# Patient Record
Sex: MALE | Race: WHITE | NOT HISPANIC OR LATINO | Employment: FULL TIME | URBAN - METROPOLITAN AREA
[De-identification: names, ages, dates, MRNs, and addresses within clinical notes are randomized per-mention and may not be internally consistent; named-entity substitution may affect disease eponyms.]

---

## 2019-01-28 LAB — HBA1C MFR BLD HPLC: 5.8 %

## 2019-02-25 ENCOUNTER — CONSULT (OUTPATIENT)
Dept: NEPHROLOGY | Facility: CLINIC | Age: 54
End: 2019-02-25
Payer: COMMERCIAL

## 2019-02-25 VITALS
BODY MASS INDEX: 31.81 KG/M2 | HEIGHT: 73 IN | SYSTOLIC BLOOD PRESSURE: 130 MMHG | WEIGHT: 240 LBS | DIASTOLIC BLOOD PRESSURE: 92 MMHG

## 2019-02-25 DIAGNOSIS — R80.1 PERSISTENT PROTEINURIA: ICD-10-CM

## 2019-02-25 DIAGNOSIS — N18.2 BENIGN HYPERTENSION WITH CKD (CHRONIC KIDNEY DISEASE), STAGE II: ICD-10-CM

## 2019-02-25 DIAGNOSIS — R79.89 ELEVATED SERUM CREATININE: ICD-10-CM

## 2019-02-25 DIAGNOSIS — E79.0 HYPERURICEMIA: ICD-10-CM

## 2019-02-25 DIAGNOSIS — I12.9 BENIGN HYPERTENSION WITH CKD (CHRONIC KIDNEY DISEASE), STAGE II: ICD-10-CM

## 2019-02-25 DIAGNOSIS — N18.2 STAGE 2 CHRONIC KIDNEY DISEASE: Primary | ICD-10-CM

## 2019-02-25 LAB
SL AMB  POCT GLUCOSE, UA: ABNORMAL
SL AMB LEUKOCYTE ESTERASE,UA: ABNORMAL
SL AMB POCT BILIRUBIN,UA: ABNORMAL
SL AMB POCT BLOOD,UA: ABNORMAL
SL AMB POCT KETONES,UA: ABNORMAL
SL AMB POCT NITRITE,UA: ABNORMAL
SL AMB POCT PH,UA: 6
SL AMB POCT SPECIFIC GRAVITY,UA: 1.03
SL AMB POCT URINE PROTEIN: ABNORMAL
SL AMB POCT UROBILINOGEN: 0.2

## 2019-02-25 PROCEDURE — 81002 URINALYSIS NONAUTO W/O SCOPE: CPT | Performed by: INTERNAL MEDICINE

## 2019-02-25 PROCEDURE — 99244 OFF/OP CNSLTJ NEW/EST MOD 40: CPT | Performed by: INTERNAL MEDICINE

## 2019-02-25 RX ORDER — ESZOPICLONE 3 MG/1
3 TABLET, FILM COATED ORAL
COMMUNITY
End: 2020-05-07 | Stop reason: SDUPTHER

## 2019-02-25 RX ORDER — IBUPROFEN 200 MG
200 TABLET ORAL 2 TIMES DAILY
COMMUNITY
End: 2020-05-07

## 2019-02-25 RX ORDER — AMLODIPINE BESYLATE 5 MG/1
5 TABLET ORAL DAILY
Qty: 30 TABLET | Refills: 5 | Status: SHIPPED | OUTPATIENT
Start: 2019-02-25 | End: 2020-05-07 | Stop reason: SDUPTHER

## 2019-02-25 RX ORDER — HYDROCHLOROTHIAZIDE 12.5 MG/1
12.5 TABLET ORAL
COMMUNITY
End: 2020-05-07 | Stop reason: ALTCHOICE

## 2019-02-25 RX ORDER — LISINOPRIL 20 MG/1
20 TABLET ORAL DAILY
COMMUNITY
End: 2020-05-07 | Stop reason: SDUPTHER

## 2019-02-25 RX ORDER — FLUOXETINE 20 MG/1
20 TABLET, FILM COATED ORAL DAILY
COMMUNITY
End: 2020-05-07 | Stop reason: SDUPTHER

## 2019-02-25 RX ORDER — OMEPRAZOLE 20 MG/1
20 CAPSULE, DELAYED RELEASE ORAL DAILY
COMMUNITY
End: 2020-05-07 | Stop reason: ALTCHOICE

## 2019-02-25 RX ORDER — MULTIVITAMIN
1 TABLET ORAL DAILY
COMMUNITY
End: 2021-10-27 | Stop reason: ALTCHOICE

## 2019-02-25 NOTE — PATIENT INSTRUCTIONS
-PLEASE DISCONTINUE IBUPROFEN/ADVIL AND AVOID ALL NSAIDS INCLUDING ADVIL, ALEVE, MOTRIN, IBUPROFEN, NAPROXEN, MELOXICAM ETC  -SALT RESTRICTED DIET  -START AMLODIPINE 5 MG ONE TABLET DAILY AND IF YOU HAVE LEG SWELLING, PLEASE CALL BACK  -CONTINUE TO CHECK BLOOD PRESSURE AT HOME REGULARLY AND CALL BACK IF BLOOD PRESSURE REMAINS GREATER THAN 140/90  -PLEASE BRING BLOOD PRESSURE MACHINE DURING NEXT OFFICE VISIT  -DISCONTINUE HYDROCHLOROTHIAZIDE AFTER DISCUSSING WITH PCP  -CHECK BLOOD AND URINE TEST IN ONE MONTH AND BEFORE NEXT OFFICE VISIT  -RECOMMEND TO CHANGE PRILOSEC/OMEPRAZOLE TO PEPCID OR ZANTAC      Low-Sodium Diet   WHAT YOU NEED TO KNOW:   A low-sodium diet limits foods that are high in sodium (salt)  You will need to follow a low-sodium diet if you have high blood pressure, kidney disease, or heart failure  You may also need to follow this diet if you have a condition that is causing your body to retain (hold) extra fluid  You may need to limit the amount of sodium you eat to 1,500 mg  Ask your healthcare provider how much sodium you can have each day  DISCHARGE INSTRUCTIONS:   How to use food labels to choose foods that are low in sodium:  Read food labels to find the amount of sodium they contain  The amount of sodium is listed in milligrams (mg)  The % Daily Value (DV) column tells you how much of your daily needs are met by 1 serving of the food for each nutrient listed  Choose foods that have less than 5% of the DV of sodium  These foods are considered low in sodium  Foods that have 20% or more of the DV of sodium are considered high in sodium  Some food labels may also list any of the following terms that tell you about the sodium content in the food:  · Sodium-free:  Less than 5 mg in each serving    · Very low sodium:  35 mg of sodium or less in each serving    · Low sodium:  140 mg of sodium or less in each serving    · Reduced sodium:   At least 25% less sodium in each serving than the regular type    · Light in sodium:  50% less sodium in each serving    · Unsalted or no added salt:  No extra salt is added during processing (the food may still contain sodium)  Foods to avoid:  Salty foods are high in sodium  You should avoid the following:  · Processed foods:      ¨ Mixes for cornbread, biscuits, cake, and pudding     ¨ Instant foods, such as potatoes, cereals, noodles, and rice     ¨ Packaged foods, such as bread stuffing, rice and pasta mixes, snack dip mixes, and macaroni and cheese     ¨ Canned foods, such as canned vegetables, soups, broths, sauces, and vegetable or tomato juice    ¨ Snack foods, such as salted chips, popcorn, pretzels, pork rinds, salted crackers, and salted nuts    ¨ Frozen foods, such as dinners, entrees, vegetables with sauces, and breaded meats    ¨ Sauerkraut, pickled vegetables, and other foods prepared in brine    · Meats and cheeses:      ¨ Smoked or cured meat, such as corned beef, bell, ham, hot dogs, and sausage    ¨ Canned meats or spreads, such as potted meats, sardines, anchovies, and imitation seafood    ¨ Deli or lunch meats, such as bologna, ham, turkey, and roast beef    ¨ Processed cheese, such as American cheese and cheese spreads    · Condiments, sauces, and seasonings:      ¨ Salt (¼ teaspoon of salt contains 575 mg of sodium)    ¨ Seasonings made with salt, such as garlic salt, celery salt, onion salt, and seasoned salt    ¨ Regular soy sauce, barbecue sauce, teriyaki sauce, steak sauce, Worcestershire sauce, and most flavored vinegars    ¨ Canned gravy and mixes     ¨ Regular condiments, such as mustard, ketchup, and salad dressings    ¨ Pickles and olives    ¨ Meat tenderizers and monosodium glutamate (MSG)  Foods to include:  Read the food label to find the amount of sodium in each serving  · Bread and cereal:  Try to choose breads with less than 80 mg of sodium per serving  ¨ Bread, roll, cayetano, tortilla, or unsalted crackers      ¨ Ready-to-eat cereals with less than 5% DV of sodium (examples include shredded wheat and puffed rice)    ¨ Pasta    · Vegetables and fruits:      ¨ Unsalted fresh, frozen, or canned vegetables    ¨ Fresh, frozen, or canned fruits    ¨ Fruit juice    · Dairy:  One serving has about 150 mg of sodium  ¨ Milk, all types    ¨ Yogurt    ¨ Hard cheese, such as cheddar, Swiss, [employer], or mozzarella    · Meat and other protein foods:  Some raw meats may have added sodium  ¨ Plain meats, fish, and poultry     ¨ Egg    · Other foods:      ¨ Homemade pudding    ¨ Unsalted nuts, popcorn, or pretzels    ¨ Unsalted butter or margarine  Ways to decrease sodium:   · Add spices and herbs to foods instead of salt during cooking  Use salt-free seasonings to add flavor to foods  Examples include onion powder, garlic powder, basil, van powder, paprika, and parsley  Try lemon or lime juice or vinegar to give foods a tart flavor  Use hot peppers, pepper, or cayenne pepper to add a spicy flavor to foods  · Do not keep a salt shaker at your kitchen table  This may help keep you from adding salt to food at the table  It may take time to get used to enjoying the natural flavor of food instead of adding salt  Talk to your healthcare provider before you use salt substitutes  Some salt substitutes have a high amount of potassium and need to be avoided if you have kidney disease  · Choose low-sodium foods at restaurants  Meals from restaurants are often high in sodium  Some restaurants have nutrition information on the menu that tells you the amount of sodium in their foods  If possible, ask for your food to be prepared with less, or no salt  · Shop for unsalted or low-sodium foods and snacks at the grocery store  Examples include unsalted or low-sodium broths, soups, and canned vegetables  Choose fresh or frozen vegetables instead  Choose unsalted nuts or seeds or fresh fruits or vegetables as snacks   Read food labels and choose salt-free, very low-sodium, or low-sodium foods  © 2017 [employer]  Information is for End User's use only and may not be sold, redistributed or otherwise used for commercial purposes  All illustrations and images included in CareNotes® are the copyrighted property of [employer] M [employer]  or [employer]  The above information is an  only  It is not intended as medical advice for individual conditions or treatments  Talk to your doctor, nurse or pharmacist before following any medical regimen to see if it is safe and effective for you  Low Purine Diet (FOR GOUT)  WHAT YOU NEED TO KNOW:   A low-purine diet is a meal plan based on foods that are low in purine content  Purine is a substance that is found in foods and is produced naturally by the body  Purines are broken down by the body and changed to uric acid  The kidneys normally filter the uric acid, and it leaves the body through the urine  However, people with gout sometimes have a buildup of uric acid in the blood  This buildup of uric acid can cause swelling and pain (a gout attack)  A low-purine diet may help to treat and prevent gout attacks  DISCHARGE INSTRUCTIONS:   Foods to include: The following foods are low in purine  · Eggs, nuts, and peanut butter    · Low-fat and fat-free cheese and ice cream    · Skim or 1% milk    · Soup made without meat extract or broth    · Vegetables that are not on the medium-purine list below    · All fruit and fruit juice    · Bread, pasta, rice, cakes, cornbread, and popcorn    · Water, soda, tea, coffee, and cocoa    · Sugar, sweets, and gelatin    · Fat and oil  Foods to limit:   · Medium-purine foods:     ¨ Meats:  Limit the following to 4 to 6 ounces each day  ¨ Meat and poultry     ¨ Crab, lobster, oysters, and shrimp    ¨ Vegetables:  Limit the following vegetables to ½ cup each day      ¨ Asparagus    ¨ Cauliflower    ¨ Spinach    ¨ Mushrooms    ¨ Green peas    ¨ Beans, peas, and lentils (limit to 1 cup each day)    ¨ Oats and oatmeal (limit to ? cup uncooked each day)    ¨ Wheat germ and bran (limit to ¼ cup each day)    · High-purine foods:  Limit or avoid foods high in purine  ¨ Anchovies, sardines, scallops, and mussels    ¨ Tuna, codfish, herring, and [employer], like goose and duck    [employer], such as brains, heart, kidney, liver, and sweetbreads    ¨ Gravies and sauces made with meat    ¨ Yeast extracts taken in the form of a supplement  Other guidelines to follow:   · Increase liquid intake  Drink 8 to 16 (eight-ounce) cups of liquid each day  At least half of the liquid you drink should be water  Liquid can help your body get rid of extra uric acid  · Limit or avoid alcohol  Alcohol (especially beer) increases your risk of a gout attack  Beer contains a high amount of purine  · Maintain a healthy weight  If you are overweight, you should lose weight slowly  Weight loss can help decrease the amount of stress on your joints  Regular exercise can help you lose weight if you are overweight, or maintain your weight if you are at a normal weight  Talk to your healthcare provider before you begin an exercise program   © 2017 [employer] Information is for End User's use only and may not be sold, redistributed or otherwise used for commercial purposes  All illustrations and images included in CareNotes® are the copyrighted property of [employer] M [employer]  or [employer]  The above information is an  only  It is not intended as medical advice for individual conditions or treatments  Talk to your doctor, nurse or pharmacist before following any medical regimen to see if it is safe and effective for you

## 2019-02-25 NOTE — LETTER
February 25, 2019     Hoda Avery, [address]    Patient: Isma Mac   YOB: 1965   Date of Visit: 2/25/2019       Dear Dr Hiram Al: Thank you for referring Carlyle Aschoff to me for evaluation  Below are my notes for this consultation  If you have questions, please do not hesitate to call me  I look forward to following your patient along with you  Sincerely,        Ary Sevilla MD        CC: No Recipients  Ary Sevilla MD  2/25/2019 10:18 AM  Sign at close encounter  [address] Faubert 52 y o  male MRN: [id number]  Date: 2/25/2019  Reason for consultation:   Chief Complaint   Patient presents with    Consult    Chronic Kidney Disease    Hypertension     ASSESSMENT AND PLAN:  Suspected CKD stage 2 with baseline creatinine 1 1 going back to 2015  -last creatinine 1 2 in January 2019, prior to that only creatinine value available was 1 1 from 2016  -this may suggest some progression of CKD versus slightly elevated creatinine in the setting of taking significant NSAIDs/recently started HCTZ/use of lisinopril  -urinalysis in the office today shows trace proteinuria, concentrated sample  No obvious hematuria  No obvious signs of infection  -patient has strong history of taking NSAIDs for many years, he used to take Advil 8 to 9 tablets daily for long time and recently has reduce to two tablets daily for last couple months   -I have strongly recommended to discontinue NSAIDs and avoid any NSAIDs in the future   -avoid any other nephrotoxins  -also recommended to discontinue HCTZ as below  -will do repeat urinalysis, urine microalbumin/creatinine ratio, BMP in one month  -if creatinine continued to worsen, may need to consider renal ultrasound  -also recommended to change omeprazole to famotidine/Ranitidine if tolerated      Hypertension  -blood pressure slightly above goal in the office today   Currently remains on lisinopril 20 mg p o  Daily  Recommend to discontinue HCTZ in the setting of significant gout issues/hyperuricemia and he wants to discuss this with PCP  -start amlodipine 5 mg p o  Daily  -patient has BP machine although does not check blood pressure on a regular basis  Advised him to check BP regularly and call back if blood pressure remains greater than 140/90   -patient also has significant salt intake in his diet and has soup daily  I have strongly recommended him to follow low salt diet  Education material provided  -significant pain issues, use of NSAIDs, high salt intake all can contribute to elevated BP   -discontinue NSAIDs as above    Patient reported hyperuricemia/gout  -I do not have uric acid level results  Patient says that he has chronically elevated uric acid level when checked by PCP  -he was unable to tolerate allopurinol due to rash/allergy issues  -will repeat serum uric acid level  Patient was recently started on HCTZ which can occasionally cause hyperuricemia  I have recommended to discontinue HCTZ  -may consider rheumatology evaluation if has persistent high uric acid level for further treatment recommendations  -recommend to have a low purine diet  Chronic back pain  -patient has significant NSAID use as mentioned above  Advised to discontinue NSAIDs and if needed, may consider Tramadol  -may need to consider pain management evaluation, will defer to PCP    Trace proteinuria on urinalysis in the office today  -no obvious hematuria  Will check repeat urinalysis and urine microalbumin/creatinine ratio prior to next visit  No significant WBCs noted on microscopic examination in the office        HISTORY OF PRESENT ILLNESS:  Pratima Hankins is a 52y o  year old male with medical issues of hypertension for 15 years, chronic back pain, stomach ulcer in the past, gout/hyperuricemia, no obvious history of diabetes, who presents for initial consultation for worsening renal failure  Old medical records were reviewed  Patient's baseline creatinine seems to be 1 1 going back to 2016  Last creatinine 1 2 in January 2019  I do not have any creatinine values since 2016 up until January 2019  Patient does have long-term hypertension history  He also has chronic long-term significant NSAID use for many years which includes Advil 8 to 9 tablets daily although he has reduce to two tablets daily for last two months  He was recently started on HCTZ couple months ago due to elevated blood pressure issues  He does have blood pressure machine at home although does not check blood pressure regular basis  He does have hidden salt intake and has soup daily  He remains on lisinopril, and HCTZ was recently added  He has chronic back pain issues requiring significant NSAID use  He was evaluated by Orthopedic in the past and was recommended to have surgery as per patient although patient was not interested  Patient denies any urinary complaint including no dysuria, no hematuria, no urgency or hesitancy  Denies any lightheadedness or dizziness  Denies any leg swelling  Denies shortness of breath, nausea, vomiting or diarrhea  No obvious history of kidney stones or autoimmune conditions in the past       REVIEW OF SYSTEMS:    More than 10 point review of systems were obtained and discussed in length with the patient  Complete review of systems were negative / unremarkable except mentioned in the HPI section  PHYSICAL EXAM:  Vitals:    02/25/19 0855 02/25/19 0944   BP:  130/92   Weight: 109 kg (240 lb)    Height: 6' 1" (1 854 m)      Body mass index is 31 66 kg/m²  Physical Exam   Constitutional: He is oriented to person, place, and time  He appears well-developed and well-nourished  HENT:   Head: Normocephalic and atraumatic     Right Ear: External ear normal    Left Ear: External ear normal    Nose: Nose normal    Eyes: Pupils are equal, round, and reactive to light  Conjunctivae and EOM are normal  No scleral icterus  Neck: Neck supple  No JVD present  Cardiovascular: Normal rate and normal heart sounds  Pulmonary/Chest: Effort normal and breath sounds normal  No respiratory distress  He has no wheezes  He has no rales  Abdominal: Soft  Bowel sounds are normal  He exhibits no distension  There is no tenderness  Musculoskeletal: He exhibits no edema or tenderness  Neurological: He is alert and oriented to person, place, and time  Skin: Skin is warm and dry  Psychiatric: He has a normal mood and affect  His behavior is normal    Vitals reviewed  PAST MEDICAL HISTORY:  No past medical history on file  PAST SURGICAL HISTORY:  No past surgical history on file  ALLERGIES:  Allergies   Allergen Reactions    Allopurinol Rash       SOCIAL HISTORY:  Social History     Substance and Sexual Activity   Alcohol Use Yes    Frequency: 2-3 times a week    Drinks per session: 1 or 2    Binge frequency: Never     Social History     Substance and Sexual Activity   Drug Use Not on file     Social History     Tobacco Use   Smoking Status Former Smoker   Smokeless Tobacco Never Used       FAMILY HISTORY:  No family history on file      MEDICATIONS:    Current Outpatient Medications:     eszopiclone (LUNESTA) 3 MG tablet, Take 3 mg by mouth daily at bedtime Take immediately before bedtime, Disp: , Rfl:     FLUoxetine (PROzac) 20 MG tablet, Take 20 mg by mouth daily, Disp: , Rfl:     hydrochlorothiazide (HYDRODIURIL) 12 5 mg tablet, Take 12 5 mg by mouth, Disp: , Rfl:     ibuprofen (MOTRIN) 200 mg tablet, Take 200 mg by mouth 2 (two) times a day, Disp: , Rfl:     lisinopril (ZESTRIL) 20 mg tablet, Take 20 mg by mouth daily, Disp: , Rfl:     Multiple Vitamin (MULTIVITAMIN) tablet, Take 1 tablet by mouth daily, Disp: , Rfl:     omeprazole (PriLOSEC) 20 mg delayed release capsule, Take 20 mg by mouth daily, Disp: , Rfl:     amLODIPine (NORVASC) 5 mg tablet, Take 1 tablet (5 mg total) by mouth daily, Disp: 30 tablet, Rfl: 5    Lab Results:   Results for orders placed or performed in visit on 02/25/19   POCT urine dip   Result Value Ref Range    LEUKOCYTE ESTERASE,UA neg     NITRITE,UA neg     SL AMB POCT UROBILINOGEN 0 2     POCT URINE PROTEIN trace      PH,UA 6 0     BLOOD,UA nonhemolyzed trace     SPECIFIC GRAVITY,UA 1 030     KETONES,UA neg     BILIRUBIN,UA neg     GLUCOSE, UA neg     Creatinine 1 2 in January 2019  Portions of the record may have been created with voice recognition software  Occasional wrong word or "sound a like" substitutions may have occurred due to the inherent limitations of voice recognition software  Read the chart carefully and recognize, using context, where substitutions have occurred

## 2019-02-25 NOTE — PROGRESS NOTES
NEPHROLOGY OUTPATIENT CONSULTATION   Cherelle Arriola 52 y o  male MRN: [id number]  Date: 2/25/2019  Reason for consultation:   Chief Complaint   Patient presents with    Consult    Chronic Kidney Disease    Hypertension     ASSESSMENT AND PLAN:  Suspected CKD stage 2 with baseline creatinine 1 1 going back to 2015  -last creatinine 1 2 in January 2019, prior to that only creatinine value available was 1 1 from 2016  -this may suggest some progression of CKD versus slightly elevated creatinine in the setting of taking significant NSAIDs/recently started HCTZ/use of lisinopril  -urinalysis in the office today shows trace proteinuria, concentrated sample  No obvious hematuria  No obvious signs of infection  -patient has strong history of taking NSAIDs for many years, he used to take Advil 8 to 9 tablets daily for long time and recently has reduce to two tablets daily for last couple months   -I have strongly recommended to discontinue NSAIDs and avoid any NSAIDs in the future   -avoid any other nephrotoxins  -also recommended to discontinue HCTZ as below  -will do repeat urinalysis, urine microalbumin/creatinine ratio, BMP in one month  -if creatinine continued to worsen, may need to consider renal ultrasound  -also recommended to change omeprazole to famotidine/Ranitidine if tolerated  Hypertension  -blood pressure slightly above goal in the office today  Currently remains on lisinopril 20 mg p o  Daily  Recommend to discontinue HCTZ in the setting of significant gout issues/hyperuricemia and he wants to discuss this with PCP  -start amlodipine 5 mg p o  Daily  -patient has BP machine although does not check blood pressure on a regular basis  Advised him to check BP regularly and call back if blood pressure remains greater than 140/90   -patient also has significant salt intake in his diet and has soup daily  I have strongly recommended him to follow low salt diet    Education material provided  -significant pain issues, use of NSAIDs, high salt intake all can contribute to elevated BP   -discontinue NSAIDs as above    Patient reported hyperuricemia/gout  -I do not have uric acid level results  Patient says that he has chronically elevated uric acid level when checked by PCP  -he was unable to tolerate allopurinol due to rash/allergy issues  -will repeat serum uric acid level  Patient was recently started on HCTZ which can occasionally cause hyperuricemia  I have recommended to discontinue HCTZ  -may consider rheumatology evaluation if has persistent high uric acid level for further treatment recommendations  -recommend to have a low purine diet  Chronic back pain  -patient has significant NSAID use as mentioned above  Advised to discontinue NSAIDs and if needed, may consider Tramadol  -may need to consider pain management evaluation, will defer to PCP    Trace proteinuria on urinalysis in the office today  -no obvious hematuria  Will check repeat urinalysis and urine microalbumin/creatinine ratio prior to next visit  No significant WBCs noted on microscopic examination in the office  HISTORY OF PRESENT ILLNESS:  Theo Aguilar is a 52y o  year old male with medical issues of hypertension for 15 years, chronic back pain, stomach ulcer in the past, gout/hyperuricemia, no obvious history of diabetes, who presents for initial consultation for worsening renal failure  Old medical records were reviewed  Patient's baseline creatinine seems to be 1 1 going back to 2016  Last creatinine 1 2 in January 2019  I do not have any creatinine values since 2016 up until January 2019  Patient does have long-term hypertension history  He also has chronic long-term significant NSAID use for many years which includes Advil 8 to 9 tablets daily although he has reduce to two tablets daily for last two months    He was recently started on HCTZ couple months ago due to elevated blood pressure issues  He does have blood pressure machine at home although does not check blood pressure regular basis  He does have hidden salt intake and has soup daily  He remains on lisinopril, and HCTZ was recently added  He has chronic back pain issues requiring significant NSAID use  He was evaluated by Orthopedic in the past and was recommended to have surgery as per patient although patient was not interested  Patient denies any urinary complaint including no dysuria, no hematuria, no urgency or hesitancy  Denies any lightheadedness or dizziness  Denies any leg swelling  Denies shortness of breath, nausea, vomiting or diarrhea  No obvious history of kidney stones or autoimmune conditions in the past       REVIEW OF SYSTEMS:    More than 10 point review of systems were obtained and discussed in length with the patient  Complete review of systems were negative / unremarkable except mentioned in the HPI section  PHYSICAL EXAM:  Vitals:    02/25/19 0855 02/25/19 0944   BP:  130/92   Weight: 109 kg (240 lb)    Height: 6' 1" (1 854 m)      Body mass index is 31 66 kg/m²  Physical Exam   Constitutional: He is oriented to person, place, and time  He appears well-developed and well-nourished  HENT:   Head: Normocephalic and atraumatic  Right Ear: External ear normal    Left Ear: External ear normal    Nose: Nose normal    Eyes: Pupils are equal, round, and reactive to light  Conjunctivae and EOM are normal  No scleral icterus  Neck: Neck supple  No JVD present  Cardiovascular: Normal rate and normal heart sounds  Pulmonary/Chest: Effort normal and breath sounds normal  No respiratory distress  He has no wheezes  He has no rales  Abdominal: Soft  Bowel sounds are normal  He exhibits no distension  There is no tenderness  Musculoskeletal: He exhibits no edema or tenderness  Neurological: He is alert and oriented to person, place, and time  Skin: Skin is warm and dry     Psychiatric: He has a normal mood and affect  His behavior is normal    Vitals reviewed  PAST MEDICAL HISTORY:  No past medical history on file  PAST SURGICAL HISTORY:  No past surgical history on file  ALLERGIES:  Allergies   Allergen Reactions    Allopurinol Rash       SOCIAL HISTORY:  Social History     Substance and Sexual Activity   Alcohol Use Yes    Frequency: 2-3 times a week    Drinks per session: 1 or 2    Binge frequency: Never     Social History     Substance and Sexual Activity   Drug Use Not on file     Social History     Tobacco Use   Smoking Status Former Smoker   Smokeless Tobacco Never Used       FAMILY HISTORY:  No family history on file  MEDICATIONS:    Current Outpatient Medications:     eszopiclone (LUNESTA) 3 MG tablet, Take 3 mg by mouth daily at bedtime Take immediately before bedtime, Disp: , Rfl:     FLUoxetine (PROzac) 20 MG tablet, Take 20 mg by mouth daily, Disp: , Rfl:     hydrochlorothiazide (HYDRODIURIL) 12 5 mg tablet, Take 12 5 mg by mouth, Disp: , Rfl:     ibuprofen (MOTRIN) 200 mg tablet, Take 200 mg by mouth 2 (two) times a day, Disp: , Rfl:     lisinopril (ZESTRIL) 20 mg tablet, Take 20 mg by mouth daily, Disp: , Rfl:     Multiple Vitamin (MULTIVITAMIN) tablet, Take 1 tablet by mouth daily, Disp: , Rfl:     omeprazole (PriLOSEC) 20 mg delayed release capsule, Take 20 mg by mouth daily, Disp: , Rfl:     amLODIPine (NORVASC) 5 mg tablet, Take 1 tablet (5 mg total) by mouth daily, Disp: 30 tablet, Rfl: 5    Lab Results:   Results for orders placed or performed in visit on 02/25/19   POCT urine dip   Result Value Ref Range    LEUKOCYTE ESTERASE,UA neg     NITRITE,UA neg     SL AMB POCT UROBILINOGEN 0 2     POCT URINE PROTEIN trace      PH,UA 6 0     BLOOD,UA nonhemolyzed trace     SPECIFIC GRAVITY,UA 1 030     KETONES,UA neg     BILIRUBIN,UA neg     GLUCOSE, UA neg     Creatinine 1 2 in January 2019       Portions of the record may have been created with voice recognition software  Occasional wrong word or "sound a like" substitutions may have occurred due to the inherent limitations of voice recognition software  Read the chart carefully and recognize, using context, where substitutions have occurred

## 2019-06-13 ENCOUNTER — HOSPITAL ENCOUNTER (EMERGENCY)
Facility: HOSPITAL | Age: 54
Discharge: HOME | End: 2019-06-13
Attending: EMERGENCY MEDICINE | Admitting: EMERGENCY MEDICINE
Payer: COMMERCIAL

## 2019-06-13 ENCOUNTER — APPOINTMENT (EMERGENCY)
Dept: CT IMAGING | Facility: HOSPITAL | Age: 54
End: 2019-06-13
Payer: COMMERCIAL

## 2019-06-13 VITALS
RESPIRATION RATE: 18 BRPM | SYSTOLIC BLOOD PRESSURE: 128 MMHG | HEART RATE: 61 BPM | DIASTOLIC BLOOD PRESSURE: 75 MMHG | OXYGEN SATURATION: 97 % | TEMPERATURE: 97.8 F

## 2019-06-13 DIAGNOSIS — K42.9 UMBILICAL HERNIA: ICD-10-CM

## 2019-06-13 DIAGNOSIS — K76.9 HEPATIC LESION: ICD-10-CM

## 2019-06-13 DIAGNOSIS — N40.0 ENLARGED PROSTATE: ICD-10-CM

## 2019-06-13 DIAGNOSIS — K57.92 DIVERTICULITIS: Primary | ICD-10-CM

## 2019-06-13 LAB
ALBUMIN SERPL BCP-MCNC: 3.8 G/DL (ref 3.5–5)
ALP SERPL-CCNC: 72 U/L (ref 46–116)
ALT SERPL W P-5'-P-CCNC: 44 U/L (ref 12–78)
ANION GAP SERPL CALCULATED.3IONS-SCNC: 9 MMOL/L (ref 4–13)
AST SERPL W P-5'-P-CCNC: 20 U/L (ref 5–45)
BASOPHILS # BLD AUTO: 0.06 THOUSANDS/ΜL (ref 0–0.1)
BASOPHILS NFR BLD AUTO: 1 % (ref 0–1)
BILIRUB SERPL-MCNC: 0.5 MG/DL (ref 0.2–1)
BILIRUB UR QL STRIP: NEGATIVE
BUN SERPL-MCNC: 19 MG/DL (ref 5–25)
CALCIUM SERPL-MCNC: 9.4 MG/DL (ref 8.3–10.1)
CHLORIDE SERPL-SCNC: 102 MMOL/L (ref 100–108)
CLARITY UR: CLEAR
CO2 SERPL-SCNC: 25 MMOL/L (ref 21–32)
COLOR UR: YELLOW
CREAT SERPL-MCNC: 1.19 MG/DL (ref 0.6–1.3)
EOSINOPHIL # BLD AUTO: 0.09 THOUSAND/ΜL (ref 0–0.61)
EOSINOPHIL NFR BLD AUTO: 1 % (ref 0–6)
ERYTHROCYTE [DISTWIDTH] IN BLOOD BY AUTOMATED COUNT: 13 % (ref 11.6–15.1)
GFR SERPL CREATININE-BSD FRML MDRD: 68 ML/MIN/1.73SQ M
GLUCOSE SERPL-MCNC: 132 MG/DL (ref 65–140)
GLUCOSE UR STRIP-MCNC: NEGATIVE MG/DL
HCT VFR BLD AUTO: 41.7 % (ref 36.5–49.3)
HGB BLD-MCNC: 14 G/DL (ref 12–17)
HGB UR QL STRIP.AUTO: NEGATIVE
IMM GRANULOCYTES # BLD AUTO: 0.06 THOUSAND/UL (ref 0–0.2)
IMM GRANULOCYTES NFR BLD AUTO: 1 % (ref 0–2)
KETONES UR STRIP-MCNC: NEGATIVE MG/DL
LEUKOCYTE ESTERASE UR QL STRIP: NEGATIVE
LIPASE SERPL-CCNC: 155 U/L (ref 73–393)
LYMPHOCYTES # BLD AUTO: 1.33 THOUSANDS/ΜL (ref 0.6–4.47)
LYMPHOCYTES NFR BLD AUTO: 13 % (ref 14–44)
MCH RBC QN AUTO: 29.9 PG (ref 26.8–34.3)
MCHC RBC AUTO-ENTMCNC: 33.6 G/DL (ref 31.4–37.4)
MCV RBC AUTO: 89 FL (ref 82–98)
MONOCYTES # BLD AUTO: 0.7 THOUSAND/ΜL (ref 0.17–1.22)
MONOCYTES NFR BLD AUTO: 7 % (ref 4–12)
NEUTROPHILS # BLD AUTO: 8.05 THOUSANDS/ΜL (ref 1.85–7.62)
NEUTS SEG NFR BLD AUTO: 77 % (ref 43–75)
NITRITE UR QL STRIP: NEGATIVE
NRBC BLD AUTO-RTO: 0 /100 WBCS
PH UR STRIP.AUTO: 5.5 [PH] (ref 4.5–8)
PLATELET # BLD AUTO: 369 THOUSANDS/UL (ref 149–390)
PMV BLD AUTO: 9 FL (ref 8.9–12.7)
POTASSIUM SERPL-SCNC: 3.5 MMOL/L (ref 3.5–5.3)
PROT SERPL-MCNC: 7.7 G/DL (ref 6.4–8.2)
PROT UR STRIP-MCNC: NEGATIVE MG/DL
RBC # BLD AUTO: 4.68 MILLION/UL (ref 3.88–5.62)
SODIUM SERPL-SCNC: 136 MMOL/L (ref 136–145)
SP GR UR STRIP.AUTO: <=1.005 (ref 1–1.03)
UROBILINOGEN UR QL STRIP.AUTO: 0.2 E.U./DL
WBC # BLD AUTO: 10.29 THOUSAND/UL (ref 4.31–10.16)

## 2019-06-13 PROCEDURE — 74177 CT ABD & PELVIS W/CONTRAST: CPT

## 2019-06-13 PROCEDURE — 85025 COMPLETE CBC W/AUTO DIFF WBC: CPT | Performed by: EMERGENCY MEDICINE

## 2019-06-13 PROCEDURE — 81003 URINALYSIS AUTO W/O SCOPE: CPT

## 2019-06-13 PROCEDURE — 96374 THER/PROPH/DIAG INJ IV PUSH: CPT

## 2019-06-13 PROCEDURE — 99284 EMERGENCY DEPT VISIT MOD MDM: CPT | Performed by: EMERGENCY MEDICINE

## 2019-06-13 PROCEDURE — 99284 EMERGENCY DEPT VISIT MOD MDM: CPT

## 2019-06-13 PROCEDURE — 83690 ASSAY OF LIPASE: CPT | Performed by: EMERGENCY MEDICINE

## 2019-06-13 PROCEDURE — 36415 COLL VENOUS BLD VENIPUNCTURE: CPT | Performed by: EMERGENCY MEDICINE

## 2019-06-13 PROCEDURE — 80053 COMPREHEN METABOLIC PANEL: CPT | Performed by: EMERGENCY MEDICINE

## 2019-06-13 RX ORDER — ONDANSETRON 4 MG/1
4 TABLET, ORALLY DISINTEGRATING ORAL EVERY 8 HOURS PRN
Qty: 12 TABLET | Refills: 0 | Status: SHIPPED | OUTPATIENT
Start: 2019-06-13 | End: 2020-05-07 | Stop reason: ALTCHOICE

## 2019-06-13 RX ORDER — CIPROFLOXACIN 500 MG/1
500 TABLET, FILM COATED ORAL ONCE
Status: COMPLETED | OUTPATIENT
Start: 2019-06-13 | End: 2019-06-13

## 2019-06-13 RX ORDER — METRONIDAZOLE 500 MG/1
500 TABLET ORAL EVERY 8 HOURS SCHEDULED
Qty: 30 TABLET | Refills: 0 | Status: SHIPPED | OUTPATIENT
Start: 2019-06-13 | End: 2019-06-23

## 2019-06-13 RX ORDER — KETOROLAC TROMETHAMINE 30 MG/ML
15 INJECTION, SOLUTION INTRAMUSCULAR; INTRAVENOUS ONCE
Status: COMPLETED | OUTPATIENT
Start: 2019-06-13 | End: 2019-06-13

## 2019-06-13 RX ORDER — METRONIDAZOLE 500 MG/1
500 TABLET ORAL ONCE
Status: COMPLETED | OUTPATIENT
Start: 2019-06-13 | End: 2019-06-13

## 2019-06-13 RX ORDER — CIPROFLOXACIN 500 MG/1
500 TABLET, FILM COATED ORAL 2 TIMES DAILY
Qty: 20 TABLET | Refills: 0 | Status: SHIPPED | OUTPATIENT
Start: 2019-06-13 | End: 2019-06-23

## 2019-06-13 RX ORDER — IBUPROFEN 600 MG/1
600 TABLET ORAL EVERY 6 HOURS PRN
Qty: 28 TABLET | Refills: 0 | Status: SHIPPED | OUTPATIENT
Start: 2019-06-13 | End: 2020-05-07 | Stop reason: ALTCHOICE

## 2019-06-13 RX ADMIN — METRONIDAZOLE 500 MG: 500 TABLET, FILM COATED ORAL at 14:23

## 2019-06-13 RX ADMIN — IOHEXOL 100 ML: 350 INJECTION, SOLUTION INTRAVENOUS at 13:19

## 2019-06-13 RX ADMIN — KETOROLAC TROMETHAMINE 15 MG: 30 INJECTION, SOLUTION INTRAMUSCULAR at 13:11

## 2019-06-13 RX ADMIN — CIPROFLOXACIN HYDROCHLORIDE 500 MG: 500 TABLET, FILM COATED ORAL at 14:23

## 2020-05-07 ENCOUNTER — TELEMEDICINE (OUTPATIENT)
Dept: FAMILY MEDICINE CLINIC | Facility: CLINIC | Age: 55
End: 2020-05-07
Payer: COMMERCIAL

## 2020-05-07 VITALS — HEIGHT: 73 IN | BODY MASS INDEX: 29.16 KG/M2 | WEIGHT: 220 LBS

## 2020-05-07 DIAGNOSIS — K21.9 GASTROESOPHAGEAL REFLUX DISEASE, ESOPHAGITIS PRESENCE NOT SPECIFIED: ICD-10-CM

## 2020-05-07 DIAGNOSIS — F51.01 PRIMARY INSOMNIA: Primary | ICD-10-CM

## 2020-05-07 DIAGNOSIS — I12.9 BENIGN HYPERTENSION WITH CKD (CHRONIC KIDNEY DISEASE), STAGE II: ICD-10-CM

## 2020-05-07 DIAGNOSIS — Z12.5 SCREENING FOR PROSTATE CANCER: ICD-10-CM

## 2020-05-07 DIAGNOSIS — K57.92 DIVERTICULITIS: ICD-10-CM

## 2020-05-07 DIAGNOSIS — E78.2 MIXED HYPERLIPIDEMIA: ICD-10-CM

## 2020-05-07 DIAGNOSIS — N40.1 BPH ASSOCIATED WITH NOCTURIA: ICD-10-CM

## 2020-05-07 DIAGNOSIS — Z11.4 SCREENING FOR HIV (HUMAN IMMUNODEFICIENCY VIRUS): ICD-10-CM

## 2020-05-07 DIAGNOSIS — E55.9 VITAMIN D DEFICIENCY: ICD-10-CM

## 2020-05-07 DIAGNOSIS — Z11.59 NEED FOR HEPATITIS C SCREENING TEST: ICD-10-CM

## 2020-05-07 DIAGNOSIS — R35.1 BPH ASSOCIATED WITH NOCTURIA: ICD-10-CM

## 2020-05-07 DIAGNOSIS — N18.2 BENIGN HYPERTENSION WITH CKD (CHRONIC KIDNEY DISEASE), STAGE II: ICD-10-CM

## 2020-05-07 DIAGNOSIS — F32.A DEPRESSION, UNSPECIFIED DEPRESSION TYPE: ICD-10-CM

## 2020-05-07 DIAGNOSIS — Z13.1 SCREENING FOR DIABETES MELLITUS: ICD-10-CM

## 2020-05-07 DIAGNOSIS — I10 ESSENTIAL HYPERTENSION: ICD-10-CM

## 2020-05-07 DIAGNOSIS — Z13.6 SCREENING FOR CARDIOVASCULAR CONDITION: ICD-10-CM

## 2020-05-07 PROBLEM — K57.90 DIVERTICULOSIS: Status: ACTIVE | Noted: 2020-05-07

## 2020-05-07 PROBLEM — G47.30 SLEEP APNEA: Status: ACTIVE | Noted: 2020-05-07

## 2020-05-07 PROBLEM — E78.5 HYPERLIPIDEMIA: Status: ACTIVE | Noted: 2020-05-07

## 2020-05-07 PROCEDURE — 99203 OFFICE O/P NEW LOW 30 MIN: CPT | Performed by: NURSE PRACTITIONER

## 2020-05-07 RX ORDER — LISINOPRIL 20 MG/1
20 TABLET ORAL DAILY
Qty: 90 TABLET | Refills: 3 | Status: SHIPPED | OUTPATIENT
Start: 2020-05-07 | End: 2020-09-24

## 2020-05-07 RX ORDER — ESZOPICLONE 3 MG/1
3 TABLET, FILM COATED ORAL
Qty: 90 TABLET | Refills: 1 | Status: SHIPPED | OUTPATIENT
Start: 2020-05-07 | End: 2020-11-06 | Stop reason: SDUPTHER

## 2020-05-07 RX ORDER — AMLODIPINE BESYLATE 5 MG/1
5 TABLET ORAL DAILY
Qty: 90 TABLET | Refills: 3 | Status: SHIPPED | OUTPATIENT
Start: 2020-05-07 | End: 2020-11-06 | Stop reason: SDUPTHER

## 2020-05-07 RX ORDER — FLUOXETINE 20 MG/1
20 TABLET, FILM COATED ORAL DAILY
Qty: 90 TABLET | Refills: 3 | Status: SHIPPED | OUTPATIENT
Start: 2020-05-07 | End: 2020-11-06 | Stop reason: SDUPTHER

## 2020-05-17 ENCOUNTER — APPOINTMENT (OUTPATIENT)
Dept: LAB | Facility: CLINIC | Age: 55
End: 2020-05-17
Payer: COMMERCIAL

## 2020-05-17 ENCOUNTER — TRANSCRIBE ORDERS (OUTPATIENT)
Dept: LAB | Facility: CLINIC | Age: 55
End: 2020-05-17

## 2020-05-17 DIAGNOSIS — Z13.1 SCREENING FOR DIABETES MELLITUS: ICD-10-CM

## 2020-05-17 DIAGNOSIS — E78.2 MIXED HYPERLIPIDEMIA: ICD-10-CM

## 2020-05-17 DIAGNOSIS — Z11.59 NEED FOR HEPATITIS C SCREENING TEST: ICD-10-CM

## 2020-05-17 DIAGNOSIS — R35.1 BPH ASSOCIATED WITH NOCTURIA: ICD-10-CM

## 2020-05-17 DIAGNOSIS — I10 ESSENTIAL HYPERTENSION: ICD-10-CM

## 2020-05-17 DIAGNOSIS — E55.9 VITAMIN D DEFICIENCY: ICD-10-CM

## 2020-05-17 DIAGNOSIS — K21.9 GASTROESOPHAGEAL REFLUX DISEASE, ESOPHAGITIS PRESENCE NOT SPECIFIED: ICD-10-CM

## 2020-05-17 DIAGNOSIS — F32.A DEPRESSION, UNSPECIFIED DEPRESSION TYPE: ICD-10-CM

## 2020-05-17 DIAGNOSIS — N40.1 BPH ASSOCIATED WITH NOCTURIA: ICD-10-CM

## 2020-05-17 DIAGNOSIS — Z13.6 SCREENING FOR CARDIOVASCULAR CONDITION: ICD-10-CM

## 2020-05-17 DIAGNOSIS — Z11.4 SCREENING FOR HIV (HUMAN IMMUNODEFICIENCY VIRUS): ICD-10-CM

## 2020-05-17 LAB
25(OH)D3 SERPL-MCNC: 29.7 NG/ML (ref 30–100)
ALBUMIN SERPL BCP-MCNC: 4.1 G/DL (ref 3.5–5)
ALP SERPL-CCNC: 64 U/L (ref 46–116)
ALT SERPL W P-5'-P-CCNC: 42 U/L (ref 12–78)
ANION GAP SERPL CALCULATED.3IONS-SCNC: 9 MMOL/L (ref 4–13)
AST SERPL W P-5'-P-CCNC: 21 U/L (ref 5–45)
BASOPHILS # BLD AUTO: 0.03 THOUSANDS/ΜL (ref 0–0.1)
BASOPHILS NFR BLD AUTO: 1 % (ref 0–1)
BILIRUB SERPL-MCNC: 0.96 MG/DL (ref 0.2–1)
BUN SERPL-MCNC: 24 MG/DL (ref 5–25)
CALCIUM SERPL-MCNC: 9.2 MG/DL (ref 8.3–10.1)
CHLORIDE SERPL-SCNC: 100 MMOL/L (ref 100–108)
CHOLEST SERPL-MCNC: 196 MG/DL (ref 50–200)
CO2 SERPL-SCNC: 27 MMOL/L (ref 21–32)
CREAT SERPL-MCNC: 1.22 MG/DL (ref 0.6–1.3)
EOSINOPHIL # BLD AUTO: 0 THOUSAND/ΜL (ref 0–0.61)
EOSINOPHIL NFR BLD AUTO: 0 % (ref 0–6)
ERYTHROCYTE [DISTWIDTH] IN BLOOD BY AUTOMATED COUNT: 13.1 % (ref 11.6–15.1)
EST. AVERAGE GLUCOSE BLD GHB EST-MCNC: 114 MG/DL
GFR SERPL CREATININE-BSD FRML MDRD: 66 ML/MIN/1.73SQ M
GLUCOSE P FAST SERPL-MCNC: 114 MG/DL (ref 65–99)
HBA1C MFR BLD: 5.6 %
HCT VFR BLD AUTO: 46.8 % (ref 36.5–49.3)
HCV AB SER QL: NORMAL
HDLC SERPL-MCNC: 64 MG/DL
HGB BLD-MCNC: 15.5 G/DL (ref 12–17)
IMM GRANULOCYTES # BLD AUTO: 0.01 THOUSAND/UL (ref 0–0.2)
IMM GRANULOCYTES NFR BLD AUTO: 0 % (ref 0–2)
LDLC SERPL CALC-MCNC: 119 MG/DL (ref 0–100)
LYMPHOCYTES # BLD AUTO: 1.07 THOUSANDS/ΜL (ref 0.6–4.47)
LYMPHOCYTES NFR BLD AUTO: 20 % (ref 14–44)
MCH RBC QN AUTO: 30.7 PG (ref 26.8–34.3)
MCHC RBC AUTO-ENTMCNC: 33.1 G/DL (ref 31.4–37.4)
MCV RBC AUTO: 93 FL (ref 82–98)
MONOCYTES # BLD AUTO: 0.44 THOUSAND/ΜL (ref 0.17–1.22)
MONOCYTES NFR BLD AUTO: 8 % (ref 4–12)
NEUTROPHILS # BLD AUTO: 3.86 THOUSANDS/ΜL (ref 1.85–7.62)
NEUTS SEG NFR BLD AUTO: 71 % (ref 43–75)
NONHDLC SERPL-MCNC: 132 MG/DL
NRBC BLD AUTO-RTO: 0 /100 WBCS
PLATELET # BLD AUTO: 259 THOUSANDS/UL (ref 149–390)
PMV BLD AUTO: 9.6 FL (ref 8.9–12.7)
POTASSIUM SERPL-SCNC: 4.4 MMOL/L (ref 3.5–5.3)
PROT SERPL-MCNC: 8 G/DL (ref 6.4–8.2)
PSA SERPL-MCNC: 1.1 NG/ML (ref 0–4)
RBC # BLD AUTO: 5.05 MILLION/UL (ref 3.88–5.62)
SODIUM SERPL-SCNC: 136 MMOL/L (ref 136–145)
TRIGL SERPL-MCNC: 63 MG/DL
TSH SERPL DL<=0.05 MIU/L-ACNC: 0.85 UIU/ML (ref 0.36–3.74)
WBC # BLD AUTO: 5.41 THOUSAND/UL (ref 4.31–10.16)

## 2020-05-17 PROCEDURE — 87389 HIV-1 AG W/HIV-1&-2 AB AG IA: CPT

## 2020-05-17 PROCEDURE — 80061 LIPID PANEL: CPT

## 2020-05-17 PROCEDURE — 84443 ASSAY THYROID STIM HORMONE: CPT

## 2020-05-17 PROCEDURE — 36415 COLL VENOUS BLD VENIPUNCTURE: CPT

## 2020-05-17 PROCEDURE — 86803 HEPATITIS C AB TEST: CPT

## 2020-05-17 PROCEDURE — G0103 PSA SCREENING: HCPCS

## 2020-05-17 PROCEDURE — 82306 VITAMIN D 25 HYDROXY: CPT

## 2020-05-17 PROCEDURE — 83036 HEMOGLOBIN GLYCOSYLATED A1C: CPT

## 2020-05-17 PROCEDURE — 80053 COMPREHEN METABOLIC PANEL: CPT

## 2020-05-17 PROCEDURE — 85025 COMPLETE CBC W/AUTO DIFF WBC: CPT

## 2020-05-18 LAB — HIV 1+2 AB+HIV1 P24 AG SERPL QL IA: NORMAL

## 2020-06-03 ENCOUNTER — TELEPHONE (OUTPATIENT)
Dept: FAMILY MEDICINE CLINIC | Facility: CLINIC | Age: 55
End: 2020-06-03

## 2020-06-04 ENCOUNTER — TELEMEDICINE (OUTPATIENT)
Dept: FAMILY MEDICINE CLINIC | Facility: CLINIC | Age: 55
End: 2020-06-04
Payer: COMMERCIAL

## 2020-06-04 ENCOUNTER — APPOINTMENT (OUTPATIENT)
Dept: RADIOLOGY | Facility: CLINIC | Age: 55
End: 2020-06-04
Payer: COMMERCIAL

## 2020-06-04 ENCOUNTER — TELEPHONE (OUTPATIENT)
Dept: ADMINISTRATIVE | Facility: OTHER | Age: 55
End: 2020-06-04

## 2020-06-04 VITALS — BODY MASS INDEX: 29.16 KG/M2 | WEIGHT: 220 LBS | HEIGHT: 73 IN

## 2020-06-04 DIAGNOSIS — E78.00 PURE HYPERCHOLESTEROLEMIA: Primary | ICD-10-CM

## 2020-06-04 DIAGNOSIS — I12.9 BENIGN HYPERTENSION WITH CKD (CHRONIC KIDNEY DISEASE), STAGE II: ICD-10-CM

## 2020-06-04 DIAGNOSIS — M25.522 ELBOW PAIN, LEFT: ICD-10-CM

## 2020-06-04 DIAGNOSIS — L40.9 PSORIASIS: ICD-10-CM

## 2020-06-04 DIAGNOSIS — N18.2 BENIGN HYPERTENSION WITH CKD (CHRONIC KIDNEY DISEASE), STAGE II: ICD-10-CM

## 2020-06-04 DIAGNOSIS — E55.9 VITAMIN D DEFICIENCY: ICD-10-CM

## 2020-06-04 PROCEDURE — 1036F TOBACCO NON-USER: CPT | Performed by: NURSE PRACTITIONER

## 2020-06-04 PROCEDURE — 3074F SYST BP LT 130 MM HG: CPT | Performed by: NURSE PRACTITIONER

## 2020-06-04 PROCEDURE — 73080 X-RAY EXAM OF ELBOW: CPT

## 2020-06-04 PROCEDURE — 3078F DIAST BP <80 MM HG: CPT | Performed by: NURSE PRACTITIONER

## 2020-06-04 PROCEDURE — 3008F BODY MASS INDEX DOCD: CPT | Performed by: NURSE PRACTITIONER

## 2020-06-04 PROCEDURE — 99213 OFFICE O/P EST LOW 20 MIN: CPT | Performed by: NURSE PRACTITIONER

## 2020-06-04 RX ORDER — MELATONIN
1000 DAILY
COMMUNITY
Start: 2020-06-04 | End: 2020-06-04

## 2020-06-04 RX ORDER — MELATONIN
1000 DAILY
Qty: 90 TABLET | Refills: 3 | Status: SHIPPED | OUTPATIENT
Start: 2020-06-04 | End: 2021-06-03 | Stop reason: SDUPTHER

## 2020-06-07 ENCOUNTER — OFFICE VISIT (OUTPATIENT)
Dept: URGENT CARE | Facility: CLINIC | Age: 55
End: 2020-06-07
Payer: COMMERCIAL

## 2020-06-07 VITALS
SYSTOLIC BLOOD PRESSURE: 125 MMHG | TEMPERATURE: 97.7 F | BODY MASS INDEX: 30.09 KG/M2 | DIASTOLIC BLOOD PRESSURE: 80 MMHG | WEIGHT: 227 LBS | HEART RATE: 101 BPM | HEIGHT: 73 IN | RESPIRATION RATE: 24 BRPM

## 2020-06-07 DIAGNOSIS — K57.92 DIVERTICULITIS: Primary | ICD-10-CM

## 2020-06-07 PROCEDURE — S9083 URGENT CARE CENTER GLOBAL: HCPCS | Performed by: FAMILY MEDICINE

## 2020-06-07 PROCEDURE — G0382 LEV 3 HOSP TYPE B ED VISIT: HCPCS | Performed by: FAMILY MEDICINE

## 2020-06-07 RX ORDER — METRONIDAZOLE 500 MG/1
500 TABLET ORAL EVERY 12 HOURS SCHEDULED
Qty: 28 TABLET | Refills: 0 | Status: SHIPPED | OUTPATIENT
Start: 2020-06-07 | End: 2020-06-21

## 2020-06-07 RX ORDER — CIPROFLOXACIN 500 MG/1
500 TABLET, FILM COATED ORAL EVERY 12 HOURS SCHEDULED
Qty: 28 TABLET | Refills: 0 | Status: SHIPPED | OUTPATIENT
Start: 2020-06-07 | End: 2020-06-21

## 2020-06-12 DIAGNOSIS — M25.522 ELBOW PAIN, LEFT: Primary | ICD-10-CM

## 2020-07-02 ENCOUNTER — OFFICE VISIT (OUTPATIENT)
Dept: OBGYN CLINIC | Facility: CLINIC | Age: 55
End: 2020-07-02
Payer: COMMERCIAL

## 2020-07-02 VITALS
HEART RATE: 87 BPM | HEIGHT: 73 IN | BODY MASS INDEX: 30.09 KG/M2 | WEIGHT: 227 LBS | DIASTOLIC BLOOD PRESSURE: 86 MMHG | SYSTOLIC BLOOD PRESSURE: 131 MMHG

## 2020-07-02 DIAGNOSIS — M25.522 ELBOW PAIN, LEFT: ICD-10-CM

## 2020-07-02 DIAGNOSIS — M77.12 LATERAL EPICONDYLITIS OF LEFT ELBOW: Primary | ICD-10-CM

## 2020-07-02 PROCEDURE — 99244 OFF/OP CNSLTJ NEW/EST MOD 40: CPT | Performed by: SURGERY

## 2020-07-02 RX ORDER — MELOXICAM 7.5 MG/1
7.5 TABLET ORAL DAILY
Qty: 30 TABLET | Refills: 2 | Status: SHIPPED | OUTPATIENT
Start: 2020-07-02 | End: 2020-11-05 | Stop reason: ALTCHOICE

## 2020-07-02 RX ORDER — METHYLPREDNISOLONE 4 MG/1
TABLET ORAL
Qty: 1 EACH | Refills: 0 | Status: SHIPPED | OUTPATIENT
Start: 2020-07-02 | End: 2020-11-05 | Stop reason: ALTCHOICE

## 2020-07-02 NOTE — PROGRESS NOTES
Deborrah Hodgkins M D  Attending, Orthopaedic Surgery  Hand, Wrist, and Elbow Surgery  [facility]      ORTHOPAEDIC HAND, WRIST, AND ELBOW OFFICE  VISIT       ASSESSMENT/PLAN:      62 y o  male with left lateral epicondylitis  The etiology of lateral epicondylitis was discussed with Shirin Martins today  It was discussed today that if a MRI was performed it would likely show micro tears of the common extensor origin  The main stay or treatment with regards to lateral epicondylitis is OT  OT was ordered today for lateral epicondylitis exercises  Patient was also provided with a cock-up wrist splint  Medrol Dosepak was sent electronically as was a 7 5 mg Mobic prescription He was instructed to be compliant with OT as well as his HEP  A medrol dosepak was prescribed today and sent to his pharmacy electronically  Once the medrol dosepak is completed, he will start Mobic once a day  He was advised that the medrol dosepak will raise his blood sugar while on the medication  His kidney function was reviewed prior to prescribing Mobic  Shirin Martins was fit with a left cock up wrist brace that he will wear at night  If he fails 6-8 weeks of therapy a CSI may be performed  Follow up in 6 weeks time  The patient verbalized understanding of exam findings and treatment plan  We engaged in the shared decision-making process and treatment options were discussed at length with the patient  Surgical and conservative management discussed today along with risks and benefits  Diagnoses and all orders for this visit:    Lateral epicondylitis of left elbow  -     Ambulatory referral to PT/OT hand therapy; Future    Elbow pain, left  -     Ambulatory referral to Orthopedic Surgery    Other orders  -     meloxicam (MOBIC) 7 5 mg tablet; Take 1 tablet (7 5 mg total) by mouth daily  -     methylPREDNISolone 4 MG tablet therapy pack; Use as directed on package      Follow Up:  Return in about 6 weeks (around 8/13/2020)      To Do Next Visit:  Re-evaluation of current issue      General Discussions:  Lateral Epicondylitis: The anatomy and physiology of lateral epicondylitis was discussed with the patient today  Typically, degenerative changes in the extensor carpi radialis brevis muscle occur over time  These degenerative changes appear as tears of the tendon on MRI  This creates pain over the lateral epicondyle (outside part of elbow)  This pain typically is made worse with palm down lifting activities as well as anything that involves strength and stability of the wrist   The pain may radiate from the wrist up to the elbow  At times, the shoulder may be weak as well which can predispose or cause continuation of the problem  Conservative treatment usually cures a majority of patients; however, this may take up to 6-9 months  Conservative treatment options typically include activity modification, therapy for strengthening of the shoulder and elbow, tennis elbow straps, and possible corticosteroid injections  Corticosteroid injections are very effective at relieving pain but do not alter the natural history of this process  Rather, steroid injections decrease the pain temporarily to allow for therapy to take place without discomfort  It was discussed that therapy to prevent recurrence is a "life long" process and that if patient relies on the steroid injection alone without performing therapeutic exercises the risk of recurrence is likely  Typical home regimen includes heat and stretching and resisted wrist extension exercises discussed in the office   Surgery is required in fewer than 10% of patients     ____________________________________________________________________________________________________________________________________________      CHIEF COMPLAINT:  Chief Complaint   Patient presents with    Left Elbow - Pain       SUBJECTIVE:  Annette Caceres is a 62y o  year old LHD male who presents to the office today for left elbow pain  Merari Samayoa was referred for consultation by his PCP, DODIE Hatfield  Merari Samayoa states that he has been experiencing pain to the lateral aspect of his left elbow for multiple months  He denies any injury or trama  He notes his pain is worse with use of the arm and can radiate into his forearm  He is taking Advil for pain control  He states that he has had kidney issues in the past  He tried a elbow brace with relief of his symptoms         Pain/symptom timing:  Worse during the day when active  Pain/symptom context:  Worse with activites and work  Pain/symptom modifying factors:  Rest makes better, activities make worse  Pain/symptom associated signs/symptoms: none    Prior treatment   · NSAIDsYes   · Injections No   · Bracing/Orthotics Yes    Physical Therapy No     I have personally reviewed all the relevant PMH, PSH, SH, FH, Medications and allergies      PAST MEDICAL HISTORY:  Past Medical History:   Diagnosis Date    Depression     Diverticulosis     GERD (gastroesophageal reflux disease)     Hyperlipidemia     Hypertension     Sleep apnea     Umbilical hernia        PAST SURGICAL HISTORY:  Past Surgical History:   Procedure Laterality Date    CATARACT EXTRACTION Bilateral     CHOLECYSTECTOMY      COLONOSCOPY      ESOPHAGOGASTRODUODENOSCOPY      HERNIA REPAIR  09/19/2019    Open repair of incarcerated incisional hernia with mesh - Dr Sari Younger ARTHROSCOPY Left     WISDOM TOOTH EXTRACTION         FAMILY HISTORY:  Family History   Problem Relation Age of Onset    Anemia Mother     Hypertension Mother     Pancreatic cancer Father     Diabetes Father     Hypertension Father     Diabetes Maternal Grandmother     Diabetes Maternal Grandfather     Diabetes Paternal Grandmother     Diabetes Paternal Grandfather        SOCIAL HISTORY:  Social History     Tobacco Use    Smoking status: Former Smoker     Packs/day: 1 00     Years: 35 00     Pack years: 35 00     Types: Cigarettes    Smokeless tobacco: Never Used    Tobacco comment: quit 6 5 years ago - As per [place]    Substance Use Topics    Alcohol use: Yes     Frequency: 2-3 times a week     Drinks per session: 1 or 2     Binge frequency: Never     Comment: "few times a week"    Drug use: Never       MEDICATIONS:    Current Outpatient Medications:     amLODIPine (NORVASC) 5 mg tablet, Take 1 tablet (5 mg total) by mouth daily, Disp: 90 tablet, Rfl: 3    cholecalciferol (VITAMIN D3) 1,000 units tablet, Take 1 tablet (1,000 Units total) by mouth daily, Disp: 90 tablet, Rfl: 3    eszopiclone (LUNESTA) 3 MG tablet, Take 1 tablet (3 mg total) by mouth daily at bedtime Take immediately before bedtime, Disp: 90 tablet, Rfl: 1    Famotidine (PEPCID AC PO), Take 1 Dose by mouth daily as needed Patient does not recall dosage (OTC) , Disp: , Rfl:     FLUoxetine (PROzac) 20 MG tablet, Take 1 tablet (20 mg total) by mouth daily, Disp: 90 tablet, Rfl: 3    lisinopril (ZESTRIL) 20 mg tablet, Take 1 tablet (20 mg total) by mouth daily, Disp: 90 tablet, Rfl: 3    Multiple Vitamin (MULTIVITAMIN) tablet, Take 1 tablet by mouth daily, Disp: , Rfl:     triamcinolone (KENALOG) 0 1 % ointment, Apply topically 2 (two) times a day, Disp: 15 g, Rfl: 1    ALLERGIES:  Allergies   Allergen Reactions    Allopurinol Rash           REVIEW OF SYSTEMS:  Review of Systems   Constitutional: Negative for chills, fever and unexpected weight change  HENT: Negative for hearing loss, nosebleeds and sore throat  Eyes: Negative for pain, redness and visual disturbance  Respiratory: Negative for cough, shortness of breath and wheezing  Cardiovascular: Negative for chest pain, palpitations and leg swelling  Gastrointestinal: Negative for abdominal pain, nausea and vomiting  Endocrine: Negative for polydipsia and polyuria  Genitourinary: Negative for difficulty urinating and hematuria  Musculoskeletal: Negative for arthralgias, joint swelling and myalgias  Skin: Negative for rash and wound  Neurological: Negative for dizziness, numbness and headaches  Psychiatric/Behavioral: Negative for decreased concentration, dysphoric mood and suicidal ideas  The patient is not nervous/anxious  VITALS:  Vitals:    07/02/20 0804   BP: 131/86   Pulse: 87       LABS:  HgA1c:   Lab Results   Component Value Date    HGBA1C 5 6 05/17/2020     BMP:   Lab Results   Component Value Date    CALCIUM 9 2 05/17/2020    K 4 4 05/17/2020    CO2 27 05/17/2020     05/17/2020    BUN 24 05/17/2020    CREATININE 1 22 05/17/2020       _____________________________________________________  PHYSICAL EXAMINATION:  General: well developed and well nourished, alert, oriented times 3 and appears comfortable  Psychiatric: Normal  HEENT: Normocephalic, Atraumatic Trachea Midline, No torticollis  Pulmonary: No audible wheezing or respiratory distress   Cardiovascular: No pitting edema, 2+ radial pulse   Skin: No masses, erythema, lacerations, fluctation, ulcerations  Neurovascular: Sensation Intact to the Median, Ulnar, Radial Nerve, Motor Intact to the Median, Ulnar, Radial Nerve and Pulses Intact  Musculoskeletal: Normal, except as noted in detailed exam and in HPI  MUSCULOSKELETAL EXAMINATION:    Left Elbow:    No swelling or deformity  Full range of motion with flexion, extension, supination and pronation  Positive tenderness to palpation over the Lateral Epicondyle, mild pain to radial tunnel, no pain to the cubital tunnel or medial epicondyle  Pain with passive flexion of the wrist with elbow fully extended  Pain with resisted wrist extension with elbow fully extended  Pain with resisted long finger extension with the elbow fully extended  Negative tinels over the ulnar nerve at the elbow        ___________________________________________________  STUDIES REVIEWED:  I have personally reviewed AP lateral and oblique radiographs of left elbow which demonstrates no acute fracture dislocation no significant degenerative changes no significant calcification of the common extensor origin          PROCEDURES PERFORMED:  Procedures  No Procedures performed today    _____________________________________________________      Jatinder Cortez    I,:   Nia Mario am acting as a scribe while in the presence of the attending physician :        I,:   Latonya Shen MD personally performed the services described in this documentation    as scribed in my presence :

## 2020-07-14 ENCOUNTER — EVALUATION (OUTPATIENT)
Dept: OCCUPATIONAL THERAPY | Facility: CLINIC | Age: 55
End: 2020-07-14
Payer: COMMERCIAL

## 2020-07-14 DIAGNOSIS — M77.12 LATERAL EPICONDYLITIS OF LEFT ELBOW: ICD-10-CM

## 2020-07-14 PROCEDURE — 97140 MANUAL THERAPY 1/> REGIONS: CPT | Performed by: OCCUPATIONAL THERAPIST

## 2020-07-14 PROCEDURE — 97165 OT EVAL LOW COMPLEX 30 MIN: CPT | Performed by: OCCUPATIONAL THERAPIST

## 2020-07-14 NOTE — PROGRESS NOTES
OT Evaluation     Today's date: 2020  Patient name: Boris Gan  : 1965  MRN: [id number]  Referring provider: Refugio Cotter MD  Dx:   Encounter Diagnosis     ICD-10-CM    1  Lateral epicondylitis of left elbow M77 12 Ambulatory referral to PT/OT hand therapy                  Assessment  Assessment details: Chantel Mac presents with findings consistent to the referred diagnosis including tenderness at the lateral epicondyle, pain with grasping, weakness, reduced function, and positive provacative testing  See below for a detailed assessment  Impairments: abnormal or restricted ROM, activity intolerance, impaired physical strength, lacks appropriate home exercise program and pain with function    Symptom irritability: moderate  Goals  STG: Patient will be compliant with ergonomic modifications and home exercise program in 1 week  STG: Pain will be reduced to 5/10 during appropriate activity and during therapy in 4 weeks  STG: Strength will be improved to 115 pounds and painfree in 4 weeks  LTG: Pain will be reduced to 3/10 during appropriate activity and during therapy by discharge  LTG: Strength will be improved to 125 pounds and painfree in 6-8 weeks or discharge  LTG: Performance in ADLs and IADLS will be improved to prior level of function with the affected extremity within 6 weeks or discharge  LTG: Performance in work activity will be improved to prior level of function with the affected extremity within 6 weeks or discharge  LTG: FOTO score increase by 15 points within 6 weeks or discharge  Plan  Plan details: Treatment to include modalities, manual therapy, PRE's, HEP, and orthotics as appropriate     Patient would benefit from: skilled OT and OT eval  Planned modality interventions: thermotherapy: hydrocollator packs  Planned therapy interventions: home exercise program, joint mobilization, manual therapy, therapeutic exercise, patient education and therapeutic activities  Frequency: 2x week  Duration in visits: 10  Plan of Care beginning date: 2020  Plan of Care expiration date: 9/15/2020  Treatment plan discussed with: patient        Subjective Evaluation    History of Present Illness  Mechanism of injury: He reports left lateral elbow pain that began a few months ago not associated with any trauma  Pain  At best pain ratin (No pain at rest)  At worst pain rating: 10    Social Support    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[employer])  [employer] dominance: left    Treatments  Current treatment: occupational therapy  Patient Goals  Patient goals for therapy: decreased pain and increased strength          Objective     Tenderness     Left Elbow   Tenderness in the lateral epicondyle  Left Wrist/Hand   Tenderness in the common extensor tendon and lateral epicondyle  Active Range of Motion     Left Elbow   Normal active range of motion  Extension: with pain    Strength/Myotome Testing     Left Wrist/Hand      (2nd hand position)     Trial 1: 94 8    Comments: 6/10 pain        Right Wrist/Hand      (2nd hand position)     Trial 1: 137 1    Additional Strength Details  36 7 Stress position gripping and 8/10 pain     Tests     Left Elbow   Positive Cozen's and Mill's  Negative radial tunnel  Left Wrist/Hand   Positive resisted middle finger                Precautions: Universal  HEP: Isometric, Extrinsic stretch    Manuals             IASTM 7'            Cupping 3'                                      Neuro Re-Ed             MWM                                                                                           Ther Ex             Wrist eccentric                                                                                                        Ther Activity                                       Gait Training                                       Modalities             MHP 5'

## 2020-07-16 ENCOUNTER — OFFICE VISIT (OUTPATIENT)
Dept: OCCUPATIONAL THERAPY | Facility: CLINIC | Age: 55
End: 2020-07-16
Payer: COMMERCIAL

## 2020-07-16 DIAGNOSIS — M77.12 LATERAL EPICONDYLITIS OF LEFT ELBOW: Primary | ICD-10-CM

## 2020-07-16 PROCEDURE — 97140 MANUAL THERAPY 1/> REGIONS: CPT | Performed by: OCCUPATIONAL THERAPIST

## 2020-07-16 NOTE — PROGRESS NOTES
Daily Note     Today's date: 2020  Patient name: Rubina Coelho  : 1965  MRN: [id number]  Referring provider: Estelle Hearn MD  Dx:   Encounter Diagnosis     ICD-10-CM    1  Lateral epicondylitis of left elbow M77 12                   Subjective: He states consistent 6/10 pain in the elbow over the last 24 hours and feels that this is worse than normal        Objective: See treatment diary below  Assessment: Was not able to tolerate exercises today due to pain  Focusing on pain relief and gentle soft tissue mobilization to tolerance  Also applied KT  Plan: Progress treatment as tolerated         Precautions: Universal  HEP: Isometric, Extrinsic stretch    Manuals            IASTM 7' 7'           Cupping 3' 3'           KT  X                        Neuro Re-Ed             MWM  No weight, 2x10                                                                                         Ther Ex             Wrist eccentric                                                                                                        Ther Activity                                       Gait Training                                       Modalities             MHP 5' 5'           CP post  5'

## 2020-07-21 ENCOUNTER — OFFICE VISIT (OUTPATIENT)
Dept: OCCUPATIONAL THERAPY | Facility: CLINIC | Age: 55
End: 2020-07-21
Payer: COMMERCIAL

## 2020-07-21 DIAGNOSIS — M77.12 LATERAL EPICONDYLITIS OF LEFT ELBOW: Primary | ICD-10-CM

## 2020-07-21 PROCEDURE — 97140 MANUAL THERAPY 1/> REGIONS: CPT

## 2020-07-21 NOTE — PROGRESS NOTES
Daily Note     Today's date: 2020  Patient name: Be Read  : 1965  MRN: [id number]  Referring provider: Mary Woods MD  Dx:   Encounter Diagnosis     ICD-10-CM    1  Lateral epicondylitis of left elbow M77 12                   Subjective: "It felt a little better after my last tx, but yesterday I had to lift boxes of tile for an hour and now (the elbow) is very angry "       Objective: See treatment diary below  Assessment: Pt felt relief with [id number] today  Focused on modalities and soft tissue mobilization for pain relief  Plan: Progress treatment as tolerated         Precautions: Universal  HEP: Isometric, Extrinsic stretch    Manuals           IASTM 7' 7' 8'          Cupping 3' 3' 5'          KT  X X                       Neuro Re-Ed             MWM  No weight, 2x10 No wt 2x10, Nirschl stretches in between sets                                                                                        Ther Ex             Wrist eccentric                                                                                                        Ther Activity                                       Gait Training                                       Modalities             MHP 5' 5' 5'          CP post  5' 5'

## 2020-07-23 ENCOUNTER — OFFICE VISIT (OUTPATIENT)
Dept: OCCUPATIONAL THERAPY | Facility: CLINIC | Age: 55
End: 2020-07-23
Payer: COMMERCIAL

## 2020-07-23 DIAGNOSIS — M77.12 LATERAL EPICONDYLITIS OF LEFT ELBOW: Primary | ICD-10-CM

## 2020-07-23 PROCEDURE — 97140 MANUAL THERAPY 1/> REGIONS: CPT | Performed by: OCCUPATIONAL THERAPIST

## 2020-07-23 PROCEDURE — 97110 THERAPEUTIC EXERCISES: CPT | Performed by: OCCUPATIONAL THERAPIST

## 2020-07-23 NOTE — PROGRESS NOTES
Daily Note     Today's date: 2020  Patient name: Amisha Pardo  : 1965  MRN: [id number]  Referring provider: Med Holliday MD  Dx:   Encounter Diagnosis     ICD-10-CM    1  Lateral epicondylitis of left elbow M77 12                   Subjective: "Sometimes it is good and sometimes it isn't"      Objective: See treatment diary below  Assessment: Upgraded PRE today  Can adjust as needed based on reports of elbow pain after today's session  Improved tolerance towards manuals  Plan: Progress treatment as tolerated         Precautions: Universal  HEP: Isometric, Extrinsic stretch    Manuals          IASTM 7' 7' 8' 8'         Cupping 3' 3' 5' 5'         KT  X X X                      Neuro Re-Ed             MWM  No weight, 2x10 No wt 2x10, Nirschl stretches in between sets 1# 3x10 with self stretching in between                                                                                       Ther Ex             Wrist eccentric    YFB F/E 2x10         Flex bar    Yellow S/P 2x10          Wrist maze    10x                                                                          Ther Activity                                       Gait Training                                       Modalities             MHP 5' 5' 5' 5'         CP post  5' 5'

## 2020-07-28 ENCOUNTER — APPOINTMENT (OUTPATIENT)
Dept: OCCUPATIONAL THERAPY | Facility: CLINIC | Age: 55
End: 2020-07-28
Payer: COMMERCIAL

## 2020-07-30 ENCOUNTER — OFFICE VISIT (OUTPATIENT)
Dept: OCCUPATIONAL THERAPY | Facility: CLINIC | Age: 55
End: 2020-07-30
Payer: COMMERCIAL

## 2020-07-30 DIAGNOSIS — M77.12 LATERAL EPICONDYLITIS OF LEFT ELBOW: Primary | ICD-10-CM

## 2020-07-30 PROCEDURE — 97110 THERAPEUTIC EXERCISES: CPT | Performed by: OCCUPATIONAL THERAPIST

## 2020-07-30 PROCEDURE — 97140 MANUAL THERAPY 1/> REGIONS: CPT | Performed by: OCCUPATIONAL THERAPIST

## 2020-07-30 PROCEDURE — 97112 NEUROMUSCULAR REEDUCATION: CPT | Performed by: OCCUPATIONAL THERAPIST

## 2020-07-30 NOTE — PROGRESS NOTES
Daily Note     Today's date: 2020  Patient name: Cherelle Arriola  : 1965  MRN: [id number]  Referring provider: Marlee Glass MD  Dx:   Encounter Diagnosis     ICD-10-CM    1  Lateral epicondylitis of left elbow M77 12                   Subjective: "It is about the same"  He states minimal improvement in elbow pain thus far, however has completed only 4 full sessions of therapy  He is going on vacation next week and will again not be able to attend therapy during this time  Objective: See treatment diary below  Assessment: Therapist assisted MWM allows for pain free wrist extension  All other active motion of the wrist continues to elicit pain through the elbow  Less trigger points and soft tissue tightness through the extensors  It is likely that his activity at work is aggravating the elbow  He may benefit from more consistent wrist bracing if pain does not subside  Plan: Progress treatment as tolerated         Precautions: Universal  HEP: Isometric, Extrinsic stretch    Manuals         IASTM 7' 7' 8' 8' 8'        Cupping 3' 3' 5' 5' 5'        KT  X X X Declined                     Neuro Re-Ed             MWM  No weight, 2x10 No wt 2x10, Nirschl stretches in between sets 1# 3x10 with self stretching in between No weight, 3x10        Door self MWM     1x10 with gripping, 2x10 with wrist extension AROM                                                                         Ther Ex             Wrist eccentric    YFB F/E 2x10 YFB wrist extension only 2x10        Flex bar    Yellow S/P 2x10          Wrist maze    10x 10x        keypegs     1x with elbow extension as tolerated                                                             Ther Activity                                       Gait Training                                       Modalities             MHP 5' 5' 5' 5' 5'        CP post  5' 5'

## 2020-08-12 ENCOUNTER — APPOINTMENT (OUTPATIENT)
Dept: OCCUPATIONAL THERAPY | Facility: CLINIC | Age: 55
End: 2020-08-12
Payer: COMMERCIAL

## 2020-08-13 ENCOUNTER — OFFICE VISIT (OUTPATIENT)
Dept: OBGYN CLINIC | Facility: CLINIC | Age: 55
End: 2020-08-13
Payer: COMMERCIAL

## 2020-08-13 VITALS
BODY MASS INDEX: 31.01 KG/M2 | DIASTOLIC BLOOD PRESSURE: 84 MMHG | SYSTOLIC BLOOD PRESSURE: 124 MMHG | HEART RATE: 83 BPM | HEIGHT: 73 IN | WEIGHT: 234 LBS

## 2020-08-13 DIAGNOSIS — M77.12 LATERAL EPICONDYLITIS OF LEFT ELBOW: Primary | ICD-10-CM

## 2020-08-13 PROCEDURE — 20551 NJX 1 TENDON ORIGIN/INSJ: CPT | Performed by: SURGERY

## 2020-08-13 PROCEDURE — 99213 OFFICE O/P EST LOW 20 MIN: CPT | Performed by: SURGERY

## 2020-08-13 PROCEDURE — 3074F SYST BP LT 130 MM HG: CPT | Performed by: SURGERY

## 2020-08-13 PROCEDURE — 1036F TOBACCO NON-USER: CPT | Performed by: SURGERY

## 2020-08-13 PROCEDURE — 3008F BODY MASS INDEX DOCD: CPT | Performed by: SURGERY

## 2020-08-13 PROCEDURE — 3079F DIAST BP 80-89 MM HG: CPT | Performed by: SURGERY

## 2020-08-13 RX ORDER — LIDOCAINE HYDROCHLORIDE 10 MG/ML
0.5 INJECTION, SOLUTION INFILTRATION; PERINEURAL
Status: COMPLETED | OUTPATIENT
Start: 2020-08-13 | End: 2020-08-13

## 2020-08-13 RX ORDER — BUPIVACAINE HYDROCHLORIDE 2.5 MG/ML
0.5 INJECTION, SOLUTION INFILTRATION; PERINEURAL
Status: COMPLETED | OUTPATIENT
Start: 2020-08-13 | End: 2020-08-13

## 2020-08-13 RX ORDER — TRIAMCINOLONE ACETONIDE 40 MG/ML
20 INJECTION, SUSPENSION INTRA-ARTICULAR; INTRAMUSCULAR
Status: COMPLETED | OUTPATIENT
Start: 2020-08-13 | End: 2020-08-13

## 2020-08-13 RX ADMIN — LIDOCAINE HYDROCHLORIDE 0.5 ML: 10 INJECTION, SOLUTION INFILTRATION; PERINEURAL at 08:05

## 2020-08-13 RX ADMIN — TRIAMCINOLONE ACETONIDE 20 MG: 40 INJECTION, SUSPENSION INTRA-ARTICULAR; INTRAMUSCULAR at 08:05

## 2020-08-13 RX ADMIN — BUPIVACAINE HYDROCHLORIDE 0.5 ML: 2.5 INJECTION, SOLUTION INFILTRATION; PERINEURAL at 08:05

## 2020-08-13 NOTE — PROGRESS NOTES
ASSESSMENT/PLAN:      60 y o  male with left lateral epicondylitis  It was discussed with Ruel Carlos today that he does have to perform activity mortification  He does have to rehab the elbow and push thru the discomfort  A left lateral epicondylitis CSI was offered today, Prince excepted  Left lateral epicondylitis CSI was performed in the office today, without complication  The CSI may wear off if he does not rehab the elbow correctly  I would not inject his lateral epicondyle more then 3 times as the tendon can rupture resulting in weakness  He was advised to continue the use of the cock up wrist brace at night  He will continue OT, advised to resume in aprox  10 days, updated script provided today  I will see him back in 2 months time  The patient verbalized understanding of exam findings and treatment plan  We engaged in the shared decision-making process and treatment options were discussed at length with the patient  Surgical and conservative management discussed today along with risks and benefits  Diagnoses and all orders for this visit:    Lateral epicondylitis of left elbow  -     Hand/upper extremity injection: L elbow  -     Ambulatory referral to PT/OT hand therapy; Future      Follow Up:  Return in about 2 months (around 10/13/2020)  To Do Next Visit:  Re-evaluation of current issue    ____________________________________________________________________________________________________________________________________________      CHIEF COMPLAINT:  Chief Complaint   Patient presents with    Left Elbow - Follow-up       SUBJECTIVE:  Annette Caceres is a 60y o  year old LHD male who presents to the office today for a follow up regarding left lateral epicondylitis  Prince notes that while on the medrol dosepak and when he fist started OT his lateral elbow pain improved  He notes that once he completed the oral steroid and therapy become more aggressive his lateral elbow pain increased   He did take Mobic for a couple of weeks and didn't see benefit from the medication so he stopped it  He has been wearing a cock up wrist brace at night  I have personally reviewed all the relevant PMH, PSH, SH, FH, Medications and allergies       PAST MEDICAL HISTORY:  Past Medical History:   Diagnosis Date    Depression     Diverticulosis     GERD (gastroesophageal reflux disease)     Hyperlipidemia     Hypertension     Sleep apnea     Umbilical hernia        PAST SURGICAL HISTORY:  Past Surgical History:   Procedure Laterality Date    CATARACT EXTRACTION Bilateral     CHOLECYSTECTOMY      COLONOSCOPY      ESOPHAGOGASTRODUODENOSCOPY      HERNIA REPAIR  09/19/2019    Open repair of incarcerated incisional hernia with mesh - Dr Martha Martinez ARTHROSCOPY Left     WISDOM TOOTH EXTRACTION         FAMILY HISTORY:  Family History   Problem Relation Age of Onset    Anemia Mother     Hypertension Mother     Pancreatic cancer Father     Diabetes Father     Hypertension Father     Diabetes Maternal Grandmother     Diabetes Maternal Grandfather     Diabetes Paternal Grandmother     Diabetes Paternal Grandfather        SOCIAL HISTORY:  Social History     Tobacco Use    Smoking status: Former Smoker     Packs/day: 1 00     Years: 35 00     Pack years: 35 00     Types: Cigarettes    Smokeless tobacco: Never Used    Tobacco comment: quit 6 5 years ago - As per [place]    Substance Use Topics    Alcohol use: Yes     Frequency: 2-3 times a week     Drinks per session: 1 or 2     Binge frequency: Never     Comment: "few times a week"    Drug use: Never       MEDICATIONS:    Current Outpatient Medications:     amLODIPine (NORVASC) 5 mg tablet, Take 1 tablet (5 mg total) by mouth daily, Disp: 90 tablet, Rfl: 3    cholecalciferol (VITAMIN D3) 1,000 units tablet, Take 1 tablet (1,000 Units total) by mouth daily, Disp: 90 tablet, Rfl: 3    eszopiclone (LUNESTA) 3 MG tablet, Take 1 tablet (3 mg total) by mouth daily at bedtime Take immediately before bedtime, Disp: 90 tablet, Rfl: 1    Famotidine (PEPCID AC PO), Take 1 Dose by mouth daily as needed Patient does not recall dosage (OTC) , Disp: , Rfl:     FLUoxetine (PROzac) 20 MG tablet, Take 1 tablet (20 mg total) by mouth daily, Disp: 90 tablet, Rfl: 3    lisinopril (ZESTRIL) 20 mg tablet, Take 1 tablet (20 mg total) by mouth daily, Disp: 90 tablet, Rfl: 3    meloxicam (MOBIC) 7 5 mg tablet, Take 1 tablet (7 5 mg total) by mouth daily, Disp: 30 tablet, Rfl: 2    methylPREDNISolone 4 MG tablet therapy pack, Use as directed on package, Disp: 1 each, Rfl: 0    Multiple Vitamin (MULTIVITAMIN) tablet, Take 1 tablet by mouth daily, Disp: , Rfl:     triamcinolone (KENALOG) 0 1 % ointment, Apply topically 2 (two) times a day, Disp: 15 g, Rfl: 1    ALLERGIES:  Allergies   Allergen Reactions    Allopurinol Rash       REVIEW OF SYSTEMS:  Review of Systems   Constitutional: Negative for chills, fever and unexpected weight change  HENT: Negative for hearing loss, nosebleeds and sore throat  Eyes: Negative for pain, redness and visual disturbance  Respiratory: Negative for cough, shortness of breath and wheezing  Cardiovascular: Negative for chest pain, palpitations and leg swelling  Gastrointestinal: Negative for abdominal pain, nausea and vomiting  Endocrine: Negative for polydipsia and polyuria  Genitourinary: Negative for difficulty urinating and hematuria  Musculoskeletal: Negative for arthralgias, joint swelling and myalgias  Skin: Negative for rash and wound  Neurological: Negative for dizziness, numbness and headaches  Psychiatric/Behavioral: Negative for decreased concentration, dysphoric mood and suicidal ideas  The patient is not nervous/anxious          VITALS:  Vitals:    08/13/20 0742   BP: 124/84   Pulse: 83       LABS:  HgA1c:   Lab Results   Component Value Date    HGBA1C 5 6 05/17/2020     BMP:   Lab Results   Component Value Date CALCIUM 9 2 05/17/2020    K 4 4 05/17/2020    CO2 27 05/17/2020     05/17/2020    BUN 24 05/17/2020    CREATININE 1 22 05/17/2020       _____________________________________________________  PHYSICAL EXAMINATION:  General: well developed and well nourished, alert, oriented times 3 and appears comfortable  Psychiatric: Normal  HEENT: Normocephalic, Atraumatic Trachea Midline, No torticollis  Pulmonary: No audible wheezing or respiratory distress   Cardiovascular: No pitting edema, 2+ radial pulse   Skin: No masses, erythema, lacerations, fluctation, ulcerations  Neurovascular: Sensation Intact to the Median, Ulnar, Radial Nerve, Motor Intact to the Median, Ulnar, Radial Nerve and Pulses Intact  Musculoskeletal: Normal, except as noted in detailed exam and in HPI  MUSCULOSKELETAL EXAMINATION:    Left Elbow:    No swelling or deformity  Full range of motion with flexion, extension, supination and pronation  Positive tenderness to palpation over the Lateral Epicondyle  No pain with passive flexion of the wrist with elbow fully extended  Pain with resisted wrist extension with elbow fully extended  Pain with resisted long finger extension with the elbow fully extended  Negative tinels over the ulnar nerve at the elbow      ___________________________________________________  STUDIES REVIEWED:  No new imaging to review           PROCEDURES PERFORMED:  Hand/upper extremity injection: L elbow  Date/Time: 8/13/2020 8:05 AM  Consent given by: patient  Site marked: site marked  Timeout: Immediately prior to procedure a time out was called to verify the correct patient, procedure, equipment, support staff and site/side marked as required   Supporting Documentation  Indications: pain   Procedure Details  Condition:lateral epicondylitis Site: L elbow   Preparation: Patient was prepped and draped in the usual sterile fashion  Needle size: 25 G  Ultrasound guidance: no  Medications administered: 0 5 mL bupivacaine 0 25 %; 0 5 mL lidocaine 1 %; 20 mg triamcinolone acetonide 40 mg/mL    Patient tolerance: patient tolerated the procedure well with no immediate complications  Dressing:  Sterile dressing applied            _____________________________________________________      Scribe Attestation    I,:   Samantha Mario am acting as a scribe while in the presence of the attending physician :        I,:   Rodrigo Carrasco MD personally performed the services described in this documentation    as scribed in my presence :

## 2020-08-21 ENCOUNTER — TELEPHONE (OUTPATIENT)
Dept: FAMILY MEDICINE CLINIC | Facility: CLINIC | Age: 55
End: 2020-08-21

## 2020-08-31 ENCOUNTER — EVALUATION (OUTPATIENT)
Dept: OCCUPATIONAL THERAPY | Facility: CLINIC | Age: 55
End: 2020-08-31
Payer: COMMERCIAL

## 2020-08-31 DIAGNOSIS — M77.12 LATERAL EPICONDYLITIS OF LEFT ELBOW: Primary | ICD-10-CM

## 2020-08-31 PROCEDURE — 97112 NEUROMUSCULAR REEDUCATION: CPT | Performed by: OCCUPATIONAL THERAPIST

## 2020-08-31 PROCEDURE — 97140 MANUAL THERAPY 1/> REGIONS: CPT | Performed by: OCCUPATIONAL THERAPIST

## 2020-08-31 PROCEDURE — 97110 THERAPEUTIC EXERCISES: CPT | Performed by: OCCUPATIONAL THERAPIST

## 2020-08-31 NOTE — PROGRESS NOTES
OT Re-Evaluation     Today's date: 2020  Patient name: Theo Aguilar  : 1965  MRN: [id number]  Referring provider: Brook Tucker MD  Dx:   Encounter Diagnosis     ICD-10-CM    1  Lateral epicondylitis of left elbow  M77 12                   Assessment  Assessment details: Ranjana Escobedo presents with findings consistent to the referred diagnosis including tenderness at the lateral epicondyle, pain with grasping, weakness, reduced function, and positive provacative testing  He states minimal to no improvement in elbow pain since beginning therapy but is willing to continue with conservative measures  See below for a detailed assessment  Impairments: abnormal or restricted ROM, activity intolerance, impaired physical strength, lacks appropriate home exercise program and pain with function    Symptom irritability: moderateUnderstanding of Dx/Px/POC: good   Prognosis: good    Goals  STG: Patient will be compliant with ergonomic modifications and home exercise program in 1 week  - MET  STG: Pain will be reduced to 5/10 during appropriate activity and during therapy in 4 weeks - NOT MET  STG: Strength will be improved to 115 pounds and painfree in 4 weeks  - NOT MET    LTG: Pain will be reduced to 3/10 during appropriate activity and during therapy by discharge  LTG: Strength will be improved to 125 pounds and painfree in 6-8 weeks or discharge  LTG: Performance in ADLs and IADLS will be improved to prior level of function with the affected extremity within 6 weeks or discharge  LTG: Performance in work activity will be improved to prior level of function with the affected extremity within 6 weeks or discharge  LTG: FOTO score increase by 15 points within 6 weeks or discharge  Plan  Plan details: Treatment to include modalities, manual therapy, PRE's, HEP, and orthotics as appropriate     Patient would benefit from: skilled OT and OT eval  Planned modality interventions: thermotherapy: hydrocollator packs  Planned therapy interventions: home exercise program, joint mobilization, manual therapy, therapeutic exercise, patient education and therapeutic activities  Frequency: 2x week  Duration in visits: 10  Plan of Care beginning date: 2020  Plan of Care expiration date: 10/30/2020  Treatment plan discussed with: patient        Subjective Evaluation    History of Present Illness  Mechanism of injury: He reports left lateral elbow pain that began a few months ago not associated with any trauma  He received a cortisone injection two weeks ago and states no improvement  Pain  At best pain ratin (No pain at rest)  At worst pain rating: 10  Quality: sharp, tight and burning    Social Support    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[employer])  [employer] dominance: left    Treatments  Current treatment: occupational therapy  Patient Goals  Patient goals for therapy: decreased pain and increased strength          Objective     Tenderness     Left Elbow   Tenderness in the lateral epicondyle  Left Wrist/Hand   Tenderness in the common extensor tendon and lateral epicondyle  Active Range of Motion     Left Elbow   Normal active range of motion  Extension: with pain    Strength/Myotome Testing     Left Wrist/Hand      (2nd hand position)     Trial 1: 73 1    Comments: 10/10 pain    Right Wrist/Hand      (2nd hand position)     Trial 1: 137 1    Additional Strength Details  33 6 Stress position gripping and 10/10 pain     Tests     Left Elbow   Positive Co[employer]'s [employer] Mill's  Negative radial tunnel  Left Wrist/Hand   Positive resisted middle finger                   Precautions: Universal  HEP: Isometric, Extrinsic stretch    Manuals        IASTM 7' 7' 8' 8' 8' 8'       Cupping 3' 3' 5' 5' 5' 5'       KT  X X X Declined                     Neuro Re-Ed             MWM  No weight, 2x10 No wt 2x10, Nirschl stretches in between sets 1# 3x10 with self stretching in between No weight, 3x10 With belt and gripping 4x6       Door self MWM     1x10 with gripping, 2x10 with wrist extension AROM                                                                         Ther Ex             Wrist eccentric    YFB F/E 2x10 YFB wrist extension only 2x10 YFB F/E 2x10       Flex bar    Yellow S/P 2x10          Wrist maze    10x 10x        keypegs     1x with elbow extension as tolerated  1x with elbow extension as tolerated        Rows      Green TB 3x10                                              Ther Activity                                       Gait Training                                       Modalities             MHP 5' 5' 5' 5' 5'        CP post  5' 5'

## 2020-09-08 ENCOUNTER — OFFICE VISIT (OUTPATIENT)
Dept: OCCUPATIONAL THERAPY | Facility: CLINIC | Age: 55
End: 2020-09-08
Payer: COMMERCIAL

## 2020-09-08 DIAGNOSIS — M77.12 LATERAL EPICONDYLITIS OF LEFT ELBOW: Primary | ICD-10-CM

## 2020-09-08 PROCEDURE — 97140 MANUAL THERAPY 1/> REGIONS: CPT | Performed by: OCCUPATIONAL THERAPIST

## 2020-09-08 PROCEDURE — 97112 NEUROMUSCULAR REEDUCATION: CPT | Performed by: OCCUPATIONAL THERAPIST

## 2020-09-08 PROCEDURE — 97110 THERAPEUTIC EXERCISES: CPT | Performed by: OCCUPATIONAL THERAPIST

## 2020-09-08 NOTE — PROGRESS NOTES
Daily Note     Today's date: 2020  Patient name: Chase Barraza  : 1965  MRN: [id number]  Referring provider: Eliane Sorensen MD  Dx:   Encounter Diagnosis     ICD-10-CM    1  Lateral epicondylitis of left elbow  M77 12                   Subjective: He states more pain with increased use of the wrist brace, so discontinued it use  Objective: See treatment diary below  Assessment: Good tolerance towards MWM, however pain with eccentric wrist extension  Plan: Progress treatment as tolerated            Precautions: Universal  HEP: Isometric, Extrinsic stretch    Manuals       IASTM 7' 7' 8' 8' 8' 8' 8'      Cupping 3' 3' 5' 5' 5' 5' 5'      KT  X X X Declined                     Neuro Re-Ed             MWM  No weight, 2x10 No wt 2x10, Nirschl stretches in between sets 1# 3x10 with self stretching in between No weight, 3x10 With belt and gripping 4x6 With belt and gripping 4x6      Door self MWM     1x10 with gripping, 2x10 with wrist extension AROM        Wrist extension eccentric       therapist assisted 2# 3x8                                                          Ther Ex             Wrist eccentric    YFB F/E 2x10 YFB wrist extension only 2x10 YFB F/E 2x10 RFB 2x10      Flex bar    Yellow S/P 2x10          Wrist maze    10x 10x        keypegs     1x with elbow extension as tolerated  1x with elbow extension as tolerated        Rows      Green TB 3x10 Green TB 3x10                                             Ther Activity                                       Gait Training                                       Modalities             MHP 5' 5' 5' 5' 5'        CP post  5' 5'

## 2020-09-10 ENCOUNTER — OFFICE VISIT (OUTPATIENT)
Dept: OCCUPATIONAL THERAPY | Facility: CLINIC | Age: 55
End: 2020-09-10
Payer: COMMERCIAL

## 2020-09-10 DIAGNOSIS — M77.12 LATERAL EPICONDYLITIS OF LEFT ELBOW: Primary | ICD-10-CM

## 2020-09-10 PROCEDURE — 97112 NEUROMUSCULAR REEDUCATION: CPT | Performed by: OCCUPATIONAL THERAPIST

## 2020-09-10 PROCEDURE — 97110 THERAPEUTIC EXERCISES: CPT | Performed by: OCCUPATIONAL THERAPIST

## 2020-09-10 PROCEDURE — 97140 MANUAL THERAPY 1/> REGIONS: CPT | Performed by: OCCUPATIONAL THERAPIST

## 2020-09-10 RX ORDER — DEXAMETHASONE SODIUM PHOSPHATE 4 MG/ML
4 INJECTION, SOLUTION INTRA-ARTICULAR; INTRALESIONAL; INTRAMUSCULAR; INTRAVENOUS; SOFT TISSUE EVERY 6 HOURS SCHEDULED
Qty: 30 ML | Refills: 3 | Status: SHIPPED | OUTPATIENT
Start: 2020-09-10 | End: 2020-11-05 | Stop reason: ALTCHOICE

## 2020-09-10 NOTE — PROGRESS NOTES
Daily Note     Today's date: 9/10/2020  Patient name: Kyleigh Arnold  : 1965  MRN: [id number]  Referring provider: Janice Lockett MD  Dx:   Encounter Diagnosis     ICD-10-CM    1  Lateral epicondylitis of left elbow  M77 12                   Subjective: "my shoulder kills me at the end of the day"    Objective: See treatment diary below  Assessment: Wrist extensors over contract during most activity  Also addressed shoulder discomfort today which is likely due to compensating from elbow pain  Educated on self stretches to perform for the shoulder during the day  No ROM impairment in the shoulder noted today  Plan: Progress treatment as tolerated  Spoke with physician about initiating ionto for the elbow, and she concurred that this would be a good modality to try  He will get it filled at the pharmacy and bring it to his next visit           Precautions: Universal  HEP: Isometric, Extrinsic stretch    Manuals  9/10     IASTM 7' 7' 8' 8' 8' 8' 8' 8'     Cupping 3' 3' 5' 5' 5' 5' 5' 5'     KT  X X X Declined                     Neuro Re-Ed             MWM  No weight, 2x10 No wt 2x10, Nirschl stretches in between sets 1# 3x10 with self stretching in between No weight, 3x10 With belt and gripping 4x6 With belt and gripping 4x6 With belt and gripping 3/10     Door self MWM     1x10 with gripping, 2x10 with wrist extension AROM        Wrist extension eccentric       therapist assisted 2# 3x8 therapist assisted 2# 2x8     Gripping        elbow flexed 3x10 GPW, 50% extended 3x10                                            Ther Ex             Wrist eccentric    YFB F/E 2x10 YFB wrist extension only 2x10 YFB F/E 2x10 RFB 2x10      Flex bar    Yellow S/P 2x10          Wrist maze    10x 10x        keypegs     1x with elbow extension as tolerated  1x with elbow extension as tolerated        Rows      Green TB 3x10 Green TB 3x10      Extension web        Submax, yellow 2x10 Shoulder stretching        5' performed by therapist, educated on self stretching and performed                   Ther Activity                                       Gait Training                                       Modalities             MHP 5' 5' 5' 5' 5'   5'     CP post  5' 5'

## 2020-09-15 ENCOUNTER — OFFICE VISIT (OUTPATIENT)
Dept: OCCUPATIONAL THERAPY | Facility: CLINIC | Age: 55
End: 2020-09-15
Payer: COMMERCIAL

## 2020-09-15 DIAGNOSIS — M77.12 LATERAL EPICONDYLITIS OF LEFT ELBOW: Primary | ICD-10-CM

## 2020-09-15 PROCEDURE — 97112 NEUROMUSCULAR REEDUCATION: CPT | Performed by: OCCUPATIONAL THERAPIST

## 2020-09-15 PROCEDURE — 97140 MANUAL THERAPY 1/> REGIONS: CPT | Performed by: OCCUPATIONAL THERAPIST

## 2020-09-15 PROCEDURE — 97110 THERAPEUTIC EXERCISES: CPT | Performed by: OCCUPATIONAL THERAPIST

## 2020-09-15 NOTE — PROGRESS NOTES
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  Objective: See treatment diary below  Assessment: Initiated ionto for the lateral elbow  Reduced the PRE's today due to the heavy activity he performed at work and the pain level he was already feeling at the start of the visit  Plan: Progress treatment as tolerated            Precautions: Universal  HEP: Isometric, Extrinsic stretch    Manuals 7/14 7/16 7/21 7/23 7/30 8/31 9/8 9/10 9/15    IASTM 7' 7' 8' 8' 8' 8' 8' 8' 8'    Cupping 3' 3' 5' 5' 5' 5' 5' 5' 5'    KT  X X X Declined                     Neuro Re-Ed             MWM  No weight, 2x10 No wt 2x10, Nirschl stretches in between sets 1# 3x10 with self stretching in between No weight, 3x10 With belt and gripping 4x6 With belt and gripping 4x6 With belt and gripping 3/10 With belt and 1# 4x10    Door self MWM     1x10 with gripping, 2x10 with wrist extension AROM        Wrist extension eccentric       therapist assisted 2# 3x8 therapist assisted 2# 2x8     Gripping        elbow flexed 3x10 GPW, 50% extended 3x10 elbow flexed 3x10 GPW, 50% extended 3x10                                           Ther Ex             Wrist eccentric    YFB F/E 2x10 YFB wrist extension only 2x10 YFB F/E 2x10 RFB 2x10      Flex bar    Yellow S/P 2x10          Wrist maze    10x 10x        keypegs     1x with elbow extension as tolerated  1x with elbow extension as tolerated        Rows      Green TB 3x10 Green TB 3x10      Extension web        Submax, yellow 2x10     Shoulder stretching        5' performed by therapist, educated on self stretching and performed Ther Activity                                       Gait Training                                       Modalities             MHP 5' 5' 5' 5' 5'   5' 5'    CP post  5' 5'

## 2020-09-17 ENCOUNTER — OFFICE VISIT (OUTPATIENT)
Dept: OCCUPATIONAL THERAPY | Facility: CLINIC | Age: 55
End: 2020-09-17
Payer: COMMERCIAL

## 2020-09-17 DIAGNOSIS — M77.12 LATERAL EPICONDYLITIS OF LEFT ELBOW: Primary | ICD-10-CM

## 2020-09-17 PROCEDURE — 97112 NEUROMUSCULAR REEDUCATION: CPT | Performed by: OCCUPATIONAL THERAPIST

## 2020-09-17 PROCEDURE — 97140 MANUAL THERAPY 1/> REGIONS: CPT | Performed by: OCCUPATIONAL THERAPIST

## 2020-09-17 NOTE — PROGRESS NOTES
Daily Note     Today's date: 2020  Patient name: Jonny Barrios  : 1965  MRN: [id number]  Referring provider: Librado Najera MD  Dx:   Encounter Diagnosis     ICD-10-CM    1  Lateral epicondylitis of left elbow  M77 12                   Subjective: "It is the same" referring to elbow and shoulder    Objective: See treatment diary below  Assessment: +quispe-bereket test for impingement  After MWM for shoulder IR, able to perform close to end range AROM IR with no pain  Will benefit from continued postural and mechanical adjustment  Shoulder impairment likely contributing to elbow irritation  Focused on manuals and pain reduction today and held PREs as there has been no improvement in pain or symptoms  Plan: Progress treatment as tolerated            Precautions: Universal  HEP: Isometric, Extrinsic stretch    Manuals  9 9/10 9/15 9/17   IASTM 7' 7' 8' 8' 8' 8' 8' 8' 8' 10'   Cupping 3' 3' 5' 5' 5' 5' 5' 5' 5' 5'   KT  X X X Declined        Trigger point release          8''   Neuro Re-Ed             MWM  No weight, 2x10 No wt 2x10, Nirschl stretches in between sets 1# 3x10 with self stretching in between No weight, 3x10 With belt and gripping 4x6 With belt and gripping 4x6 With belt and gripping 3/10 With belt and 1# 4x10 With belt and 1# 4x10   Door self MWM     1x10 with gripping, 2x10 with wrist extension AROM        Wrist extension eccentric       therapist assisted 2# 3x8 therapist assisted 2# 2x8     Gripping        elbow flexed 3x10 GPW, 50% extended 3x10 elbow flexed 3x10 GPW, 50% extended 3x10    IR MWM           5' for impingement                             Ther Ex             Wrist eccentric    YFB F/E 2x10 YFB wrist extension only 2x10 YFB F/E 2x10 RFB 2x10      Flex bar    Yellow S/P 2x10          Wrist maze    10x 10x        keypegs     1x with elbow extension as tolerated  1x with elbow extension as tolerated        Rows      Green TB 3x10 Green TB 3x10      Extension web        Submax, yellow 2x10     Shoulder stretching        5' performed by therapist, educated on self stretching and performed                   Ther Activity                                       Gait Training                                       Modalities             MHP 5' 5' 5' 5' 5'   5' 5' 5'   Ionto         X X   CP post  5' 5'

## 2020-09-21 ENCOUNTER — OFFICE VISIT (OUTPATIENT)
Dept: OCCUPATIONAL THERAPY | Facility: CLINIC | Age: 55
End: 2020-09-21
Payer: COMMERCIAL

## 2020-09-21 DIAGNOSIS — M77.12 LATERAL EPICONDYLITIS OF LEFT ELBOW: Primary | ICD-10-CM

## 2020-09-21 PROCEDURE — 97110 THERAPEUTIC EXERCISES: CPT | Performed by: OCCUPATIONAL THERAPIST

## 2020-09-21 PROCEDURE — 97112 NEUROMUSCULAR REEDUCATION: CPT | Performed by: OCCUPATIONAL THERAPIST

## 2020-09-21 PROCEDURE — 97140 MANUAL THERAPY 1/> REGIONS: CPT | Performed by: OCCUPATIONAL THERAPIST

## 2020-09-21 NOTE — PROGRESS NOTES
Daily Note     Today's date: 2020  Patient name: Rubina Coelho  : 1965  MRN: [id number]  Referring provider: Estelle Hearn MD  Dx:   Encounter Diagnosis     ICD-10-CM    1  Lateral epicondylitis of left elbow  M77 12                   Subjective: "It is about the same" referring to elbow and shoulder    Objective: See treatment diary below  Assessment: +quispe-bereket test for impingement and painful arc  Continued focus on manual techniques and postural retraining to reduce shoulder impingement that is contributing to elbow impairment  Plan: Progress treatment as tolerated       Next MD visit 10/8/20        Precautions: Universal  HEP: Isometric, Extrinsic stretch    Manuals 9/21 7/16 7/21 7/23 7/30 8/31 9/8 9/10 9/15 9/17   IASTM 7' 7' 8' 8' 8' 8' 8' 8' 8' 10'   Cupping 3' 3' 5' 5' 5' 5' 5' 5' 5' 5'   KT  X X X Declined        Trigger point release 5'         8'   Neuro Re-Ed             MWM With belt and 1# 4x10 No weight, 2x10 No wt 2x10, Nirschl stretches in between sets 1# 3x10 with self stretching in between No weight, 3x10 With belt and gripping 4x6 With belt and gripping 4x6 With belt and gripping 3/10 With belt and 1# 4x10 With belt and 1# 4x10   Door self MWM     1x10 with gripping, 2x10 with wrist extension AROM        Wrist extension eccentric       therapist assisted 2# 3x8 therapist assisted 2# 2x8     Gripping        elbow flexed 3x10 GPW, 50% extended 3x10 elbow flexed 3x10 GPW, 50% extended 3x10    IR MWM           5' for impingement   SF MWM 8'                         Ther Ex             Wrist eccentric    YFB F/E 2x10 YFB wrist extension only 2x10 YFB F/E 2x10 RFB 2x10      Flex bar    Yellow S/P 2x10          Wrist maze    10x 10x        keypegs     1x with elbow extension as tolerated  1x with elbow extension as tolerated        Rows      Green TB 3x10 Green TB 3x10      Extension web        Submax, yellow 2x10     Shoulder stretching 5'       5' performed by therapist, educated on self stretching and performed      UBE warm up 2'2, 2 0 resistance 90 RPM            Ther Activity                                       Gait Training                                       Modalities             MHP  5' 5' 5' 5'   5' 5' 5'   Ionto X        X X   CP post  5' 5'

## 2020-09-22 PROBLEM — F32.0 MAJOR DEPRESSIVE DISORDER, SINGLE EPISODE, MILD (HCC): Status: ACTIVE | Noted: 2020-05-07

## 2020-09-22 RX ORDER — TESTOSTERONE CYPIONATE 200 MG/ML
INJECTION INTRAMUSCULAR
COMMUNITY
Start: 2020-09-10 | End: 2020-11-05 | Stop reason: ALTCHOICE

## 2020-09-23 ENCOUNTER — TELEPHONE (OUTPATIENT)
Dept: FAMILY MEDICINE CLINIC | Facility: CLINIC | Age: 55
End: 2020-09-23

## 2020-09-24 ENCOUNTER — OFFICE VISIT (OUTPATIENT)
Dept: FAMILY MEDICINE CLINIC | Facility: CLINIC | Age: 55
End: 2020-09-24
Payer: COMMERCIAL

## 2020-09-24 ENCOUNTER — EVALUATION (OUTPATIENT)
Dept: OCCUPATIONAL THERAPY | Facility: CLINIC | Age: 55
End: 2020-09-24
Payer: COMMERCIAL

## 2020-09-24 VITALS
BODY MASS INDEX: 30.22 KG/M2 | TEMPERATURE: 97.4 F | HEART RATE: 78 BPM | WEIGHT: 228 LBS | SYSTOLIC BLOOD PRESSURE: 130 MMHG | RESPIRATION RATE: 16 BRPM | HEIGHT: 73 IN | DIASTOLIC BLOOD PRESSURE: 90 MMHG | OXYGEN SATURATION: 98 %

## 2020-09-24 DIAGNOSIS — I12.9 BENIGN HYPERTENSION WITH CKD (CHRONIC KIDNEY DISEASE), STAGE II: ICD-10-CM

## 2020-09-24 DIAGNOSIS — I10 ESSENTIAL HYPERTENSION: ICD-10-CM

## 2020-09-24 DIAGNOSIS — E78.00 PURE HYPERCHOLESTEROLEMIA: ICD-10-CM

## 2020-09-24 DIAGNOSIS — N18.2 BENIGN HYPERTENSION WITH CKD (CHRONIC KIDNEY DISEASE), STAGE II: ICD-10-CM

## 2020-09-24 DIAGNOSIS — M77.12 LATERAL EPICONDYLITIS OF LEFT ELBOW: Primary | ICD-10-CM

## 2020-09-24 DIAGNOSIS — Z00.00 ANNUAL PHYSICAL EXAM: ICD-10-CM

## 2020-09-24 DIAGNOSIS — R73.01 ELEVATED FASTING GLUCOSE: ICD-10-CM

## 2020-09-24 DIAGNOSIS — R68.82 LOW LIBIDO: ICD-10-CM

## 2020-09-24 DIAGNOSIS — R41.89 SLUGGISHNESS: ICD-10-CM

## 2020-09-24 DIAGNOSIS — Z23 ENCOUNTER FOR IMMUNIZATION: Primary | ICD-10-CM

## 2020-09-24 PROCEDURE — 90715 TDAP VACCINE 7 YRS/> IM: CPT | Performed by: NURSE PRACTITIONER

## 2020-09-24 PROCEDURE — 3725F SCREEN DEPRESSION PERFORMED: CPT | Performed by: NURSE PRACTITIONER

## 2020-09-24 PROCEDURE — 97140 MANUAL THERAPY 1/> REGIONS: CPT | Performed by: OCCUPATIONAL THERAPIST

## 2020-09-24 PROCEDURE — 3075F SYST BP GE 130 - 139MM HG: CPT | Performed by: NURSE PRACTITIONER

## 2020-09-24 PROCEDURE — 99396 PREV VISIT EST AGE 40-64: CPT | Performed by: NURSE PRACTITIONER

## 2020-09-24 PROCEDURE — 90682 RIV4 VACC RECOMBINANT DNA IM: CPT | Performed by: NURSE PRACTITIONER

## 2020-09-24 PROCEDURE — 90471 IMMUNIZATION ADMIN: CPT | Performed by: NURSE PRACTITIONER

## 2020-09-24 PROCEDURE — 97110 THERAPEUTIC EXERCISES: CPT | Performed by: OCCUPATIONAL THERAPIST

## 2020-09-24 PROCEDURE — 90472 IMMUNIZATION ADMIN EACH ADD: CPT | Performed by: NURSE PRACTITIONER

## 2020-09-24 PROCEDURE — 3080F DIAST BP >= 90 MM HG: CPT | Performed by: NURSE PRACTITIONER

## 2020-09-24 PROCEDURE — 97112 NEUROMUSCULAR REEDUCATION: CPT | Performed by: OCCUPATIONAL THERAPIST

## 2020-09-24 RX ORDER — LOSARTAN POTASSIUM AND HYDROCHLOROTHIAZIDE 12.5; 5 MG/1; MG/1
1 TABLET ORAL DAILY
Qty: 30 TABLET | Refills: 11 | Status: SHIPPED | OUTPATIENT
Start: 2020-09-24 | End: 2020-09-30

## 2020-09-24 NOTE — PATIENT INSTRUCTIONS
Wellness Visit for Adults   AMBULATORY CARE:   A wellness visit  is when you see your healthcare provider to get screened for health problems  You can also get advice on how to stay healthy  Write down your questions so you remember to ask them  Ask your healthcare provider how often you should have a wellness visit  What happens at a wellness visit:  Your healthcare provider will ask about your health, and your family history of health problems  This includes high blood pressure, heart disease, and cancer  He or she will ask if you have symptoms that concern you, if you smoke, and about your mood  You may also be asked about your intake of medicines, supplements, food, and alcohol  Any of the following may be done:  · Your weight  will be checked  Your height may also be checked so your body mass index (BMI) can be calculated  Your BMI shows if you are at a healthy weight  · Your blood pressure  and heart rate will be checked  Your temperature may also be checked  · Blood and urine tests  may be done  Blood tests may be done to check your cholesterol levels  Abnormal cholesterol levels increase your risk for heart disease and stroke  You may also need a blood or urine test to check for diabetes if you are at increased risk  Urine tests may be done to look for signs of an infection or kidney disease  · A physical exam  includes checking your heartbeat and lungs with a stethoscope  Your healthcare provider may also check your skin to look for sun damage  · Screening tests  may be recommended  A screening test is done to check for diseases that may not cause symptoms  The screening tests you may need depend on your age, gender, family history, and lifestyle habits  For example, colorectal screening may be recommended if you are 48years old or older  Screening tests you need if you are a woman:   · A Pap smear  is used to screen for cervical cancer   Pap smears are usually done every 3 to 5 years depending on your age  You may need them more often if you have had abnormal Pap smear test results in the past  Ask your healthcare provider how often you should have a Pap smear  · A mammogram  is an x-ray of your breasts to screen for breast cancer  Experts recommend mammograms every 2 years starting at age 48 years  You may need a mammogram at age 52 years or younger if you have an increased risk for breast cancer  Talk to your healthcare provider about when you should start having mammograms and how often you need them  Vaccines you may need:   · Get an influenza vaccine  every year  The influenza vaccine protects you from the flu  Several types of viruses cause the flu  The viruses change over time, so new vaccines are made each year  · Get a tetanus-diphtheria (Td) booster vaccine  every 10 years  This vaccine protects you against tetanus and diphtheria  Tetanus is a severe infection that may cause painful muscle spasms and lockjaw  Diphtheria is a severe bacterial infection that causes a thick covering in the back of your mouth and throat  · Get a human papillomavirus (HPV) vaccine  if you are female and aged 23 to 32 or male 23 to 24 and never received it  This vaccine protects you from HPV infection  HPV is the most common infection spread by sexual contact  HPV may also cause vaginal, penile, and anal cancers  · Get a pneumococcal vaccine  if you are aged 72 years or older  The pneumococcal vaccine is an injection given to protect you from pneumococcal disease  Pneumococcal disease is an infection caused by pneumococcal bacteria  The infection may cause pneumonia, meningitis, or an ear infection  · Get a shingles vaccine  if you are aged 61 or older, even if you have had shingles before  The shingles vaccine is an injection to protect you from the varicella-zoster virus  This is the same virus that causes chickenpox   Shingles is a painful rash that develops in people who had chickenpox or have been exposed to the virus  How to eat healthy:  My Plate is a model for planning healthy meals  It shows the types and amounts of foods that should go on your plate  Fruits and vegetables make up about half of your plate, and grains and protein make up the other half  A serving of dairy is included on the side of your plate  The amount of calories and serving sizes you need depends on your age, gender, weight, and height  Examples of healthy foods are listed below:  · Eat a variety of vegetables  such as dark green, red, and orange vegetables  You can also include canned vegetables low in sodium (salt) and frozen vegetables without added butter or sauces  · Eat a variety of fresh fruits , canned fruit in 100% juice, frozen fruit, and dried fruit  · Include whole grains  At least half of the grains you eat should be whole grains  Examples include whole-wheat bread, wheat pasta, brown rice, and whole-grain cereals such as oatmeal     · Eat a variety of protein foods such as seafood (fish and shellfish), lean meat, and poultry without skin (turkey and chicken)  Examples of lean meats include pork leg, shoulder, or tenderloin, and beef round, sirloin, tenderloin, and extra lean ground beef  Other protein foods include eggs and egg substitutes, beans, peas, soy products, nuts, and seeds  · Choose low-fat dairy products such as skim or 1% milk or low-fat yogurt, cheese, and cottage cheese  · Limit unhealthy fats  such as butter, hard margarine, and shortening  Exercise:  Exercise at least 30 minutes per day on most days of the week  Some examples of exercise include walking, biking, dancing, and swimming  You can also fit in more physical activity by taking the stairs instead of the elevator or parking farther away from stores  Include muscle strengthening activities 2 days each week  Regular exercise provides many health benefits   It helps you manage your weight, and decreases your risk for type 2 diabetes, heart disease, stroke, and high blood pressure  Exercise can also help improve your mood  Ask your healthcare provider about the best exercise plan for you  General health and safety guidelines:   · Do not smoke  Nicotine and other chemicals in cigarettes and cigars can cause lung damage  Ask your healthcare provider for information if you currently smoke and need help to quit  E-cigarettes or smokeless tobacco still contain nicotine  Talk to your healthcare provider before you use these products  · Limit alcohol  A drink of alcohol is 12 ounces of beer, 5 ounces of wine, or 1½ ounces of liquor  · Lose weight, if needed  Being overweight increases your risk of certain health conditions  These include heart disease, high blood pressure, type 2 diabetes, and certain types of cancer  · Protect your skin  Do not sunbathe or use tanning beds  Use sunscreen with a SPF 15 or higher  Apply sunscreen at least 15 minutes before you go outside  Reapply sunscreen every 2 hours  Wear protective clothing, hats, and sunglasses when you are outside  · Drive safely  Always wear your seatbelt  Make sure everyone in your car wears a seatbelt  A seatbelt can save your life if you are in an accident  Do not use your cell phone when you are driving  This could distract you and cause an accident  Pull over if you need to make a call or send a text message  · Practice safe sex  Use latex condoms if are sexually active and have more than one partner  Your healthcare provider may recommend screening tests for sexually transmitted infections (STIs)  · Wear helmets, lifejackets, and protective gear  Always wear a helmet when you ride a bike or motorcycle, go skiing, or play sports that could cause a head injury  Wear protective equipment when you play sports  Wear a lifejacket when you are on a boat or doing water sports    © 2017 [employer] Information is for End User's use only and may not be sold, redistributed or otherwise used for commercial purposes  All illustrations and images included in CareNotes® are the copyrighted property of [employer]  or [employer]  The above information is an  only  It is not intended as medical advice for individual conditions or treatments  Talk to your doctor, nurse or pharmacist before following any medical regimen to see if it is safe and effective for you  Weight Management   AMBULATORY CARE:   Why it is important to manage your weight:  Being overweight increases your risk of health conditions such as heart disease, high blood pressure, type 2 diabetes, and certain types of cancer  It can also increase your risk for osteoarthritis, sleep apnea, and other respiratory problems  Aim for a slow, steady weight loss  Even a small amount of weight loss can lower your risk of health problems  How to lose weight safely:  A safe and healthy way to lose weight is to eat fewer calories and get regular exercise  You can lose up about 1 pound a week by decreasing the number of calories you eat by 500 calories each day  You can decrease calories by eating smaller portion sizes or by cutting out high-calorie foods  Read labels to find out how many calories are in the foods you eat  You can also burn calories with exercise such as walking, swimming, or biking  You will be more likely to keep weight off if you make these changes part of your lifestyle  Healthy meal plan for weight management:  A healthy meal plan includes a variety of foods, contains fewer calories, and helps you stay healthy  A healthy meal plan includes the following:  · Eat whole-grain foods more often  A healthy meal plan should contain fiber  Fiber is the part of grains, fruits, and vegetables that is not broken down by your body  Whole-grain foods are healthy and provide extra fiber in your diet   Some examples of whole-grain foods are whole-wheat breads and pastas, oatmeal, brown rice, and bulgur  · Eat a variety of vegetables every day  Include dark, leafy greens such as spinach, kale, elvin greens, and mustard greens  Eat yellow and orange vegetables such as carrots, sweet potatoes, and winter squash  · Eat a variety of fruits every day  Choose fresh or canned fruit (canned in its own juice or light syrup) instead of juice  Fruit juice has very little or no fiber  · Eat low-fat dairy foods  Drink fat-free (skim) milk or 1% milk  Eat fat-free yogurt and low-fat cottage cheese  Try low-fat cheeses such as mozzarella and other reduced-fat cheeses  · Choose meat and other protein foods that are low in fat  Choose beans or other legumes such as split peas or lentils  Choose fish, skinless poultry (chicken or turkey), or lean cuts of red meat (beef or pork)  Before you cook meat or poultry, cut off any visible fat  · Use less fat and oil  Try baking foods instead of frying them  Add less fat, such as margarine, sour cream, regular salad dressing and mayonnaise to foods  Eat fewer high-fat foods  Some examples of high-fat foods include french fries, doughnuts, ice cream, and cakes  · Eat fewer sweets  Limit foods and drinks that are high in sugar  This includes candy, cookies, regular soda, and sweetened drinks  Ways to decrease calories:   · Eat smaller portions  ¨ Use a small plate with smaller servings  ¨ Do not eat second helpings  ¨ When you eat at a restaurant, ask for a box and place half of your meal in the box before you eat  ¨ Share an entrée with someone else  · Replace high-calorie snacks with healthy, low-calorie snacks  ¨ Choose fresh fruit, vegetables, fat-free rice cakes, or air-popped popcorn instead of potato chips, nuts, or chocolate  ¨ Choose water or calorie-free drinks instead of soda or sweetened drinks  · Eat regular meals  Skipping meals can lead to overeating later in the day   Eat a healthy snack in place of a meal if you do not have time to eat a regular meal      · Do not shop for groceries when you are hungry  You may be more likely to make unhealthy food choices  Take a grocery list of healthy foods and shop after you have eaten  Exercise:  Exercise at least 30 minutes per day on most days of the week  Some examples of exercise include walking, biking, dancing, and swimming  You can also fit in more physical activity by taking the stairs instead of the elevator or parking farther away from stores  Ask your healthcare provider about the best exercise plan for you  Other things to consider as you try to lose weight:   · Be aware of situations that may give you the urge to overeat, such as eating while watching television  Find ways to avoid these situations  For example, read a book, go for a walk, or do crafts  · Meet with a weight loss support group or friends who are also trying to lose weight  This may help you stay motivated to continue working on your weight loss goals  © 2017 [employer]  Information is for End User's use only and may not be sold, redistributed or otherwise used for commercial purposes  All illustrations and images included in CareNotes® are the copyrighted property of [employer] M [employer] Inc  or [employer]  The above information is an  only  It is not intended as medical advice for individual conditions or treatments  Talk to your doctor, nurse or pharmacist before following any medical regimen to see if it is safe and effective for you  Obesity   AMBULATORY CARE:   Obesity  is when your body mass index (BMI) is greater than 30  Your healthcare provider will use your height and weight to measure your BMI  The risks of obesity include  many health problems, such as injuries or physical disability   You may need tests to check for the following:  · Diabetes     · High blood pressure or high cholesterol     · Heart disease · Gallbladder or liver disease     · Cancer of the colon, breast, prostate, liver, or kidney     · Sleep apnea     · Arthritis or gout  Seek care immediately if:   · You have a severe headache, confusion, or difficulty speaking  · You have weakness on one side of your body  · You have chest pain, sweating, or shortness of breath  Contact your healthcare provider if:   · You have symptoms of gallbladder or liver disease, such as pain in your upper abdomen  · You have knee or hip pain and discomfort while walking  · You have symptoms of diabetes, such as intense hunger and thirst, and frequent urination  · You have symptoms of sleep apnea, such as snoring or daytime sleepiness  · You have questions or concerns about your condition or care  Treatment for obesity  focuses on helping you lose weight to improve your health  Even a small decrease in BMI can reduce the risk for many health problems  Your healthcare provider will help you set a weight-loss goal   · Lifestyle changes  are the first step in treating obesity  These include making healthy food choices and getting regular physical activity  Your healthcare provider may suggest a weight-loss program that involves coaching, education, and therapy  · Medicine  may help you lose weight when it is used with a healthy diet and physical activity  · Surgery  can help you lose weight if you are very obese and have other health problems  There are several types of weight-loss surgery  Ask your healthcare provider for more information  Be successful losing weight:   · Set small, realistic goals  An example of a small goal is to walk for 20 minutes 5 days a week  Ocatvio goal is to lose 5% of your body weight  · Tell friends, family members, and coworkers about your goals  and ask for their support  Ask a friend to lose weight with you, or join a weight-loss support group      · Identify foods or triggers that may cause you to overeat , and find ways to avoid them  Remove tempting high-calorie foods from your home and workplace  Place a bowl of fresh fruit on your kitchen counter  If stress causes you to eat, then find other ways to cope with stress  · Keep a diary to track what you eat and drink  Also write down how many minutes of physical activity you do each day  Weigh yourself once a week and record it in your diary  Eating changes: You will need to eat 500 to 1,000 fewer calories each day than you currently eat to lose 1 to 2 pounds a week  The following changes will help you cut calories:  · Eat smaller portions  Use small plates, no larger than 9 inches in diameter  Fill your plate half full of fruits and vegetables  Measure your food using measuring cups until you know what a serving size looks like  · Eat 3 meals and 1 or 2 snacks each day  Plan your meals in advance  Yuri Serrano and eat at home most of the time  Eat slowly  · Eat fruits and vegetables at every meal   They are low in calories and high in fiber, which makes you feel full  Do not add butter, margarine, or cream sauce to vegetables  Use herbs to season steamed vegetables  · Eat less fat and fewer fried foods  Eat more baked or grilled chicken and fish  These protein sources are lower in calories and fat than red meat  Limit fast food  Dress your salads with olive oil and vinegar instead of bottled dressing  · Limit the amount of sugar you eat  Do not drink sugary beverages  Limit alcohol  Activity changes:  Physical activity is good for your body in many ways  It helps you burn calories and build strong muscles  It decreases stress and depression, and improves your mood  It can also help you sleep better  Talk to your healthcare provider before you begin an exercise program   · Exercise for at least 30 minutes 5 days a week  Start slowly  Set aside time each day for physical activity that you enjoy and that is convenient for you   It is best to do both weight training and an activity that increases your heart rate, such as walking, bicycling, or swimming  · Find ways to be more active  Do yard work and housecleaning  Walk up the stairs instead of using elevators  Spend your leisure time going to events that require walking, such as outdoor festivals or fairs  This extra physical activity can help you lose weight and keep it off  Follow up with your healthcare provider as directed: You may need to meet with a dietitian  Write down your questions so you remember to ask them during your visits  © 2017 [employer]  Information is for End User's use only and may not be sold, redistributed or otherwise used for commercial purposes  All illustrations and images included in CareNotes® are the copyrighted property of [employer]  or [employer]  The above information is an  only  It is not intended as medical advice for individual conditions or treatments  Talk to your doctor, nurse or pharmacist before following any medical regimen to see if it is safe and effective for you  Cholesterol and Your Health   AMBULATORY CARE:   Cholesterol  is a waxy, fat-like substance  Cholesterol is made by your body, but also comes from certain foods you eat  Your body uses cholesterol to make hormones and new cells  Your body also uses cholesterol to protect nerves  Cholesterol comes from foods such as meat and dairy products  Your total cholesterol level is made up by LDL cholesterol, HDL cholesterol, and triglycerides:  · LDL cholesterol  is called bad cholesterol  because it forms plaque in your arteries  As plaque builds up, your arteries become narrow, and less blood flows through  When plaque decreases blood flow to your heart, you may have chest pain  If plaque completely blocks an artery that bring blood to your heart, you may have a heart attack  Plaque can break off and form blood clots   Blood clots may block arteries in your brain and cause a stroke  · HDL cholesterol  is called good cholesterol  because it helps remove LDL cholesterol from your arteries  It does this by attaching to LDL cholesterol and carrying it to your liver  Your liver breaks down LDL cholesterol so your body can get rid of it  High levels of HDL cholesterol can help prevent a heart attack and stroke  Low levels of HDL cholesterol can increase your risk for heart disease, heart attack, and stroke  · Triglycerides  are a type of fat that store energy from foods you eat  High levels of triglycerides also cause plaque buildup  This can increase your risk for a heart attack or stroke  If your triglyceride level is high, your LDL cholesterol level may also be high  How food affects your cholesterol levels:   · Unhealthy fats  increase LDL cholesterol and triglyceride levels in your blood  They are found in foods high in cholesterol, saturated fat, and trans fat:     ¨ Cholesterol  is found in eggs, dairy, and meat  ¨ Saturated fat  is found in butter, cheese, ice cream, whole milk, and coconut oil  Saturated fat is also found in meat, such as sausage, hot dogs, and bologna  ¨ Trans fat  is found in liquid oils and is used in fried and baked foods  Foods that contain trans fats include chips, crackers, muffins, sweet rolls, microwave popcorn, and cookies  · Healthy fats,  also called unsaturated fats, help lower LDL cholesterol and triglyceride levels  Healthy fats include the following:     ¨ Monounsaturated fats  are found in foods such as olive oil, canola oil, avocado, nuts, and olives  ¨ Polyunsaturated fats,  such as omega 3 fats, are found in fish, such as salmon, trout, and tuna  They can also be found in plant foods such as flaxseed, walnuts, and soybeans    Other things that affect your cholesterol levels:   · Smoking cigarettes    · Being overweight or obese     · Drinking large amounts of alcohol    · Not enough exercise or no exercise    · Certain genes passed from your parents to you  What you need to know about having your cholesterol levels checked: Adults 21to 39years of age should have their cholesterol levels checked every 4 to 6 years  Adults 45 years and older should have their cholesterol checked every 1 to 2 years  You may need your cholesterol checked more often, or at a younger age, if you have risk factors for heart disease  You may also need to have your cholesterol checked more often if you have other health conditions, such as diabetes  Blood tests are used to check cholesterol levels  Blood tests measure your levels of triglycerides, LDL cholesterol, and HDL cholesterol  Cholesterol level goals: Your cholesterol level goal may depend on your risk for heart disease  It may also depend on your age and other health conditions  Ask your healthcare provider if the following goals are right for you:  · Your total cholesterol level  should be less than 200 mg/dL  This number may also depend on your HDL and LDL cholesterol goals  · Your LDL cholesterol level  should be less than 130 mg/dL  · Your HDL cholesterol level  should be 60 mg/dL or higher  · Your triglyceride level  should be less than 150 mg/dL  Treatment for high cholesterol:  Treatment for high cholesterol will also decrease your risk of heart disease, heart attack, and stroke  Treatment may include any of the following:  · Medicines  may be given to lower your LDL cholesterol, triglyceride levels, or total cholesterol level   You may need medicines to lower your cholesterol if any of the following is true:     ¨ You have a history of stroke, TIA, unstable angina, or a heart attack    ¨ Your LDL cholesterol level is 190 mg/dL or higher    ¨ You are age 36to 76years of age, have diabetes, and your LDL cholesterol is 70 mg/dL or higher    ¨ You are age 36to 76years of age, have risk factors for heart disease, and your LDL cholesterol is 70 mg/dL or higher    · Lifestyle changes  include changes to your diet, exercise, weight loss, and quitting smoking  It also includes decreasing the amount of alcohol you drink  · Supplements  include fish oil, red yeast rice, and garlic  Fish oil may help lower your triglyceride and LDL cholesterol levels  It may also increase your HDL cholesterol level  Red yeast rice may help decrease your total cholesterol level and LDL cholesterol level  Garlic may help lower your total cholesterol level  Do not take these supplements without talking to your healthcare provider  Nutrition to help lower your cholesterol levels:  A registered dietitian can help you create a healthy eating plan  Read food labels and choose foods low in saturated fat, trans fats, and cholesterol  · Decrease the total amount of fat you eat  Choose lean meats, fat-free or 1% fat milk, and low-fat dairy products, such as yogurt and cheese  Try to limit or avoid red meats  Limit or do not eat fried foods or baked goods such as cookies  · Replace unhealthy fats with healthy fats  Cook foods in olive oil or canola oil  Choose soft margarines that are low in saturated fat and trans fat  Seeds, nuts, and avocados are other examples of healthy fats  · Eat foods with omega-3 fats  Examples include salmon, tuna, mackerel, walnuts, and flaxseed  Eat fish 2 times per week  Children and pregnant women should not eat fish that have high levels of mercury, such as shark, swordfish, and yunier mackerel  · Increase the amount of plant-based foods you eat  Plant-based foods are low in cholesterol and fat  Eating more of these foods may help lower your cholesterol and help you lose weight  Examples of plant-based foods includes fruits, vegetables, legumes, and whole grains  Replace milk that contains dairy with almond, soy, or coconut milk  Eat beans and foods with soy for protein instead of meat   Ask your healthcare provider or dietitian for more information on plant-based foods  · Increase the amount of fiber you eat  High-fiber foods can help lower your LDL cholesterol  You should eat between 20 and 30 grams of fiber each day  Eat at least 5 servings of fruits and vegetables each day  Other examples of high-fiber foods include whole-grain or whole-wheat breads, pastas, or cereals, and brown rice  Eat 3 ounces of whole-grain foods each day  Increase fiber slowly  You may have abdominal discomfort, bloating, and gas if you add fiber to your diet too quickly  Lifestyle changes you can make to help lower your cholesterol levels:   · Maintain a healthy weight  Ask your healthcare provider how much you should weigh  Ask him or her to help you create a weight loss plan if you are overweight  Weight loss can decrease your total cholesterol and triglyceride levels  · Exercise regularly  Exercise can help lower your total cholesterol level and maintain a healthy weight  Exercise can also help increase your HDL cholesterol level  Work with your healthcare provider to create an exercise program that is right for you  Get at least 30 minutes of moderate exercise most days of the week  Examples of exercise include brisk walking, swimming, or biking  · Do not smoke  Nicotine and other chemicals in cigarettes and cigars can damage your lungs, heart, and blood vessels  They can also raise your triglyceride levels  Ask your healthcare provider for information if you currently smoke and need help to quit  E-cigarettes or smokeless tobacco still contain nicotine  Talk to your healthcare provider before you use these products  · Limit or do not drink alcohol  Alcohol can increase your triglyceride levels  Ask your healthcare provider if it is safe for you to drink alcohol  Also ask how much is safe for you to drink each day    © 2017 [employer] Information is for End User's use only and may not be sold, redistributed or otherwise used for commercial purposes  All illustrations and images included in CareNotes® are the copyrighted property of [employer]  or [employer]  The above information is an  only  It is not intended as medical advice for individual conditions or treatments  Talk to your doctor, nurse or pharmacist before following any medical regimen to see if it is safe and effective for you  Heart Healthy Diet   AMBULATORY CARE:   A heart healthy diet  is an eating plan low in total fat, unhealthy fats, and sodium (salt)  A heart healthy diet helps decrease your risk for heart disease and stroke  Limit the amount of fat you eat to 25% to 35% of your total daily calories  Limit sodium to less than 2,300 mg each day  Healthy fats:  Healthy fats can help improve cholesterol levels  The risk for heart disease is decreased when cholesterol levels are normal  Choose healthy fats, such as the following:  · Unsaturated fat  is found in foods such as soybean, canola, olive, corn, and safflower oils  It is also found in soft tub margarine that is made with liquid vegetable oil  · Omega-3 fat  is found in certain fish, such as salmon, tuna, and trout, and in walnuts and flaxseed  Unhealthy fats:  Unhealthy fats can cause unhealthy cholesterol levels in your blood and increase your risk of heart disease  Limit unhealthy fats, such as the following:  · Cholesterol  is found in animal foods, such as eggs and lobster, and in dairy products made from whole milk  Limit cholesterol to less than 300 milligrams (mg) each day  You may need to limit cholesterol to 200 mg each day if you have heart disease  · Saturated fat  is found in meats, such as bell and hamburger  It is also found in chicken or turkey skin, whole milk, and butter  Limit saturated fat to less than 7% of your total daily calories  Limit saturated fat to less than 6% if you have heart disease or are at increased risk for it       · Trans fat  is found in packaged foods, such as potato chips and cookies  It is also in hard margarine, some fried foods, and shortening  Avoid trans fats as much as possible    Heart healthy foods and drinks to include:  Ask your dietitian or healthcare provider how many servings to have from each of the following food groups:  · Grains:      ¨ Whole-wheat breads, cereals, and pastas, and brown rice    ¨ Low-fat, low-sodium crackers and chips    · Vegetables:      ¨ Broccoli, green beans, green peas, and spinach    ¨ Collards, kale, and lima beans    ¨ Carrots, sweet potatoes, tomatoes, and peppers    ¨ Canned vegetables with no salt added    · Fruits:      ¨ Bananas, peaches, pears, and pineapple    ¨ Grapes, raisins, and dates    ¨ Oranges, tangerines, grapefruit, orange juice, and grapefruit juice    ¨ Apricots, mangoes, melons, and papaya    ¨ Raspberries and strawberries    ¨ Canned fruit with no added sugar    · Low-fat dairy products:      ¨ Nonfat (skim) milk, 1% milk, and low-fat almond, cashew, or soy milks fortified with calcium    ¨ Low-fat cheese, regular or frozen yogurt, and cottage cheese    · Meats and proteins , such as lean cuts of beef and pork (loin, leg, round), skinless chicken and turkey, legumes, soy products, egg whites, and nuts  Foods and drinks to limit or avoid:  Ask your dietitian or healthcare provider about these and other foods that are high in unhealthy fat, sodium, and sugar:  · Snack or packaged foods , such as frozen dinners, cookies, macaroni and cheese, and cereals with more than 300 mg of sodium per serving    · Canned or dry mixes  for cakes, soups, sauces, or gravies    · Vegetables with added sodium , such as instant potatoes, vegetables with added sauces, or regular canned vegetables    · Other foods high in sodium , such as ketchup, barbecue sauce, salad dressing, pickles, olives, soy sauce, and miso    · High-fat dairy foods  such as whole or 2% milk, cream cheese, or sour cream, and cheeses     · High-fat protein foods  such as high-fat cuts of beef (T-bone steaks, ribs), chicken or turkey with skin, and organ meats, such as liver    · Cured or smoked meats , such as hot dogs, bell, and sausage    · Unhealthy fats and oils , such as butter, stick margarine, shortening, and cooking oils such as coconut or palm oil    · Food and drinks high in sugar , such as soft drinks (soda), sports drinks, sweetened tea, candy, cake, cookies, pies, and doughnuts  Other diet guidelines to follow:   · Eat more foods containing omega-3 fats  Eat fish high in omega-3 fats at least 2 times a week  · Limit alcohol  Too much alcohol can damage your heart and raise your blood pressure  Women should limit alcohol to 1 drink a day  Men should limit alcohol to 2 drinks a day  A drink of alcohol is 12 ounces of beer, 5 ounces of wine, or 1½ ounces of liquor  · Choose low-sodium foods  High-sodium foods can lead to high blood pressure  Add little or no salt to food you prepare  Use herbs and spices in place of salt  · Eat more fiber  to help lower cholesterol levels  Eat at least 5 servings of fruits and vegetables each day  Eat 3 ounces of whole-grain foods each day  Legumes (beans) are also a good source of fiber  Lifestyle guidelines:   · Do not smoke  Nicotine and other chemicals in cigarettes and cigars can cause lung and heart damage  Ask your healthcare provider for information if you currently smoke and need help to quit  E-cigarettes or smokeless tobacco still contain nicotine  Talk to your healthcare provider before you use these products  · Exercise regularly  to help you maintain a healthy weight and improve your blood pressure and cholesterol levels  Ask your healthcare provider about the best exercise plan for you  Do not start an exercise program without asking your healthcare provider     Follow up with your healthcare provider as directed:  Write down your questions so you remember to ask them during your visits  © 2017 [employer] Information is for End User's use only and may not be sold, redistributed or otherwise used for commercial purposes  All illustrations and images included in CareNotes® are the copyrighted property of [employer]  or [employer]  The above information is an  only  It is not intended as medical advice for individual conditions or treatments  Talk to your doctor, nurse or pharmacist before following any medical regimen to see if it is safe and effective for you  Hypertension   WHAT YOU NEED TO KNOW:   What is hypertension? Hypertension is high blood pressure (BP)  Your BP is the force of your blood moving against the walls of your arteries  Normal BP is less than 120/80  Prehypertension is between 120/80 and 139/89  Hypertension is 140/90 or higher  Hypertension causes your BP to get so high that your heart has to work much harder than normal  This can damage your heart  You can control hypertension with a healthy lifestyle or medicines  A controlled blood pressure helps protect your organs, such as your heart, lungs, brain, and kidneys  What causes hypertension? The cause of hypertension may not be known  This type of hypertension is called essential or primary hypertension  Hypertension can sometimes be caused by other medical conditions, such as kidney disease  This type of hypertension is called secondary hypertension  What increases my risk for hypertension?    · Age older than 54 years (men)    · Age older than 72 years (women)    · A family history of hypertension or heart disease     · Obesity or lack of exercise     · Too many high-sodium foods    · Stress     · Use of tobacco, alcohol, or illegal drugs     · A medical condition, such as diabetes, kidney disease, thyroid disease, or adrenal gland disorder     · Certain medicines, such as steroids or birth control pills  What are the signs and symptoms of hypertension? You may have no signs or symptoms, or you may have any of the following:  · Headache     · Blurred vision     · Chest pain     · Dizziness or weakness     · Trouble breathing    · Nosebleeds  How is hypertension diagnosed? Your healthcare provider will ask about your symptoms and the medicines you take  He or she will also ask if you have a family history of high blood pressure and about any health conditions you have  He or she will also check your blood pressure and weight and examine your heart, lungs, and eyes  You may need any of the following tests:  · Blood tests  may help healthcare providers find the cause of your hypertension  Blood tests can also help find other health problems caused by hypertension  · Urine tests  will be done to check your kidney function  Kidney problems can increase your risk for hypertension  How is hypertension treated? Your healthcare provider will recommend lifestyle changes to lower your BP  You may also need the following medicines:  · Medicine  may be used to help lower your BP  You may need more than one type of medicine  Take the medicine exactly as directed  · Diuretics  help decrease extra fluid that collects in your body  This will help lower your BP  You may urinate more often while you take this medicine  · Cholesterol medicine  helps lower your cholesterol level  A low cholesterol level helps prevent heart disease and makes it easier to control your blood pressure  What can I do to manage hypertension? Talk with your healthcare provider about these and other ways to manage hypertension:  · Check your BP at home  Sit and rest for 5 minutes before you take your BP  Extend your arm and support it on a flat surface  Your arm should be at the same level as your heart  Follow the directions that came with your BP monitor  If possible, take at least 2 BP readings each time   Take your BP at least twice a day at the same times each day, such as morning and evening  Keep a record of your BP readings and bring it to your follow-up visits  Ask your healthcare provider what your BP should be  · Limit sodium (salt) as directed  Too much sodium can affect your fluid balance  Check labels to find low-sodium or no-salt-added foods  Some low-sodium foods use potassium salts for flavor  Too much potassium can also cause health problems  Your healthcare provider will tell you how much sodium and potassium are safe for you to have in a day  He or she may recommend that you limit sodium to 2,300 mg a day  · Follow the meal plan recommended by your healthcare provider  A dietitian or your provider can give you more information on low-sodium plans or the DASH (Dietary Approaches to Stop Hypertension) eating plan  The DASH plan is low in sodium, unhealthy fats, and total fat  It is high in potassium, calcium, and fiber  · Exercise to maintain a healthy weight  Exercise at least 30 minutes per day, on most days of the week  This will help decrease your blood pressure  Ask your healthcare provider about the best exercise plan for you  · Decrease stress  This may help lower your BP  Learn ways to relax, such as deep breathing or listening to music  · Limit alcohol  Women should limit alcohol to 1 drink a day  Men should limit alcohol to 2 drinks a day  A drink of alcohol is 12 ounces of beer, 5 ounces of wine, or 1½ ounces of liquor  · Do not smoke  Nicotine and other chemicals in cigarettes and cigars can increase your BP and also cause lung damage  Ask your healthcare provider for information if you currently smoke and need help to quit  E-cigarettes or smokeless tobacco still contain nicotine  Talk to your healthcare provider before you use these products  · Manage any other health conditions you have  Health conditions such as diabetes can increase your risk for hypertension   Follow your healthcare provider's instructions and take all your medicines as directed  Call 911 for any of the following:   · You have discomfort in your chest that feels like squeezing, pressure, fullness, or pain  · You become confused or have difficulty speaking  · You suddenly feel lightheaded or have trouble breathing  · You have pain or discomfort in your back, neck, jaw, stomach, or arm  When should I seek immediate care? · You have a severe headache or vision loss  · You have weakness in an arm or leg  When should I contact my healthcare provider? · You feel faint, dizzy, confused, or drowsy  · You have been taking your BP medicine and your BP is still higher than your healthcare provider says it should be  · You have questions or concerns about your condition or care  CARE AGREEMENT:   You have the right to help plan your care  Learn about your health condition and how it may be treated  Discuss treatment options with your caregivers to decide what care you want to receive  You always have the right to refuse treatment  The above information is an  only  It is not intended as medical advice for individual conditions or treatments  Talk to your doctor, nurse or pharmacist before following any medical regimen to see if it is safe and effective for you  © 2017 [employer]  Information is for End User's use only and may not be sold, redistributed or otherwise used for commercial purposes  All illustrations and images included in CareNotes® are the copyrighted property of [employer]  or [employer]  Losartan/Hydrochlorothiazide (By mouth)   Hydrochlorothiazide (jose juan-droe-klor-sn-BEVM-s-zide), Losartan Potassium (emma-SHAREE-tan sfd-NYM-gi-um)  Treats high blood pressure  This medicine contains an angiotensin receptor blocker (ARB) and a diuretic (water pill)  Brand Name(s): Hyzaar   There may be other brand names for this medicine    When This Medicine Should Not Be Used: This medicine is not right for everyone  Do not use it if you had an allergic reaction to losartan or hydrochlorothiazide, or if you are pregnant  How to Use This Medicine:   Tablet  · Take your medicine as directed  Your dose may need to be changed several times to find what works best for you  · Read and follow the patient instructions that come with this medicine  Talk to your doctor or pharmacist if you have any questions  · Missed dose: Take a dose as soon as you remember  If it is almost time for your next dose, wait until then and take a regular dose  Do not take extra medicine to make up for a missed dose  · Store the medicine in a closed container at room temperature, away from heat, moisture, and direct light  Drugs and Foods to Avoid:   Ask your doctor or pharmacist before using any other medicine, including over-the-counter medicines, vitamins, and herbal products  · Do not use this medicine together with aliskiren if you have diabetes  · Some foods and medicines can affect how this medicine works  Tell your doctor if you are using any of the following:  ¨ Lithium, insulin or other diabetes medicine  ¨ Another blood pressure medicine  ¨ Diuretic (water pill)  ¨ NSAID pain or arthritis medicine (including aspirin, celecoxib, diclofenac, ibuprofen, naproxen)  · Ask your doctor before you use any medicine, supplement, or salt substitute that contains potassium  · If you also use cholestyramine or colestipol, take it at least 4 hours after you take this medicine  · Alcohol, narcotic pain relievers, or sleeping pills may cause you to feel more lightheaded, dizzy, or faint when used with this medicine  Warnings While Using This Medicine:   · It is not safe to take this medicine during pregnancy  It could harm an unborn baby  Tell your doctor right away if you become pregnant    · Tell your doctor if you are breastfeeding, or if you have kidney disease, liver disease (including cirrhosis), heart failure, diabetes, gout, high cholesterol, lupus, trouble urinating, or a history of asthma or allergies  · This medicine may cause the following problems:   ¨ Kidney problems  ¨ Low or high levels of minerals in your blood, including potassium and sodium  ¨ Glaucoma  · This medicine could lower your blood pressure too much, especially when you first use it or if you are dehydrated  Stand or sit up slowly if you feel dizzy or lightheaded  Do not drive or do anything dangerous until you know how this medicine affects you  · Do not stop using this medicine without asking your doctor, even if you feel well  This medicine will not cure your high blood pressure, but it will help keep it in normal range  You may have to take blood pressure medicine for the rest of your life  · Your doctor will do lab tests at regular visits to check on the effects of this medicine  Keep all appointments  · Keep all medicine out of the reach of children  Never share your medicine with anyone  Possible Side Effects While Using This Medicine:   Call your doctor right away if you notice any of these side effects:  · Allergic reaction: Itching or hives, swelling in your face or hands, swelling or tingling in your mouth or throat, chest tightness, trouble breathing  · Change in how much or how often you urinate, bloody urine, lower back or side pain, rapid weight gain, swelling in your hands, ankles, or feet  · Confusion, weakness, trouble breathing, numbness in your hands, feet, or lips  · Dry mouth, increased thirst, muscle twitching or cramps, nausea, vomiting  · Eye pain, vision changes, seeing halos around lights  · Fast, pounding, or uneven heartbeat  · Lightheadedness, dizziness, fainting  If you notice these less serious side effects, talk with your doctor:   · Dry cough  If you notice other side effects that you think are caused by this medicine, tell your doctor  Call your doctor for medical advice about side effects   You may report side effects to FDA at 2-697-FDA-5051  © 2017 [employer] Information is for End User's use only and may not be sold, redistributed or otherwise used for commercial purposes  The above information is an  only  It is not intended as medical advice for individual conditions or treatments  Talk to your doctor, nurse or pharmacist before following any medical regimen to see if it is safe and effective for you

## 2020-09-24 NOTE — PROGRESS NOTES
OT Re-Evaluation     Today's date: 2020  Patient name: Mickie Chance  : 1965  MRN: [id number]  Referring provider: Mary Hand MD  Dx:   Encounter Diagnosis     ICD-10-CM    1  Lateral epicondylitis of left elbow  M77 12                   Assessment  Assessment details: Debby Arevalo presents with findings consistent to the referred diagnosis of left lateral epicondylitis including tenderness at the lateral epicondyle, pain with grasping, weakness, reduced function, and positive provacative testing  He states minimal to no improvement in elbow pain since beginning therapy but is willing to continue with conservative measures  He is unable to tolerate daytime or nighttime wrist splinting and states that this makes his pain worse  He has started iontophoresis treatment and also reports minimal improvement  He demonstrates left shoulder impairment as well, likely subacromial impingement which is contributing to his elbow irritation  See below for a detailed assessment  Impairments: abnormal or restricted ROM, activity intolerance, impaired physical strength, lacks appropriate home exercise program and pain with function    Symptom irritability: moderateUnderstanding of Dx/Px/POC: good   Prognosis: fair    Goals  STG: Patient will be compliant with ergonomic modifications and home exercise program in 1 week  - MET  STG: Pain will be reduced to 5/10 during appropriate activity and during therapy in 4 weeks - NOT MET  STG: Strength will be improved to 115 pounds and painfree in 4 weeks  - NOT MET    LTG: Pain will be reduced to 3/10 during appropriate activity and during therapy by discharge  LTG: Strength will be improved to 125 pounds and painfree in 6-8 weeks or discharge  LTG: Performance in ADLs and IADLS will be improved to prior level of function with the affected extremity within 6 weeks or discharge     LTG: Performance in work activity will be improved to prior level of function with the affected extremity within 6 weeks or discharge  LTG: FOTO score increase by 15 points within 6 weeks or discharge  Plan  Plan details: Treatment to include modalities, manual therapy, PRE's, HEP, and orthotics as appropriate  Patient would benefit from: skilled OT and OT eval  Planned modality interventions: thermotherapy: hydrocollator packs and iontophoresis  Planned therapy interventions: home exercise program, joint mobilization, manual therapy, therapeutic exercise, patient education, therapeutic activities, neuromuscular re-education, strengthening and stretching  Frequency: 2x week  Duration in visits: 10  Plan of Care beginning date: 2020  Plan of Care expiration date: 2020  Treatment plan discussed with: patient        Subjective Evaluation    History of Present Illness  Mechanism of injury: He reports left lateral elbow pain that began a few months ago not associated with any trauma  He received a cortisone injection two weeks ago and states no improvement  He is also complaining of worsening left shoulder pain  Pain  At best pain ratin (No pain at rest)  At worst pain rating: 10  Quality: sharp and tight    Social Support    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[employer])  [employer] dominance: left    Treatments  Current treatment: occupational therapy  Patient Goals  Patient goals for therapy: decreased pain and increased strength          Objective     Tenderness     Left Elbow   Tenderness in the lateral epicondyle  Left Wrist/Hand   Tenderness in the common extensor tendon and lateral epicondyle       Active Range of Motion   Left Shoulder   Flexion: 161 degrees with pain  Abduction: 148 degrees with pain    Left Elbow   Normal active range of motion  Flexion: 136 degrees   Extension: 0 degrees with pain    Strength/Myotome Testing     Left Wrist/Hand      (2nd hand position)     Trial 1: 55    Comments: 8/10 pain        Right Wrist/Hand      (2nd hand position)     Trial 1: 137 1    Additional Strength Details  38 6 Stress position gripping and 10/10 pain     Tests     Left Shoulder   Positive Hawkin's and painful arc (Painful at 137 degrees)  Left Elbow   Positive Cozen's and Mill's  Negative radial tunnel  Left Wrist/Hand   Positive resisted middle finger                   Next MD visit 10/8/20        Precautions: Universal  HEP: Isometric, Extrinsic stretch    Manuals 9/21 9/24 7/21 7/23 7/30 8/31 9/8 9/10 9/15 9/17   IASTM 7' 7' 8' 8' 8' 8' 8' 8' 8' 10'   Cupping 3' 3' 5' 5' 5' 5' 5' 5' 5' 5'   KT   X X Declined        Trigger point release 5'         8'   Neuro Re-Ed             MWM With belt and 1# 4x10 With belt and 2# 4x10 No wt 2x10, Nirschl stretches in between sets 1# 3x10 with self stretching in between No weight, 3x10 With belt and gripping 4x6 With belt and gripping 4x6 With belt and gripping 3/10 With belt and 1# 4x10 With belt and 1# 4x10   Door self MWM     1x10 with gripping, 2x10 with wrist extension AROM        Wrist extension eccentric       therapist assisted 2# 3x8 therapist assisted 2# 2x8     Gripping        elbow flexed 3x10 GPW, 50% extended 3x10 elbow flexed 3x10 GPW, 50% extended 3x10    IR MWM           5' for impingement   MWM 8' shoulder flexion 7' shoulder flexion, abduction                        Ther Ex             Wrist eccentric    YFB F/E 2x10 YFB wrist extension only 2x10 YFB F/E 2x10 RFB 2x10      Flex bar    Yellow S/P 2x10          Wrist maze    10x 10x        keypegs     1x with elbow extension as tolerated  1x with elbow extension as tolerated        Rows      Green TB 3x10 Green TB 3x10      Extension web        Submax, yellow 2x10     Shoulder stretching 5'       5' performed by therapist, educated on self stretching and performed      UBE warm up 2'2, 2 0 resistance 90 RPM warm up 2 5'2, 4 0 resistance 90 RPM           Re-eval  performed           Ther Activity                                       Gait Training                                       Modalities             [facility]   5' 5' 5'   5' 5' 5'   Ionto X Forgot medicine, given patch for home with instructions       X X   CP post   5'

## 2020-09-24 NOTE — PROGRESS NOTES
ADULT ANNUAL PHYSICAL  [address] [facility] [address]    NAME: Hershal Baumgarten  AGE: 60 y o  SEX: male  : 1965     DATE: 2020     Assessment and Plan:  Take blood pressure daily and log  Have blood work drawn as ordered  Will follow with virtual visit in 2 weeks to review findings    Additionally, have fasting blood work drawn in the end of November and follow 1 week later for follow-up     Problem List Items Addressed This Visit        Cardiovascular and Mediastinum    Hypertension    Relevant Medications    losartan-hydrochlorothiazide (HYZAAR) 50-12 5 mg per tablet    Other Relevant Orders    CBC and differential    Comprehensive metabolic panel    Lipid panel    UA w Reflex to Microscopic w Reflex to Culture    Hemoglobin A1C    Benign hypertension with CKD (chronic kidney disease), stage II    Relevant Medications    losartan-hydrochlorothiazide (HYZAAR) 50-12 5 mg per tablet    Other Relevant Orders    CBC and differential    Comprehensive metabolic panel    Lipid panel    UA w Reflex to Microscopic w Reflex to Culture    Hemoglobin A1C       Other    Hyperlipidemia    Relevant Orders    CBC and differential    Comprehensive metabolic panel    Lipid panel    UA w Reflex to Microscopic w Reflex to Culture    Hemoglobin A1C    Encounter for immunization - Primary    Relevant Orders    influenza vaccine, quadrivalent, recombinant, PF, 0 5 mL, for patients 18 yr+ (FLUBLOK) (Completed)    TDAP VACCINE GREATER THAN OR EQUAL TO 6YO IM (Completed)    Sluggishness    Relevant Orders    Testosterone, free, total    Ambulatory referral to Endocrinology    Low libido    Relevant Orders    Testosterone, free, total    Ambulatory referral to Endocrinology    Annual physical exam      Other Visit Diagnoses     Elevated fasting glucose        Relevant Orders    CBC and differential    Comprehensive metabolic panel    Lipid panel    UA w Reflex to Microscopic w Reflex to Culture    Hemoglobin A1C    BMI 30 0-30 9,adult        Relevant Orders    CBC and differential    Comprehensive metabolic panel    Lipid panel    UA w Reflex to Microscopic w Reflex to Culture    Hemoglobin A1C          Immunizations and preventive care screenings were discussed with patient today  Appropriate education was printed on patient's after visit summary  Counseling:  Alcohol/drug use: discussed moderation in alcohol intake, the recommendations for healthy alcohol use, and avoidance of illicit drug use  Dental Health: discussed importance of regular tooth brushing, flossing, and dental visits  Injury prevention: discussed safety/seat belts, safety helmets, smoke detectors, carbon dioxide detectors, and smoking near bedding or upholstery  Sexual health: discussed sexually transmitted diseases, partner selection, use of condoms, avoidance of unintended pregnancy, and contraceptive alternatives  · Exercise: the importance of regular exercise/physical activity was discussed  Recommend exercise 3-5 times per week for at least 30 minutes  Return in 2 months (on 2020)  Chief Complaint:     Chief Complaint   Patient presents with    Annual Exam      History of Present Illness:     Adult Annual Physical   Patient here for a comprehensive physical exam  The patient reports problems - low libido, sluggishness  Diet and Physical Activity  · Diet/Nutrition: heart healthy (low sodium) diet, frequent junk food and low fat diet  · Exercise: no formal exercise        Depression Screening  PHQ-9 Depression Screening    PHQ-9:    Frequency of the following problems over the past two weeks:       Little interest or pleasure in doing things:  0 - not at all  Feeling down, depressed, or hopeless:  0 - not at all  Trouble falling or staying asleep, or sleeping too much:  3 - nearly every day  Feeling tired or having little energy:  3 - nearly every day  Poor appetite or overeatin - not at all  Feeling bad about yourself - or that you are a failure or have let yourself or your family down:  0 - not at all  Trouble concentrating on things, such as reading the newspaper or watching television:  0 - not at all  Moving or speaking so slowly that other people could have noticed  Or the opposite - being so fidgety or restless that you have been moving around a lot more than usual:  0 - not at all  Thoughts that you would be better off dead, or of hurting yourself in some way:  0 - not at all  PHQ-2 Score:  0  PHQ-9 Score:  6       General Health  · Sleep: sleeps poorly, gets 4-6 hours of sleep on average and snores loudly  · Hearing:  Decreased mildly bilaterally  · Vision: vision problems: wears glasses, goes for regular eye exams and wears glasses  · Dental: regular dental visits, brushes teeth twice daily and does not floss   Health  · Symptoms include: urinary hesitancy and nocturia, refuses urology consult     Review of Systems:     Review of Systems   Constitutional: Negative  HENT: Negative  Eyes: Negative  Respiratory: Negative  Cardiovascular: Negative  Gastrointestinal: Negative  Endocrine: Negative  Sluggishness   Genitourinary: Negative  Low libido, now sex drive recently   Musculoskeletal: Positive for arthralgias (Left elbow)  Skin: Negative  Allergic/Immunologic: Negative  Neurological: Negative  Hematological: Negative  Psychiatric/Behavioral: Negative  All other systems reviewed and are negative       Past Medical History:     Past Medical History:   Diagnosis Date    Depression     Diverticulosis     GERD (gastroesophageal reflux disease)     Hyperlipidemia     Hypertension     Sleep apnea     Umbilical hernia       Past Surgical History:     Past Surgical History:   Procedure Laterality Date    CATARACT EXTRACTION Bilateral     CHOLECYSTECTOMY      COLONOSCOPY      ESOPHAGOGASTRODUODENOSCOPY      HERNIA REPAIR  09/19/2019    Open repair of incarcerated incisional hernia with mesh - Dr Antony Mortimer ARTHROSCOPY Left     WISDOM TOOTH EXTRACTION        Family History:     Family History   Problem Relation Age of Onset    Anemia Mother     Hypertension Mother     Pancreatic cancer Father     Diabetes Father     Hypertension Father     Diabetes Maternal Grandmother     Diabetes Maternal Grandfather     Diabetes Paternal Grandmother     Diabetes Paternal Grandfather       Social History:        Social History     Socioeconomic History    Marital status: /Civil Union     Spouse name: None    Number of children: None    Years of education: None    Highest education level: None   Occupational History    None   Social Needs    Financial resource strain: Not hard at all   Nilesh-Eileen insecurity     Worry: Never true     Inability: Never true    Transportation needs     Medical: No     Non-medical: No   Tobacco Use    Smoking status: Former Smoker     Packs/day: 1 00     Years: 35 00     Pack years: 35 00     Types: Cigarettes    Smokeless tobacco: Never Used    Tobacco comment: quit 6 5 years ago - As per [place]    Substance and Sexual Activity    Alcohol use: Yes     Frequency: 2-3 times a week     Drinks per session: 1 or 2     Binge frequency: Never     Comment: "few times a week"    Drug use: Never    Sexual activity: Yes     Partners: Female   Lifestyle    Physical activity     Days per week: 5 days     Minutes per session: 30 min    Stress:  To some extent   Relationships    Social connections     Talks on phone: More than three times a week     Gets together: Once a week     Attends Synagogue service: Never     Active member of club or organization: No     Attends meetings of clubs or organizations: Never     Relationship status:     Intimate partner violence     Fear of current or ex partner: No     Emotionally abused: No     Physically abused: No     Forced sexual activity: No Other Topics Concern    None   Social History Narrative    · Do you currently or have you served in [employer]:   No      · Were you activated, into active duty, as a member of the [employer] or as a Reservist:   No      · Advance directive:   No       Current Medications:     Current Outpatient Medications   Medication Sig Dispense Refill    amLODIPine (NORVASC) 5 mg tablet Take 1 tablet (5 mg total) by mouth daily 90 tablet 3    cholecalciferol (VITAMIN D3) 1,000 units tablet Take 1 tablet (1,000 Units total) by mouth daily 90 tablet 3    dexamethasone (DECADRON) 120 mg/30 mL SOLN USE 1 ML BY IONTOPHORESIS EVERY 6 HOURS      dexamethasone (DECADRON) 4 mg/mL 1 mL (4 mg total) by Iontophoresis route every 6 (six) hours 30 mL 3    eszopiclone (LUNESTA) 3 MG tablet Take 1 tablet (3 mg total) by mouth daily at bedtime Take immediately before bedtime 90 tablet 1    Famotidine (PEPCID AC PO) Take 1 Dose by mouth daily as needed Patient does not recall dosage (OTC)       FLUoxetine (PROzac) 20 MG tablet Take 1 tablet (20 mg total) by mouth daily 90 tablet 3    meloxicam (MOBIC) 7 5 mg tablet Take 1 tablet (7 5 mg total) by mouth daily 30 tablet 2    Multiple Vitamin (MULTIVITAMIN) tablet Take 1 tablet by mouth daily      triamcinolone (KENALOG) 0 1 % ointment Apply topically 2 (two) times a day 15 g 1    losartan-hydrochlorothiazide (HYZAAR) 50-12 5 mg per tablet Take 1 tablet by mouth daily 30 tablet 11    methylPREDNISolone 4 MG tablet therapy pack Use as directed on package (Patient not taking: Reported on 9/24/2020) 1 each 0     No current facility-administered medications for this visit  Allergies:      Allergies   Allergen Reactions    Allopurinol Rash      Physical Exam:     /90 (BP Location: Left arm, Patient Position: Sitting, Cuff Size: Large)   Pulse 78   Temp (!) 97 4 °F (36 3 °C) (Temporal)   Resp 16   Ht 6' 1" (1 854 m)   Wt 103 kg (228 lb)   SpO2 98%   BMI 30 08 kg/m²     Physical Exam  Vitals signs and nursing note reviewed  Constitutional:       Appearance: Normal appearance  He is normal weight  HENT:      Head: Normocephalic and atraumatic  Nose: Nose normal    Eyes:      Extraocular Movements: Extraocular movements intact  Conjunctiva/sclera: Conjunctivae normal    Neck:      Musculoskeletal: Normal range of motion  No muscular tenderness  Cardiovascular:      Rate and Rhythm: Normal rate and regular rhythm  Heart sounds: Normal heart sounds  Pulmonary:      Effort: Pulmonary effort is normal       Breath sounds: Normal breath sounds  Abdominal:      General: Abdomen is flat  Bowel sounds are normal    Musculoskeletal: Normal range of motion  General: Tenderness (Left elbow point tenderness followed by PT OT and ortho) present  Skin:     General: Skin is warm and dry  Neurological:      General: No focal deficit present  Mental Status: He is alert and oriented to person, place, and time  Motor: No weakness  Gait: Gait normal    Psychiatric:         Mood and Affect: Mood normal          Behavior: Behavior normal          Thought Content:  Thought content normal          Judgment: Judgment normal           Ranee Kayser, [address]

## 2020-09-25 ENCOUNTER — APPOINTMENT (OUTPATIENT)
Dept: LAB | Facility: CLINIC | Age: 55
End: 2020-09-25
Payer: COMMERCIAL

## 2020-09-25 ENCOUNTER — TRANSCRIBE ORDERS (OUTPATIENT)
Dept: LAB | Facility: CLINIC | Age: 55
End: 2020-09-25

## 2020-09-25 DIAGNOSIS — R68.82 LOW LIBIDO: ICD-10-CM

## 2020-09-25 DIAGNOSIS — R41.89 SLUGGISHNESS: ICD-10-CM

## 2020-09-25 PROCEDURE — 84402 ASSAY OF FREE TESTOSTERONE: CPT

## 2020-09-25 PROCEDURE — 84403 ASSAY OF TOTAL TESTOSTERONE: CPT

## 2020-09-25 PROCEDURE — 36415 COLL VENOUS BLD VENIPUNCTURE: CPT

## 2020-09-26 LAB
TESTOST FREE SERPL-MCNC: 9.9 PG/ML (ref 7.2–24)
TESTOST SERPL-MCNC: 382 NG/DL (ref 264–916)

## 2020-09-28 ENCOUNTER — OFFICE VISIT (OUTPATIENT)
Dept: OCCUPATIONAL THERAPY | Facility: CLINIC | Age: 55
End: 2020-09-28
Payer: COMMERCIAL

## 2020-09-28 DIAGNOSIS — M77.12 LATERAL EPICONDYLITIS OF LEFT ELBOW: Primary | ICD-10-CM

## 2020-09-28 PROCEDURE — 97112 NEUROMUSCULAR REEDUCATION: CPT | Performed by: OCCUPATIONAL THERAPIST

## 2020-09-28 PROCEDURE — 97140 MANUAL THERAPY 1/> REGIONS: CPT | Performed by: OCCUPATIONAL THERAPIST

## 2020-09-28 NOTE — PROGRESS NOTES
Daily Note     Today's date: 2020  Patient name: Hershal Baumgarten  : 1965  MRN: [id number]  Referring provider: Suresh Fuentes MD  Dx:   Encounter Diagnosis     ICD-10-CM    1  Lateral epicondylitis of left elbow  M77 12                   Subjective: "it is the same" referring to elbow and shoulder pain  Despite continued education regarding ergonomics, avoiding repetitive use, and encouraging rest, the pain in his elbow and shoulder remains unchanged  Objective: See treatment diary below      Assessment: He declined continuing with ionto, and states that his over the counter pain patches have helped his symptoms more  Slightly improved tolerance towards soft tissue mobilization at the lateral elbow  There is no pain initially at the lateral epicondyle during MWM, however by the 3rd set pain is mild  Plan: Progress treatment as tolerated            Next MD visit 10/8/20        Precautions: Universal  HEP: Isometric, Extrinsic stretch    Manuals 9/21 9/24 9/28 7/23 7/30 8/31 9/8 9/10 9/15 9/17   IASTM 7' 7' 7' 8' 8' 8' 8' 8' 8' 10'   Cupping 3' 3' 3' 5' 5' 5' 5' 5' 5' 5'   KT    X Declined        Trigger point release 5' 5'        8'   Neuro Re-Ed             MWM With belt and 1# 4x10 With belt and 2# 4x10 With belt and 2# 4x10 1# 3x10 with self stretching in between No weight, 3x10 With belt and gripping 4x6 With belt and gripping 4x6 With belt and gripping 3/10 With belt and 1# 4x10 With belt and 1# 4x10   Door self MWM     1x10 with gripping, 2x10 with wrist extension AROM        Wrist extension eccentric       therapist assisted 2# 3x8 therapist assisted 2# 2x8     Gripping        elbow flexed 3x10 GPW, 50% extended 3x10 elbow flexed 3x10 GPW, 50% extended 3x10    IR MWM           5' for impingement   MWM 8' shoulder flexion 7' shoulder flexion, abduction Posterior and inferior gliding with motion,7'                       Ther Ex             Wrist eccentric    YFB F/E 2x10 YFB wrist extension only 2x10 YFB F/E 2x10 RFB 2x10      Flex bar    Yellow S/P 2x10          Wrist maze    10x 10x        keypegs     1x with elbow extension as tolerated  1x with elbow extension as tolerated        Rows      Green TB 3x10 Green TB 3x10      Extension web        Submax, yellow 2x10     Shoulder stretching 5'       5' performed by therapist, educated on self stretching and performed      UBE warm up 2'2, 2 0 resistance 90 RPM warm up 2 5'2, 4 0 resistance 90 RPM warm up 2 5'2, 4 0 resistance 90 RPM          Re-eval  performed           Ther Activity                                       Gait Training                                       Modalities             MHP    5' 5'   5' 5' 5'   Ionto X Forgot medicine, given patch for home with instructions Declined      X X   CP post   5'

## 2020-09-29 ENCOUNTER — TELEPHONE (OUTPATIENT)
Dept: FAMILY MEDICINE CLINIC | Facility: CLINIC | Age: 55
End: 2020-09-29

## 2020-09-30 DIAGNOSIS — I10 ESSENTIAL HYPERTENSION: Primary | ICD-10-CM

## 2020-09-30 RX ORDER — OLMESARTAN MEDOXOMIL AND HYDROCHLOROTHIAZIDE 20/12.5 20; 12.5 MG/1; MG/1
1 TABLET ORAL DAILY
Qty: 30 TABLET | Refills: 0 | Status: SHIPPED | OUTPATIENT
Start: 2020-09-30 | End: 2020-11-05 | Stop reason: ALTCHOICE

## 2020-10-08 ENCOUNTER — OFFICE VISIT (OUTPATIENT)
Dept: OBGYN CLINIC | Facility: CLINIC | Age: 55
End: 2020-10-08
Payer: COMMERCIAL

## 2020-10-08 VITALS
BODY MASS INDEX: 30.35 KG/M2 | TEMPERATURE: 97.8 F | HEART RATE: 93 BPM | HEIGHT: 73 IN | WEIGHT: 229 LBS | DIASTOLIC BLOOD PRESSURE: 86 MMHG | SYSTOLIC BLOOD PRESSURE: 134 MMHG

## 2020-10-08 DIAGNOSIS — M77.12 LATERAL EPICONDYLITIS OF LEFT ELBOW: Primary | ICD-10-CM

## 2020-10-08 PROCEDURE — 99214 OFFICE O/P EST MOD 30 MIN: CPT | Performed by: SURGERY

## 2020-10-08 PROCEDURE — 1036F TOBACCO NON-USER: CPT | Performed by: SURGERY

## 2020-10-13 NOTE — PROGRESS NOTES
Patient did not return to therapy and he canceled his last visit  Patient status relating to their referred diagnosis is unknown after this service date

## 2020-10-29 ENCOUNTER — HOSPITAL ENCOUNTER (OUTPATIENT)
Dept: MRI IMAGING | Facility: HOSPITAL | Age: 55
Discharge: HOME | End: 2020-10-29
Attending: SURGERY
Payer: COMMERCIAL

## 2020-10-29 DIAGNOSIS — M77.12 LATERAL EPICONDYLITIS OF LEFT ELBOW: ICD-10-CM

## 2020-10-29 PROCEDURE — G1004 CDSM NDSC: HCPCS

## 2020-10-29 PROCEDURE — 73221 MRI JOINT UPR EXTREM W/O DYE: CPT

## 2020-11-05 ENCOUNTER — OFFICE VISIT (OUTPATIENT)
Dept: FAMILY MEDICINE CLINIC | Facility: CLINIC | Age: 55
End: 2020-11-05
Payer: COMMERCIAL

## 2020-11-05 ENCOUNTER — OFFICE VISIT (OUTPATIENT)
Dept: OBGYN CLINIC | Facility: CLINIC | Age: 55
End: 2020-11-05
Payer: COMMERCIAL

## 2020-11-05 ENCOUNTER — ANESTHESIA EVENT (OUTPATIENT)
Dept: PERIOP | Facility: AMBULARY SURGERY CENTER | Age: 55
End: 2020-11-05
Payer: COMMERCIAL

## 2020-11-05 ENCOUNTER — APPOINTMENT (OUTPATIENT)
Dept: LAB | Facility: CLINIC | Age: 55
End: 2020-11-05
Payer: COMMERCIAL

## 2020-11-05 ENCOUNTER — OFFICE VISIT (OUTPATIENT)
Dept: LAB | Facility: CLINIC | Age: 55
End: 2020-11-05
Payer: COMMERCIAL

## 2020-11-05 VITALS
TEMPERATURE: 98.7 F | OXYGEN SATURATION: 95 % | HEART RATE: 99 BPM | BODY MASS INDEX: 31.01 KG/M2 | DIASTOLIC BLOOD PRESSURE: 84 MMHG | WEIGHT: 234 LBS | HEIGHT: 73 IN | RESPIRATION RATE: 16 BRPM | SYSTOLIC BLOOD PRESSURE: 110 MMHG

## 2020-11-05 VITALS
HEART RATE: 96 BPM | SYSTOLIC BLOOD PRESSURE: 127 MMHG | BODY MASS INDEX: 31.01 KG/M2 | WEIGHT: 234 LBS | HEIGHT: 73 IN | DIASTOLIC BLOOD PRESSURE: 85 MMHG

## 2020-11-05 DIAGNOSIS — I12.9 BENIGN HYPERTENSION WITH CKD (CHRONIC KIDNEY DISEASE), STAGE II: ICD-10-CM

## 2020-11-05 DIAGNOSIS — M79.672 FOOT PAIN, BILATERAL: ICD-10-CM

## 2020-11-05 DIAGNOSIS — E55.9 VITAMIN D DEFICIENCY: ICD-10-CM

## 2020-11-05 DIAGNOSIS — M19.072 OSTEOARTHRITIS OF FIRST METATARSOPHALANGEAL (MTP) JOINT OF BOTH FEET: ICD-10-CM

## 2020-11-05 DIAGNOSIS — M77.12 LATERAL EPICONDYLITIS OF LEFT ELBOW: ICD-10-CM

## 2020-11-05 DIAGNOSIS — Z01.812 PRE-PROCEDURE LAB EXAM: ICD-10-CM

## 2020-11-05 DIAGNOSIS — F32.A DEPRESSION, UNSPECIFIED DEPRESSION TYPE: ICD-10-CM

## 2020-11-05 DIAGNOSIS — M79.671 FOOT PAIN, BILATERAL: ICD-10-CM

## 2020-11-05 DIAGNOSIS — M77.12 LATERAL EPICONDYLITIS OF LEFT ELBOW: Primary | ICD-10-CM

## 2020-11-05 DIAGNOSIS — E66.09 CLASS 1 OBESITY DUE TO EXCESS CALORIES WITH SERIOUS COMORBIDITY AND BODY MASS INDEX (BMI) OF 30.0 TO 30.9 IN ADULT: ICD-10-CM

## 2020-11-05 DIAGNOSIS — N18.2 BENIGN HYPERTENSION WITH CKD (CHRONIC KIDNEY DISEASE), STAGE II: ICD-10-CM

## 2020-11-05 DIAGNOSIS — I10 ESSENTIAL HYPERTENSION: Primary | ICD-10-CM

## 2020-11-05 DIAGNOSIS — F32.0 MAJOR DEPRESSIVE DISORDER, SINGLE EPISODE, MILD (HCC): ICD-10-CM

## 2020-11-05 DIAGNOSIS — M10.079 IDIOPATHIC GOUT OF FOOT, UNSPECIFIED CHRONICITY, UNSPECIFIED LATERALITY: ICD-10-CM

## 2020-11-05 DIAGNOSIS — F51.01 PRIMARY INSOMNIA: ICD-10-CM

## 2020-11-05 DIAGNOSIS — E78.2 MIXED HYPERLIPIDEMIA: ICD-10-CM

## 2020-11-05 DIAGNOSIS — R53.83 OTHER FATIGUE: ICD-10-CM

## 2020-11-05 DIAGNOSIS — M19.071 OSTEOARTHRITIS OF FIRST METATARSOPHALANGEAL (MTP) JOINT OF BOTH FEET: ICD-10-CM

## 2020-11-05 PROBLEM — Z00.00 ANNUAL PHYSICAL EXAM: Status: RESOLVED | Noted: 2020-09-24 | Resolved: 2020-11-05

## 2020-11-05 PROBLEM — Z23 ENCOUNTER FOR IMMUNIZATION: Status: RESOLVED | Noted: 2020-09-24 | Resolved: 2020-11-05

## 2020-11-05 PROBLEM — E66.811 CLASS 1 OBESITY DUE TO EXCESS CALORIES WITH SERIOUS COMORBIDITY AND BODY MASS INDEX (BMI) OF 30.0 TO 30.9 IN ADULT: Status: ACTIVE | Noted: 2020-11-05

## 2020-11-05 PROBLEM — R41.89 SLUGGISHNESS: Status: RESOLVED | Noted: 2020-09-24 | Resolved: 2020-11-05

## 2020-11-05 LAB
ANION GAP SERPL CALCULATED.3IONS-SCNC: 10 MMOL/L (ref 4–13)
BUN SERPL-MCNC: 21 MG/DL (ref 5–25)
CALCIUM SERPL-MCNC: 9.8 MG/DL (ref 8.3–10.1)
CHLORIDE SERPL-SCNC: 102 MMOL/L (ref 100–108)
CO2 SERPL-SCNC: 26 MMOL/L (ref 21–32)
CREAT SERPL-MCNC: 1.25 MG/DL (ref 0.6–1.3)
GFR SERPL CREATININE-BSD FRML MDRD: 64 ML/MIN/1.73SQ M
GLUCOSE SERPL-MCNC: 135 MG/DL (ref 65–140)
POTASSIUM SERPL-SCNC: 4.5 MMOL/L (ref 3.5–5.3)
SODIUM SERPL-SCNC: 138 MMOL/L (ref 136–145)

## 2020-11-05 PROCEDURE — 99214 OFFICE O/P EST MOD 30 MIN: CPT | Performed by: SURGERY

## 2020-11-05 PROCEDURE — 3008F BODY MASS INDEX DOCD: CPT | Performed by: SURGERY

## 2020-11-05 PROCEDURE — 3725F SCREEN DEPRESSION PERFORMED: CPT | Performed by: FAMILY MEDICINE

## 2020-11-05 PROCEDURE — 99215 OFFICE O/P EST HI 40 MIN: CPT | Performed by: FAMILY MEDICINE

## 2020-11-05 PROCEDURE — 80048 BASIC METABOLIC PNL TOTAL CA: CPT

## 2020-11-05 PROCEDURE — 3079F DIAST BP 80-89 MM HG: CPT | Performed by: FAMILY MEDICINE

## 2020-11-05 PROCEDURE — 3074F SYST BP LT 130 MM HG: CPT | Performed by: FAMILY MEDICINE

## 2020-11-05 PROCEDURE — 1036F TOBACCO NON-USER: CPT | Performed by: SURGERY

## 2020-11-05 PROCEDURE — 36415 COLL VENOUS BLD VENIPUNCTURE: CPT

## 2020-11-05 RX ORDER — LISINOPRIL 30 MG/1
30 TABLET ORAL DAILY
Qty: 30 TABLET | Refills: 2 | Status: SHIPPED | OUTPATIENT
Start: 2020-11-05 | End: 2021-02-08 | Stop reason: SDUPTHER

## 2020-11-06 LAB
ATRIAL RATE: 89 BPM
PR INTERVAL: 172 MS
QRS AXIS: 61 DEGREES
QRSD INTERVAL: 84 MS
QT INTERVAL: 358 MS
QTC INTERVAL: 435 MS
T WAVE AXIS: 49 DEGREES
VENTRICULAR RATE: 89 BPM

## 2020-11-06 PROCEDURE — 93010 ELECTROCARDIOGRAM REPORT: CPT | Performed by: INTERNAL MEDICINE

## 2020-11-06 RX ORDER — FLUOXETINE 20 MG/1
20 TABLET, FILM COATED ORAL DAILY
Qty: 90 TABLET | Refills: 3 | Status: SHIPPED | OUTPATIENT
Start: 2020-11-06 | End: 2021-02-08 | Stop reason: SDUPTHER

## 2020-11-06 RX ORDER — ESZOPICLONE 3 MG/1
3 TABLET, FILM COATED ORAL
Qty: 90 TABLET | Refills: 1 | Status: SHIPPED | OUTPATIENT
Start: 2020-11-06 | End: 2021-02-08 | Stop reason: SDUPTHER

## 2020-11-06 RX ORDER — AMLODIPINE BESYLATE 5 MG/1
5 TABLET ORAL DAILY
Qty: 90 TABLET | Refills: 3 | Status: SHIPPED | OUTPATIENT
Start: 2020-11-06 | End: 2021-02-08 | Stop reason: SDUPTHER

## 2020-11-12 DIAGNOSIS — Z01.818 PRE-OP TESTING: ICD-10-CM

## 2020-11-12 DIAGNOSIS — Z01.818 PRE-OP TESTING: Primary | ICD-10-CM

## 2020-11-12 PROCEDURE — U0003 INFECTIOUS AGENT DETECTION BY NUCLEIC ACID (DNA OR RNA); SEVERE ACUTE RESPIRATORY SYNDROME CORONAVIRUS 2 (SARS-COV-2) (CORONAVIRUS DISEASE [COVID-19]), AMPLIFIED PROBE TECHNIQUE, MAKING USE OF HIGH THROUGHPUT TECHNOLOGIES AS DESCRIBED BY CMS-2020-01-R: HCPCS | Performed by: SURGERY

## 2020-11-14 LAB — SARS-COV-2 RNA SPEC QL NAA+PROBE: NOT DETECTED

## 2020-11-17 ENCOUNTER — ANESTHESIA (OUTPATIENT)
Dept: PERIOP | Facility: AMBULARY SURGERY CENTER | Age: 55
End: 2020-11-17
Payer: COMMERCIAL

## 2020-11-17 ENCOUNTER — HOSPITAL ENCOUNTER (OUTPATIENT)
Facility: AMBULARY SURGERY CENTER | Age: 55
Setting detail: OUTPATIENT SURGERY
Discharge: HOME | End: 2020-11-17
Attending: SURGERY | Admitting: SURGERY
Payer: COMMERCIAL

## 2020-11-17 VITALS
WEIGHT: 232 LBS | BODY MASS INDEX: 30.75 KG/M2 | HEIGHT: 73 IN | TEMPERATURE: 97.2 F | RESPIRATION RATE: 18 BRPM | HEART RATE: 80 BPM | SYSTOLIC BLOOD PRESSURE: 130 MMHG | OXYGEN SATURATION: 92 % | DIASTOLIC BLOOD PRESSURE: 88 MMHG

## 2020-11-17 VITALS — HEART RATE: 71 BPM

## 2020-11-17 DIAGNOSIS — M77.12 LATERAL EPICONDYLITIS OF LEFT ELBOW: Primary | ICD-10-CM

## 2020-11-17 DIAGNOSIS — Z47.89 AFTERCARE FOLLOWING SURGERY OF THE MUSCULOSKELETAL SYSTEM: ICD-10-CM

## 2020-11-17 PROCEDURE — C1713 ANCHOR/SCREW BN/BN,TIS/BN: HCPCS | Performed by: SURGERY

## 2020-11-17 PROCEDURE — 24359 REPAIR ELBOW DEB/ATTCH OPEN: CPT | Performed by: SURGERY

## 2020-11-17 PROCEDURE — 24359 REPAIR ELBOW DEB/ATTCH OPEN: CPT | Performed by: PHYSICIAN ASSISTANT

## 2020-11-17 DEVICE — ANCHOR MINI: Type: IMPLANTABLE DEVICE | Site: ELBOW | Status: FUNCTIONAL

## 2020-11-17 RX ORDER — DEXAMETHASONE SODIUM PHOSPHATE 4 MG/ML
INJECTION, SOLUTION INTRA-ARTICULAR; INTRALESIONAL; INTRAMUSCULAR; INTRAVENOUS; SOFT TISSUE AS NEEDED
Status: DISCONTINUED | OUTPATIENT
Start: 2020-11-17 | End: 2020-11-17

## 2020-11-17 RX ORDER — ONDANSETRON 2 MG/ML
4 INJECTION INTRAMUSCULAR; INTRAVENOUS ONCE AS NEEDED
Status: DISCONTINUED | OUTPATIENT
Start: 2020-11-17 | End: 2020-11-17 | Stop reason: HOSPADM

## 2020-11-17 RX ORDER — CEFAZOLIN SODIUM 2 G/50ML
2000 SOLUTION INTRAVENOUS ONCE
Status: COMPLETED | OUTPATIENT
Start: 2020-11-17 | End: 2020-11-17

## 2020-11-17 RX ORDER — PROPOFOL 10 MG/ML
INJECTION, EMULSION INTRAVENOUS AS NEEDED
Status: DISCONTINUED | OUTPATIENT
Start: 2020-11-17 | End: 2020-11-17

## 2020-11-17 RX ORDER — HYDROCODONE BITARTRATE AND ACETAMINOPHEN 5; 325 MG/1; MG/1
1 TABLET ORAL EVERY 6 HOURS PRN
Status: DISCONTINUED | OUTPATIENT
Start: 2020-11-17 | End: 2020-11-17 | Stop reason: HOSPADM

## 2020-11-17 RX ORDER — HYDROCODONE BITARTRATE AND ACETAMINOPHEN 5; 325 MG/1; MG/1
1 TABLET ORAL EVERY 6 HOURS PRN
Qty: 12 TABLET | Refills: 0 | Status: SHIPPED | OUTPATIENT
Start: 2020-11-17 | End: 2020-11-17 | Stop reason: HOSPADM

## 2020-11-17 RX ORDER — ROPIVACAINE HYDROCHLORIDE 5 MG/ML
INJECTION, SOLUTION EPIDURAL; INFILTRATION; PERINEURAL AS NEEDED
Status: DISCONTINUED | OUTPATIENT
Start: 2020-11-17 | End: 2020-11-17

## 2020-11-17 RX ORDER — LIDOCAINE HYDROCHLORIDE 10 MG/ML
INJECTION, SOLUTION EPIDURAL; INFILTRATION; INTRACAUDAL; PERINEURAL AS NEEDED
Status: DISCONTINUED | OUTPATIENT
Start: 2020-11-17 | End: 2020-11-17

## 2020-11-17 RX ORDER — SODIUM CHLORIDE, SODIUM LACTATE, POTASSIUM CHLORIDE, CALCIUM CHLORIDE 600; 310; 30; 20 MG/100ML; MG/100ML; MG/100ML; MG/100ML
INJECTION, SOLUTION INTRAVENOUS CONTINUOUS PRN
Status: DISCONTINUED | OUTPATIENT
Start: 2020-11-17 | End: 2020-11-17

## 2020-11-17 RX ORDER — FENTANYL CITRATE/PF 50 MCG/ML
50 SYRINGE (ML) INJECTION
Status: DISCONTINUED | OUTPATIENT
Start: 2020-11-17 | End: 2020-11-17 | Stop reason: HOSPADM

## 2020-11-17 RX ORDER — KETAMINE HYDROCHLORIDE 50 MG/ML
INJECTION, SOLUTION, CONCENTRATE INTRAMUSCULAR; INTRAVENOUS AS NEEDED
Status: DISCONTINUED | OUTPATIENT
Start: 2020-11-17 | End: 2020-11-17

## 2020-11-17 RX ORDER — MAGNESIUM HYDROXIDE 1200 MG/15ML
LIQUID ORAL AS NEEDED
Status: DISCONTINUED | OUTPATIENT
Start: 2020-11-17 | End: 2020-11-17 | Stop reason: HOSPADM

## 2020-11-17 RX ORDER — GLYCOPYRROLATE 0.2 MG/ML
INJECTION INTRAMUSCULAR; INTRAVENOUS AS NEEDED
Status: DISCONTINUED | OUTPATIENT
Start: 2020-11-17 | End: 2020-11-17

## 2020-11-17 RX ORDER — FENTANYL CITRATE 50 UG/ML
INJECTION, SOLUTION INTRAMUSCULAR; INTRAVENOUS AS NEEDED
Status: DISCONTINUED | OUTPATIENT
Start: 2020-11-17 | End: 2020-11-17

## 2020-11-17 RX ORDER — SODIUM CHLORIDE, SODIUM LACTATE, POTASSIUM CHLORIDE, CALCIUM CHLORIDE 600; 310; 30; 20 MG/100ML; MG/100ML; MG/100ML; MG/100ML
50 INJECTION, SOLUTION INTRAVENOUS CONTINUOUS
Status: DISCONTINUED | OUTPATIENT
Start: 2020-11-17 | End: 2020-11-17 | Stop reason: HOSPADM

## 2020-11-17 RX ORDER — METOCLOPRAMIDE HYDROCHLORIDE 5 MG/ML
10 INJECTION INTRAMUSCULAR; INTRAVENOUS ONCE AS NEEDED
Status: DISCONTINUED | OUTPATIENT
Start: 2020-11-17 | End: 2020-11-17 | Stop reason: HOSPADM

## 2020-11-17 RX ORDER — PROPOFOL 10 MG/ML
INJECTION, EMULSION INTRAVENOUS CONTINUOUS PRN
Status: DISCONTINUED | OUTPATIENT
Start: 2020-11-17 | End: 2020-11-17

## 2020-11-17 RX ORDER — OXYCODONE HYDROCHLORIDE AND ACETAMINOPHEN 5; 325 MG/1; MG/1
1 TABLET ORAL EVERY 4 HOURS PRN
Qty: 12 TABLET | Refills: 0 | Status: SHIPPED | OUTPATIENT
Start: 2020-11-17 | End: 2020-11-27

## 2020-11-17 RX ORDER — MIDAZOLAM HYDROCHLORIDE 2 MG/2ML
INJECTION, SOLUTION INTRAMUSCULAR; INTRAVENOUS AS NEEDED
Status: DISCONTINUED | OUTPATIENT
Start: 2020-11-17 | End: 2020-11-17

## 2020-11-17 RX ADMIN — PROPOFOL 100 MCG/KG/MIN: 10 INJECTION, EMULSION INTRAVENOUS at 10:56

## 2020-11-17 RX ADMIN — PROPOFOL 20 MG: 10 INJECTION, EMULSION INTRAVENOUS at 11:01

## 2020-11-17 RX ADMIN — ROPIVACAINE HYDROCHLORIDE 30 ML: 5 INJECTION, SOLUTION EPIDURAL; INFILTRATION; PERINEURAL at 10:25

## 2020-11-17 RX ADMIN — MIDAZOLAM HYDROCHLORIDE 2 MG: 1 INJECTION, SOLUTION INTRAMUSCULAR; INTRAVENOUS at 10:23

## 2020-11-17 RX ADMIN — GLYCOPYRROLATE 0.1 MG: 0.2 INJECTION, SOLUTION INTRAMUSCULAR; INTRAVENOUS at 11:30

## 2020-11-17 RX ADMIN — FENTANYL CITRATE 25 MCG: 50 INJECTION, SOLUTION INTRAMUSCULAR; INTRAVENOUS at 10:56

## 2020-11-17 RX ADMIN — LIDOCAINE HYDROCHLORIDE 50 MG: 10 INJECTION, SOLUTION EPIDURAL; INFILTRATION; INTRACAUDAL at 10:56

## 2020-11-17 RX ADMIN — PROPOFOL 70 MG: 10 INJECTION, EMULSION INTRAVENOUS at 10:56

## 2020-11-17 RX ADMIN — CEFAZOLIN SODIUM 2000 MG: 2 SOLUTION INTRAVENOUS at 10:50

## 2020-11-17 RX ADMIN — KETAMINE HYDROCHLORIDE 20 MG: 50 INJECTION INTRAMUSCULAR; INTRAVENOUS at 11:09

## 2020-11-17 RX ADMIN — SODIUM CHLORIDE, SODIUM LACTATE, POTASSIUM CHLORIDE, AND CALCIUM CHLORIDE: .6; .31; .03; .02 INJECTION, SOLUTION INTRAVENOUS at 10:53

## 2020-11-17 RX ADMIN — FENTANYL CITRATE 25 MCG: 50 INJECTION, SOLUTION INTRAMUSCULAR; INTRAVENOUS at 11:02

## 2020-11-17 RX ADMIN — DEXAMETHASONE SODIUM PHOSPHATE 4 MG: 4 INJECTION, SOLUTION INTRAMUSCULAR; INTRAVENOUS at 10:25

## 2020-11-19 ENCOUNTER — TELEPHONE (OUTPATIENT)
Dept: OBGYN CLINIC | Facility: MEDICAL CENTER | Age: 55
End: 2020-11-19

## 2020-11-20 ENCOUNTER — APPOINTMENT (OUTPATIENT)
Dept: RADIOLOGY | Facility: CLINIC | Age: 55
End: 2020-11-20
Payer: COMMERCIAL

## 2020-11-20 ENCOUNTER — LAB (OUTPATIENT)
Dept: LAB | Facility: CLINIC | Age: 55
End: 2020-11-20
Payer: COMMERCIAL

## 2020-11-20 ENCOUNTER — TRANSCRIBE ORDERS (OUTPATIENT)
Dept: LAB | Facility: CLINIC | Age: 55
End: 2020-11-20

## 2020-11-20 DIAGNOSIS — R53.83 OTHER FATIGUE: ICD-10-CM

## 2020-11-20 DIAGNOSIS — M10.079 IDIOPATHIC GOUT OF FOOT, UNSPECIFIED CHRONICITY, UNSPECIFIED LATERALITY: ICD-10-CM

## 2020-11-20 DIAGNOSIS — E78.2 MIXED HYPERLIPIDEMIA: ICD-10-CM

## 2020-11-20 DIAGNOSIS — E55.9 VITAMIN D DEFICIENCY: ICD-10-CM

## 2020-11-20 DIAGNOSIS — I10 ESSENTIAL HYPERTENSION: ICD-10-CM

## 2020-11-20 DIAGNOSIS — M79.672 FOOT PAIN, BILATERAL: ICD-10-CM

## 2020-11-20 DIAGNOSIS — I12.9 BENIGN HYPERTENSION WITH CKD (CHRONIC KIDNEY DISEASE), STAGE II: ICD-10-CM

## 2020-11-20 DIAGNOSIS — M79.671 FOOT PAIN, BILATERAL: ICD-10-CM

## 2020-11-20 DIAGNOSIS — N18.2 BENIGN HYPERTENSION WITH CKD (CHRONIC KIDNEY DISEASE), STAGE II: ICD-10-CM

## 2020-11-20 LAB
BASOPHILS # BLD AUTO: 0.05 THOUSANDS/ΜL (ref 0–0.1)
BASOPHILS NFR BLD AUTO: 1 % (ref 0–1)
CHOLEST SERPL-MCNC: 206 MG/DL (ref 50–200)
CREAT UR-MCNC: 163 MG/DL
EOSINOPHIL # BLD AUTO: 0.03 THOUSAND/ΜL (ref 0–0.61)
EOSINOPHIL NFR BLD AUTO: 0 % (ref 0–6)
ERYTHROCYTE [DISTWIDTH] IN BLOOD BY AUTOMATED COUNT: 12.4 % (ref 11.6–15.1)
HCT VFR BLD AUTO: 47.9 % (ref 36.5–49.3)
HDLC SERPL-MCNC: 55 MG/DL
HGB BLD-MCNC: 15.5 G/DL (ref 12–17)
IMM GRANULOCYTES # BLD AUTO: 0.03 THOUSAND/UL (ref 0–0.2)
IMM GRANULOCYTES NFR BLD AUTO: 0 % (ref 0–2)
LDLC SERPL CALC-MCNC: 112 MG/DL (ref 0–100)
LYMPHOCYTES # BLD AUTO: 2 THOUSANDS/ΜL (ref 0.6–4.47)
LYMPHOCYTES NFR BLD AUTO: 24 % (ref 14–44)
MCH RBC QN AUTO: 30.4 PG (ref 26.8–34.3)
MCHC RBC AUTO-ENTMCNC: 32.4 G/DL (ref 31.4–37.4)
MCV RBC AUTO: 94 FL (ref 82–98)
MICROALBUMIN UR-MCNC: 15.3 MG/L (ref 0–20)
MICROALBUMIN/CREAT 24H UR: 9 MG/G CREATININE (ref 0–30)
MONOCYTES # BLD AUTO: 0.83 THOUSAND/ΜL (ref 0.17–1.22)
MONOCYTES NFR BLD AUTO: 10 % (ref 4–12)
NEUTROPHILS # BLD AUTO: 5.4 THOUSANDS/ΜL (ref 1.85–7.62)
NEUTS SEG NFR BLD AUTO: 65 % (ref 43–75)
NONHDLC SERPL-MCNC: 151 MG/DL
NRBC BLD AUTO-RTO: 0 /100 WBCS
PLATELET # BLD AUTO: 272 THOUSANDS/UL (ref 149–390)
PMV BLD AUTO: 9.6 FL (ref 8.9–12.7)
RBC # BLD AUTO: 5.1 MILLION/UL (ref 3.88–5.62)
TRIGL SERPL-MCNC: 195 MG/DL
TSH SERPL DL<=0.05 MIU/L-ACNC: 2.21 UIU/ML (ref 0.36–3.74)
URATE SERPL-MCNC: 6.5 MG/DL (ref 4.2–8)
WBC # BLD AUTO: 8.34 THOUSAND/UL (ref 4.31–10.16)

## 2020-11-20 PROCEDURE — 85025 COMPLETE CBC W/AUTO DIFF WBC: CPT

## 2020-11-20 PROCEDURE — 84550 ASSAY OF BLOOD/URIC ACID: CPT

## 2020-11-20 PROCEDURE — 84443 ASSAY THYROID STIM HORMONE: CPT

## 2020-11-20 PROCEDURE — 80061 LIPID PANEL: CPT

## 2020-11-20 PROCEDURE — 36415 COLL VENOUS BLD VENIPUNCTURE: CPT

## 2020-11-20 PROCEDURE — 73630 X-RAY EXAM OF FOOT: CPT

## 2020-11-20 PROCEDURE — 82043 UR ALBUMIN QUANTITATIVE: CPT | Performed by: FAMILY MEDICINE

## 2020-11-20 PROCEDURE — 82570 ASSAY OF URINE CREATININE: CPT | Performed by: FAMILY MEDICINE

## 2020-11-24 ENCOUNTER — HOSPITAL ENCOUNTER (OUTPATIENT)
Dept: NON INVASIVE DIAGNOSTICS | Facility: CLINIC | Age: 55
Discharge: HOME | End: 2020-11-24
Payer: COMMERCIAL

## 2020-11-24 DIAGNOSIS — I10 ESSENTIAL HYPERTENSION: ICD-10-CM

## 2020-11-24 PROCEDURE — 93975 VASCULAR STUDY: CPT | Performed by: SURGERY

## 2020-11-24 PROCEDURE — 93975 VASCULAR STUDY: CPT

## 2020-12-03 ENCOUNTER — OFFICE VISIT (OUTPATIENT)
Dept: OBGYN CLINIC | Facility: CLINIC | Age: 55
End: 2020-12-03

## 2020-12-03 ENCOUNTER — OFFICE VISIT (OUTPATIENT)
Dept: OCCUPATIONAL THERAPY | Facility: CLINIC | Age: 55
End: 2020-12-03
Payer: COMMERCIAL

## 2020-12-03 VITALS
BODY MASS INDEX: 31.28 KG/M2 | SYSTOLIC BLOOD PRESSURE: 127 MMHG | HEART RATE: 89 BPM | HEIGHT: 73 IN | DIASTOLIC BLOOD PRESSURE: 86 MMHG | WEIGHT: 236 LBS

## 2020-12-03 DIAGNOSIS — M77.12 LATERAL EPICONDYLITIS OF LEFT ELBOW: ICD-10-CM

## 2020-12-03 DIAGNOSIS — M77.12 LATERAL EPICONDYLITIS OF LEFT ELBOW: Primary | ICD-10-CM

## 2020-12-03 PROCEDURE — L3906 WHO W/O JOINTS CF: HCPCS | Performed by: OCCUPATIONAL THERAPIST

## 2020-12-03 PROCEDURE — 99024 POSTOP FOLLOW-UP VISIT: CPT | Performed by: SURGERY

## 2020-12-08 ENCOUNTER — OFFICE VISIT (OUTPATIENT)
Dept: FAMILY MEDICINE CLINIC | Facility: CLINIC | Age: 55
End: 2020-12-08
Payer: COMMERCIAL

## 2020-12-08 VITALS
HEART RATE: 95 BPM | RESPIRATION RATE: 16 BRPM | DIASTOLIC BLOOD PRESSURE: 86 MMHG | TEMPERATURE: 97.9 F | OXYGEN SATURATION: 97 % | WEIGHT: 237.2 LBS | HEIGHT: 73 IN | SYSTOLIC BLOOD PRESSURE: 136 MMHG | BODY MASS INDEX: 31.44 KG/M2

## 2020-12-08 DIAGNOSIS — Z12.5 SCREENING PSA (PROSTATE SPECIFIC ANTIGEN): ICD-10-CM

## 2020-12-08 DIAGNOSIS — E78.2 MIXED HYPERLIPIDEMIA: Primary | ICD-10-CM

## 2020-12-08 DIAGNOSIS — E78.00 HYPERCHOLESTEREMIA: ICD-10-CM

## 2020-12-08 DIAGNOSIS — R06.83 SNORING: ICD-10-CM

## 2020-12-08 DIAGNOSIS — I10 ESSENTIAL HYPERTENSION: ICD-10-CM

## 2020-12-08 DIAGNOSIS — R53.83 OTHER FATIGUE: ICD-10-CM

## 2020-12-08 DIAGNOSIS — M19.071 ARTHRITIS OF BOTH FEET: ICD-10-CM

## 2020-12-08 DIAGNOSIS — M19.072 ARTHRITIS OF BOTH FEET: ICD-10-CM

## 2020-12-08 PROCEDURE — 3008F BODY MASS INDEX DOCD: CPT | Performed by: FAMILY MEDICINE

## 2020-12-08 PROCEDURE — 1036F TOBACCO NON-USER: CPT | Performed by: FAMILY MEDICINE

## 2020-12-08 PROCEDURE — 3079F DIAST BP 80-89 MM HG: CPT | Performed by: FAMILY MEDICINE

## 2020-12-08 PROCEDURE — 3075F SYST BP GE 130 - 139MM HG: CPT | Performed by: FAMILY MEDICINE

## 2020-12-08 PROCEDURE — 99214 OFFICE O/P EST MOD 30 MIN: CPT | Performed by: FAMILY MEDICINE

## 2020-12-09 ENCOUNTER — TELEPHONE (OUTPATIENT)
Dept: ADMINISTRATIVE | Facility: OTHER | Age: 55
End: 2020-12-09

## 2020-12-13 PROBLEM — E78.00 HYPERCHOLESTEREMIA: Status: ACTIVE | Noted: 2020-12-13

## 2020-12-13 PROBLEM — M19.071 ARTHRITIS OF BOTH FEET: Status: ACTIVE | Noted: 2020-12-13

## 2020-12-13 PROBLEM — M19.072 ARTHRITIS OF BOTH FEET: Status: ACTIVE | Noted: 2020-12-13

## 2020-12-15 ENCOUNTER — TELEPHONE (OUTPATIENT)
Dept: OBGYN CLINIC | Facility: CLINIC | Age: 55
End: 2020-12-15

## 2020-12-15 ENCOUNTER — TELEPHONE (OUTPATIENT)
Dept: OBGYN CLINIC | Facility: HOSPITAL | Age: 55
End: 2020-12-15

## 2020-12-17 ENCOUNTER — APPOINTMENT (OUTPATIENT)
Dept: OCCUPATIONAL THERAPY | Facility: CLINIC | Age: 55
End: 2020-12-17
Payer: COMMERCIAL

## 2020-12-28 ENCOUNTER — EVALUATION (OUTPATIENT)
Dept: OCCUPATIONAL THERAPY | Facility: CLINIC | Age: 55
End: 2020-12-28
Payer: COMMERCIAL

## 2020-12-28 DIAGNOSIS — M77.12 LATERAL EPICONDYLITIS OF LEFT ELBOW: Primary | ICD-10-CM

## 2020-12-28 PROCEDURE — 97140 MANUAL THERAPY 1/> REGIONS: CPT | Performed by: OCCUPATIONAL THERAPIST

## 2020-12-28 PROCEDURE — 97165 OT EVAL LOW COMPLEX 30 MIN: CPT | Performed by: OCCUPATIONAL THERAPIST

## 2020-12-31 ENCOUNTER — TRANSCRIBE ORDERS (OUTPATIENT)
Dept: LAB | Facility: CLINIC | Age: 55
End: 2020-12-31

## 2020-12-31 ENCOUNTER — LAB (OUTPATIENT)
Dept: LAB | Facility: CLINIC | Age: 55
End: 2020-12-31
Payer: COMMERCIAL

## 2020-12-31 DIAGNOSIS — R53.83 OTHER FATIGUE: ICD-10-CM

## 2020-12-31 PROCEDURE — 36415 COLL VENOUS BLD VENIPUNCTURE: CPT

## 2020-12-31 PROCEDURE — 86618 LYME DISEASE ANTIBODY: CPT

## 2021-01-02 LAB — B BURGDOR IGG+IGM SER-ACNC: 0

## 2021-01-05 ENCOUNTER — TELEPHONE (OUTPATIENT)
Dept: SLEEP CENTER | Facility: CLINIC | Age: 56
End: 2021-01-05

## 2021-01-05 NOTE — TELEPHONE ENCOUNTER
----- Message from Mendel Look, DO sent at 1/2/2021  9:11 PM EST -----  Chart reviewed  Study approved  Schedule HST    ----- Message -----  From: Pelon Simpson MA  Sent: 12/29/2020   6:48 AM EST  To: Sleep Medicine Cathy Provider    This sleep study needs approval      If approved please sign and return to clerical pool  If denied please include reasons why  Also provide alternative testing if warranted  Please sign and return to clerical pool

## 2021-01-06 ENCOUNTER — OFFICE VISIT (OUTPATIENT)
Dept: OCCUPATIONAL THERAPY | Facility: CLINIC | Age: 56
End: 2021-01-06
Payer: COMMERCIAL

## 2021-01-06 DIAGNOSIS — M77.12 LATERAL EPICONDYLITIS OF LEFT ELBOW: Primary | ICD-10-CM

## 2021-01-06 PROCEDURE — 97530 THERAPEUTIC ACTIVITIES: CPT | Performed by: OCCUPATIONAL THERAPIST

## 2021-01-06 PROCEDURE — 97140 MANUAL THERAPY 1/> REGIONS: CPT | Performed by: OCCUPATIONAL THERAPIST

## 2021-01-06 PROCEDURE — 97110 THERAPEUTIC EXERCISES: CPT | Performed by: OCCUPATIONAL THERAPIST

## 2021-01-06 NOTE — PROGRESS NOTES
Daily Note     Today's date: 2021  Patient name: Inocencio Wang  : 1965  MRN: [id number]  Referring provider: Roxana Perez MD  Dx:   Encounter Diagnosis     ICD-10-CM    1  Lateral epicondylitis of left elbow  M77 12                   Subjective: "I was really sore last time"      Objective: See treatment diary below      Assessment: Downgraded due to painful response to last time      Plan: Continue per plan of care        Precautions: No composite wrist flexion with elbow extension; strengthening as tolerated and pain allows    Manuals            IASTM 5' extensors 5           Scar massage 3' 3                                     Neuro Re-Ed                                                                                                        Ther Ex             HEP: Green TB wrist strength, blue TB elbow and shoulder strength; elastomere Issued Concentric strengthening with weights if tolerated            UBE             Flex bar F/E  R 3x 10           Flex bar S/P  R 3x 10           Flex bar all planes on table  R 3 10           Cable column strengthening   GTB3x 10 EE, EF, rows           EDC  Y Gum 3x10           Sup/pro  Hammer 3x10           Ther Activity              strengthening                          Gait Training                                       Modalities             MHP 5' 5

## 2021-01-12 ENCOUNTER — OFFICE VISIT (OUTPATIENT)
Dept: OCCUPATIONAL THERAPY | Facility: CLINIC | Age: 56
End: 2021-01-12
Payer: COMMERCIAL

## 2021-01-12 DIAGNOSIS — M77.12 LATERAL EPICONDYLITIS OF LEFT ELBOW: Primary | ICD-10-CM

## 2021-01-12 PROCEDURE — 97110 THERAPEUTIC EXERCISES: CPT | Performed by: OCCUPATIONAL THERAPIST

## 2021-01-12 PROCEDURE — 97140 MANUAL THERAPY 1/> REGIONS: CPT | Performed by: OCCUPATIONAL THERAPIST

## 2021-01-12 NOTE — PROGRESS NOTES
Daily Note     Today's date: 2021  Patient name: Marybel Armstrong  : 1965  MRN: [id number]  Referring provider: Stephenie Russ MD  Dx:   Encounter Diagnosis     ICD-10-CM    1  Lateral epicondylitis of left elbow  M77 12                   Subjective: He states minimal pain during upgrades and feels that he can independently strengthen at home      Objective: See treatment diary below  Assessment: Minimal scar tissue adhesion  Slight ECRB tightness  Doing very well  Full elbow and wrist motion  Plan: Returns to doctor tomorrow for follow up, will return as needed to therapy        Precautions: No composite wrist flexion with elbow extension; strengthening as tolerated and pain allows    Manuals           IASTM 5' extensors 5 5'          Scar massage 3' 3 3'                                    Neuro Re-Ed                                                                                                        Ther Ex             HEP: Green TB wrist strength, blue TB elbow and shoulder strength; elastomere Issued Concentric strengthening with weights if tolerated  Instructed to begin          UBE             Flex bar F/E  R 3x 10 Green 3x10          Flex bar S/P  R 3x 10 Green 3x10          Flex bar all planes on table  R 3 10           Cable column strengthening   GTB3x 10 EE, EF, rows Bicep 25# 3x10, tricep 30# 3x10, rows 30# 3x10          EDC  Y Gum 3x10 yellow 3x10          Sup/pro  Hammer 3x10 hammer 3x10          Ther Activity              strengthening   static gripping, 2# with red spring 3rd, 15 seconds, 5x                       Gait Training                                       Modalities             MHP 5' 5 5'

## 2021-01-13 ENCOUNTER — OFFICE VISIT (OUTPATIENT)
Dept: OBGYN CLINIC | Facility: CLINIC | Age: 56
End: 2021-01-13

## 2021-01-13 VITALS
SYSTOLIC BLOOD PRESSURE: 118 MMHG | HEART RATE: 99 BPM | BODY MASS INDEX: 31.54 KG/M2 | DIASTOLIC BLOOD PRESSURE: 82 MMHG | HEIGHT: 73 IN | WEIGHT: 238 LBS

## 2021-01-13 DIAGNOSIS — M77.12 LATERAL EPICONDYLITIS OF LEFT ELBOW: Primary | ICD-10-CM

## 2021-01-13 PROCEDURE — 99024 POSTOP FOLLOW-UP VISIT: CPT | Performed by: SURGERY

## 2021-01-13 PROCEDURE — 3008F BODY MASS INDEX DOCD: CPT | Performed by: FAMILY MEDICINE

## 2021-01-13 NOTE — LETTER
January 13, 2021     Patient: Jerome Yoo   YOB: 1965   Date of Visit: 1/13/2021       To Whom it May Concern:    Maddy Cody is under my professional care  He was seen in my office on 1/13/2021  He may return to work with limitations with max lifting 20lbs  He will be re-evaluate in 4 weeks  If you have any questions or concerns, please don't hesitate to call           Sincerely,          Libertad Peguero MD        CC: No Recipients

## 2021-01-13 NOTE — PROGRESS NOTES
Assessment/Plan:  Patient ID: Roseann Nance 60 y o  male   Surgery: lateral epicondyle elbow debridement, release, and repair - Left  Date of Surgery: 11/17/2020    The patient is 2 months s/p of the left lateral epicondylar debridement, release and repair  At this time, we can increase his lifting restriction from 5 to 20lbs max  In regards to weight-lifting and other activities, it's recommended to wait until the 3 month angélica  We will re-evaluate in 5 weeks, which would bring him to 3 months before releasing him to all his activities  Follow Up:  5  week(s)    To Do Next Visit:  Re-evaluate      CHIEF COMPLAINT:  Chief Complaint   Patient presents with    Follow-up         SUBJECTIVE:  Roseann Nance is a 60y o  year old male who presents for follow up after lateral epicondyle elbow debridement, release, and repair - Left  Today patient has Pain  Mild  Intermittant  Dull and Aching  At this time he has completed formal occupational therapy and transition to home exercise program   Felt that he could do the exercises independently at home  Patient states that he is ambitious and wants to get back to his normal activities but does not want to have a setback        PHYSICAL EXAMINATION:  General: well developed and well nourished, alert, oriented times 3 and appears comfortable  Psychiatric: Normal    MUSCULOSKELETAL EXAMINATION:  Incision: Clean, dry, intact and healed  Surgery Site: normal, no evidence of infection   Range of Motion: As expected, opposition intact and full composite fist possible, full elbow range of motion, full forearm pronation supination  Neurovascular status: Neuro intact, good cap refill and radial pulse 2+  Activity Restrictions: Restrictions: max lifting 20lbs      STUDIES REVIEWED:  No Studies to review      PROCEDURES PERFORMED:  Procedures  No Procedures performed today    Scribe Attestation    I,:  Arpit Springer am acting as a scribe while in the presence of the attending physician :       I,:  Jennifer Rodriguez MD personally performed the services described in this documentation    as scribed in my presence :

## 2021-01-28 ENCOUNTER — PATIENT MESSAGE (OUTPATIENT)
Dept: FAMILY MEDICINE CLINIC | Facility: CLINIC | Age: 56
End: 2021-01-28

## 2021-01-28 ENCOUNTER — OFFICE VISIT (OUTPATIENT)
Dept: PODIATRY | Facility: CLINIC | Age: 56
End: 2021-01-28
Payer: COMMERCIAL

## 2021-01-28 VITALS
DIASTOLIC BLOOD PRESSURE: 90 MMHG | BODY MASS INDEX: 31.54 KG/M2 | HEIGHT: 73 IN | SYSTOLIC BLOOD PRESSURE: 139 MMHG | WEIGHT: 238 LBS | RESPIRATION RATE: 17 BRPM

## 2021-01-28 DIAGNOSIS — M21.961 ACQUIRED DEFORMITY OF RIGHT FOOT: ICD-10-CM

## 2021-01-28 DIAGNOSIS — M21.42 ACQUIRED FLAT FOOT, LEFT: ICD-10-CM

## 2021-01-28 DIAGNOSIS — M20.22 HALLUX RIGIDUS, LEFT FOOT: ICD-10-CM

## 2021-01-28 DIAGNOSIS — M21.41 ACQUIRED FLAT FOOT, RIGHT: ICD-10-CM

## 2021-01-28 DIAGNOSIS — M21.962 ACQUIRED DEFORMITY OF LEFT FOOT: ICD-10-CM

## 2021-01-28 DIAGNOSIS — M20.21 HALLUX RIGIDUS, RIGHT FOOT: Primary | ICD-10-CM

## 2021-01-28 PROCEDURE — 20605 DRAIN/INJ JOINT/BURSA W/O US: CPT | Performed by: PODIATRIST

## 2021-01-28 PROCEDURE — L3000 FT INSERT UCB BERKELEY SHELL: HCPCS | Performed by: PODIATRIST

## 2021-01-28 PROCEDURE — 99202 OFFICE O/P NEW SF 15 MIN: CPT | Performed by: PODIATRIST

## 2021-01-28 NOTE — TELEPHONE ENCOUNTER
From: Seda Stone LPN  To: Tito Maldonado  Sent: 1/27/2021 11:56 AM EST  Subject: Influenza Vaccine     Hello,     We hope you are doing well  While it is important to receive your flu vaccine yearly, our records show that you have not yet received yours for this year  Please call the office at [phone] if you are interested in scheduling a visit to receive the vaccine  If you have had your flu vaccine outside of our office, please reply to this message with the date and location so we can update your medical record  If you have already received your flu vaccine in our office, please disregard this message            We look forward to hearing from you,    Your Medical Team at [address]

## 2021-01-28 NOTE — PROGRESS NOTES
Assessment/Plan: arthralgia 1st MPJ bilateral   Acquired deformity of foot  Pes planus bilateral     plan  Foot exam performed  X-rays reviewed  Bilateral arthrocentesis done  1 cc Kenalog 10 injected into each 1st MPJ bilateral          Diagnoses and all orders for this visit:    Hallux rigidus, right foot    Hallux rigidus, left foot    Acquired deformity of right foot    Acquired deformity of left foot    Acquired flat foot, right    Acquired flat foot, left          Subjective:   Patient has bilateral foot pain  He has been diagnosed with arthralgia  Presents for 2nd opinion      Allergies   Allergen Reactions    Allopurinol Rash         Current Outpatient Medications:     amLODIPine (NORVASC) 5 mg tablet, Take 1 tablet (5 mg total) by mouth daily, Disp: 90 tablet, Rfl: 3    cholecalciferol (VITAMIN D3) 1,000 units tablet, Take 1 tablet (1,000 Units total) by mouth daily, Disp: 90 tablet, Rfl: 3    eszopiclone (LUNESTA) 3 MG tablet, Take 1 tablet (3 mg total) by mouth daily at bedtime Take immediately before bedtime, Disp: 90 tablet, Rfl: 1    Famotidine (PEPCID AC PO), Take 20 mg by mouth daily as needed Patient does not recall dosage (OTC) , Disp: , Rfl:     FLUoxetine (PROzac) 20 MG tablet, Take 1 tablet (20 mg total) by mouth daily, Disp: 90 tablet, Rfl: 3    lisinopril (ZESTRIL) 30 mg tablet, Take 1 tablet (30 mg total) by mouth daily (Patient taking differently: Take 30 mg by mouth daily at bedtime ), Disp: 30 tablet, Rfl: 2    Multiple Vitamin (MULTIVITAMIN) tablet, Take 1 tablet by mouth daily, Disp: , Rfl:     triamcinolone (KENALOG) 0 1 % ointment, Apply topically 2 (two) times a day, Disp: 15 g, Rfl: 1    Patient Active Problem List   Diagnosis    Essential hypertension    GERD (gastroesophageal reflux disease)    Diverticulosis    Sleep apnea    Major depressive disorder, single episode, mild (HCC)    Mixed hyperlipidemia    Benign hypertension with CKD (chronic kidney disease), stage II    Primary insomnia    Elbow pain, left    Psoriasis    Vitamin D deficiency    Low libido    Class 1 obesity due to excess calories with serious comorbidity and body mass index (BMI) of 30 0 to 30 9 in adult    Lateral epicondylitis of left elbow    Hypercholesteremia    Arthritis of both feet          Patient ID: Altaf Perez is a 60 y o  male  HPI    The following portions of the patient's history were reviewed and updated as appropriate:     family history includes Abdominal aortic aneurysm in his father; Anemia in his mother; Diabetes in his father, maternal grandfather, maternal grandmother, paternal grandfather, and paternal grandmother; Hypertension in his father and mother; Pancreatic cancer in his father  reports that he quit smoking about 8 years ago  His smoking use included cigarettes  He has a 35 00 pack-year smoking history  He has never used smokeless tobacco  He reports current alcohol use  He reports that he does not use drugs  Vitals:    01/28/21 1128   BP: 139/90   Resp: 17       Review of Systems      Objective:  Patient's shoes and socks removed  Foot ExamPhysical Exam  Vitals signs and nursing note reviewed  Cardiovascular:      Rate and Rhythm: Normal rate and regular rhythm  Musculoskeletal:      Comments:   Pes planus noted bilateral   Patient has metatarsus adductus  Early hallux rigidus deformity noted  X-ray review demonstrates significant osteoarthritic change of the 1st MPJ bilateral   Psychiatric:         Mood and Affect: Mood normal          Behavior: Behavior normal          Thought Content:  Thought content normal          Judgment: Judgment normal

## 2021-01-29 RX ORDER — MELOXICAM 7.5 MG/1
7.5 TABLET ORAL DAILY
Qty: 10 TABLET | Refills: 0 | Status: SHIPPED | OUTPATIENT
Start: 2021-01-29 | End: 2021-06-03

## 2021-02-07 DIAGNOSIS — I10 ESSENTIAL HYPERTENSION: ICD-10-CM

## 2021-02-07 RX ORDER — LISINOPRIL 30 MG/1
TABLET ORAL
Qty: 30 TABLET | Refills: 2 | OUTPATIENT
Start: 2021-02-07

## 2021-02-07 NOTE — TELEPHONE ENCOUNTER
Medication refill received from [facility]  Please let the patient know that we do not accept refills directly from the pharmacy

## 2021-02-08 DIAGNOSIS — I10 ESSENTIAL HYPERTENSION: ICD-10-CM

## 2021-02-08 DIAGNOSIS — F32.0 MAJOR DEPRESSIVE DISORDER, SINGLE EPISODE, MILD (HCC): ICD-10-CM

## 2021-02-08 DIAGNOSIS — N18.2 BENIGN HYPERTENSION WITH CKD (CHRONIC KIDNEY DISEASE), STAGE II: ICD-10-CM

## 2021-02-08 DIAGNOSIS — F51.01 PRIMARY INSOMNIA: ICD-10-CM

## 2021-02-08 DIAGNOSIS — I12.9 BENIGN HYPERTENSION WITH CKD (CHRONIC KIDNEY DISEASE), STAGE II: ICD-10-CM

## 2021-02-08 RX ORDER — FLUOXETINE 20 MG/1
20 TABLET, FILM COATED ORAL DAILY
Qty: 90 TABLET | Refills: 1 | Status: SHIPPED | OUTPATIENT
Start: 2021-02-08 | End: 2021-06-03 | Stop reason: SDUPTHER

## 2021-02-08 RX ORDER — ESZOPICLONE 3 MG/1
3 TABLET, FILM COATED ORAL
Qty: 90 TABLET | Refills: 1 | Status: SHIPPED | OUTPATIENT
Start: 2021-02-08 | End: 2021-05-10 | Stop reason: SDUPTHER

## 2021-02-08 RX ORDER — LISINOPRIL 30 MG/1
30 TABLET ORAL DAILY
Qty: 30 TABLET | Refills: 1 | Status: SHIPPED | OUTPATIENT
Start: 2021-02-08 | End: 2021-04-09 | Stop reason: SDUPTHER

## 2021-02-08 RX ORDER — AMLODIPINE BESYLATE 5 MG/1
5 TABLET ORAL DAILY
Qty: 90 TABLET | Refills: 1 | Status: SHIPPED | OUTPATIENT
Start: 2021-02-08 | End: 2021-06-03 | Stop reason: SDUPTHER

## 2021-02-08 RX ORDER — LISINOPRIL 30 MG/1
30 TABLET ORAL DAILY
Qty: 30 TABLET | Refills: 2 | OUTPATIENT
Start: 2021-02-08

## 2021-02-08 NOTE — TELEPHONE ENCOUNTER
Patient needs his medication refilled, he stated he thought he sent the office a message for this refill but may not have, please send refill over as he is running low

## 2021-02-08 NOTE — TELEPHONE ENCOUNTER
Medication: NORVASC 5 MG, LISINOPRIL 30 MG, PROZAC 20 MG, LUNESTA 3 MG   Last refilled: 11/6/20, 11/5/20, 11/6/20, 11/6/20  Last Office Visit: 12/08/20  Next Office Visit: 6/3/21  Pharmacy: [facility]

## 2021-02-08 NOTE — TELEPHONE ENCOUNTER
Medication: LISINOPRIL 30 MG   Last refilled: 11/13/20  Last Office Visit: 12/8/20  Next Office Visit: 6/3/21  Pharmacy: [facility]

## 2021-02-11 ENCOUNTER — OFFICE VISIT (OUTPATIENT)
Dept: OBGYN CLINIC | Facility: CLINIC | Age: 56
End: 2021-02-11

## 2021-02-11 VITALS
BODY MASS INDEX: 31.54 KG/M2 | WEIGHT: 238 LBS | SYSTOLIC BLOOD PRESSURE: 113 MMHG | DIASTOLIC BLOOD PRESSURE: 75 MMHG | HEIGHT: 73 IN | HEART RATE: 85 BPM

## 2021-02-11 DIAGNOSIS — M77.12 LATERAL EPICONDYLITIS OF LEFT ELBOW: Primary | ICD-10-CM

## 2021-02-11 PROCEDURE — 99024 POSTOP FOLLOW-UP VISIT: CPT | Performed by: SURGERY

## 2021-02-11 RX ORDER — PREDNISONE 5 MG/1
TABLET, DELAYED RELEASE ORAL
COMMUNITY
Start: 2021-01-23 | End: 2021-06-03

## 2021-02-11 NOTE — PROGRESS NOTES
Assessment/Plan:  Patient ID: Miky Keane 60 y o  male   Surgery: lateral epicondyle elbow debridement, release, and repair - Left  Date of Surgery: 11/17/2020    Patient has minimal pain at the elbow, only worsened with shoveling and strenuous activity   ROM is full, strength will continue to return  At this time he may be released for activities as tolerated, work note provided today  Follow up as needed     Follow Up:  PRN    To Do Next Visit:         CHIEF COMPLAINT:  Chief Complaint   Patient presents with    Left Elbow - Post-op         SUBJECTIVE:  Miky Keane is a 60y o  year old male who presents for follow up after lateral epicondyle elbow debridement, release, and repair - Left  Theo Arita notes only mild soreness at the surgical site when shoveling, but otherwise is doing well and denies significant daily pain or any functional limitations  He denies any paresthesias  Patient has completed OT and states his strength seems to be coming back        PHYSICAL EXAMINATION:  General: well developed and well nourished, alert, oriented times 3 and appears comfortable  Psychiatric: Normal    MUSCULOSKELETAL EXAMINATION:  Incision: Clean, dry, intact and healed  Surgery Site: normal, no evidence of infection   Range of Motion: full ROM of the elbow  Neurovascular status: Neuro intact, good cap refill  Activity Restrictions: No restrictions      Only very mild pain with resisted wrist extension with elbow extended       STUDIES REVIEWED:  No Studies to review      PROCEDURES PERFORMED:  Procedures  No Procedures performed today    Jorden Denton PA-C

## 2021-02-11 NOTE — LETTER
February 11, 2021     Patient: Lilliam Jacobs   YOB: 1965   Date of Visit: 2/11/2021       To Whom it May Concern:    Ervin Rehman is under my professional care  He was seen in my office on 2/11/2021  He may return to work without restrictions as of 2/11/2021  If you have any questions or concerns, please don't hesitate to call  Sincerely,          Anamaria Kapadia MD        CC: Prince Gonzalez

## 2021-03-25 ENCOUNTER — OFFICE VISIT (OUTPATIENT)
Dept: PODIATRY | Facility: CLINIC | Age: 56
End: 2021-03-25
Payer: COMMERCIAL

## 2021-03-25 VITALS
SYSTOLIC BLOOD PRESSURE: 113 MMHG | BODY MASS INDEX: 31.54 KG/M2 | HEART RATE: 85 BPM | WEIGHT: 238 LBS | DIASTOLIC BLOOD PRESSURE: 75 MMHG | HEIGHT: 73 IN | RESPIRATION RATE: 17 BRPM

## 2021-03-25 DIAGNOSIS — M21.962 ACQUIRED DEFORMITY OF LEFT FOOT: ICD-10-CM

## 2021-03-25 DIAGNOSIS — M20.22 HALLUX RIGIDUS, LEFT FOOT: ICD-10-CM

## 2021-03-25 DIAGNOSIS — M20.21 HALLUX RIGIDUS, RIGHT FOOT: Primary | ICD-10-CM

## 2021-03-25 DIAGNOSIS — M21.961 ACQUIRED DEFORMITY OF RIGHT FOOT: ICD-10-CM

## 2021-03-25 PROCEDURE — 20605 DRAIN/INJ JOINT/BURSA W/O US: CPT | Performed by: PODIATRIST

## 2021-03-25 PROCEDURE — 99212 OFFICE O/P EST SF 10 MIN: CPT | Performed by: PODIATRIST

## 2021-03-25 NOTE — PROGRESS NOTES
Assessment/Plan: arthralgia 1st MPJ bilateral   Acquired deformity of foot  Pes planus bilateral      plan  Foot exam performed  X-rays reviewed  Bilateral arthrocentesis done  1 cc Kenalog 10 injected into each 1st MPJ bilateral  Patient will need cheilectomy  He will take Aleve p r n  use orthotics as directed            Diagnoses and all orders for this visit:     Hallux rigidus, right foot     Hallux rigidus, left foot     Acquired deformity of right foot     Acquired deformity of left foot     Acquired flat foot, right     Acquired flat foot, left            Subjective:   Patient has bilateral foot pain  He has been diagnosed with arthralgia    Presents for 2nd opinion           Allergies   Allergen Reactions    Allopurinol Rash            Current Outpatient Medications:     amLODIPine (NORVASC) 5 mg tablet, Take 1 tablet (5 mg total) by mouth daily, Disp: 90 tablet, Rfl: 3    cholecalciferol (VITAMIN D3) 1,000 units tablet, Take 1 tablet (1,000 Units total) by mouth daily, Disp: 90 tablet, Rfl: 3    eszopiclone (LUNESTA) 3 MG tablet, Take 1 tablet (3 mg total) by mouth daily at bedtime Take immediately before bedtime, Disp: 90 tablet, Rfl: 1    Famotidine (PEPCID AC PO), Take 20 mg by mouth daily as needed Patient does not recall dosage (OTC) , Disp: , Rfl:     FLUoxetine (PROzac) 20 MG tablet, Take 1 tablet (20 mg total) by mouth daily, Disp: 90 tablet, Rfl: 3    lisinopril (ZESTRIL) 30 mg tablet, Take 1 tablet (30 mg total) by mouth daily (Patient taking differently: Take 30 mg by mouth daily at bedtime ), Disp: 30 tablet, Rfl: 2    Multiple Vitamin (MULTIVITAMIN) tablet, Take 1 tablet by mouth daily, Disp: , Rfl:     triamcinolone (KENALOG) 0 1 % ointment, Apply topically 2 (two) times a day, Disp: 15 g, Rfl: 1         Patient Active Problem List   Diagnosis    Essential hypertension    GERD (gastroesophageal reflux disease)    Diverticulosis    Sleep apnea    Major depressive disorder, single episode, mild (HCC)    Mixed hyperlipidemia    Benign hypertension with CKD (chronic kidney disease), stage II    Primary insomnia    Elbow pain, left    Psoriasis    Vitamin D deficiency    Low libido    Class 1 obesity due to excess calories with serious comorbidity and body mass index (BMI) of 30 0 to 30 9 in adult    Lateral epicondylitis of left elbow    Hypercholesteremia    Arthritis of both feet             Patient ID: Mary Ortiz is a 60 y o  male      HPI     The following portions of the patient's history were reviewed and updated as appropriate:      family history includes Abdominal aortic aneurysm in his father; Anemia in his mother; Diabetes in his father, maternal grandfather, maternal grandmother, paternal grandfather, and paternal grandmother; Hypertension in his father and mother; Pancreatic cancer in his father        reports that he quit smoking about 8 years ago  His smoking use included cigarettes  He has a 35 00 pack-year smoking history  He has never used smokeless tobacco  He reports current alcohol use  He reports that he does not use drugs          Vitals:     01/28/21 1128   BP: 139/90   Resp: 17         Review of Systems       Objective:  Patient's shoes and socks removed  Foot ExamPhysical Exam  Vitals signs and nursing note reviewed  Cardiovascular:      Rate and Rhythm: Normal rate and regular rhythm  Musculoskeletal:      Comments:   Pes planus noted bilateral   Patient has metatarsus adductus  Early hallux rigidus deformity noted  X-ray review demonstrates significant osteoarthritic change of the 1st MPJ bilateral   Psychiatric:         Mood and Affect: Mood normal          Behavior: Behavior normal          Thought Content:  Thought content normal          Judgment: Judgment normal

## 2021-04-09 DIAGNOSIS — F51.01 PRIMARY INSOMNIA: ICD-10-CM

## 2021-04-09 DIAGNOSIS — I10 ESSENTIAL HYPERTENSION: ICD-10-CM

## 2021-04-09 RX ORDER — ESZOPICLONE 3 MG/1
3 TABLET, FILM COATED ORAL
Qty: 30 TABLET | Refills: 5 | Status: CANCELLED | OUTPATIENT
Start: 2021-04-09

## 2021-04-09 RX ORDER — LISINOPRIL 30 MG/1
TABLET ORAL
Qty: 30 TABLET | Refills: 1 | OUTPATIENT
Start: 2021-04-09

## 2021-04-09 NOTE — TELEPHONE ENCOUNTER
Patient called, left a voice message on the prescription refill line for the following medications(s) to be refilled for a 30 day supply:     Medication refill request:  lisinopril   Last office visit: 12/8/2020  Next office visit: 6/3/21  Last refilled: 2/8/21 #30x1  Pharmacy:   [place] [address] [address] [address]  Phone: [phone] Fax: [phone]      Pended: 30x5    Upon completion, patient requests a confirmation call  Denied Lunesta, refill at pharmacy and they have it filled for the patient

## 2021-04-09 NOTE — TELEPHONE ENCOUNTER
Medication refill received from [facility]  Please let the patient know that we do not accept refills directly from the pharmacy

## 2021-04-12 RX ORDER — LISINOPRIL 30 MG/1
30 TABLET ORAL DAILY
Qty: 30 TABLET | Refills: 2 | Status: SHIPPED | OUTPATIENT
Start: 2021-04-12 | End: 2021-06-03 | Stop reason: SDUPTHER

## 2021-05-10 DIAGNOSIS — F51.01 PRIMARY INSOMNIA: ICD-10-CM

## 2021-05-10 RX ORDER — ESZOPICLONE 3 MG/1
3 TABLET, FILM COATED ORAL
Qty: 90 TABLET | Refills: 0 | Status: SHIPPED | OUTPATIENT
Start: 2021-05-10 | End: 2021-06-03 | Stop reason: SDUPTHER

## 2021-05-10 NOTE — TELEPHONE ENCOUNTER
Medication: Eszopiclone 3 mg   Last refilled: 2/8/21  Last Office Visit: 12/8/20  Next Office Visit: 6/3/21  Pharmacy: [facility]

## 2021-05-26 ENCOUNTER — TELEPHONE (OUTPATIENT)
Dept: FAMILY MEDICINE CLINIC | Facility: CLINIC | Age: 56
End: 2021-05-26

## 2021-05-26 NOTE — TELEPHONE ENCOUNTER
Please remind pt to have fasting blood work prior to his upcoming appointment   (change visit to 20 min)

## 2021-05-27 ENCOUNTER — LAB (OUTPATIENT)
Dept: LAB | Facility: CLINIC | Age: 56
End: 2021-05-27
Payer: COMMERCIAL

## 2021-05-27 ENCOUNTER — RA CDI HCC (OUTPATIENT)
Dept: OTHER | Facility: HOSPITAL | Age: 56
End: 2021-05-27

## 2021-05-27 DIAGNOSIS — R73.01 IFG (IMPAIRED FASTING GLUCOSE): Primary | ICD-10-CM

## 2021-05-27 DIAGNOSIS — Z12.5 SCREENING PSA (PROSTATE SPECIFIC ANTIGEN): ICD-10-CM

## 2021-05-27 DIAGNOSIS — E78.2 MIXED HYPERLIPIDEMIA: ICD-10-CM

## 2021-05-27 LAB
ALBUMIN SERPL BCP-MCNC: 4 G/DL (ref 3.5–5)
ALP SERPL-CCNC: 57 U/L (ref 46–116)
ALT SERPL W P-5'-P-CCNC: 43 U/L (ref 12–78)
ANION GAP SERPL CALCULATED.3IONS-SCNC: 9 MMOL/L (ref 4–13)
AST SERPL W P-5'-P-CCNC: 21 U/L (ref 5–45)
BILIRUB SERPL-MCNC: 0.56 MG/DL (ref 0.2–1)
BUN SERPL-MCNC: 16 MG/DL (ref 5–25)
CALCIUM SERPL-MCNC: 9.3 MG/DL (ref 8.3–10.1)
CHLORIDE SERPL-SCNC: 107 MMOL/L (ref 100–108)
CHOLEST SERPL-MCNC: 178 MG/DL (ref 50–200)
CO2 SERPL-SCNC: 28 MMOL/L (ref 21–32)
CREAT SERPL-MCNC: 1.12 MG/DL (ref 0.6–1.3)
GFR SERPL CREATININE-BSD FRML MDRD: 73 ML/MIN/1.73SQ M
GLUCOSE P FAST SERPL-MCNC: 118 MG/DL (ref 65–99)
HDLC SERPL-MCNC: 61 MG/DL
LDLC SERPL CALC-MCNC: 101 MG/DL (ref 0–100)
NONHDLC SERPL-MCNC: 117 MG/DL
POTASSIUM SERPL-SCNC: 4.5 MMOL/L (ref 3.5–5.3)
PROT SERPL-MCNC: 7.6 G/DL (ref 6.4–8.2)
PSA SERPL-MCNC: 1.7 NG/ML (ref 0–4)
SODIUM SERPL-SCNC: 144 MMOL/L (ref 136–145)
TRIGL SERPL-MCNC: 80 MG/DL

## 2021-05-27 PROCEDURE — 80053 COMPREHEN METABOLIC PANEL: CPT

## 2021-05-27 PROCEDURE — 80061 LIPID PANEL: CPT

## 2021-05-27 PROCEDURE — G0103 PSA SCREENING: HCPCS

## 2021-05-27 PROCEDURE — 36415 COLL VENOUS BLD VENIPUNCTURE: CPT

## 2021-05-27 NOTE — TELEPHONE ENCOUNTER
Left detailed message (Natalie Sargent per communication form in chart ) informing patient  and to call the office with any further questions or concerns   Portal message also sent

## 2021-05-27 NOTE — PROGRESS NOTES
[employer]  coding opportunities             Chart reviewed, (number of) suggestions sent to provider: 1           Patients insurance company: [employer] (Medicare Advantage and Commercial)     Visit status: Patient arrived for their scheduled appointment     Provider never responded to [employer]  coding request  Provider used F32 0       [employer] [facility]  coding opportunities             Chart reviewed, (number of) suggestions sent to provider: 1           Patients insurance company: [id number] ([employer])           Found on active problem list - Please assess for 2021 - last assessed 11/5/20  F32 0 Major depressive disorder, single episode, mild ([address] ) - USED

## 2021-06-02 PROBLEM — I10 ESSENTIAL HYPERTENSION: Status: RESOLVED | Noted: 2020-05-07 | Resolved: 2021-06-02

## 2021-06-02 PROBLEM — E78.00 HYPERCHOLESTEREMIA: Status: RESOLVED | Noted: 2020-12-13 | Resolved: 2021-06-02

## 2021-06-03 ENCOUNTER — OFFICE VISIT (OUTPATIENT)
Dept: FAMILY MEDICINE CLINIC | Facility: CLINIC | Age: 56
End: 2021-06-03
Payer: COMMERCIAL

## 2021-06-03 VITALS
BODY MASS INDEX: 30.93 KG/M2 | OXYGEN SATURATION: 98 % | RESPIRATION RATE: 16 BRPM | WEIGHT: 233.4 LBS | HEIGHT: 73 IN | TEMPERATURE: 98.2 F | DIASTOLIC BLOOD PRESSURE: 86 MMHG | HEART RATE: 91 BPM | SYSTOLIC BLOOD PRESSURE: 116 MMHG

## 2021-06-03 DIAGNOSIS — E78.2 MIXED HYPERLIPIDEMIA: ICD-10-CM

## 2021-06-03 DIAGNOSIS — Z12.2 ENCOUNTER FOR SCREENING FOR MALIGNANT NEOPLASM OF LUNG IN FORMER SMOKER WHO QUIT IN PAST 15 YEARS WITH 30 PACK YEAR HISTORY OR GREATER: ICD-10-CM

## 2021-06-03 DIAGNOSIS — F32.0 MAJOR DEPRESSIVE DISORDER, SINGLE EPISODE, MILD (HCC): ICD-10-CM

## 2021-06-03 DIAGNOSIS — I10 ESSENTIAL HYPERTENSION: ICD-10-CM

## 2021-06-03 DIAGNOSIS — R73.01 IFG (IMPAIRED FASTING GLUCOSE): ICD-10-CM

## 2021-06-03 DIAGNOSIS — E66.09 CLASS 1 OBESITY DUE TO EXCESS CALORIES WITH SERIOUS COMORBIDITY AND BODY MASS INDEX (BMI) OF 31.0 TO 31.9 IN ADULT: ICD-10-CM

## 2021-06-03 DIAGNOSIS — Z87.891 ENCOUNTER FOR SCREENING FOR MALIGNANT NEOPLASM OF LUNG IN FORMER SMOKER WHO QUIT IN PAST 15 YEARS WITH 30 PACK YEAR HISTORY OR GREATER: ICD-10-CM

## 2021-06-03 DIAGNOSIS — I12.9 BENIGN HYPERTENSION WITH CKD (CHRONIC KIDNEY DISEASE), STAGE II: Primary | ICD-10-CM

## 2021-06-03 DIAGNOSIS — E55.9 VITAMIN D DEFICIENCY: ICD-10-CM

## 2021-06-03 DIAGNOSIS — K21.9 GASTROESOPHAGEAL REFLUX DISEASE, UNSPECIFIED WHETHER ESOPHAGITIS PRESENT: ICD-10-CM

## 2021-06-03 DIAGNOSIS — N18.2 BENIGN HYPERTENSION WITH CKD (CHRONIC KIDNEY DISEASE), STAGE II: Primary | ICD-10-CM

## 2021-06-03 DIAGNOSIS — F51.01 PRIMARY INSOMNIA: ICD-10-CM

## 2021-06-03 DIAGNOSIS — Z87.891 FORMER SMOKER: ICD-10-CM

## 2021-06-03 PROBLEM — M77.12 LATERAL EPICONDYLITIS OF LEFT ELBOW: Status: RESOLVED | Noted: 2020-11-17 | Resolved: 2021-06-03

## 2021-06-03 PROCEDURE — 99214 OFFICE O/P EST MOD 30 MIN: CPT | Performed by: FAMILY MEDICINE

## 2021-06-03 PROCEDURE — 1036F TOBACCO NON-USER: CPT | Performed by: FAMILY MEDICINE

## 2021-06-03 PROCEDURE — 3079F DIAST BP 80-89 MM HG: CPT | Performed by: FAMILY MEDICINE

## 2021-06-03 PROCEDURE — 3008F BODY MASS INDEX DOCD: CPT | Performed by: FAMILY MEDICINE

## 2021-06-03 PROCEDURE — 3074F SYST BP LT 130 MM HG: CPT | Performed by: FAMILY MEDICINE

## 2021-06-03 RX ORDER — ESZOPICLONE 3 MG/1
3 TABLET, FILM COATED ORAL
Qty: 90 TABLET | Refills: 1 | Status: SHIPPED | OUTPATIENT
Start: 2021-06-03 | End: 2021-09-07 | Stop reason: SDUPTHER

## 2021-06-03 RX ORDER — FLUOXETINE 20 MG/1
20 TABLET, FILM COATED ORAL DAILY
Qty: 90 TABLET | Refills: 1 | Status: SHIPPED | OUTPATIENT
Start: 2021-06-03 | End: 2021-09-07 | Stop reason: SDUPTHER

## 2021-06-03 RX ORDER — LISINOPRIL 30 MG/1
30 TABLET ORAL DAILY
Qty: 30 TABLET | Refills: 1 | Status: SHIPPED | OUTPATIENT
Start: 2021-06-03 | End: 2021-08-04 | Stop reason: SDUPTHER

## 2021-06-03 RX ORDER — AMLODIPINE BESYLATE 5 MG/1
5 TABLET ORAL DAILY
Qty: 90 TABLET | Refills: 1 | Status: SHIPPED | OUTPATIENT
Start: 2021-06-03 | End: 2021-09-07 | Stop reason: SDUPTHER

## 2021-06-03 RX ORDER — FAMOTIDINE 20 MG/1
20 TABLET, FILM COATED ORAL 2 TIMES DAILY
Qty: 90 TABLET | Refills: 1 | Status: SHIPPED | OUTPATIENT
Start: 2021-06-03 | End: 2021-10-27 | Stop reason: ALTCHOICE

## 2021-06-03 RX ORDER — MELATONIN
1000 DAILY
Qty: 90 TABLET | Refills: 1 | Status: SHIPPED | OUTPATIENT
Start: 2021-06-03 | End: 2021-10-27 | Stop reason: ALTCHOICE

## 2021-06-03 NOTE — PROGRESS NOTES
Assessment/Plan:  1  Benign hypertension with CKD (chronic kidney disease), stage II  Well controlled  Refill:   - amLODIPine (NORVASC) 5 mg tablet; Take 1 tablet (5 mg total) by mouth daily  Dispense: 90 tablet; Refill: 1    2  Mixed hyperlipidemia  Nice improvement since prior    - Comprehensive metabolic panel; Future  - Lipid panel; Future  - Hemoglobin A1C; Future    3  Major depressive disorder, single episode, mild ([address] )  Doing well on prozac  Refill   - FLUoxetine (PROzac) 20 MG tablet; Take 1 tablet (20 mg total) by mouth daily  Dispense: 90 tablet; Refill: 1    4  IFG (impaired fasting glucose)  New  Work on diet and exercise, specifically lowering carbs  Update labs with A1C in 6 mos  - Comprehensive metabolic panel; Future  - Lipid panel; Future  - Hemoglobin A1C; Future    5  Class 1 obesity due to excess calories with serious comorbidity and body mass index (BMI) of 31 0 to 31 9 in adult  Encouraged diet and exercise to help for a healthier BMI  6  Primary insomnia  Doing well on lunesta  Understands risks and benefits   PDMP Review       Value Time User    PDMP Reviewed  Yes 6/3/2021  8:42 AM Bailey Obrien DO          - eszopiclone (LUNESTA) 3 MG tablet; Take 1 tablet (3 mg total) by mouth daily at bedtime Take immediately before bedtime  Dispense: 90 tablet; Refill: 1    7  Former smoker  - CT lung screening program; Future  - NM myocardial perfusion spect (stress and/or rest); Future  - Spirometry with diffusing capacity; Future    8  Encounter for screening for malignant neoplasm of lung in former smoker who quit in past 15 years with 30 pack year history or greater  Pros and cons of screening were reviewed including false positive, radiation exposure, incidental findings, further testing needed  The patient was counseling on smoking cessation or remaining smoke free if they have already quit     We reviewed the importance of annual follow up for this exam in order to be an effective screening exam   - CT lung screening program; Future  - NM myocardial perfusion spect (stress and/or rest); Future  - Spirometry with diffusing capacity; Future    9  Vitamin D deficiency  Continue   - cholecalciferol (VITAMIN D3) 1,000 units tablet; Take 1 tablet (1,000 Units total) by mouth daily  Dispense: 90 tablet; Refill: 1    10  Essential hypertension  Well controlled  Refill   - lisinopril (ZESTRIL) 30 mg tablet; Take 1 tablet (30 mg total) by mouth daily  Dispense: 30 tablet; Refill: 1    11  Gastroesophageal reflux disease, unspecified whether esophagitis present  Continue   - famotidine (PEPCID) 20 mg tablet; Take 1 tablet (20 mg total) by mouth 2 (two) times a day  Dispense: 90 tablet; Refill: 1    Return in about 6 months (around 12/3/2021) for labs, CC (physical) 40 min        Subjective:   Merari Samayoa is a 60 y o  male here today for a follow-up on his current medical conditions:  Patient Active Problem List   Diagnosis    GERD (gastroesophageal reflux disease)    Diverticulosis    Sleep apnea    Major depressive disorder, single episode, mild (HCC)    Mixed hyperlipidemia    Benign hypertension with CKD (chronic kidney disease), stage II    Primary insomnia    Elbow pain, left    Psoriasis    Vitamin D deficiency    Low libido    Class 1 obesity due to excess calories with serious comorbidity and body mass index (BMI) of 31 0 to 31 9 in adult    Arthritis of both feet        Current Outpatient Medications   Medication Sig Dispense Refill    amLODIPine (NORVASC) 5 mg tablet Take 1 tablet (5 mg total) by mouth daily 90 tablet 1    cholecalciferol (VITAMIN D3) 1,000 units tablet Take 1 tablet (1,000 Units total) by mouth daily 90 tablet 1    eszopiclone (LUNESTA) 3 MG tablet Take 1 tablet (3 mg total) by mouth daily at bedtime Take immediately before bedtime 90 tablet 1    FLUoxetine (PROzac) 20 MG tablet Take 1 tablet (20 mg total) by mouth daily 90 tablet 1    lisinopril (ZESTRIL) 30 mg tablet Take 1 tablet (30 mg total) by mouth daily 30 tablet 1    Multiple Vitamin (MULTIVITAMIN) tablet Take 1 tablet by mouth daily      triamcinolone (KENALOG) 0 1 % ointment Apply topically 2 (two) times a day 15 g 1    famotidine (PEPCID) 20 mg tablet Take 1 tablet (20 mg total) by mouth 2 (two) times a day 90 tablet 1     No current facility-administered medications for this visit  HPI:  Chief Complaint   Patient presents with    Follow-up     CHRONICS, LAB REVIEW     Shortness of Breath     Patient c/o SOB for as long as he can remember, family hx/o Emphysema      -- Above per clinical staff and reviewed  --    PHQ-9 Depression Screening    PHQ-9:   Frequency of the following problems over the past two weeks:             last visit increase lisinopril  foot xrays DJD b/l  no RUDOLPH  follows with nephrology Gerard Stern last Feb 2019 - d/c HCTZ due to gout, recommend d/c omerazole and NSAIDS   started Norvasc  low salt diet   May 2021 labs fbs 118 chol 206 to 178,  to 80  , HDL 61,  to 101, GFR 64 to 73, PSA 1 1 to1 7  *could not add on HbA1C   Today:  Diet doing pretty well    Busy with carpentry   pepcid 20 mg s as needed     He has had shortness of breath for years   PPD for 35 years   Quit 8 yrs ago   Shortness of breath with working   Difficult to catch his breath   Shortness of breath with activity   No wheezing   Some sweating   No nausea  Nagging cough also   Brother emphysema   Grandmother als   The following portions of the patient's history were reviewed and updated as appropriate: allergies, current medications, past family history, past medical history, past social history, past surgical history and problem list     Objective:  Vitals:  /86   Pulse 91   Temp 98 2 °F (36 8 °C)   Resp 16   Ht 6' 1" (1 854 m)   Wt 106 kg (233 lb 6 4 oz)   SpO2 98%   BMI 30 79 kg/m²    Wt Readings from Last 3 Encounters:   06/03/21 106 kg (233 lb 6 4 oz)   03/25/21 108 kg (238 lb) 02/11/21 108 kg (238 lb)      BP Readings from Last 3 Encounters:   06/03/21 116/86   03/25/21 113/75   02/11/21 113/75        Review of Systems   He has no other concerns  No unexpected weight changes  No chest pain, SOB, or palpitations  No GERD  No changes in bowels or bladder  Sleeping well  No mood changes  + cough     Physical Exam   Constitutional:  he appears well-developed and well-nourished  HENT: Head: Normocephalic  Neck: Neck supple  Cardiovascular: Normal rate, regular rhythm and normal heart sounds  Pulmonary/Chest: Effort normal and breath sounds normal  No wheezes, rales, or rhonchi  Abdominal: Soft  Bowel sounds are normal  There is no tenderness  No hepatosplenomegaly  Musculoskeletal: he exhibits no edema  Lymphadenopathy: he has no cervical adenopathy  Neurological: he is alert and oriented to person, place, and time  Skin: Skin is warm and dry  Psychiatric: he has a normal mood and affect  his behavior is normal  Thought content normal      BMI Counseling: Body mass index is 30 79 kg/m²  The BMI is above normal  Nutrition recommendations include decreasing portion sizes  Exercise recommendations include exercising 3-5 times per week

## 2021-07-01 ENCOUNTER — OFFICE VISIT (OUTPATIENT)
Dept: PODIATRY | Facility: CLINIC | Age: 56
End: 2021-07-01
Payer: COMMERCIAL

## 2021-07-01 VITALS
WEIGHT: 233 LBS | HEART RATE: 91 BPM | RESPIRATION RATE: 16 BRPM | HEIGHT: 73 IN | BODY MASS INDEX: 30.88 KG/M2 | SYSTOLIC BLOOD PRESSURE: 116 MMHG | DIASTOLIC BLOOD PRESSURE: 68 MMHG

## 2021-07-01 DIAGNOSIS — M20.21 HALLUX RIGIDUS, RIGHT FOOT: Primary | ICD-10-CM

## 2021-07-01 DIAGNOSIS — M20.22 HALLUX RIGIDUS, LEFT FOOT: ICD-10-CM

## 2021-07-01 DIAGNOSIS — M79.671 RIGHT FOOT PAIN: ICD-10-CM

## 2021-07-01 DIAGNOSIS — M79.672 LEFT FOOT PAIN: ICD-10-CM

## 2021-07-01 PROCEDURE — 20605 DRAIN/INJ JOINT/BURSA W/O US: CPT | Performed by: PODIATRIST

## 2021-07-01 RX ORDER — MELOXICAM 7.5 MG/1
7.5 TABLET ORAL DAILY
Qty: 20 TABLET | Refills: 0 | Status: SHIPPED | OUTPATIENT
Start: 2021-07-01 | End: 2021-08-13

## 2021-07-01 NOTE — PROGRESS NOTES
Assessment/Plan: arthralgia 1st MPJ bilateral   Acquired deformity of foot   Pes planus bilateral      plan   Foot exam performed   X-rays reviewed   Bilateral arthrocentesis done   1 cc Kenalog 10 injected into each 1st MPJ bilateral  Patient will need cheilectomy    He will take Aleve p r n  use orthotics as directed            Diagnoses and all orders for this visit:     Hallux rigidus, right foot     Hallux rigidus, left foot     Acquired deformity of right foot     Acquired deformity of left foot     Acquired flat foot, right     Acquired flat foot, left            Subjective:   Patient has bilateral foot pain   He has been diagnosed with arthralgia   Presents for 2nd opinion              Allergies   Allergen Reactions    Allopurinol Rash            Current Outpatient Medications:     amLODIPine (NORVASC) 5 mg tablet, Take 1 tablet (5 mg total) by mouth daily, Disp: 90 tablet, Rfl: 3    cholecalciferol (VITAMIN D3) 1,000 units tablet, Take 1 tablet (1,000 Units total) by mouth daily, Disp: 90 tablet, Rfl: 3    eszopiclone (LUNESTA) 3 MG tablet, Take 1 tablet (3 mg total) by mouth daily at bedtime Take immediately before bedtime, Disp: 90 tablet, Rfl: 1    Famotidine (PEPCID AC PO), Take 20 mg by mouth daily as needed Patient does not recall dosage (OTC) , Disp: , Rfl:     FLUoxetine (PROzac) 20 MG tablet, Take 1 tablet (20 mg total) by mouth daily, Disp: 90 tablet, Rfl: 3    lisinopril (ZESTRIL) 30 mg tablet, Take 1 tablet (30 mg total) by mouth daily (Patient taking differently: Take 30 mg by mouth daily at bedtime ), Disp: 30 tablet, Rfl: 2    Multiple Vitamin (MULTIVITAMIN) tablet, Take 1 tablet by mouth daily, Disp: , Rfl:     triamcinolone (KENALOG) 0 1 % ointment, Apply topically 2 (two) times a day, Disp: 15 g, Rfl: 1           Patient Active Problem List   Diagnosis    Essential hypertension    GERD (gastroesophageal reflux disease)    Diverticulosis    Sleep apnea    Major depressive disorder, single episode, mild (HCC)    Mixed hyperlipidemia    Benign hypertension with CKD (chronic kidney disease), stage II    Primary insomnia    Elbow pain, left    Psoriasis    Vitamin D deficiency    Low libido    Class 1 obesity due to excess calories with serious comorbidity and body mass index (BMI) of 30 0 to 30 9 in adult    Lateral epicondylitis of left elbow    Hypercholesteremia    Arthritis of both feet             Patient ID: Prince Gonzalez is a 60 y  o  male      HPI     The following portions of the patient's history were reviewed and updated as appropriate:      family history includes Abdominal aortic aneurysm in his father; Anemia in his mother; Diabetes in his father, maternal grandfather, maternal grandmother, paternal grandfather, and paternal grandmother; Hypertension in his father and mother; Pancreatic cancer in his father        reports that he quit smoking about 8 years ago  His smoking use included cigarettes  He has a 35 00 pack-year smoking history  He has never used smokeless tobacco  He reports current alcohol use  He reports that he does not use drugs            Vitals:     01/28/21 1128   BP: 139/90   Resp: 17         Review of Systems       Objective:  Patient's shoes and socks removed    Foot ExamPhysical Exam  Vitals signs and nursing note reviewed  Cardiovascular:      Rate and Rhythm: Normal rate and regular rhythm  Musculoskeletal:      Comments:   Pes planus noted bilateral   Patient has metatarsus adductus   Early hallux rigidus deformity noted       X-ray review demonstrates significant osteoarthritic change of the 1st MPJ bilateral   Psychiatric:         Mood and Affect: Mood normal          Behavior: Behavior normal          Thought Content:  Thought content normal          Judgment: Judgment normal

## 2021-07-15 ENCOUNTER — HOSPITAL ENCOUNTER (OUTPATIENT)
Dept: CT IMAGING | Facility: HOSPITAL | Age: 56
Discharge: HOME | End: 2021-07-15
Attending: FAMILY MEDICINE
Payer: COMMERCIAL

## 2021-07-15 ENCOUNTER — HOSPITAL ENCOUNTER (OUTPATIENT)
Dept: PULMONOLOGY | Facility: HOSPITAL | Age: 56
Discharge: HOME | End: 2021-07-15
Attending: FAMILY MEDICINE
Payer: COMMERCIAL

## 2021-07-15 DIAGNOSIS — Z12.2 ENCOUNTER FOR SCREENING FOR MALIGNANT NEOPLASM OF LUNG IN FORMER SMOKER WHO QUIT IN PAST 15 YEARS WITH 30 PACK YEAR HISTORY OR GREATER: ICD-10-CM

## 2021-07-15 DIAGNOSIS — Z87.891 FORMER SMOKER: ICD-10-CM

## 2021-07-15 DIAGNOSIS — Z87.891 ENCOUNTER FOR SCREENING FOR MALIGNANT NEOPLASM OF LUNG IN FORMER SMOKER WHO QUIT IN PAST 15 YEARS WITH 30 PACK YEAR HISTORY OR GREATER: ICD-10-CM

## 2021-07-15 PROCEDURE — 94760 N-INVAS EAR/PLS OXIMETRY 1: CPT

## 2021-07-15 PROCEDURE — 94729 DIFFUSING CAPACITY: CPT

## 2021-07-15 PROCEDURE — 94729 DIFFUSING CAPACITY: CPT | Performed by: INTERNAL MEDICINE

## 2021-07-15 PROCEDURE — 94010 BREATHING CAPACITY TEST: CPT

## 2021-07-15 PROCEDURE — 94010 BREATHING CAPACITY TEST: CPT | Performed by: INTERNAL MEDICINE

## 2021-07-15 PROCEDURE — 71271 CT THORAX LUNG CANCER SCR C-: CPT

## 2021-07-24 ENCOUNTER — PATIENT MESSAGE (OUTPATIENT)
Dept: FAMILY MEDICINE CLINIC | Facility: CLINIC | Age: 56
End: 2021-07-24

## 2021-07-26 ENCOUNTER — PATIENT MESSAGE (OUTPATIENT)
Dept: FAMILY MEDICINE CLINIC | Facility: CLINIC | Age: 56
End: 2021-07-26

## 2021-07-26 DIAGNOSIS — J43.9 PULMONARY EMPHYSEMA, UNSPECIFIED EMPHYSEMA TYPE (HCC): Primary | ICD-10-CM

## 2021-07-26 DIAGNOSIS — R06.02 SOB (SHORTNESS OF BREATH): ICD-10-CM

## 2021-08-03 DIAGNOSIS — I10 ESSENTIAL HYPERTENSION: ICD-10-CM

## 2021-08-04 RX ORDER — LISINOPRIL 30 MG/1
30 TABLET ORAL DAILY
Qty: 30 TABLET | Refills: 5 | Status: SHIPPED | OUTPATIENT
Start: 2021-08-04 | End: 2021-09-07 | Stop reason: SDUPTHER

## 2021-08-04 RX ORDER — LISINOPRIL 30 MG/1
TABLET ORAL
Qty: 30 TABLET | Refills: 1 | OUTPATIENT
Start: 2021-08-04

## 2021-08-04 NOTE — TELEPHONE ENCOUNTER
Medication refill requested lisinopril (ZESTRIL) 30 mg tablet     Spoke with pt who is nearly out of BP med  Last office visit: 6/3/21  Next office visit: 12/2/21  Last refilled: 6/3/21  Pharmacy:   [place] [address] [address] [facility], [address] [place] [id number]  Phone: [phone] Fax: [phone]      Pended: y

## 2021-09-07 DIAGNOSIS — F51.01 PRIMARY INSOMNIA: ICD-10-CM

## 2021-09-07 DIAGNOSIS — F32.0 MAJOR DEPRESSIVE DISORDER, SINGLE EPISODE, MILD (HCC): ICD-10-CM

## 2021-09-07 DIAGNOSIS — I10 ESSENTIAL HYPERTENSION: ICD-10-CM

## 2021-09-07 DIAGNOSIS — I12.9 BENIGN HYPERTENSION WITH CKD (CHRONIC KIDNEY DISEASE), STAGE II: ICD-10-CM

## 2021-09-07 DIAGNOSIS — N18.2 BENIGN HYPERTENSION WITH CKD (CHRONIC KIDNEY DISEASE), STAGE II: ICD-10-CM

## 2021-09-07 RX ORDER — LISINOPRIL 30 MG/1
30 TABLET ORAL DAILY
Qty: 90 TABLET | Refills: 1 | Status: SHIPPED | OUTPATIENT
Start: 2021-09-07 | End: 2022-02-10 | Stop reason: SDUPTHER

## 2021-09-07 RX ORDER — AMLODIPINE BESYLATE 5 MG/1
5 TABLET ORAL DAILY
Qty: 90 TABLET | Refills: 1 | Status: SHIPPED | OUTPATIENT
Start: 2021-09-07 | End: 2021-10-27

## 2021-09-07 RX ORDER — FLUOXETINE 20 MG/1
20 TABLET, FILM COATED ORAL DAILY
Qty: 90 TABLET | Refills: 1 | Status: SHIPPED | OUTPATIENT
Start: 2021-09-07 | End: 2022-02-10 | Stop reason: SDUPTHER

## 2021-09-07 RX ORDER — ESZOPICLONE 3 MG/1
3 TABLET, FILM COATED ORAL
Qty: 90 TABLET | Refills: 1 | Status: SHIPPED | OUTPATIENT
Start: 2021-09-07 | End: 2021-12-07

## 2021-09-07 NOTE — TELEPHONE ENCOUNTER
Patient called, left a voice message stating he need refills on medication but did not leave the names of the medications  Please call patient to follow up

## 2021-09-07 NOTE — TELEPHONE ENCOUNTER
Medication refill requested: Lunesta  Last office visit: 6/3/21  Next office visit: 12/2/21  Last refilled: 6/3/21  Pharmacy :   [facility], [address] [place] [address], [address]  [address] [place] [address]  Phone: [phone] Fax: [phone]       Pended: 80 x 1    Pt left message requesting refill for meds - he stated [employer] system shut off and they are unable to refill meds that were sent there

## 2021-09-09 ENCOUNTER — HOSPITAL ENCOUNTER (OUTPATIENT)
Dept: RADIOLOGY | Facility: HOSPITAL | Age: 56
Discharge: HOME | End: 2021-09-09
Payer: COMMERCIAL

## 2021-09-09 ENCOUNTER — HOSPITAL ENCOUNTER (OUTPATIENT)
Dept: NON INVASIVE DIAGNOSTICS | Facility: HOSPITAL | Age: 56
Discharge: HOME | End: 2021-09-09
Payer: COMMERCIAL

## 2021-09-09 DIAGNOSIS — Z12.2 ENCOUNTER FOR SCREENING FOR MALIGNANT NEOPLASM OF LUNG IN FORMER SMOKER WHO QUIT IN PAST 15 YEARS WITH 30 PACK YEAR HISTORY OR GREATER: ICD-10-CM

## 2021-09-09 DIAGNOSIS — Z87.891 ENCOUNTER FOR SCREENING FOR MALIGNANT NEOPLASM OF LUNG IN FORMER SMOKER WHO QUIT IN PAST 15 YEARS WITH 30 PACK YEAR HISTORY OR GREATER: ICD-10-CM

## 2021-09-09 DIAGNOSIS — Z87.891 FORMER SMOKER: ICD-10-CM

## 2021-09-09 LAB
CHEST PAIN STATEMENT: NORMAL
MAX DIASTOLIC BP: 80 MMHG
MAX HEART RATE: 107 BPM
MAX PREDICTED HEART RATE: 162 BPM
MAX. SYSTOLIC BP: 124 MMHG
PROTOCOL NAME: NORMAL
REASON FOR TERMINATION: NORMAL
TARGET HR FORMULA: NORMAL
TIME IN EXERCISE PHASE: NORMAL

## 2021-09-09 PROCEDURE — A9502 TC99M TETROFOSMIN: HCPCS

## 2021-09-09 PROCEDURE — 78452 HT MUSCLE IMAGE SPECT MULT: CPT

## 2021-09-09 PROCEDURE — 93017 CV STRESS TEST TRACING ONLY: CPT

## 2021-09-09 PROCEDURE — G1004 CDSM NDSC: HCPCS

## 2021-09-09 RX ADMIN — REGADENOSON 0.4 MG: 0.08 INJECTION, SOLUTION INTRAVENOUS at 09:12

## 2021-09-10 PROCEDURE — 93016 CV STRESS TEST SUPVJ ONLY: CPT | Performed by: INTERNAL MEDICINE

## 2021-09-10 PROCEDURE — 93018 CV STRESS TEST I&R ONLY: CPT | Performed by: INTERNAL MEDICINE

## 2021-09-10 PROCEDURE — 78452 HT MUSCLE IMAGE SPECT MULT: CPT | Performed by: INTERNAL MEDICINE

## 2021-09-11 PROBLEM — R94.39 ABNORMAL STRESS TEST: Status: ACTIVE | Noted: 2021-09-11

## 2021-09-11 NOTE — RESULT ENCOUNTER NOTE
Call patient with results - stress test was equivocal meaning it was not for sure positive, but it was not completely normal either  I would like him to see cardiology to see what they think  (please place referral to cardio and give him the # - let him know they will pick a provider for him   Dx abnormal stress test, HTN)

## 2021-09-23 ENCOUNTER — OFFICE VISIT (OUTPATIENT)
Dept: PODIATRY | Facility: CLINIC | Age: 56
End: 2021-09-23
Payer: COMMERCIAL

## 2021-09-23 VITALS
DIASTOLIC BLOOD PRESSURE: 88 MMHG | BODY MASS INDEX: 30.88 KG/M2 | WEIGHT: 233 LBS | HEART RATE: 98 BPM | HEIGHT: 73 IN | SYSTOLIC BLOOD PRESSURE: 127 MMHG | RESPIRATION RATE: 17 BRPM

## 2021-09-23 DIAGNOSIS — M79.672 LEFT FOOT PAIN: ICD-10-CM

## 2021-09-23 DIAGNOSIS — M20.22 HALLUX RIGIDUS, LEFT FOOT: ICD-10-CM

## 2021-09-23 DIAGNOSIS — M20.21 HALLUX RIGIDUS, RIGHT FOOT: Primary | ICD-10-CM

## 2021-09-23 DIAGNOSIS — M79.671 RIGHT FOOT PAIN: ICD-10-CM

## 2021-09-23 DIAGNOSIS — M54.16 RADICULOPATHY OF LUMBAR REGION: ICD-10-CM

## 2021-09-23 PROCEDURE — 99212 OFFICE O/P EST SF 10 MIN: CPT | Performed by: PODIATRIST

## 2021-09-23 PROCEDURE — 20605 DRAIN/INJ JOINT/BURSA W/O US: CPT | Performed by: PODIATRIST

## 2021-09-23 RX ORDER — GABAPENTIN 100 MG/1
100 CAPSULE ORAL 3 TIMES DAILY
Qty: 90 CAPSULE | Refills: 0 | Status: SHIPPED | OUTPATIENT
Start: 2021-09-23 | End: 2021-10-11

## 2021-09-23 NOTE — PROGRESS NOTES
Assessment/Plan: hallux rigidus with arthropathy 1st MPJ bilateral   Pain upon ambulation  Acquired deformity of foot  Rule out radiculopathy  Plan  Foot exam performed  Patient educated on condition  Bilateral 1st MPJ arthrocentesis done  1 cc of Celestone injected into 1st MPJ bilateral   Patient will use orthotics  We will workup radiculopathy  Spine x-rays ordered  He will be placed on gabapentin  Diagnoses and all orders for this visit:    Hallux rigidus, right foot    Hallux rigidus, left foot    Right foot pain    Left foot pain    Radiculopathy of lumbar region  -     gabapentin (NEURONTIN) 100 mg capsule; Take 1 capsule (100 mg total) by mouth 3 (three) times a day  -     XR spine lumbar complete w bending minimum 6 views; Future          Subjective:   Patient has return of pain in his big toe joints  He is requesting injection therapy  But he has a new complaint  He now has pain in the ball of his feet  This occurs when he walks  He is using orthotics  He has history of low back pain      Allergies   Allergen Reactions    Allopurinol Rash         Current Outpatient Medications:     amLODIPine (NORVASC) 5 mg tablet, Take 1 tablet (5 mg total) by mouth daily, Disp: 90 tablet, Rfl: 1    cholecalciferol (VITAMIN D3) 1,000 units tablet, Take 1 tablet (1,000 Units total) by mouth daily, Disp: 90 tablet, Rfl: 1    eszopiclone (LUNESTA) 3 MG tablet, Take 1 tablet (3 mg total) by mouth daily at bedtime Take immediately before bedtime, Disp: 90 tablet, Rfl: 1    famotidine (PEPCID) 20 mg tablet, Take 1 tablet (20 mg total) by mouth 2 (two) times a day, Disp: 90 tablet, Rfl: 1    FLUoxetine (PROzac) 20 MG tablet, Take 1 tablet (20 mg total) by mouth daily, Disp: 90 tablet, Rfl: 1    gabapentin (NEURONTIN) 100 mg capsule, Take 1 capsule (100 mg total) by mouth 3 (three) times a day, Disp: 90 capsule, Rfl: 0    lisinopril (ZESTRIL) 30 mg tablet, Take 1 tablet (30 mg total) by mouth daily, Disp: 90 tablet, Rfl: 1    meloxicam (MOBIC) 7 5 mg tablet, TAKE 1 TABLET(7 5 MG) BY MOUTH DAILY FOR 20 DAYS, Disp: 20 tablet, Rfl: 0    Multiple Vitamin (MULTIVITAMIN) tablet, Take 1 tablet by mouth daily, Disp: , Rfl:     triamcinolone (KENALOG) 0 1 % ointment, Apply topically 2 (two) times a day, Disp: 15 g, Rfl: 1    Patient Active Problem List   Diagnosis    GERD (gastroesophageal reflux disease)    Diverticulosis    Sleep apnea    Major depressive disorder, single episode, mild (HCC)    Mixed hyperlipidemia    Benign hypertension with CKD (chronic kidney disease), stage II    Primary insomnia    Elbow pain, left    Psoriasis    Vitamin D deficiency    Low libido    Class 1 obesity due to excess calories with serious comorbidity and body mass index (BMI) of 31 0 to 31 9 in adult    Arthritis of both feet    Abnormal stress test          Patient ID: Kranthi Rogers is a 60 y o  male  HPI    The following portions of the patient's history were reviewed and updated as appropriate:     family history includes Abdominal aortic aneurysm in his father; Anemia in his mother; Diabetes in his father, maternal grandfather, maternal grandmother, paternal grandfather, and paternal grandmother; Emphysema in his brother and maternal grandmother; Hypertension in his father and mother; Pancreatic cancer in his father  reports that he quit smoking about 8 years ago  His smoking use included cigarettes  He has a 35 00 pack-year smoking history  He has never used smokeless tobacco  He reports current alcohol use  He reports that he does not use drugs  Vitals:    09/23/21 1135   BP: 127/88   Pulse: 98   Resp: 17       Review of Systems      Objective:  Patient's shoes and socks removed  Foot ExamPhysical Exam       Vitals signs and nursing note reviewed  Cardiovascular:      Rate and Rhythm: Normal rate and regular rhythm     Musculoskeletal:      Comments:   Pes planus noted bilateral   Patient has metatarsus adductus   Early hallux rigidus deformity noted       X-ray review demonstrates significant osteoarthritic change of the 1st MPJ bilateral   Psychiatric:         Mood and Affect: Mood normal          Behavior: Behavior normal          Thought Content: Thought content normal          Judgment: Judgment normal        Neuro  Decreased vibratory sensation noted bilateral foot  Negative Tinel bilateral   Negative Patel sign of either foot    L5 test 4/5

## 2021-09-26 ENCOUNTER — APPOINTMENT (OUTPATIENT)
Dept: RADIOLOGY | Facility: CLINIC | Age: 56
End: 2021-09-26
Payer: COMMERCIAL

## 2021-09-26 DIAGNOSIS — M54.16 RADICULOPATHY OF LUMBAR REGION: ICD-10-CM

## 2021-09-26 PROCEDURE — 72114 X-RAY EXAM L-S SPINE BENDING: CPT

## 2021-10-04 ENCOUNTER — TELEPHONE (OUTPATIENT)
Dept: PODIATRY | Facility: CLINIC | Age: 56
End: 2021-10-04

## 2021-10-04 DIAGNOSIS — M54.16 RADICULOPATHY OF LUMBAR REGION: Primary | ICD-10-CM

## 2021-10-04 RX ORDER — GABAPENTIN 300 MG/1
300 CAPSULE ORAL 2 TIMES DAILY
Qty: 60 CAPSULE | Refills: 0 | Status: SHIPPED | OUTPATIENT
Start: 2021-10-04 | End: 2021-12-02

## 2021-10-11 ENCOUNTER — OFFICE VISIT (OUTPATIENT)
Dept: PODIATRY | Facility: CLINIC | Age: 56
End: 2021-10-11
Payer: COMMERCIAL

## 2021-10-11 VITALS — BODY MASS INDEX: 30.88 KG/M2 | RESPIRATION RATE: 17 BRPM | HEIGHT: 73 IN | WEIGHT: 233 LBS

## 2021-10-11 DIAGNOSIS — M20.21 HALLUX RIGIDUS, RIGHT FOOT: Primary | ICD-10-CM

## 2021-10-11 DIAGNOSIS — M20.22 HALLUX RIGIDUS, LEFT FOOT: ICD-10-CM

## 2021-10-11 DIAGNOSIS — M54.16 RADICULOPATHY OF LUMBAR REGION: ICD-10-CM

## 2021-10-11 DIAGNOSIS — M79.671 RIGHT FOOT PAIN: ICD-10-CM

## 2021-10-11 DIAGNOSIS — M79.672 LEFT FOOT PAIN: ICD-10-CM

## 2021-10-11 PROCEDURE — 99212 OFFICE O/P EST SF 10 MIN: CPT | Performed by: PODIATRIST

## 2021-10-11 PROCEDURE — 20605 DRAIN/INJ JOINT/BURSA W/O US: CPT | Performed by: PODIATRIST

## 2021-10-11 RX ORDER — GABAPENTIN 400 MG/1
400 CAPSULE ORAL 2 TIMES DAILY
Qty: 60 CAPSULE | Refills: 0 | Status: SHIPPED | OUTPATIENT
Start: 2021-10-11 | End: 2021-11-12 | Stop reason: SDUPTHER

## 2021-10-27 ENCOUNTER — CONSULT (OUTPATIENT)
Dept: CARDIOLOGY CLINIC | Facility: CLINIC | Age: 56
End: 2021-10-27
Payer: COMMERCIAL

## 2021-10-27 VITALS
HEIGHT: 73 IN | WEIGHT: 234.6 LBS | HEART RATE: 91 BPM | BODY MASS INDEX: 31.09 KG/M2 | OXYGEN SATURATION: 93 % | DIASTOLIC BLOOD PRESSURE: 74 MMHG | SYSTOLIC BLOOD PRESSURE: 132 MMHG

## 2021-10-27 DIAGNOSIS — J43.9 PULMONARY EMPHYSEMA, UNSPECIFIED EMPHYSEMA TYPE (HCC): ICD-10-CM

## 2021-10-27 DIAGNOSIS — I10 PRIMARY HYPERTENSION: ICD-10-CM

## 2021-10-27 DIAGNOSIS — G47.33 OBSTRUCTIVE SLEEP APNEA SYNDROME: ICD-10-CM

## 2021-10-27 DIAGNOSIS — R06.02 SHORTNESS OF BREATH: ICD-10-CM

## 2021-10-27 DIAGNOSIS — E78.2 MIXED HYPERLIPIDEMIA: ICD-10-CM

## 2021-10-27 DIAGNOSIS — I25.10 CORONARY ARTERY CALCIFICATION SEEN ON CAT SCAN: ICD-10-CM

## 2021-10-27 DIAGNOSIS — E66.09 CLASS 1 OBESITY DUE TO EXCESS CALORIES WITH SERIOUS COMORBIDITY AND BODY MASS INDEX (BMI) OF 31.0 TO 31.9 IN ADULT: ICD-10-CM

## 2021-10-27 DIAGNOSIS — R94.39 ABNORMAL STRESS TEST: ICD-10-CM

## 2021-10-27 PROCEDURE — 99204 OFFICE O/P NEW MOD 45 MIN: CPT | Performed by: INTERNAL MEDICINE

## 2021-10-27 PROCEDURE — 1036F TOBACCO NON-USER: CPT | Performed by: INTERNAL MEDICINE

## 2021-10-27 PROCEDURE — 3008F BODY MASS INDEX DOCD: CPT | Performed by: INTERNAL MEDICINE

## 2021-10-27 PROCEDURE — 93000 ELECTROCARDIOGRAM COMPLETE: CPT | Performed by: INTERNAL MEDICINE

## 2021-10-27 RX ORDER — CARVEDILOL 3.12 MG/1
3.12 TABLET ORAL 2 TIMES DAILY WITH MEALS
Qty: 60 TABLET | Refills: 1 | Status: SHIPPED | OUTPATIENT
Start: 2021-10-27 | End: 2021-12-07

## 2021-10-27 RX ORDER — ROSUVASTATIN CALCIUM 5 MG/1
5 TABLET, COATED ORAL EVERY OTHER DAY
Qty: 45 TABLET | Refills: 1 | Status: SHIPPED | OUTPATIENT
Start: 2021-10-27 | End: 2021-12-04

## 2021-10-27 RX ORDER — DEXAMETHASONE SODIUM PHOSPHATE 4 MG/ML
INJECTION, SOLUTION INTRA-ARTICULAR; INTRALESIONAL; INTRAMUSCULAR; INTRAVENOUS; SOFT TISSUE
COMMUNITY
End: 2021-12-02

## 2021-11-03 ENCOUNTER — CONSULT (OUTPATIENT)
Dept: PULMONOLOGY | Facility: MEDICAL CENTER | Age: 56
End: 2021-11-03
Payer: COMMERCIAL

## 2021-11-03 VITALS
RESPIRATION RATE: 12 BRPM | BODY MASS INDEX: 31.01 KG/M2 | SYSTOLIC BLOOD PRESSURE: 128 MMHG | OXYGEN SATURATION: 97 % | HEART RATE: 76 BPM | DIASTOLIC BLOOD PRESSURE: 82 MMHG | WEIGHT: 234 LBS | HEIGHT: 73 IN | TEMPERATURE: 98 F

## 2021-11-03 DIAGNOSIS — J43.2 CENTRILOBULAR EMPHYSEMA (HCC): ICD-10-CM

## 2021-11-03 DIAGNOSIS — G47.33 OBSTRUCTIVE SLEEP APNEA SYNDROME: ICD-10-CM

## 2021-11-03 DIAGNOSIS — R06.02 SOB (SHORTNESS OF BREATH): Primary | ICD-10-CM

## 2021-11-03 PROCEDURE — 1036F TOBACCO NON-USER: CPT | Performed by: INTERNAL MEDICINE

## 2021-11-03 PROCEDURE — 99214 OFFICE O/P EST MOD 30 MIN: CPT | Performed by: INTERNAL MEDICINE

## 2021-11-03 PROCEDURE — 3008F BODY MASS INDEX DOCD: CPT | Performed by: INTERNAL MEDICINE

## 2021-11-04 ENCOUNTER — TELEPHONE (OUTPATIENT)
Dept: CARDIOLOGY CLINIC | Facility: CLINIC | Age: 56
End: 2021-11-04

## 2021-11-04 ENCOUNTER — APPOINTMENT (OUTPATIENT)
Dept: LAB | Facility: CLINIC | Age: 56
End: 2021-11-04
Payer: COMMERCIAL

## 2021-11-04 DIAGNOSIS — R06.02 SHORTNESS OF BREATH: ICD-10-CM

## 2021-11-04 DIAGNOSIS — I25.10 CORONARY ARTERY CALCIFICATION SEEN ON CAT SCAN: ICD-10-CM

## 2021-11-04 DIAGNOSIS — E66.09 CLASS 1 OBESITY DUE TO EXCESS CALORIES WITH SERIOUS COMORBIDITY AND BODY MASS INDEX (BMI) OF 31.0 TO 31.9 IN ADULT: ICD-10-CM

## 2021-11-04 DIAGNOSIS — G47.33 OBSTRUCTIVE SLEEP APNEA SYNDROME: ICD-10-CM

## 2021-11-04 DIAGNOSIS — I10 PRIMARY HYPERTENSION: ICD-10-CM

## 2021-11-04 DIAGNOSIS — J43.9 PULMONARY EMPHYSEMA, UNSPECIFIED EMPHYSEMA TYPE (HCC): ICD-10-CM

## 2021-11-04 DIAGNOSIS — R94.39 ABNORMAL STRESS TEST: ICD-10-CM

## 2021-11-04 DIAGNOSIS — E78.2 MIXED HYPERLIPIDEMIA: ICD-10-CM

## 2021-11-04 LAB
ALBUMIN SERPL BCP-MCNC: 3.8 G/DL (ref 3.5–5)
ALP SERPL-CCNC: 55 U/L (ref 46–116)
ALT SERPL W P-5'-P-CCNC: 53 U/L (ref 12–78)
AST SERPL W P-5'-P-CCNC: 25 U/L (ref 5–45)
BILIRUB DIRECT SERPL-MCNC: 0.1 MG/DL (ref 0–0.2)
BILIRUB SERPL-MCNC: 0.42 MG/DL (ref 0.2–1)
CHOLEST SERPL-MCNC: 175 MG/DL (ref 50–200)
HDLC SERPL-MCNC: 58 MG/DL
LDLC SERPL CALC-MCNC: 100 MG/DL (ref 0–100)
NONHDLC SERPL-MCNC: 117 MG/DL
PROT SERPL-MCNC: 7.1 G/DL (ref 6.4–8.2)
TRIGL SERPL-MCNC: 87 MG/DL

## 2021-11-04 PROCEDURE — 80061 LIPID PANEL: CPT

## 2021-11-04 PROCEDURE — 80076 HEPATIC FUNCTION PANEL: CPT

## 2021-11-04 PROCEDURE — 36415 COLL VENOUS BLD VENIPUNCTURE: CPT

## 2021-11-12 DIAGNOSIS — M54.16 RADICULOPATHY OF LUMBAR REGION: ICD-10-CM

## 2021-11-12 RX ORDER — GABAPENTIN 400 MG/1
400 CAPSULE ORAL 2 TIMES DAILY
Qty: 60 CAPSULE | Refills: 0 | Status: SHIPPED | OUTPATIENT
Start: 2021-11-12 | End: 2021-12-07

## 2021-11-15 ENCOUNTER — TELEPHONE (OUTPATIENT)
Dept: PULMONOLOGY | Facility: MEDICAL CENTER | Age: 56
End: 2021-11-15

## 2021-11-15 DIAGNOSIS — J43.9 PULMONARY EMPHYSEMA, UNSPECIFIED EMPHYSEMA TYPE (HCC): Primary | ICD-10-CM

## 2021-11-16 DIAGNOSIS — J43.9 PULMONARY EMPHYSEMA, UNSPECIFIED EMPHYSEMA TYPE (HCC): Primary | ICD-10-CM

## 2021-11-16 RX ORDER — FLUTICASONE FUROATE, UMECLIDINIUM BROMIDE AND VILANTEROL TRIFENATATE 100; 62.5; 25 UG/1; UG/1; UG/1
1 POWDER RESPIRATORY (INHALATION) DAILY
Qty: 60 BLISTER | Refills: 11 | Status: SHIPPED | OUTPATIENT
Start: 2021-11-16 | End: 2022-07-14

## 2021-11-17 RX ORDER — ALBUTEROL SULFATE 90 UG/1
2 AEROSOL, METERED RESPIRATORY (INHALATION) EVERY 4 HOURS PRN
Qty: 18 G | Refills: 5 | Status: SHIPPED | OUTPATIENT
Start: 2021-11-17 | End: 2022-02-10 | Stop reason: SDUPTHER

## 2021-11-18 ENCOUNTER — OFFICE VISIT (OUTPATIENT)
Dept: PODIATRY | Facility: CLINIC | Age: 56
End: 2021-11-18
Payer: COMMERCIAL

## 2021-11-18 VITALS — WEIGHT: 234 LBS | RESPIRATION RATE: 16 BRPM | BODY MASS INDEX: 31.01 KG/M2 | HEIGHT: 73 IN

## 2021-11-18 DIAGNOSIS — M20.22 HALLUX RIGIDUS, LEFT FOOT: ICD-10-CM

## 2021-11-18 DIAGNOSIS — M79.672 LEFT FOOT PAIN: ICD-10-CM

## 2021-11-18 DIAGNOSIS — M79.671 RIGHT FOOT PAIN: ICD-10-CM

## 2021-11-18 DIAGNOSIS — M20.21 HALLUX RIGIDUS, RIGHT FOOT: Primary | ICD-10-CM

## 2021-11-18 PROCEDURE — 20605 DRAIN/INJ JOINT/BURSA W/O US: CPT | Performed by: PODIATRIST

## 2021-11-21 PROBLEM — R06.02 SOB (SHORTNESS OF BREATH): Status: ACTIVE | Noted: 2021-11-21

## 2021-11-21 PROBLEM — J43.2 CENTRILOBULAR EMPHYSEMA (HCC): Status: ACTIVE | Noted: 2021-11-21

## 2021-11-24 ENCOUNTER — TELEPHONE (OUTPATIENT)
Dept: FAMILY MEDICINE CLINIC | Facility: CLINIC | Age: 56
End: 2021-11-24

## 2021-11-28 ENCOUNTER — TELEPHONE (OUTPATIENT)
Dept: FAMILY MEDICINE CLINIC | Facility: CLINIC | Age: 56
End: 2021-11-28

## 2021-11-28 ENCOUNTER — APPOINTMENT (OUTPATIENT)
Dept: LAB | Facility: CLINIC | Age: 56
End: 2021-11-28
Payer: COMMERCIAL

## 2021-11-28 DIAGNOSIS — E78.2 MIXED HYPERLIPIDEMIA: ICD-10-CM

## 2021-11-28 DIAGNOSIS — R73.01 IFG (IMPAIRED FASTING GLUCOSE): ICD-10-CM

## 2021-11-28 DIAGNOSIS — M20.22 HALLUX RIGIDUS OF LEFT FOOT: ICD-10-CM

## 2021-11-28 DIAGNOSIS — M20.21 HALLUX RIGIDUS OF RIGHT FOOT: ICD-10-CM

## 2021-12-02 ENCOUNTER — OFFICE VISIT (OUTPATIENT)
Dept: FAMILY MEDICINE CLINIC | Facility: CLINIC | Age: 56
End: 2021-12-02
Payer: COMMERCIAL

## 2021-12-02 ENCOUNTER — HOSPITAL ENCOUNTER (OUTPATIENT)
Dept: NON INVASIVE DIAGNOSTICS | Facility: HOSPITAL | Age: 56
Discharge: HOME | End: 2021-12-02
Attending: INTERNAL MEDICINE
Payer: COMMERCIAL

## 2021-12-02 ENCOUNTER — APPOINTMENT (OUTPATIENT)
Dept: LAB | Facility: CLINIC | Age: 56
End: 2021-12-02
Payer: COMMERCIAL

## 2021-12-02 ENCOUNTER — OFFICE VISIT (OUTPATIENT)
Dept: LAB | Facility: CLINIC | Age: 56
End: 2021-12-02
Payer: COMMERCIAL

## 2021-12-02 VITALS
HEIGHT: 74 IN | TEMPERATURE: 97.7 F | BODY MASS INDEX: 29.88 KG/M2 | OXYGEN SATURATION: 96 % | DIASTOLIC BLOOD PRESSURE: 82 MMHG | SYSTOLIC BLOOD PRESSURE: 104 MMHG | HEART RATE: 87 BPM | RESPIRATION RATE: 18 BRPM | WEIGHT: 232.8 LBS

## 2021-12-02 VITALS
SYSTOLIC BLOOD PRESSURE: 116 MMHG | BODY MASS INDEX: 31.01 KG/M2 | HEIGHT: 73 IN | WEIGHT: 234 LBS | DIASTOLIC BLOOD PRESSURE: 68 MMHG | HEART RATE: 78 BPM

## 2021-12-02 DIAGNOSIS — M20.22 HALLUX RIGIDUS OF LEFT FOOT: ICD-10-CM

## 2021-12-02 DIAGNOSIS — J43.2 CENTRILOBULAR EMPHYSEMA (HCC): ICD-10-CM

## 2021-12-02 DIAGNOSIS — G47.33 OBSTRUCTIVE SLEEP APNEA SYNDROME: ICD-10-CM

## 2021-12-02 DIAGNOSIS — E78.2 MIXED HYPERLIPIDEMIA: ICD-10-CM

## 2021-12-02 DIAGNOSIS — F51.01 PRIMARY INSOMNIA: ICD-10-CM

## 2021-12-02 DIAGNOSIS — R06.02 SHORTNESS OF BREATH: ICD-10-CM

## 2021-12-02 DIAGNOSIS — E66.09 CLASS 1 OBESITY DUE TO EXCESS CALORIES WITH SERIOUS COMORBIDITY AND BODY MASS INDEX (BMI) OF 31.0 TO 31.9 IN ADULT: ICD-10-CM

## 2021-12-02 DIAGNOSIS — K21.9 GASTROESOPHAGEAL REFLUX DISEASE, UNSPECIFIED WHETHER ESOPHAGITIS PRESENT: ICD-10-CM

## 2021-12-02 DIAGNOSIS — I25.10 CORONARY ARTERY CALCIFICATION SEEN ON CAT SCAN: ICD-10-CM

## 2021-12-02 DIAGNOSIS — J43.9 PULMONARY EMPHYSEMA, UNSPECIFIED EMPHYSEMA TYPE (HCC): ICD-10-CM

## 2021-12-02 DIAGNOSIS — M20.21 HALLUX RIGIDUS OF RIGHT FOOT: ICD-10-CM

## 2021-12-02 DIAGNOSIS — R94.39 ABNORMAL STRESS TEST: ICD-10-CM

## 2021-12-02 DIAGNOSIS — Z01.818 PRE-OP EXAMINATION: Primary | ICD-10-CM

## 2021-12-02 DIAGNOSIS — I10 HTN (HYPERTENSION), BENIGN: ICD-10-CM

## 2021-12-02 DIAGNOSIS — F32.0 MAJOR DEPRESSIVE DISORDER, SINGLE EPISODE, MILD (HCC): ICD-10-CM

## 2021-12-02 DIAGNOSIS — I10 PRIMARY HYPERTENSION: ICD-10-CM

## 2021-12-02 DIAGNOSIS — E55.9 VITAMIN D DEFICIENCY: ICD-10-CM

## 2021-12-02 DIAGNOSIS — N18.31 STAGE 3A CHRONIC KIDNEY DISEASE (HCC): ICD-10-CM

## 2021-12-02 LAB
ALBUMIN SERPL BCP-MCNC: 4 G/DL (ref 3.5–5)
ALP SERPL-CCNC: 61 U/L (ref 46–116)
ALT SERPL W P-5'-P-CCNC: 61 U/L (ref 12–78)
ANION GAP SERPL CALCULATED.3IONS-SCNC: 11 MMOL/L (ref 4–13)
AST SERPL W P-5'-P-CCNC: 23 U/L (ref 5–45)
ATRIAL RATE: 74 BPM
BILIRUB SERPL-MCNC: 0.94 MG/DL (ref 0.2–1)
BUN SERPL-MCNC: 23 MG/DL (ref 5–25)
CALCIUM SERPL-MCNC: 9.1 MG/DL (ref 8.3–10.1)
CHLORIDE SERPL-SCNC: 103 MMOL/L (ref 100–108)
CHOLEST SERPL-MCNC: 172 MG/DL
CO2 SERPL-SCNC: 24 MMOL/L (ref 21–32)
CREAT SERPL-MCNC: 1.35 MG/DL (ref 0.6–1.3)
EST. AVERAGE GLUCOSE BLD GHB EST-MCNC: 111 MG/DL
GFR SERPL CREATININE-BSD FRML MDRD: 57 ML/MIN/1.73SQ M
GLUCOSE P FAST SERPL-MCNC: 119 MG/DL (ref 65–99)
HBA1C MFR BLD: 5.5 %
HDLC SERPL-MCNC: 63 MG/DL
HGB BLD-MCNC: 15.1 G/DL (ref 12–17)
LDLC SERPL CALC-MCNC: 94 MG/DL (ref 0–100)
NONHDLC SERPL-MCNC: 109 MG/DL
POTASSIUM SERPL-SCNC: 4.3 MMOL/L (ref 3.5–5.3)
PR INTERVAL: 164 MS
PROT SERPL-MCNC: 7.6 G/DL (ref 6.4–8.2)
QRS AXIS: 61 DEGREES
QRSD INTERVAL: 88 MS
QT INTERVAL: 394 MS
QTC INTERVAL: 437 MS
SODIUM SERPL-SCNC: 138 MMOL/L (ref 136–145)
T WAVE AXIS: 42 DEGREES
TRIGL SERPL-MCNC: 77 MG/DL
VENTRICULAR RATE: 74 BPM

## 2021-12-02 PROCEDURE — 93010 ELECTROCARDIOGRAM REPORT: CPT | Performed by: INTERNAL MEDICINE

## 2021-12-02 PROCEDURE — 93005 ELECTROCARDIOGRAM TRACING: CPT

## 2021-12-02 PROCEDURE — 99214 OFFICE O/P EST MOD 30 MIN: CPT | Performed by: FAMILY MEDICINE

## 2021-12-02 PROCEDURE — 85018 HEMOGLOBIN: CPT

## 2021-12-02 PROCEDURE — 83036 HEMOGLOBIN GLYCOSYLATED A1C: CPT

## 2021-12-02 PROCEDURE — 3725F SCREEN DEPRESSION PERFORMED: CPT | Performed by: FAMILY MEDICINE

## 2021-12-02 PROCEDURE — 36415 COLL VENOUS BLD VENIPUNCTURE: CPT

## 2021-12-02 PROCEDURE — 80053 COMPREHEN METABOLIC PANEL: CPT

## 2021-12-02 PROCEDURE — 93306 TTE W/DOPPLER COMPLETE: CPT

## 2021-12-02 PROCEDURE — 80061 LIPID PANEL: CPT

## 2021-12-04 ENCOUNTER — TELEPHONE (OUTPATIENT)
Dept: FAMILY MEDICINE CLINIC | Facility: CLINIC | Age: 56
End: 2021-12-04

## 2021-12-04 PROBLEM — N18.31 STAGE 3A CHRONIC KIDNEY DISEASE (HCC): Status: ACTIVE | Noted: 2021-12-04

## 2021-12-04 PROCEDURE — U0003 INFECTIOUS AGENT DETECTION BY NUCLEIC ACID (DNA OR RNA); SEVERE ACUTE RESPIRATORY SYNDROME CORONAVIRUS 2 (SARS-COV-2) (CORONAVIRUS DISEASE [COVID-19]), AMPLIFIED PROBE TECHNIQUE, MAKING USE OF HIGH THROUGHPUT TECHNOLOGIES AS DESCRIBED BY CMS-2020-01-R: HCPCS | Performed by: PODIATRIST

## 2021-12-04 PROCEDURE — U0005 INFEC AGEN DETEC AMPLI PROBE: HCPCS | Performed by: PODIATRIST

## 2021-12-04 RX ORDER — ROSUVASTATIN CALCIUM 10 MG/1
10 TABLET, COATED ORAL DAILY
Qty: 90 TABLET | Refills: 3 | Status: SHIPPED | OUTPATIENT
Start: 2021-12-04 | End: 2022-02-10 | Stop reason: SDUPTHER

## 2021-12-05 LAB
AORTIC ROOT: 3.7 CM
E WAVE DECELERATION TIME: 243 MS
FRACTIONAL SHORTENING: 26 % (ref 28–44)
INTERVENTRICULAR SEPTUM IN DIASTOLE (PARASTERNAL SHORT AXIS VIEW): 1.3 CM
LEFT ATRIUM AREA SYSTOLE SINGLE PLANE A4C: 14.9 CM2
LEFT INTERNAL DIMENSION IN SYSTOLE: 3.1 CM (ref 2.1–4)
LEFT VENTRICULAR INTERNAL DIMENSION IN DIASTOLE: 4.2 CM (ref 8.22–12.25)
LEFT VENTRICULAR POSTERIOR WALL IN END DIASTOLE: 1.3 CM
LEFT VENTRICULAR STROKE VOLUME: 44 ML
MV E'TISSUE VEL-LAT: 12 CM/S
MV E'TISSUE VEL-SEP: 10 CM/S
MV PEAK A VEL: 0.67 M/S
MV PEAK E VEL: 72 CM/S
MV STENOSIS PRESSURE HALF TIME: 0 MS
RIGHT ATRIUM AREA SYSTOLE A4C: 13.9 CM2
RIGHT VENTRICLE ID DIMENSION: 3.2 CM
SL CV LV EF: 57
SL CV PED ECHO LEFT VENTRICLE DIASTOLIC VOLUME (MOD BIPLANE) 2D: 80 ML
SL CV PED ECHO LEFT VENTRICLE SYSTOLIC VOLUME (MOD BIPLANE) 2D: 37 ML
TRICUSPID VALVE PEAK REGURGITATION VELOCITY: 2.33 M/S
TRICUSPID VALVE S': 0.7 CM/S
TV PEAK GRADIENT: 22 MMHG
Z-SCORE OF LEFT VENTRICULAR DIMENSION IN END SYSTOLE: -8.71

## 2021-12-05 PROCEDURE — 93306 TTE W/DOPPLER COMPLETE: CPT | Performed by: INTERNAL MEDICINE

## 2021-12-06 ENCOUNTER — TELEPHONE (OUTPATIENT)
Dept: CARDIOLOGY CLINIC | Facility: CLINIC | Age: 56
End: 2021-12-06

## 2021-12-07 DIAGNOSIS — E78.2 MIXED HYPERLIPIDEMIA: ICD-10-CM

## 2021-12-07 DIAGNOSIS — F51.01 PRIMARY INSOMNIA: ICD-10-CM

## 2021-12-07 DIAGNOSIS — M54.16 RADICULOPATHY OF LUMBAR REGION: ICD-10-CM

## 2021-12-07 RX ORDER — CARVEDILOL 3.12 MG/1
TABLET ORAL
Qty: 60 TABLET | Refills: 1 | Status: SHIPPED | OUTPATIENT
Start: 2021-12-07 | End: 2022-02-10 | Stop reason: SDUPTHER

## 2021-12-07 RX ORDER — GABAPENTIN 400 MG/1
CAPSULE ORAL
Qty: 60 CAPSULE | Refills: 0 | Status: SHIPPED | OUTPATIENT
Start: 2021-12-07 | End: 2022-01-18

## 2021-12-07 RX ORDER — ESZOPICLONE 3 MG/1
3 TABLET, FILM COATED ORAL
Qty: 90 TABLET | Refills: 1 | Status: SHIPPED | OUTPATIENT
Start: 2021-12-07 | End: 2022-02-10 | Stop reason: SDUPTHER

## 2021-12-08 ENCOUNTER — OFFICE VISIT (OUTPATIENT)
Dept: PODIATRY | Facility: CLINIC | Age: 56
End: 2021-12-08
Payer: COMMERCIAL

## 2021-12-08 VITALS
DIASTOLIC BLOOD PRESSURE: 68 MMHG | BODY MASS INDEX: 31.01 KG/M2 | HEIGHT: 73 IN | WEIGHT: 234 LBS | SYSTOLIC BLOOD PRESSURE: 116 MMHG | RESPIRATION RATE: 17 BRPM

## 2021-12-08 DIAGNOSIS — M20.22 HALLUX RIGIDUS, LEFT FOOT: ICD-10-CM

## 2021-12-08 DIAGNOSIS — M79.671 RIGHT FOOT PAIN: ICD-10-CM

## 2021-12-08 DIAGNOSIS — M79.672 LEFT FOOT PAIN: ICD-10-CM

## 2021-12-08 DIAGNOSIS — M20.21 HALLUX RIGIDUS, RIGHT FOOT: Primary | ICD-10-CM

## 2021-12-08 PROCEDURE — 99213 OFFICE O/P EST LOW 20 MIN: CPT | Performed by: PODIATRIST

## 2021-12-08 PROCEDURE — 73620 X-RAY EXAM OF FOOT: CPT | Performed by: PODIATRIST

## 2021-12-08 RX ORDER — HYDROXYZINE HYDROCHLORIDE 25 MG/1
25 TABLET, FILM COATED ORAL 3 TIMES DAILY
Qty: 9 TABLET | Refills: 0 | Status: SHIPPED | OUTPATIENT
Start: 2021-12-08 | End: 2022-01-18

## 2021-12-08 RX ORDER — CLINDAMYCIN HYDROCHLORIDE 300 MG/1
300 CAPSULE ORAL 4 TIMES DAILY
Qty: 40 CAPSULE | Refills: 0 | Status: SHIPPED | OUTPATIENT
Start: 2021-12-08 | End: 2021-12-18

## 2021-12-08 RX ORDER — NALOXONE HYDROCHLORIDE 4 MG/.1ML
SPRAY NASAL
Qty: 1 EACH | Refills: 1 | Status: SHIPPED | OUTPATIENT
Start: 2021-12-08 | End: 2022-02-10

## 2021-12-08 RX ORDER — OXYCODONE AND ACETAMINOPHEN 10; 325 MG/1; MG/1
1 TABLET ORAL EVERY 6 HOURS PRN
Qty: 20 TABLET | Refills: 0 | Status: SHIPPED | OUTPATIENT
Start: 2021-12-08 | End: 2021-12-13

## 2021-12-09 ENCOUNTER — ANESTHESIA EVENT (OUTPATIENT)
Dept: PERIOP | Facility: HOSPITAL | Age: 56
End: 2021-12-09
Payer: COMMERCIAL

## 2021-12-10 ENCOUNTER — ANESTHESIA (OUTPATIENT)
Dept: PERIOP | Facility: HOSPITAL | Age: 56
End: 2021-12-10
Payer: COMMERCIAL

## 2021-12-10 ENCOUNTER — HOSPITAL ENCOUNTER (OUTPATIENT)
Facility: HOSPITAL | Age: 56
Setting detail: OUTPATIENT SURGERY
Discharge: HOME | End: 2021-12-10
Attending: PODIATRIST | Admitting: PODIATRIST
Payer: COMMERCIAL

## 2021-12-10 VITALS
HEART RATE: 63 BPM | DIASTOLIC BLOOD PRESSURE: 70 MMHG | OXYGEN SATURATION: 95 % | HEIGHT: 73 IN | TEMPERATURE: 97.3 F | RESPIRATION RATE: 16 BRPM | WEIGHT: 236.6 LBS | SYSTOLIC BLOOD PRESSURE: 143 MMHG | BODY MASS INDEX: 31.36 KG/M2

## 2021-12-10 DIAGNOSIS — M19.071 PRIMARY OSTEOARTHRITIS OF BOTH FEET: Primary | ICD-10-CM

## 2021-12-10 DIAGNOSIS — M19.072 PRIMARY OSTEOARTHRITIS OF BOTH FEET: Primary | ICD-10-CM

## 2021-12-10 PROBLEM — M20.22 HALLUX RIGIDUS, LEFT FOOT: Status: ACTIVE | Noted: 2021-12-10

## 2021-12-10 PROBLEM — M20.21 HALLUX RIGIDUS, RIGHT FOOT: Status: ACTIVE | Noted: 2021-12-10

## 2021-12-10 PROCEDURE — 28289 CORRJ HALUX RIGDUS W/O IMPLT: CPT | Performed by: PODIATRIST

## 2021-12-10 PROCEDURE — C1713 ANCHOR/SCREW BN/BN,TIS/BN: HCPCS | Performed by: PODIATRIST

## 2021-12-10 RX ORDER — KETOROLAC TROMETHAMINE 30 MG/ML
INJECTION, SOLUTION INTRAMUSCULAR; INTRAVENOUS AS NEEDED
Status: DISCONTINUED | OUTPATIENT
Start: 2021-12-10 | End: 2021-12-10

## 2021-12-10 RX ORDER — KETAMINE HCL IN NACL, ISO-OSM 100MG/10ML
SYRINGE (ML) INJECTION AS NEEDED
Status: DISCONTINUED | OUTPATIENT
Start: 2021-12-10 | End: 2021-12-10

## 2021-12-10 RX ORDER — PROPOFOL 10 MG/ML
INJECTION, EMULSION INTRAVENOUS AS NEEDED
Status: DISCONTINUED | OUTPATIENT
Start: 2021-12-10 | End: 2021-12-10

## 2021-12-10 RX ORDER — ONDANSETRON 2 MG/ML
4 INJECTION INTRAMUSCULAR; INTRAVENOUS ONCE AS NEEDED
Status: DISCONTINUED | OUTPATIENT
Start: 2021-12-10 | End: 2021-12-10 | Stop reason: HOSPADM

## 2021-12-10 RX ORDER — MIDAZOLAM HYDROCHLORIDE 2 MG/2ML
INJECTION, SOLUTION INTRAMUSCULAR; INTRAVENOUS AS NEEDED
Status: DISCONTINUED | OUTPATIENT
Start: 2021-12-10 | End: 2021-12-10

## 2021-12-10 RX ORDER — EPHEDRINE SULFATE 50 MG/ML
INJECTION INTRAVENOUS AS NEEDED
Status: DISCONTINUED | OUTPATIENT
Start: 2021-12-10 | End: 2021-12-10

## 2021-12-10 RX ORDER — SODIUM CHLORIDE, SODIUM LACTATE, POTASSIUM CHLORIDE, CALCIUM CHLORIDE 600; 310; 30; 20 MG/100ML; MG/100ML; MG/100ML; MG/100ML
125 INJECTION, SOLUTION INTRAVENOUS CONTINUOUS
Status: DISCONTINUED | OUTPATIENT
Start: 2021-12-10 | End: 2021-12-10 | Stop reason: HOSPADM

## 2021-12-10 RX ORDER — PROPOFOL 10 MG/ML
INJECTION, EMULSION INTRAVENOUS CONTINUOUS PRN
Status: DISCONTINUED | OUTPATIENT
Start: 2021-12-10 | End: 2021-12-10

## 2021-12-10 RX ORDER — FENTANYL CITRATE 50 UG/ML
INJECTION, SOLUTION INTRAMUSCULAR; INTRAVENOUS AS NEEDED
Status: DISCONTINUED | OUTPATIENT
Start: 2021-12-10 | End: 2021-12-10

## 2021-12-10 RX ORDER — CLINDAMYCIN PHOSPHATE 600 MG/50ML
INJECTION INTRAVENOUS AS NEEDED
Status: DISCONTINUED | OUTPATIENT
Start: 2021-12-10 | End: 2021-12-10

## 2021-12-10 RX ORDER — LIDOCAINE HYDROCHLORIDE 10 MG/ML
INJECTION, SOLUTION EPIDURAL; INFILTRATION; INTRACAUDAL; PERINEURAL AS NEEDED
Status: DISCONTINUED | OUTPATIENT
Start: 2021-12-10 | End: 2021-12-10 | Stop reason: HOSPADM

## 2021-12-10 RX ORDER — FENTANYL CITRATE/PF 50 MCG/ML
25 SYRINGE (ML) INJECTION
Status: DISCONTINUED | OUTPATIENT
Start: 2021-12-10 | End: 2021-12-10 | Stop reason: HOSPADM

## 2021-12-10 RX ORDER — LIDOCAINE HYDROCHLORIDE 10 MG/ML
INJECTION, SOLUTION EPIDURAL; INFILTRATION; INTRACAUDAL; PERINEURAL AS NEEDED
Status: DISCONTINUED | OUTPATIENT
Start: 2021-12-10 | End: 2021-12-10

## 2021-12-10 RX ORDER — CEFAZOLIN SODIUM 2 G/50ML
2000 SOLUTION INTRAVENOUS ONCE
Status: COMPLETED | OUTPATIENT
Start: 2021-12-10 | End: 2021-12-10

## 2021-12-10 RX ORDER — MAGNESIUM HYDROXIDE 1200 MG/15ML
LIQUID ORAL AS NEEDED
Status: DISCONTINUED | OUTPATIENT
Start: 2021-12-10 | End: 2021-12-10 | Stop reason: HOSPADM

## 2021-12-10 RX ADMIN — FENTANYL CITRATE 25 MCG: 50 INJECTION, SOLUTION INTRAMUSCULAR; INTRAVENOUS at 08:17

## 2021-12-10 RX ADMIN — Medication 10 MG: at 08:20

## 2021-12-10 RX ADMIN — MIDAZOLAM 2 MG: 1 INJECTION INTRAMUSCULAR; INTRAVENOUS at 08:09

## 2021-12-10 RX ADMIN — KETOROLAC TROMETHAMINE 15 MG: 30 INJECTION, SOLUTION INTRAMUSCULAR at 09:40

## 2021-12-10 RX ADMIN — PHENYLEPHRINE HYDROCHLORIDE 2 DROP: 1 SPRAY NASAL at 08:18

## 2021-12-10 RX ADMIN — LIDOCAINE HYDROCHLORIDE 20 MG: 10 INJECTION, SOLUTION EPIDURAL; INFILTRATION; INTRACAUDAL; PERINEURAL at 08:16

## 2021-12-10 RX ADMIN — FENTANYL CITRATE 50 MCG: 50 INJECTION, SOLUTION INTRAMUSCULAR; INTRAVENOUS at 08:15

## 2021-12-10 RX ADMIN — EPHEDRINE SULFATE 10 MG: 50 INJECTION, SOLUTION INTRAVENOUS at 09:20

## 2021-12-10 RX ADMIN — Medication 30 MG: at 08:15

## 2021-12-10 RX ADMIN — SODIUM CHLORIDE, SODIUM LACTATE, POTASSIUM CHLORIDE, AND CALCIUM CHLORIDE: .6; .31; .03; .02 INJECTION, SOLUTION INTRAVENOUS at 08:09

## 2021-12-10 RX ADMIN — FENTANYL CITRATE 25 MCG: 50 INJECTION, SOLUTION INTRAMUSCULAR; INTRAVENOUS at 08:20

## 2021-12-10 RX ADMIN — PROPOFOL 120 MCG/KG/MIN: 10 INJECTION, EMULSION INTRAVENOUS at 08:16

## 2021-12-10 RX ADMIN — CEFAZOLIN SODIUM 2000 MG: 2 SOLUTION INTRAVENOUS at 08:09

## 2021-12-10 RX ADMIN — PROPOFOL 50 MG: 10 INJECTION, EMULSION INTRAVENOUS at 08:16

## 2021-12-10 RX ADMIN — Medication 10 MG: at 08:30

## 2021-12-10 RX ADMIN — CLINDAMYCIN PHOSPHATE 600 MG: 600 INJECTION, SOLUTION INTRAVENOUS at 08:51

## 2021-12-14 ENCOUNTER — OFFICE VISIT (OUTPATIENT)
Dept: PODIATRY | Facility: CLINIC | Age: 56
End: 2021-12-14

## 2021-12-14 VITALS — WEIGHT: 236 LBS | BODY MASS INDEX: 31.28 KG/M2 | HEIGHT: 73 IN | RESPIRATION RATE: 17 BRPM

## 2021-12-14 DIAGNOSIS — M79.672 PAIN IN BOTH FEET: Primary | ICD-10-CM

## 2021-12-14 DIAGNOSIS — M79.671 PAIN IN BOTH FEET: Primary | ICD-10-CM

## 2021-12-14 PROCEDURE — 99024 POSTOP FOLLOW-UP VISIT: CPT | Performed by: PODIATRIST

## 2021-12-16 ENCOUNTER — APPOINTMENT (EMERGENCY)
Dept: RADIOLOGY | Facility: HOSPITAL | Age: 56
End: 2021-12-16
Payer: COMMERCIAL

## 2021-12-16 ENCOUNTER — HOSPITAL ENCOUNTER (EMERGENCY)
Facility: HOSPITAL | Age: 56
Discharge: HOME | End: 2021-12-16
Attending: EMERGENCY MEDICINE | Admitting: EMERGENCY MEDICINE
Payer: COMMERCIAL

## 2021-12-16 ENCOUNTER — TELEPHONE (OUTPATIENT)
Dept: FAMILY MEDICINE CLINIC | Facility: CLINIC | Age: 56
End: 2021-12-16

## 2021-12-16 VITALS
DIASTOLIC BLOOD PRESSURE: 75 MMHG | TEMPERATURE: 97.9 F | RESPIRATION RATE: 20 BRPM | OXYGEN SATURATION: 95 % | HEART RATE: 77 BPM | SYSTOLIC BLOOD PRESSURE: 132 MMHG

## 2021-12-16 DIAGNOSIS — U07.1 COVID: Primary | ICD-10-CM

## 2021-12-16 DIAGNOSIS — R73.9 HYPERGLYCEMIA: ICD-10-CM

## 2021-12-16 LAB
ANION GAP SERPL CALCULATED.3IONS-SCNC: 12 MMOL/L (ref 4–13)
ATRIAL RATE: 90 BPM
BASE EX.OXY STD BLDV CALC-SCNC: 92.5 % (ref 60–80)
BASE EXCESS BLDV CALC-SCNC: -2.2 MMOL/L
BASOPHILS # BLD AUTO: 0.02 THOUSANDS/ΜL (ref 0–0.1)
BASOPHILS NFR BLD AUTO: 0 % (ref 0–1)
BUN SERPL-MCNC: 21 MG/DL (ref 5–25)
CALCIUM SERPL-MCNC: 9.1 MG/DL (ref 8.3–10.1)
CHLORIDE SERPL-SCNC: 99 MMOL/L (ref 100–108)
CO2 SERPL-SCNC: 20 MMOL/L (ref 21–32)
CREAT SERPL-MCNC: 1.35 MG/DL (ref 0.6–1.3)
D DIMER PPP FEU-MCNC: 0.56 UG/ML FEU
EOSINOPHIL # BLD AUTO: 0.01 THOUSAND/ΜL (ref 0–0.61)
EOSINOPHIL NFR BLD AUTO: 0 % (ref 0–6)
ERYTHROCYTE [DISTWIDTH] IN BLOOD BY AUTOMATED COUNT: 12.7 % (ref 11.6–15.1)
FLUAV RNA RESP QL NAA+PROBE: NEGATIVE
FLUBV RNA RESP QL NAA+PROBE: NEGATIVE
GFR SERPL CREATININE-BSD FRML MDRD: 57 ML/MIN/1.73SQ M
GLUCOSE SERPL-MCNC: 173 MG/DL (ref 65–140)
HCO3 BLDV-SCNC: 19.4 MMOL/L (ref 24–30)
HCT VFR BLD AUTO: 43.7 % (ref 36.5–49.3)
HGB BLD-MCNC: 14.9 G/DL (ref 12–17)
IMM GRANULOCYTES # BLD AUTO: 0.02 THOUSAND/UL (ref 0–0.2)
IMM GRANULOCYTES NFR BLD AUTO: 0 % (ref 0–2)
LYMPHOCYTES # BLD AUTO: 0.49 THOUSANDS/ΜL (ref 0.6–4.47)
LYMPHOCYTES NFR BLD AUTO: 9 % (ref 14–44)
MCH RBC QN AUTO: 31.5 PG (ref 26.8–34.3)
MCHC RBC AUTO-ENTMCNC: 34.1 G/DL (ref 31.4–37.4)
MCV RBC AUTO: 92 FL (ref 82–98)
MONOCYTES # BLD AUTO: 0.53 THOUSAND/ΜL (ref 0.17–1.22)
MONOCYTES NFR BLD AUTO: 10 % (ref 4–12)
NEUTROPHILS # BLD AUTO: 4.19 THOUSANDS/ΜL (ref 1.85–7.62)
NEUTS SEG NFR BLD AUTO: 81 % (ref 43–75)
NRBC BLD AUTO-RTO: 0 /100 WBCS
O2 CT BLDV-SCNC: 20.3 ML/DL
PCO2 BLDV: 26.2 MM HG (ref 42–50)
PH BLDV: 7.49 [PH] (ref 7.3–7.4)
PLATELET # BLD AUTO: 182 THOUSANDS/UL (ref 149–390)
PMV BLD AUTO: 10.3 FL (ref 8.9–12.7)
PO2 BLDV: 62.9 MM HG (ref 35–45)
POTASSIUM SERPL-SCNC: 4 MMOL/L (ref 3.5–5.3)
PR INTERVAL: 160 MS
QRS AXIS: 72 DEGREES
QRSD INTERVAL: 84 MS
QT INTERVAL: 348 MS
QTC INTERVAL: 425 MS
RBC # BLD AUTO: 4.73 MILLION/UL (ref 3.88–5.62)
RSV RNA RESP QL NAA+PROBE: NEGATIVE
SARS-COV-2 RNA RESP QL NAA+PROBE: POSITIVE
SODIUM SERPL-SCNC: 131 MMOL/L (ref 136–145)
T WAVE AXIS: 55 DEGREES
VENTRICULAR RATE: 90 BPM
WBC # BLD AUTO: 5.26 THOUSAND/UL (ref 4.31–10.16)

## 2021-12-16 PROCEDURE — 85025 COMPLETE CBC W/AUTO DIFF WBC: CPT | Performed by: EMERGENCY MEDICINE

## 2021-12-16 PROCEDURE — 36415 COLL VENOUS BLD VENIPUNCTURE: CPT | Performed by: EMERGENCY MEDICINE

## 2021-12-16 PROCEDURE — 80048 BASIC METABOLIC PNL TOTAL CA: CPT | Performed by: EMERGENCY MEDICINE

## 2021-12-16 PROCEDURE — 99282 EMERGENCY DEPT VISIT SF MDM: CPT | Performed by: EMERGENCY MEDICINE

## 2021-12-16 PROCEDURE — 93005 ELECTROCARDIOGRAM TRACING: CPT

## 2021-12-16 PROCEDURE — 85379 FIBRIN DEGRADATION QUANT: CPT | Performed by: EMERGENCY MEDICINE

## 2021-12-16 PROCEDURE — 82805 BLOOD GASES W/O2 SATURATION: CPT | Performed by: EMERGENCY MEDICINE

## 2021-12-16 PROCEDURE — 0241U HB NFCT DS VIR RESP RNA 4 TRGT: CPT | Performed by: EMERGENCY MEDICINE

## 2021-12-16 PROCEDURE — 71045 X-RAY EXAM CHEST 1 VIEW: CPT

## 2021-12-16 PROCEDURE — 93010 ELECTROCARDIOGRAM REPORT: CPT | Performed by: INTERNAL MEDICINE

## 2021-12-16 PROCEDURE — 99285 EMERGENCY DEPT VISIT HI MDM: CPT

## 2021-12-16 PROCEDURE — 96365 THER/PROPH/DIAG IV INF INIT: CPT

## 2021-12-16 RX ORDER — BENZONATATE 100 MG/1
100 CAPSULE ORAL 3 TIMES DAILY PRN
Qty: 20 CAPSULE | Refills: 3 | Status: SHIPPED | OUTPATIENT
Start: 2021-12-16 | End: 2022-01-18

## 2021-12-16 RX ORDER — ACETAMINOPHEN 325 MG/1
650 TABLET ORAL ONCE
Status: COMPLETED | OUTPATIENT
Start: 2021-12-16 | End: 2021-12-16

## 2021-12-16 RX ADMIN — SODIUM CHLORIDE, SODIUM LACTATE, POTASSIUM CHLORIDE, AND CALCIUM CHLORIDE 500 ML: .6; .31; .03; .02 INJECTION, SOLUTION INTRAVENOUS at 09:04

## 2021-12-16 RX ADMIN — ACETAMINOPHEN 650 MG: 325 TABLET, FILM COATED ORAL at 08:49

## 2021-12-17 ENCOUNTER — TELEMEDICINE (OUTPATIENT)
Dept: FAMILY MEDICINE CLINIC | Facility: CLINIC | Age: 56
End: 2021-12-17
Payer: COMMERCIAL

## 2021-12-17 ENCOUNTER — TELEPHONE (OUTPATIENT)
Dept: FAMILY MEDICINE CLINIC | Facility: CLINIC | Age: 56
End: 2021-12-17

## 2021-12-17 VITALS — HEIGHT: 73 IN | BODY MASS INDEX: 31.28 KG/M2 | WEIGHT: 236 LBS

## 2021-12-17 DIAGNOSIS — N18.2 BENIGN HYPERTENSION WITH CKD (CHRONIC KIDNEY DISEASE), STAGE II: ICD-10-CM

## 2021-12-17 DIAGNOSIS — U07.1 COVID-19: Primary | ICD-10-CM

## 2021-12-17 DIAGNOSIS — I12.9 BENIGN HYPERTENSION WITH CKD (CHRONIC KIDNEY DISEASE), STAGE II: ICD-10-CM

## 2021-12-17 PROCEDURE — 99214 OFFICE O/P EST MOD 30 MIN: CPT | Performed by: FAMILY MEDICINE

## 2021-12-17 PROCEDURE — 3008F BODY MASS INDEX DOCD: CPT | Performed by: FAMILY MEDICINE

## 2021-12-17 RX ORDER — ACETAMINOPHEN 325 MG/1
650 TABLET ORAL ONCE AS NEEDED
Status: CANCELLED | OUTPATIENT
Start: 2021-12-19

## 2021-12-17 RX ORDER — ONDANSETRON 2 MG/ML
4 INJECTION INTRAMUSCULAR; INTRAVENOUS ONCE AS NEEDED
Status: CANCELLED | OUTPATIENT
Start: 2021-12-19

## 2021-12-17 RX ORDER — ALBUTEROL SULFATE 90 UG/1
3 AEROSOL, METERED RESPIRATORY (INHALATION) ONCE AS NEEDED
Status: CANCELLED | OUTPATIENT
Start: 2021-12-19

## 2021-12-17 RX ORDER — SODIUM CHLORIDE 9 MG/ML
20 INJECTION, SOLUTION INTRAVENOUS ONCE AS NEEDED
Status: CANCELLED | OUTPATIENT
Start: 2021-12-19

## 2021-12-19 ENCOUNTER — HOSPITAL ENCOUNTER (OUTPATIENT)
Dept: INFUSION CENTER | Facility: HOSPITAL | Age: 56
Discharge: HOME | End: 2021-12-19
Payer: COMMERCIAL

## 2021-12-19 VITALS
OXYGEN SATURATION: 96 % | SYSTOLIC BLOOD PRESSURE: 120 MMHG | HEART RATE: 86 BPM | RESPIRATION RATE: 18 BRPM | DIASTOLIC BLOOD PRESSURE: 66 MMHG | TEMPERATURE: 98.6 F

## 2021-12-19 DIAGNOSIS — U07.1 COVID-19: ICD-10-CM

## 2021-12-19 DIAGNOSIS — I12.9 BENIGN HYPERTENSION WITH CKD (CHRONIC KIDNEY DISEASE), STAGE II: Primary | ICD-10-CM

## 2021-12-19 DIAGNOSIS — N18.2 BENIGN HYPERTENSION WITH CKD (CHRONIC KIDNEY DISEASE), STAGE II: Primary | ICD-10-CM

## 2021-12-19 PROCEDURE — M0245 HB BAMLAN AND ETESEV INF ADMIN: HCPCS | Performed by: FAMILY MEDICINE

## 2021-12-19 RX ORDER — ONDANSETRON 2 MG/ML
4 INJECTION INTRAMUSCULAR; INTRAVENOUS ONCE AS NEEDED
Status: CANCELLED | OUTPATIENT
Start: 2021-12-19

## 2021-12-19 RX ORDER — SODIUM CHLORIDE 9 MG/ML
20 INJECTION, SOLUTION INTRAVENOUS ONCE AS NEEDED
Status: CANCELLED | OUTPATIENT
Start: 2021-12-19

## 2021-12-19 RX ORDER — ONDANSETRON 2 MG/ML
4 INJECTION INTRAMUSCULAR; INTRAVENOUS ONCE AS NEEDED
Status: DISCONTINUED | OUTPATIENT
Start: 2021-12-19 | End: 2021-12-22 | Stop reason: HOSPADM

## 2021-12-19 RX ORDER — ALBUTEROL SULFATE 90 UG/1
3 AEROSOL, METERED RESPIRATORY (INHALATION) ONCE AS NEEDED
Status: CANCELLED | OUTPATIENT
Start: 2021-12-19

## 2021-12-19 RX ORDER — ALBUTEROL SULFATE 90 UG/1
3 AEROSOL, METERED RESPIRATORY (INHALATION) ONCE AS NEEDED
Status: DISCONTINUED | OUTPATIENT
Start: 2021-12-19 | End: 2021-12-22 | Stop reason: HOSPADM

## 2021-12-19 RX ORDER — ACETAMINOPHEN 325 MG/1
650 TABLET ORAL ONCE AS NEEDED
Status: DISCONTINUED | OUTPATIENT
Start: 2021-12-19 | End: 2021-12-22 | Stop reason: HOSPADM

## 2021-12-19 RX ORDER — SODIUM CHLORIDE 9 MG/ML
20 INJECTION, SOLUTION INTRAVENOUS ONCE AS NEEDED
Status: DISCONTINUED | OUTPATIENT
Start: 2021-12-19 | End: 2021-12-22 | Stop reason: HOSPADM

## 2021-12-19 RX ORDER — ACETAMINOPHEN 325 MG/1
650 TABLET ORAL ONCE AS NEEDED
Status: CANCELLED | OUTPATIENT
Start: 2021-12-19

## 2021-12-19 RX ADMIN — SODIUM CHLORIDE 2100 MG COMBINED: 9 INJECTION, SOLUTION INTRAVENOUS at 09:51

## 2021-12-19 RX ADMIN — SODIUM CHLORIDE 20 ML/HR: 0.9 INJECTION, SOLUTION INTRAVENOUS at 09:54

## 2021-12-20 ENCOUNTER — TELEMEDICINE (OUTPATIENT)
Dept: FAMILY MEDICINE CLINIC | Facility: CLINIC | Age: 56
End: 2021-12-20
Payer: COMMERCIAL

## 2021-12-20 VITALS — TEMPERATURE: 97.4 F | HEIGHT: 73 IN | BODY MASS INDEX: 31.14 KG/M2 | HEART RATE: 83 BPM | OXYGEN SATURATION: 94 %

## 2021-12-20 DIAGNOSIS — U07.1 COVID-19: Primary | ICD-10-CM

## 2021-12-20 PROCEDURE — 99213 OFFICE O/P EST LOW 20 MIN: CPT | Performed by: FAMILY MEDICINE

## 2021-12-20 PROCEDURE — 1036F TOBACCO NON-USER: CPT | Performed by: FAMILY MEDICINE

## 2021-12-28 ENCOUNTER — OFFICE VISIT (OUTPATIENT)
Dept: PODIATRY | Facility: CLINIC | Age: 56
End: 2021-12-28
Payer: COMMERCIAL

## 2021-12-28 VITALS — BODY MASS INDEX: 31.28 KG/M2 | WEIGHT: 236 LBS | HEIGHT: 73 IN | RESPIRATION RATE: 17 BRPM

## 2021-12-28 DIAGNOSIS — M20.22 HALLUX RIGIDUS, LEFT FOOT: ICD-10-CM

## 2021-12-28 DIAGNOSIS — M20.21 HALLUX RIGIDUS, RIGHT FOOT: Primary | ICD-10-CM

## 2021-12-28 PROCEDURE — 73620 X-RAY EXAM OF FOOT: CPT | Performed by: PODIATRIST

## 2022-01-18 ENCOUNTER — TELEMEDICINE (OUTPATIENT)
Dept: FAMILY MEDICINE CLINIC | Facility: CLINIC | Age: 57
End: 2022-01-18
Payer: COMMERCIAL

## 2022-01-18 VITALS — BODY MASS INDEX: 31.28 KG/M2 | WEIGHT: 236 LBS | HEIGHT: 73 IN

## 2022-01-18 DIAGNOSIS — R19.7 DIARRHEA, UNSPECIFIED TYPE: Primary | ICD-10-CM

## 2022-01-18 DIAGNOSIS — R53.83 OTHER FATIGUE: ICD-10-CM

## 2022-01-18 PROCEDURE — 99213 OFFICE O/P EST LOW 20 MIN: CPT | Performed by: FAMILY MEDICINE

## 2022-01-18 RX ORDER — CEPHALEXIN 500 MG/1
500 CAPSULE ORAL 3 TIMES DAILY
COMMUNITY
Start: 2022-01-13 | End: 2022-01-23 | Stop reason: HOSPADM

## 2022-01-18 NOTE — PROGRESS NOTES
Virtual Regular Visit    Assessment/Plan:     Problem List Items Addressed This Visit     None      Visit Diagnoses     Diarrhea, unspecified type    -  Primary    Other fatigue          brat diet  Rest  Imodium 1 -2 times a day to slow down diarrhea  Fluids  Order c diff if diarrhea is watery or not formed at all     No follow-ups on file  Reason for visit is   Chief Complaint   Patient presents with    Virtual Regular Visit     SYMPTOMS STARTED SATURDAY     Diarrhea    Fatigue     Encounter provider Max Nash DO  Provider located at [address] [place] [address],[address]  [address]  [facility] [address] [phone]    Recent Visits  No visits were found meeting these conditions  Showing recent visits within past 7 days and meeting all other requirements  Today's Visits  Date Type Provider Dept   01/18/22 Telemedicine Theron Mo, [facility] today's visits and meeting all other requirements  Future Appointments  No visits were found meeting these conditions  Showing future appointments within next 150 days and meeting all other requirements       The patient was identified by name and date of birth  Levi Altamirano was informed that this is a telemedicine visit and that the visit is being conducted through [address] and patient was informed this is a secure, HIPAA-complaint platform  He agrees to proceed     My office door was closed  No one else was in the room  He acknowledged consent and understanding of privacy and security of the video platform  The patient has agreed to participate and understands they can discontinue the visit at any time  It was my intent to perform this visit via video technology but the patient was not able to do a video connection so the visit was completed via audio telephone only      Patient is currently located in the state [facility]  Patient is currently located in a state in which I am licensed    Patient is aware this is a billable service  Subjective  Be Read is a 60 y o  male is being seen via Video Visit today due to the COVID-19 pandemic    Chief Complaint   Patient presents with    Virtual Regular Visit     SYMPTOMS STARTED SATURDAY     Diarrhea    Fatigue     COVID + 12/16/22  Today's concerns are:    Diarrhea and fatigue   This weekend did not sleep well, very thirsty and started with dairrhea   Fatigue and weak feeling   This morning imodium and helped 2 tablets and then 1   Eye infection last week   D/c antibiotics 24 hours ago (Keflex) - was taking that 3 days or so for an eye infection   15 - 20 times a day with diarrhea - some loose, some watery   Appetite normal   drinking a lot water   Eating eggs, chicken and turkey   Worked mon, tues - then off 2 days  Last week when he was off he was very tired    Vitals:    01/18/22 1239   Weight: 107 kg (236 lb)   Height: 6' 1" (1 854 m)     Wt Readings from Last 3 Encounters:   01/18/22 107 kg (236 lb)   12/28/21 107 kg (236 lb)   12/17/21 107 kg (236 lb)     BP Readings from Last 3 Encounters:   12/19/21 120/66   12/16/21 132/75   12/10/21 143/70       PHQ-2/9 Depression Screening            Past Medical History:   Diagnosis Date    Depression     Diverticulitis     Diverticulosis     GERD (gastroesophageal reflux disease)     Hypertension     Sleep apnea     no cpap    Umbilical hernia      Past Surgical History:   Procedure Laterality Date    CATARACT EXTRACTION Bilateral     CHOLECYSTECTOMY      COLONOSCOPY      ESOPHAGOGASTRODUODENOSCOPY      HERNIA REPAIR  09/19/2019    Open repair of incarcerated incisional hernia with mesh - Dr Marilyn Goldstein ARTHROSCOPY Left     IA HALLUX RIGIDUS W/CHEILECTOMY 1ST MP JT W/O IMPLT Bilateral 12/10/2021    Procedure: CHEILECTOMY;  Surgeon: Madeleine Dewitt DPM;  Location: [address] [place] [address];  Service: Podiatry    IA TENOTOMY ELBOW LATERAL/MEDIAL DEBRIDE REPAIR Left 11/17/2020    Procedure: lateral epicondyle elbow debridement, release, and repair;  Surgeon: Mele Moreira MD;  Location: [facility];  Service: Orthopedics    WISDOM TOOTH EXTRACTION         Current Medications:  Current Outpatient Medications   Medication Sig Dispense Refill    albuterol (PROVENTIL HFA,VENTOLIN HFA) 90 mcg/act inhaler Inhale 2 puffs every 4 (four) hours as needed for wheezing or shortness of breath 18 g 5    carvedilol (COREG) 3 125 mg tablet TAKE 1 TABLET(3 125 MG) BY MOUTH TWICE DAILY WITH MEALS 60 tablet 1    cephalexin (KEFLEX) 500 mg capsule Take 500 mg by mouth 3 (three) times a day      eszopiclone (LUNESTA) 3 MG tablet Take 1 tablet (3 mg total) by mouth daily at bedtime 90 tablet 1    FLUoxetine (PROzac) 20 MG tablet Take 1 tablet (20 mg total) by mouth daily 90 tablet 1    fluticasone-umeclidinium-vilanterol (Trelegy Ellipta) 100-62 5-25 MCG/INH inhaler Inhale 1 puff daily Rinse mouth after use  60 blister 11    lisinopril (ZESTRIL) 30 mg tablet Take 1 tablet (30 mg total) by mouth daily (Patient taking differently: Take 30 mg by mouth daily at bedtime  ) 90 tablet 1    naloxone (NARCAN) 4 mg/0 1 mL nasal spray Administer 1 spray into a nostril  If no response after 2-3 minutes, give another dose in the other nostril using a new spray  1 each 1    rosuvastatin (CRESTOR) 10 MG tablet Take 1 tablet (10 mg total) by mouth daily 90 tablet 3    triamcinolone (KENALOG) 0 1 % ointment Apply topically 2 (two) times a day 15 g 1     No current facility-administered medications for this visit  Allergies: Allergies   Allergen Reactions    Allopurinol Rash       Review of Systems  all others negative - no chest pain, SOB, normal urine  noraml appetite  No fevers    no GERD  sleeping well  mood good  Fatigue  Diarrhea  Physical Exam   Unable - telephone only     As a result of this visit, I have not referred the patient for further respiratory evaluation      VIRTUAL VISIT DISCLAIMER    Woodrow Blum acknowledges that he has consented to an online visit or consultation  He understands that the online visit is based solely on information provided by him, and that, in the absence of a face-to-face physical evaluation by the physician, the diagnosis he receives is both limited and provisional in terms of accuracy and completeness  This is not intended to replace a full medical face-to-face evaluation by the physician  Rubina Coelho understands and accepts these terms  BMI Counseling: Body mass index is 31 14 kg/m²  The BMI is above normal  Nutrition recommendations include decreasing portion sizes  Exercise recommendations include exercising 3-5 times per week  Rationale for BMI follow-up plan is due to patient being overweight or obese

## 2022-01-18 NOTE — PATIENT INSTRUCTIONS
Nutrition Tips for Relief of Diarrhea   WHAT YOU NEED TO KNOW:   There are diet changes you can make to help relieve or stop diarrhea  These changes include limiting or avoiding foods and liquids that are high in sugar, fat, fiber, and lactose  Lactose is a sugar found in milk products  Milk products can cause diarrhea in people who are lactose intolerant  You should also drink extra liquids to replace fluids that are lost when you have diarrhea  Diarrhea can lead to dehydration  DISCHARGE INSTRUCTIONS:   Foods to limit or avoid:   · Dairy:      ? Whole milk    ? Half-and-half, cream, and sour cream    ? Regular (whole milk) ice cream    · Grains:      ? Whole wheat and whole grain breads, pasta, cereals, and crackers    ? [place] and wild rice    ? Breads and cereals with seeds or nuts    ? Popcorn    · Fruit and vegetables:      ? All raw fruits, except bananas and melon    ? Dried fruits, including prunes and raisins    ? Canned fruit in heavy syrup    ? Prune juice and any fruit juice with pulp    ? Raw vegetables, except lettuce     ? Fried vegetables    ? Corn, raw and cooked broccoli, cabbage, cauliflower, and elvin greens    · Protein:      ? Fried meat, poultry, and fish    ? High-fat luncheon meats, such as bologna    ? Fatty meats, such as sausage, bell, and hot dogs    ? Beans and nuts    · Liquids:      ? Sodas and fruit-flavored drinks    ? Drinks that contain caffeine, such as energy drinks, coffee, and tea     ? Drinks that contain alcohol or sugar alcohol, such as sorbitol    Foods and liquids you may eat or drink:  Most people can tolerate the foods and liquids listed below  If any of them make your symptoms worse, stop eating or drinking them until you feel better  If you are lactose intolerant, avoid milk products  · Dairy:      ? Skim or low-fat milk or evaporated milk    ? Soy milk or buttermilk     ? Low-fat, part-skim, and aged cheese    ?  Yogurt, low-fat ice cream, or sherbert    · Grains:  (Choose foods with less than 2 grams of dietary fiber per serving )     ? White or refined flour breads, bagels, pasta, and crackers    ? Cold or hot cereals made from white or refined flour such as puffed rice, cornflakes, or cream of wheat    ? White rice    · Fruit and vegetables:      ? Bananas or melon    ? Fruit juice without pulp, except prune juice    ? Canned fruit in juice or light syrup    ? Lettuce and most well-cooked vegetables without seeds or skins     ? Strained vegetable juice    · Protein:      ? Tender, well-cooked meat, poultry, or fish    ? Well-cooked eggs or soy foods (cooked without added fat)    ? Smooth nut butters    · Fats:  (Limit fats to less than 8 teaspoons a day)     ? Oil, butter, or margarine, or mayonnaise    ? Cream cheese or salad dressings    · Liquids:      ? For infants, breast milk or formula    ? Oral rehydration solution     ? Decaffeinated coffee or caffeine-free teas    ? Soft drinks without caffeine    Other guidelines to follow:   · Drink liquids as directed  You may need to drink more liquids than usual to prevent dehydration  Ask how much liquid to drink each day and which liquids are best for you  You may need to drink an oral rehydration solution (ORS)  An ORS helps replace fluids and electrolytes that you lose when you have diarrhea  · Eat small meals or snacks every 3 to 4 hours  instead of large meals  Continue eating even if you still have diarrhea  Your diarrhea will continue for a few days but should gradually go away  © Copyright [employer] 2021 Information is for End User's use only and may not be sold, redistributed or otherwise used for commercial purposes  All illustrations and images included in CareNotes® are the copyrighted property of [employer]  or [facility] Joaquim Siegel   The above information is an  only  It is not intended as medical advice for individual conditions or treatments   Talk to your doctor, nurse or pharmacist before following any medical regimen to see if it is safe and effective for you

## 2022-01-19 ENCOUNTER — TELEPHONE (OUTPATIENT)
Dept: FAMILY MEDICINE CLINIC | Facility: CLINIC | Age: 57
End: 2022-01-19

## 2022-01-19 NOTE — TELEPHONE ENCOUNTER
Pt called and left message stating that provider told him to call if symptoms worsen  He stated he now has a low grade fever, nausea and no appetite and he feels weak and wanted provider to be aware as she requested to be updated

## 2022-01-20 ENCOUNTER — APPOINTMENT (EMERGENCY)
Dept: CT IMAGING | Facility: HOSPITAL | Age: 57
DRG: 872 | End: 2022-01-20
Payer: COMMERCIAL

## 2022-01-20 ENCOUNTER — HOSPITAL ENCOUNTER (INPATIENT)
Facility: HOSPITAL | Age: 57
LOS: 3 days | Discharge: HOME | DRG: 872 | End: 2022-01-23
Attending: EMERGENCY MEDICINE | Admitting: INTERNAL MEDICINE
Payer: COMMERCIAL

## 2022-01-20 DIAGNOSIS — E86.0 DEHYDRATION: ICD-10-CM

## 2022-01-20 DIAGNOSIS — A04.72 C. DIFFICILE COLITIS: ICD-10-CM

## 2022-01-20 DIAGNOSIS — N17.9 AKI (ACUTE KIDNEY INJURY) (HCC): ICD-10-CM

## 2022-01-20 DIAGNOSIS — R19.7 DIARRHEA: ICD-10-CM

## 2022-01-20 DIAGNOSIS — A41.9 SEPSIS (HCC): ICD-10-CM

## 2022-01-20 DIAGNOSIS — K52.9 ACUTE COLITIS: Primary | ICD-10-CM

## 2022-01-20 PROBLEM — K57.32 SIGMOID DIVERTICULITIS: Status: ACTIVE | Noted: 2022-01-20

## 2022-01-20 LAB
2HR DELTA HS TROPONIN: 1 NG/L
4HR DELTA HS TROPONIN: -1 NG/L
ALBUMIN SERPL BCP-MCNC: 3.7 G/DL (ref 3.5–5)
ALP SERPL-CCNC: 92 U/L (ref 46–116)
ALT SERPL W P-5'-P-CCNC: 36 U/L (ref 12–78)
ANION GAP SERPL CALCULATED.3IONS-SCNC: 16 MMOL/L (ref 4–13)
AST SERPL W P-5'-P-CCNC: 24 U/L (ref 5–45)
ATRIAL RATE: 122 BPM
BASOPHILS # BLD MANUAL: 0 THOUSAND/UL (ref 0–0.1)
BASOPHILS NFR MAR MANUAL: 0 % (ref 0–1)
BILIRUB SERPL-MCNC: 1.43 MG/DL (ref 0.2–1)
BUN SERPL-MCNC: 17 MG/DL (ref 5–25)
C DIFF TOX A+B STL QL IA: NEGATIVE
C DIFF TOX GENS STL QL NAA+PROBE: POSITIVE
CALCIUM SERPL-MCNC: 9.4 MG/DL (ref 8.3–10.1)
CARDIAC TROPONIN I PNL SERPL HS: 2 NG/L
CARDIAC TROPONIN I PNL SERPL HS: 3 NG/L
CARDIAC TROPONIN I PNL SERPL HS: 4 NG/L
CHLORIDE SERPL-SCNC: 96 MMOL/L (ref 100–108)
CO2 SERPL-SCNC: 18 MMOL/L (ref 21–32)
CREAT SERPL-MCNC: 1.47 MG/DL (ref 0.6–1.3)
EOSINOPHIL # BLD MANUAL: 0 THOUSAND/UL (ref 0–0.4)
EOSINOPHIL NFR BLD MANUAL: 0 % (ref 0–6)
ERYTHROCYTE [DISTWIDTH] IN BLOOD BY AUTOMATED COUNT: 13.1 % (ref 11.6–15.1)
GFR SERPL CREATININE-BSD FRML MDRD: 51 ML/MIN/1.73SQ M
GLUCOSE SERPL-MCNC: 156 MG/DL (ref 65–140)
HCT VFR BLD AUTO: 44.1 % (ref 36.5–49.3)
HGB BLD-MCNC: 15.3 G/DL (ref 12–17)
LACTATE SERPL-SCNC: 1.7 MMOL/L (ref 0.5–2)
LIPASE SERPL-CCNC: 49 U/L (ref 73–393)
LYMPHOCYTES # BLD AUTO: 13 % (ref 14–44)
LYMPHOCYTES # BLD AUTO: 2.31 THOUSAND/UL (ref 0.6–4.47)
MAGNESIUM SERPL-MCNC: 1.7 MG/DL (ref 1.6–2.6)
MCH RBC QN AUTO: 31.9 PG (ref 26.8–34.3)
MCHC RBC AUTO-ENTMCNC: 34.7 G/DL (ref 31.4–37.4)
MCV RBC AUTO: 92 FL (ref 82–98)
MONOCYTES # BLD AUTO: 2.14 THOUSAND/UL (ref 0–1.22)
MONOCYTES NFR BLD: 12 % (ref 4–12)
NEUTROPHILS # BLD MANUAL: 13.35 THOUSAND/UL (ref 1.85–7.62)
NEUTS BAND NFR BLD MANUAL: 3 % (ref 0–8)
NEUTS SEG NFR BLD AUTO: 72 % (ref 43–75)
P AXIS: -88 DEGREES
PLATELET # BLD AUTO: 276 THOUSANDS/UL (ref 149–390)
PLATELET BLD QL SMEAR: ADEQUATE
PMV BLD AUTO: 10 FL (ref 8.9–12.7)
POTASSIUM SERPL-SCNC: 3.9 MMOL/L (ref 3.5–5.3)
PR INTERVAL: 144 MS
PROT SERPL-MCNC: 8.2 G/DL (ref 6.4–8.2)
QRS AXIS: 46 DEGREES
QRSD INTERVAL: 88 MS
QT INTERVAL: 316 MS
QTC INTERVAL: 441 MS
RBC # BLD AUTO: 4.79 MILLION/UL (ref 3.88–5.62)
RBC MORPH BLD: NORMAL
SODIUM SERPL-SCNC: 130 MMOL/L (ref 136–145)
T WAVE AXIS: -8 DEGREES
VENTRICULAR RATE: 117 BPM
WBC # BLD AUTO: 17.8 THOUSAND/UL (ref 4.31–10.16)

## 2022-01-20 PROCEDURE — 83735 ASSAY OF MAGNESIUM: CPT | Performed by: EMERGENCY MEDICINE

## 2022-01-20 PROCEDURE — 83690 ASSAY OF LIPASE: CPT | Performed by: EMERGENCY MEDICINE

## 2022-01-20 PROCEDURE — 93005 ELECTROCARDIOGRAM TRACING: CPT

## 2022-01-20 PROCEDURE — 87493 C DIFF AMPLIFIED PROBE: CPT | Performed by: EMERGENCY MEDICINE

## 2022-01-20 PROCEDURE — 85027 COMPLETE CBC AUTOMATED: CPT | Performed by: EMERGENCY MEDICINE

## 2022-01-20 PROCEDURE — 96365 THER/PROPH/DIAG IV INF INIT: CPT

## 2022-01-20 PROCEDURE — 99285 EMERGENCY DEPT VISIT HI MDM: CPT | Performed by: EMERGENCY MEDICINE

## 2022-01-20 PROCEDURE — 99223 1ST HOSP IP/OBS HIGH 75: CPT | Performed by: INTERNAL MEDICINE

## 2022-01-20 PROCEDURE — 96375 TX/PRO/DX INJ NEW DRUG ADDON: CPT

## 2022-01-20 PROCEDURE — 87505 NFCT AGENT DETECTION GI: CPT | Performed by: EMERGENCY MEDICINE

## 2022-01-20 PROCEDURE — 85007 BL SMEAR W/DIFF WBC COUNT: CPT | Performed by: EMERGENCY MEDICINE

## 2022-01-20 PROCEDURE — 84484 ASSAY OF TROPONIN QUANT: CPT | Performed by: EMERGENCY MEDICINE

## 2022-01-20 PROCEDURE — 80053 COMPREHEN METABOLIC PANEL: CPT | Performed by: EMERGENCY MEDICINE

## 2022-01-20 PROCEDURE — 36415 COLL VENOUS BLD VENIPUNCTURE: CPT

## 2022-01-20 PROCEDURE — 99285 EMERGENCY DEPT VISIT HI MDM: CPT

## 2022-01-20 PROCEDURE — 89055 LEUKOCYTE ASSESSMENT FECAL: CPT | Performed by: EMERGENCY MEDICINE

## 2022-01-20 PROCEDURE — 74176 CT ABD & PELVIS W/O CONTRAST: CPT

## 2022-01-20 PROCEDURE — 87040 BLOOD CULTURE FOR BACTERIA: CPT | Performed by: EMERGENCY MEDICINE

## 2022-01-20 PROCEDURE — 93010 ELECTROCARDIOGRAM REPORT: CPT | Performed by: INTERNAL MEDICINE

## 2022-01-20 PROCEDURE — 83605 ASSAY OF LACTIC ACID: CPT | Performed by: EMERGENCY MEDICINE

## 2022-01-20 RX ORDER — METRONIDAZOLE 500 MG/1
500 TABLET ORAL EVERY 8 HOURS SCHEDULED
Status: DISCONTINUED | OUTPATIENT
Start: 2022-01-20 | End: 2022-01-21

## 2022-01-20 RX ORDER — CARVEDILOL 3.12 MG/1
3.12 TABLET ORAL 2 TIMES DAILY WITH MEALS
Status: DISCONTINUED | OUTPATIENT
Start: 2022-01-20 | End: 2022-01-23 | Stop reason: HOSPADM

## 2022-01-20 RX ORDER — ONDANSETRON 4 MG/1
4 TABLET, ORALLY DISINTEGRATING ORAL ONCE
Status: COMPLETED | OUTPATIENT
Start: 2022-01-20 | End: 2022-01-20

## 2022-01-20 RX ORDER — HEPARIN SODIUM 5000 [USP'U]/ML
5000 INJECTION, SOLUTION INTRAVENOUS; SUBCUTANEOUS EVERY 8 HOURS SCHEDULED
Status: DISCONTINUED | OUTPATIENT
Start: 2022-01-20 | End: 2022-01-23 | Stop reason: HOSPADM

## 2022-01-20 RX ORDER — SODIUM CHLORIDE, SODIUM GLUCONATE, SODIUM ACETATE, POTASSIUM CHLORIDE, MAGNESIUM CHLORIDE, SODIUM PHOSPHATE, DIBASIC, AND POTASSIUM PHOSPHATE .53; .5; .37; .037; .03; .012; .00082 G/100ML; G/100ML; G/100ML; G/100ML; G/100ML; G/100ML; G/100ML
125 INJECTION, SOLUTION INTRAVENOUS CONTINUOUS
Status: DISPENSED | OUTPATIENT
Start: 2022-01-20 | End: 2022-01-21

## 2022-01-20 RX ORDER — ATORVASTATIN CALCIUM 20 MG/1
20 TABLET, FILM COATED ORAL
Status: DISCONTINUED | OUTPATIENT
Start: 2022-01-20 | End: 2022-01-23 | Stop reason: HOSPADM

## 2022-01-20 RX ORDER — ONDANSETRON 2 MG/ML
4 INJECTION INTRAMUSCULAR; INTRAVENOUS EVERY 6 HOURS PRN
Status: DISCONTINUED | OUTPATIENT
Start: 2022-01-20 | End: 2022-01-23 | Stop reason: HOSPADM

## 2022-01-20 RX ORDER — FLUTICASONE FUROATE AND VILANTEROL 100; 25 UG/1; UG/1
1 POWDER RESPIRATORY (INHALATION) DAILY
Status: DISCONTINUED | OUTPATIENT
Start: 2022-01-20 | End: 2022-01-23 | Stop reason: HOSPADM

## 2022-01-20 RX ORDER — ONDANSETRON 2 MG/ML
4 INJECTION INTRAMUSCULAR; INTRAVENOUS ONCE
Status: COMPLETED | OUTPATIENT
Start: 2022-01-20 | End: 2022-01-20

## 2022-01-20 RX ORDER — ALBUTEROL SULFATE 90 UG/1
2 AEROSOL, METERED RESPIRATORY (INHALATION) EVERY 4 HOURS PRN
Status: DISCONTINUED | OUTPATIENT
Start: 2022-01-20 | End: 2022-01-23 | Stop reason: HOSPADM

## 2022-01-20 RX ORDER — ZOLPIDEM TARTRATE 5 MG/1
2.5 TABLET ORAL
Status: DISCONTINUED | OUTPATIENT
Start: 2022-01-20 | End: 2022-01-23 | Stop reason: HOSPADM

## 2022-01-20 RX ORDER — FLUOXETINE HYDROCHLORIDE 20 MG/1
20 CAPSULE ORAL DAILY
Status: DISCONTINUED | OUTPATIENT
Start: 2022-01-20 | End: 2022-01-23 | Stop reason: HOSPADM

## 2022-01-20 RX ORDER — PROMETHAZINE HYDROCHLORIDE 25 MG/ML
25 INJECTION, SOLUTION INTRAMUSCULAR; INTRAVENOUS EVERY 6 HOURS PRN
Status: DISCONTINUED | OUTPATIENT
Start: 2022-01-20 | End: 2022-01-23 | Stop reason: HOSPADM

## 2022-01-20 RX ADMIN — SODIUM CHLORIDE, SODIUM LACTATE, POTASSIUM CHLORIDE, AND CALCIUM CHLORIDE 1000 ML: .6; .31; .03; .02 INJECTION, SOLUTION INTRAVENOUS at 08:39

## 2022-01-20 RX ADMIN — MORPHINE SULFATE 2 MG: 2 INJECTION, SOLUTION INTRAMUSCULAR; INTRAVENOUS at 21:25

## 2022-01-20 RX ADMIN — HEPARIN SODIUM 5000 UNITS: 5000 INJECTION INTRAVENOUS; SUBCUTANEOUS at 21:25

## 2022-01-20 RX ADMIN — SODIUM CHLORIDE 3.38 G: 9 INJECTION, SOLUTION INTRAVENOUS at 10:16

## 2022-01-20 RX ADMIN — FLUTICASONE FUROATE AND VILANTEROL TRIFENATATE 1 PUFF: 100; 25 POWDER RESPIRATORY (INHALATION) at 11:49

## 2022-01-20 RX ADMIN — METRONIDAZOLE 500 MG: 500 TABLET ORAL at 13:01

## 2022-01-20 RX ADMIN — UMECLIDINIUM 1 PUFF: 62.5 AEROSOL, POWDER ORAL at 11:49

## 2022-01-20 RX ADMIN — SODIUM CHLORIDE, SODIUM GLUCONATE, SODIUM ACETATE, POTASSIUM CHLORIDE, MAGNESIUM CHLORIDE, SODIUM PHOSPHATE, DIBASIC, AND POTASSIUM PHOSPHATE 125 ML/HR: .53; .5; .37; .037; .03; .012; .00082 INJECTION, SOLUTION INTRAVENOUS at 12:16

## 2022-01-20 RX ADMIN — Medication 125 MG: at 10:15

## 2022-01-20 RX ADMIN — HEPARIN SODIUM 5000 UNITS: 5000 INJECTION INTRAVENOUS; SUBCUTANEOUS at 13:01

## 2022-01-20 RX ADMIN — METRONIDAZOLE 500 MG: 500 TABLET ORAL at 21:25

## 2022-01-20 RX ADMIN — ONDANSETRON 4 MG: 2 INJECTION INTRAMUSCULAR; INTRAVENOUS at 09:48

## 2022-01-20 RX ADMIN — ONDANSETRON 4 MG: 4 TABLET, ORALLY DISINTEGRATING ORAL at 06:42

## 2022-01-20 RX ADMIN — ONDANSETRON 4 MG: 2 INJECTION INTRAMUSCULAR; INTRAVENOUS at 14:12

## 2022-01-20 RX ADMIN — PROMETHAZINE HYDROCHLORIDE 25 MG: 25 INJECTION INTRAMUSCULAR; INTRAVENOUS at 16:13

## 2022-01-20 RX ADMIN — CEFTRIAXONE SODIUM 1000 MG: 10 INJECTION, POWDER, FOR SOLUTION INTRAVENOUS at 11:28

## 2022-01-20 RX ADMIN — FLUOXETINE 20 MG: 20 CAPSULE ORAL at 12:15

## 2022-01-20 RX ADMIN — MORPHINE SULFATE 2 MG: 2 INJECTION, SOLUTION INTRAMUSCULAR; INTRAVENOUS at 12:24

## 2022-01-20 RX ADMIN — MORPHINE SULFATE 2 MG: 2 INJECTION, SOLUTION INTRAMUSCULAR; INTRAVENOUS at 16:57

## 2022-01-20 RX ADMIN — ZOLPIDEM TARTRATE 2.5 MG: 5 TABLET ORAL at 21:25

## 2022-01-20 RX ADMIN — CARVEDILOL 3.12 MG: 3.12 TABLET, FILM COATED ORAL at 16:13

## 2022-01-20 RX ADMIN — ATORVASTATIN CALCIUM 20 MG: 40 TABLET, FILM COATED ORAL at 16:13

## 2022-01-20 NOTE — ASSESSMENT & PLAN NOTE
· Sepsis evidenced by tachycardia, leukocytosis  · Source of infection is diverticulitis and pancolitis  · Discontinue Flagyl  Initiate oral vancomycin  Continue IV ceftriaxone    · Blood cultures negative at 24 hours

## 2022-01-20 NOTE — ASSESSMENT & PLAN NOTE
Most likely prerenal in the setting of diarrhea, vomiting  Continue IV fluid  Avoid nephrotoxic medications  Lisinopril on hold  Repeat BMP tomorrow

## 2022-01-20 NOTE — ASSESSMENT & PLAN NOTE
Acute diverse sigmoid diverticulitis, pancolitis  presented with intractable nausea, vomiting, left lower quad abdominal pain, diarrhea  Noted diverticulitis on CT  exam-no evidence of complication  Admit for IV fluids  Continue on ceftriaxone, Flagyl  Clear liquid diet for now    Will send stool cultures, C diff, stool leukocytes

## 2022-01-20 NOTE — LETTER
[address] [address]  Dept: [phone]    January 23, 2022     Patient: Alberta Presume   YOB: 1965   Date of Visit: 1/20/2022       To Whom it May Concern:    Mia Stanley is under my professional care  He was seen in the hospital from 1/20/2022   to 01/23/22  He may return to work on 1/31/22 without limitations  If you have any questions or concerns, please don't hesitate to call           Sincerely,          RICH ErazoC  [phone]

## 2022-01-20 NOTE — ED PROVIDER NOTES
History  Chief Complaint   Patient presents with    Diarrhea     pt states he has had abdominal pain and diarrhea since 1/15  tonight patient states he began vomitng  pt c/o of generlized fatigue and weakness  57-year-old male presenting to the ED for evaluation of generalized abdominal pain, worse the left lower quadrant with associated diarrhea and vomiting for the past 5 days  Prior to that he was on Keflex for about 4 days for right periorbital cellulitis he describes  Back in the middle December he was admitted with foot surgery, then came down with COVID-19 around December 16th  Patient complains of generalized weakness, can not eat or drink much, dry mouth  He has never had C diff before  He has a history of diverticulosis but not diverticulitis  History provided by:  Patient   used: No    Diarrhea  Quality:  Watery  Associated symptoms: vomiting        Prior to Admission Medications   Prescriptions Last Dose Informant Patient Reported? Taking? FLUoxetine (PROzac) 20 MG tablet  Self No No   Sig: Take 1 tablet (20 mg total) by mouth daily   albuterol (PROVENTIL HFA,VENTOLIN HFA) 90 mcg/act inhaler   No No   Sig: Inhale 2 puffs every 4 (four) hours as needed for wheezing or shortness of breath   carvedilol (COREG) 3 125 mg tablet   No No   Sig: TAKE 1 TABLET(3 125 MG) BY MOUTH TWICE DAILY WITH MEALS   cephalexin (KEFLEX) 500 mg capsule   Yes No   Sig: Take 500 mg by mouth 3 (three) times a day   eszopiclone (LUNESTA) 3 MG tablet   No No   Sig: Take 1 tablet (3 mg total) by mouth daily at bedtime   fluticasone-umeclidinium-vilanterol (Trelegy Ellipta) 100-62 5-25 MCG/INH inhaler   No No   Sig: Inhale 1 puff daily Rinse mouth after use     lisinopril (ZESTRIL) 30 mg tablet  Self No No   Sig: Take 1 tablet (30 mg total) by mouth daily   Patient taking differently: Take 30 mg by mouth daily at bedtime     naloxone (NARCAN) 4 mg/0 1 mL nasal spray   No No   Sig: Administer 1 spray into a nostril  If no response after 2-3 minutes, give another dose in the other nostril using a new spray  rosuvastatin (CRESTOR) 10 MG tablet   No No   Sig: Take 1 tablet (10 mg total) by mouth daily   triamcinolone (KENALOG) 0 1 % ointment  Self No No   Sig: Apply topically 2 (two) times a day      Facility-Administered Medications: None       Past Medical History:   Diagnosis Date    Depression     Diverticulitis     Diverticulosis     GERD (gastroesophageal reflux disease)     Hypertension     Sleep apnea     no cpap    Umbilical hernia        Past Surgical History:   Procedure Laterality Date    CATARACT EXTRACTION Bilateral     CHOLECYSTECTOMY      COLONOSCOPY      ESOPHAGOGASTRODUODENOSCOPY      HERNIA REPAIR  09/19/2019    Open repair of incarcerated incisional hernia with mesh - Dr Antony Mortimer ARTHROSCOPY Left     WA HALLUX RIGIDUS W/CHEILECTOMY 1ST MP JT W/O IMPLT Bilateral 12/10/2021    Procedure: CHEILECTOMY;  Surgeon: Pili Chowdhury DPM;  Location: [address] [place] [address];  Service: Podiatry    WA TENOTOMY ELBOW LATERAL/MEDIAL DEBRIDE REPAIR Left 11/17/2020    Procedure: lateral epicondyle elbow debridement, release, and repair;  Surgeon: Pedro Medeiros MD;  Location: [facility];  Service: Orthopedics    WISDOM TOOTH EXTRACTION         Family History   Problem Relation Age of Onset    Anemia Mother     Hypertension Mother     Pancreatic cancer Father     Diabetes Father     Hypertension Father     Abdominal aortic aneurysm Father     Diabetes Maternal Grandmother     Emphysema Maternal Grandmother     Diabetes Maternal Grandfather     Diabetes Paternal Grandmother     Diabetes Paternal Grandfather     Emphysema Brother      I have reviewed and agree with the history as documented      E-Cigarette/Vaping    E-Cigarette Use Never User      E-Cigarette/Vaping Substances    Nicotine No     THC No     CBD No     Flavoring No      Social History     Tobacco Use    Smoking status: Former Smoker     Packs/day: 1 00     Years: 35 00     Pack years: 35 00     Types: Cigarettes     Start date: 1986     Quit date: 2013     Years since quittin 0    Smokeless tobacco: Never Used    Tobacco comment: quit 6 5 years ago - As per [employer] Vaping Use: Never used   Substance Use Topics    Alcohol use: Yes     Comment: "few times a week"    Drug use: Never       Review of Systems   Constitutional: Positive for fatigue  Gastrointestinal: Positive for diarrhea and vomiting  All other systems reviewed and are negative  Physical Exam  Physical Exam  Vitals and nursing note reviewed  Constitutional:       General: He is not in acute distress  Appearance: Normal appearance  He is normal weight  He is ill-appearing  HENT:      Head: Normocephalic and atraumatic  Right Ear: External ear normal       Left Ear: External ear normal       Nose: Nose normal  No congestion or rhinorrhea  Mouth/Throat:      Mouth: Mucous membranes are dry  Pharynx: Oropharynx is clear  Eyes:      General: No scleral icterus  Right eye: No discharge  Left eye: No discharge  Extraocular Movements: Extraocular movements intact  Conjunctiva/sclera: Conjunctivae normal       Pupils: Pupils are equal, round, and reactive to light  Cardiovascular:      Rate and Rhythm: Regular rhythm  Tachycardia present  Pulses: Normal pulses  Heart sounds: Normal heart sounds  Pulmonary:      Effort: Pulmonary effort is normal  No respiratory distress  Breath sounds: Normal breath sounds  Abdominal:      General: Abdomen is flat  Palpations: Abdomen is soft  Tenderness: There is abdominal tenderness in the right lower quadrant, suprapubic area and left lower quadrant  There is no right CVA tenderness, left CVA tenderness, guarding or rebound  Musculoskeletal:         General: No swelling  Normal range of motion        Cervical back: Normal range of motion and neck supple  Skin:     General: Skin is warm and dry  Capillary Refill: Capillary refill takes less than 2 seconds  Neurological:      General: No focal deficit present  Mental Status: He is alert and oriented to person, place, and time  Mental status is at baseline     Psychiatric:         Mood and Affect: Mood normal          Behavior: Behavior normal          Vital Signs  ED Triage Vitals [01/20/22 0634]   Temperature Pulse Respirations Blood Pressure SpO2   97 5 °F (36 4 °C) (!) 118 20 107/59 99 %      Temp Source Heart Rate Source Patient Position - Orthostatic VS BP Location FiO2 (%)   Oral Monitor Sitting Left arm --      Pain Score       5           Vitals:    01/20/22 0634 01/20/22 1223   BP: 107/59 117/73   Pulse: (!) 118 103   Patient Position - Orthostatic VS: Sitting Lying         Visual Acuity      ED Medications  Medications   multi-electrolyte (PLASMALYTE-A/ISOLYTE-S PH 7 4) IV solution (125 mL/hr Intravenous New Bag 1/20/22 1216)   heparin (porcine) subcutaneous injection 5,000 Units (5,000 Units Subcutaneous Given 1/20/22 1301)   ondansetron (ZOFRAN) injection 4 mg (4 mg Intravenous Given 1/20/22 1412)   ceftriaxone (ROCEPHIN) 1 g/50 mL in dextrose IVPB (0 mg Intravenous Stopped 1/20/22 1216)   metroNIDAZOLE (FLAGYL) tablet 500 mg (500 mg Oral Given 1/20/22 1301)   albuterol (PROVENTIL HFA,VENTOLIN HFA) inhaler 2 puff (has no administration in time range)   carvedilol (COREG) tablet 3 125 mg (has no administration in time range)   zolpidem (AMBIEN) tablet 2 5 mg (has no administration in time range)   FLUoxetine (PROzac) capsule 20 mg (20 mg Oral Given 1/20/22 1215)   fluticasone-vilanterol (BREO ELLIPTA) 100-25 mcg/inh inhaler 1 puff (1 puff Inhalation Given 1/20/22 1149)   atorvastatin (LIPITOR) tablet 20 mg (has no administration in time range)   umeclidinium bromide (INCRUSE ELLIPTA) 62 5 mcg/inh inhaler AEPB 1 puff (1 puff Inhalation Given 1/20/22 1149)   morphine injection 2 mg (2 mg Intravenous Given 1/20/22 1224)   ondansetron (ZOFRAN-ODT) dispersible tablet 4 mg (4 mg Oral Given 1/20/22 0642)   lactated ringers bolus 1,000 mL (0 mL Intravenous Stopped 1/20/22 1219)   vancomycin (VANCOCIN) oral solution 125 mg (125 mg Oral Given 1/20/22 1015)   piperacillin-tazobactam (ZOSYN) 3 375 g in sodium chloride 0 9 % 100 mL IVPB (0 g Intravenous Stopped 1/20/22 1110)   ondansetron (ZOFRAN) injection 4 mg (4 mg Intravenous Given 1/20/22 0948)       Diagnostic Studies  Results Reviewed     Procedure Component Value Units Date/Time    Blood culture #2 [[id number]] Collected: 01/20/22 0914    Lab Status: Preliminary result Specimen: Blood from Arm, Left Updated: 01/20/22 1501     Blood Culture Received in Microbiology Lab  Culture in Progress  Blood culture #1 [[id number]] Collected: 01/20/22 0836    Lab Status: Preliminary result Specimen: Blood from Arm, Right Updated: 01/20/22 1301     Blood Culture Received in Microbiology Lab  Culture in Progress  Stool Enteric Bacterial Panel by PCR [[id number]] Collected: 01/20/22 1219    Lab Status: In process Specimen: Stool from Per Rectum Updated: 01/20/22 1225    Fecal leukocytes [[id number]] Collected: 01/20/22 1219    Lab Status: In process Specimen: Stool from Per Rectum Updated: 01/20/22 1225    HS Troponin I 4hr [[id number]] Collected: 01/20/22 1141    Lab Status: Final result Specimen: Blood from Arm, Right Updated: 01/20/22 1220     hs TnI 4hr 2 ng/L      Delta 4hr hsTnI -1 ng/L     Magnesium [[id number]]  (Normal) Collected: 01/20/22 0652    Lab Status: Final result Specimen: Blood from Arm, Left Updated: 01/20/22 1100     Magnesium 1 7 mg/dL     Platelet count [[id number]]     Lab Status: No result Specimen: Blood     Clostridium difficile toxin by PCR with EIA [[id number]] Collected: 01/20/22 1020    Lab Status:  In process Specimen: Stool from Per Rectum Updated: 01/20/22 1028    HS Troponin I 2hr [[id number]] Collected: 01/20/22 0914    Lab Status: Final result Specimen: Blood from Arm, Left Updated: 01/20/22 1017     hs TnI 2hr 4 ng/L      Delta 2hr hsTnI 1 ng/L     Lactic acid [[id number]]  (Normal) Collected: 01/20/22 0836    Lab Status: Final result Specimen: Blood from Arm, Right Updated: 01/20/22 0913     LACTIC ACID 1 7 mmol/L     Narrative:      Result may be elevated if tourniquet was used during collection  CBC and differential [[id number]]  (Abnormal) Collected: 01/20/22 0652    Lab Status: Final result Specimen: Blood from Arm, Left Updated: 01/20/22 0803     WBC 17 80 Thousand/uL      RBC 4 79 Million/uL      Hemoglobin 15 3 g/dL      Hematocrit 44 1 %      MCV 92 fL      MCH 31 9 pg      MCHC 34 7 g/dL      RDW 13 1 %      MPV 10 0 fL      Platelets 785 Thousands/uL     Narrative: This is an appended report  These results have been appended to a previously verified report      Manual Differential(PHLEBS Do Not Order) [[id number]]  (Abnormal) Collected: 01/20/22 0652    Lab Status: Final result Specimen: Blood from Arm, Left Updated: 01/20/22 0803     Segmented % 72 %      Bands % 3 %      Lymphocytes % 13 %      Monocytes % 12 %      Eosinophils, % 0 %      Basophils % 0 %      Absolute Neutrophils 13 35 Thousand/uL      Lymphocytes Absolute 2 31 Thousand/uL      Monocytes Absolute 2 14 Thousand/uL      Eosinophils Absolute 0 00 Thousand/uL      Basophils Absolute 0 00 Thousand/uL      Total Counted --     RBC Morphology Normal     Platelet Estimate Adequate    HS Troponin 0hr (reflex protocol) [[id number]]  (Normal) Collected: 01/20/22 0652    Lab Status: Final result Specimen: Blood from Arm, Left Updated: 01/20/22 0751     hs TnI 0hr 3 ng/L     Comprehensive metabolic panel [[id number]]  (Abnormal) Collected: 01/20/22 0652    Lab Status: Final result Specimen: Blood from Arm, Left Updated: 01/20/22 0739     Sodium 130 mmol/L      Potassium 3 9 mmol/L      Chloride 96 mmol/L      CO2 18 mmol/L ANION GAP 16 mmol/L      BUN 17 mg/dL      Creatinine 1 47 mg/dL      Glucose 156 mg/dL      Calcium 9 4 mg/dL      AST 24 U/L      ALT 36 U/L      Alkaline Phosphatase 92 U/L      Total Protein 8 2 g/dL      Albumin 3 7 g/dL      Total Bilirubin 1 43 mg/dL      eGFR 51 ml/min/1 73sq m     Narrative:      [place] guidelines for Chronic Kidney Disease (CKD):     Stage 1 with normal or high GFR (GFR > 90 mL/min/1 73 square meters)    Stage 2 Mild CKD (GFR = 60-89 mL/min/1 73 square meters)    Stage 3A Moderate CKD (GFR = 45-59 mL/min/1 73 square meters)    Stage 3B Moderate CKD (GFR = 30-44 mL/min/1 73 square meters)    Stage 4 Severe CKD (GFR = 15-29 mL/min/1 73 square meters)    Stage 5 End Stage CKD (GFR <15 mL/min/1 73 square meters)  Note: GFR calculation is accurate only with a steady state creatinine    Lipase [[id number]]  (Abnormal) Collected: 01/20/22 0652    Lab Status: Final result Specimen: Blood from Arm, Left Updated: 01/20/22 0739     Lipase 49 u/L                  CT abdomen pelvis wo contrast   Final Result by Fuentes Chow MD (01/20 1106)      Findings of acute uncomplicated sigmoid diverticulitis with a superimposed nonspecific pancolitis  The study was marked in EPIC for significant notification  Workstation performed: [id number]                    Procedures  Procedures         ED Course  ED Course as of 01/20/22 1559   Thu Jan 20, 2022   9832 D/w Dr Kathy Morejon, accepts to service, inpatient, for acute colitis, diarrhea, suspicious for acute C  Diff colitis vs  Other bacterial colitis  Start on IV abx, IV fluids - has SIRS criteria, wbc 17, ELKE w/ creat 1 47, normal lactic, HR over 90 at 118 on arrival  Admit to hospital under Dr Kathy Morejon                                               MDM  Number of Diagnoses or Management Options  Acute colitis: new and requires workup  ELKE (acute kidney injury) ([facility] [address] ): new and requires workup  Dehydration: new and requires workup  Diarrhea: new and requires workup  Sepsis [facility]): new and requires workup     Amount and/or Complexity of Data Reviewed  Clinical lab tests: ordered and reviewed  Tests in the radiology section of CPT®: ordered and reviewed    Risk of Complications, Morbidity, and/or Mortality  Presenting problems: moderate  Diagnostic procedures: moderate  Management options: moderate    Patient Progress  Patient progress: stable      Disposition  Final diagnoses:   Acute colitis   Diarrhea   ELKE (acute kidney injury) ([facility] )   Sepsis ([facility] )   Dehydration     Time reflects when diagnosis was documented in both MDM as applicable and the Disposition within this note     Time User Action Codes Description Comment    1/20/2022 10:01 AM Bhargav Or Add [K52 9] Acute colitis     1/20/2022 10:01 AM Camryn Beulah Diaz Add [R19 7] Diarrhea     1/20/2022 10:01 AM DichFredi burgera Dimmer Add [N17 9] ELKE (acute kidney injury) ([facility] )     1/20/2022 10:01 AM Bhargav Or Add [A41 9] Sepsis ([facility] )     1/20/2022 10:01 AM Bhargav Or Add [E86 0] Dehydration       ED Disposition     ED Disposition Condition Date/Time Comment    Admit Stable Thu Jan 20, 2022 10:01 AM Case was discussed with Dr Anne Marie Banegas and the patient's admission status was agreed to be Admission Status: inpatient status to the service of Dr Anne Marie Banegas   Follow-up Information    None         Patient's Medications   Discharge Prescriptions    No medications on file       No discharge procedures on file      PDMP Review       Value Time User    PDMP Reviewed  Yes 12/7/2021  8:54 PM Bernie Mckeon DO          ED Provider  Electronically Signed by           Kalia Weiss DO  01/20/22 8855

## 2022-01-20 NOTE — H&P
[address] 1965, 60 y o  male MRN: [id number]  Unit/Bed#: RADHA Marrufo Encounter: [id number]  Primary Care Provider: Amanda Roque DO   Date and time admitted to hospital: 1/20/2022  7:46 AM    * Acute Sigmoid diverticulitis  Assessment & Plan  Acute diverse sigmoid diverticulitis, pancolitis  presented with intractable nausea, vomiting, left lower quad abdominal pain, diarrhea  Noted diverticulitis on CT  exam-no evidence of complication  Admit for IV fluids  Continue on ceftriaxone, Flagyl  Clear liquid diet for now  Will send stool cultures, C diff, stool leukocytes    ELKE (acute kidney injury) ([facility] [address] )  Assessment & Plan  Most likely prerenal in the setting of diarrhea, vomiting  Continue IV fluid  Avoid nephrotoxic medications  Lisinopril on hold  Repeat BMP tomorrow  Mixed hyperlipidemia  Assessment & Plan  Continue statin    Major depressive disorder, single episode, mild (HCC)  Assessment & Plan  Continue fluoxetine        VTE Prophylaxis: Enoxaparin (Lovenox)  Code Status: Level 1 - Full Code  Anticipated Length of Stay:  Patient will be admitted on an Inpatient basis with an anticipated length of stay of  > than 2 midnights  Justification for Hospital Stay: Sigmoid diverticulitis  Total Time for Visit, including Counseling / Coordination of Care: 60 mins  Greater than 50% of this total time spent on direct patient counseling and coordination of care  Chief Complaint:     Chief Complaint   Patient presents with    Diarrhea     pt states he has had abdominal pain and diarrhea since 1/15  tonight patient states he began vomitng  pt c/o of generlized fatigue and weakness        History of Present Illness:    29-year-old male patient with past medical history of diverticulitis, hypertension, GERD, hyperlipidemia presents with a chief complaint of abdominal pain, diarrhea for the last 5 days with recent onset of vomiting and generalized weakness  Patient had abdominal pain worse on the left lower quadrant, patient had multiple episodes of watery diarrhea for the last few days, no hematochezia  Patient has the recent foot surgery, he had COVID 19 infection in December  Patient also reports associated poor oral intake prior history of high episode of diverticulitis treated outpatient few years back  Last colonoscopy to 21 with extensive left-sided diverticulosis and colitis,    Patient denies any fever, chills, chest pain, shortness breath, dizziness, lightheadedness, syncope, tingling, numbness, urinary symptoms, denies any headache, blurry vision    Patient is tachycardic on presentation 118 had leukocytosis normal lactic acid mild hyponatremia, ELKE with creatinine 1 47 on CT abdomen shows incomplete sigmoid diverticulitis with superimposed nonspecific pancolitis       Review of Systems:  As above        Past Medical and Surgical History:   Past Medical History:   Diagnosis Date    Depression     Diverticulitis     Diverticulosis     GERD (gastroesophageal reflux disease)     Hypertension     Sleep apnea     no cpap    Umbilical hernia      Past Surgical History:   Procedure Laterality Date    CATARACT EXTRACTION Bilateral     CHOLECYSTECTOMY      COLONOSCOPY      ESOPHAGOGASTRODUODENOSCOPY      HERNIA REPAIR  09/19/2019    Open repair of incarcerated incisional hernia with mesh - Dr Marilyn Goldstein ARTHROSCOPY Left     TN HALLUX RIGIDUS W/CHEILECTOMY 1ST MP JT W/O IMPLT Bilateral 12/10/2021    Procedure: CHEILECTOMY;  Surgeon: Madeleine Dewitt DPM;  Location: [address] [place] [address];  Service: Podiatry    TN TENOTOMY ELBOW LATERAL/MEDIAL DEBRIDE REPAIR Left 11/17/2020    Procedure: lateral epicondyle elbow debridement, release, and repair;  Surgeon: Isaiah Ventura MD;  Location: [facility]  MAIN [facility];  Service: Orthopedics    WISDOM TOOTH EXTRACTION       Meds/Allergies: Allergies:    Allergies   Allergen Reactions    Allopurinol Rash     Prior to Admission Medications   Prescriptions Last Dose Informant Patient Reported? Taking? FLUoxetine (PROzac) 20 MG tablet  Self No No   Sig: Take 1 tablet (20 mg total) by mouth daily   albuterol (PROVENTIL HFA,VENTOLIN HFA) 90 mcg/act inhaler   No No   Sig: Inhale 2 puffs every 4 (four) hours as needed for wheezing or shortness of breath   carvedilol (COREG) 3 125 mg tablet   No No   Sig: TAKE 1 TABLET(3 125 MG) BY MOUTH TWICE DAILY WITH MEALS   cephalexin (KEFLEX) 500 mg capsule   Yes No   Sig: Take 500 mg by mouth 3 (three) times a day   eszopiclone (LUNESTA) 3 MG tablet   No No   Sig: Take 1 tablet (3 mg total) by mouth daily at bedtime   fluticasone-umeclidinium-vilanterol (Trelegy Ellipta) 100-62 5-25 MCG/INH inhaler   No No   Sig: Inhale 1 puff daily Rinse mouth after use  lisinopril (ZESTRIL) 30 mg tablet  Self No No   Sig: Take 1 tablet (30 mg total) by mouth daily   Patient taking differently: Take 30 mg by mouth daily at bedtime     naloxone (NARCAN) 4 mg/0 1 mL nasal spray   No No   Sig: Administer 1 spray into a nostril  If no response after 2-3 minutes, give another dose in the other nostril using a new spray     rosuvastatin (CRESTOR) 10 MG tablet   No No   Sig: Take 1 tablet (10 mg total) by mouth daily   triamcinolone (KENALOG) 0 1 % ointment  Self No No   Sig: Apply topically 2 (two) times a day      Facility-Administered Medications: None     Social History:     Social History     Socioeconomic History    Marital status: /Civil Union     Spouse name: Not on file    Number of children: 0    Years of education: Not on file    Highest education level: Not on file   Occupational History    Not on file   Tobacco Use    Smoking status: Former Smoker     Packs/day: 1 00     Years: 35 00     Pack years: 35 00     Types: Cigarettes     Start date: 1986     Quit date: 2013     Years since quittin 0    Smokeless tobacco: Never Used    Tobacco comment: quit 6 5 years ago - As per [place]    Vaping Use    Vaping Use: Never used   Substance and Sexual Activity    Alcohol use: Yes     Comment: "few times a week"    Drug use: Never    Sexual activity: Yes     Partners: Female     Birth control/protection: None   Other Topics Concern    Not on file   Social History Narrative    · Do you currently or have you served in the [employer]:   No      · Were you activated, into active duty, as a member of the [employer] or as a Reservist:   No      · Advance directive:   No      Social Determinants of Health     Financial Resource Strain: Low Risk     Difficulty of Paying Living Expenses: Not hard at all   Food Insecurity: No Food Insecurity    Worried About [employer] [employer] in the Last Year: Never true    [employer] in the Last Year: Never true   Transportation Needs: No Transportation Needs    Lack of Transportation (Medical): No    Lack of Transportation (Non-Medical):  No   Physical Activity: Not on file   Stress: Not on file   Social Connections: Not on file   Intimate Partner Violence: Not on file   Housing Stability: Not on file       Family History:  Family History   Problem Relation Age of Onset    Anemia Mother     Hypertension Mother     Pancreatic cancer Father     Diabetes Father     Hypertension Father     Abdominal aortic aneurysm Father     Diabetes Maternal Grandmother     Emphysema Maternal Grandmother     Diabetes Maternal Grandfather     Diabetes Paternal Grandmother     Diabetes Paternal Grandfather     Emphysema Brother      Physical Exam:   Vitals:   Blood Pressure: 107/59 (01/20/22 0634)  Pulse: (!) 118 (01/20/22 0634)  Temperature: 97 5 °F (36 4 °C) (01/20/22 0634)  Temp Source: Oral (01/20/22 1925)  Respirations: 20 (01/20/22 7504)  Weight - Scale: 103 kg (227 lb 1 2 oz) (01/20/22 0634)  SpO2: 99 % (01/20/22 0634)    General appearance: alert, appears stated age and cooperative  Constitutional- looks a little weak  HEENT - atraumatic and normocephalic  Neck- supple  Skin - no fresh rash  Extremities no fresh focal deformities  Cardiovascular- S1-S2 heard  Respiratory- bilateral air entry present, no crackles or rhonchi  Skin - no fresh rash  Abdomen - normal bowel sounds present, no rebound tenderness  CNS- No fresh focal deficits  Psych- no acute psychosis     Lab Results: I have personally reviewed pertinent reports  Results from last 7 days   Lab Units 01/20/22  0652   WBC Thousand/uL 17 80*   HEMOGLOBIN g/dL 15 3   HEMATOCRIT % 44 1   PLATELETS Thousands/uL 276   LYMPHO PCT % 13*   MONO PCT % 12   EOS PCT % 0   BANDS PCT % 3     Results from last 7 days   Lab Units 01/20/22  0652   SODIUM mmol/L 130*   POTASSIUM mmol/L 3 9   CHLORIDE mmol/L 96*   CO2 mmol/L 18*   ANION GAP mmol/L 16*   BUN mg/dL 17   CREATININE mg/dL 1 47*   CALCIUM mg/dL 9 4   ALBUMIN g/dL 3 7   TOTAL BILIRUBIN mg/dL 1 43*   ALK PHOS U/L 92   ALT U/L 36   AST U/L 24   EGFR ml/min/1 73sq m 51   GLUCOSE RANDOM mg/dL 156*             Results from last 7 days   Lab Units 01/20/22  0836   LACTIC ACID mmol/L 1 7                            Imaging: I have personally reviewed pertinent reports  CT abdomen pelvis wo contrast    Result Date: 1/20/2022  Impression: Findings of acute uncomplicated sigmoid diverticulitis with a superimposed nonspecific pancolitis  The study was marked in EPIC for significant notification  Workstation performed: [id number]       EKG, Pathology, and Other Studies Reviewed on Admission:   EKG  Result Date: 01/20/22  Personally reviewed strips with impression of:  Sinus tach    [facility] Records Reviewed: Yes    Marbella Santana MD  [facility] Internal Medicine    ** Please Note: This note has been constructed using a voice recognition system   **

## 2022-01-20 NOTE — LETTER
[address] [address]  Dept: [phone]    January 23, 2022     Patient: Mickie Chance   YOB: 1965   Date of Visit: 1/20/2022       To Whom it May Concern:    Michael Moreno is under my professional care  He was seen in the hospital from 1/20/2022   to 01/23/22  He may return to work on 1/31/22 without limitations  If you have any questions or concerns, please don't hesitate to call           Sincerely,          Pratik Mendosa PA-C  [phone]

## 2022-01-21 PROBLEM — E87.1 HYPONATREMIA: Status: ACTIVE | Noted: 2022-01-21

## 2022-01-21 LAB
ANION GAP SERPL CALCULATED.3IONS-SCNC: 10 MMOL/L (ref 4–13)
BUN SERPL-MCNC: 14 MG/DL (ref 5–25)
CALCIUM SERPL-MCNC: 8.6 MG/DL (ref 8.3–10.1)
CAMPYLOBACTER DNA SPEC NAA+PROBE: NORMAL
CHLORIDE SERPL-SCNC: 100 MMOL/L (ref 100–108)
CO2 SERPL-SCNC: 24 MMOL/L (ref 21–32)
CREAT SERPL-MCNC: 1.16 MG/DL (ref 0.6–1.3)
ERYTHROCYTE [DISTWIDTH] IN BLOOD BY AUTOMATED COUNT: 13.2 % (ref 11.6–15.1)
GFR SERPL CREATININE-BSD FRML MDRD: 69 ML/MIN/1.73SQ M
GLUCOSE SERPL-MCNC: 110 MG/DL (ref 65–140)
HCT VFR BLD AUTO: 40.4 % (ref 36.5–49.3)
HGB BLD-MCNC: 13.8 G/DL (ref 12–17)
MAGNESIUM SERPL-MCNC: 2.2 MG/DL (ref 1.6–2.6)
MCH RBC QN AUTO: 31.3 PG (ref 26.8–34.3)
MCHC RBC AUTO-ENTMCNC: 34.2 G/DL (ref 31.4–37.4)
MCV RBC AUTO: 92 FL (ref 82–98)
PLATELET # BLD AUTO: 225 THOUSANDS/UL (ref 149–390)
PMV BLD AUTO: 10 FL (ref 8.9–12.7)
POTASSIUM SERPL-SCNC: 3.9 MMOL/L (ref 3.5–5.3)
RBC # BLD AUTO: 4.41 MILLION/UL (ref 3.88–5.62)
SALMONELLA DNA SPEC QL NAA+PROBE: NORMAL
SHIGA TOXIN STX GENE SPEC NAA+PROBE: NORMAL
SHIGELLA DNA SPEC QL NAA+PROBE: NORMAL
SODIUM SERPL-SCNC: 134 MMOL/L (ref 136–145)
WBC # BLD AUTO: 14.71 THOUSAND/UL (ref 4.31–10.16)
WBC SPEC QL GRAM STN: ABNORMAL

## 2022-01-21 PROCEDURE — 85027 COMPLETE CBC AUTOMATED: CPT | Performed by: INTERNAL MEDICINE

## 2022-01-21 PROCEDURE — 99232 SBSQ HOSP IP/OBS MODERATE 35: CPT | Performed by: PHYSICIAN ASSISTANT

## 2022-01-21 PROCEDURE — 80048 BASIC METABOLIC PNL TOTAL CA: CPT | Performed by: INTERNAL MEDICINE

## 2022-01-21 PROCEDURE — 83735 ASSAY OF MAGNESIUM: CPT | Performed by: INTERNAL MEDICINE

## 2022-01-21 RX ORDER — KETOROLAC TROMETHAMINE 30 MG/ML
15 INJECTION, SOLUTION INTRAMUSCULAR; INTRAVENOUS EVERY 6 HOURS PRN
Status: ACTIVE | OUTPATIENT
Start: 2022-01-21 | End: 2022-01-23

## 2022-01-21 RX ORDER — SODIUM CHLORIDE, SODIUM GLUCONATE, SODIUM ACETATE, POTASSIUM CHLORIDE, MAGNESIUM CHLORIDE, SODIUM PHOSPHATE, DIBASIC, AND POTASSIUM PHOSPHATE .53; .5; .37; .037; .03; .012; .00082 G/100ML; G/100ML; G/100ML; G/100ML; G/100ML; G/100ML; G/100ML
100 INJECTION, SOLUTION INTRAVENOUS CONTINUOUS
Status: DISCONTINUED | OUTPATIENT
Start: 2022-01-21 | End: 2022-01-23 | Stop reason: HOSPADM

## 2022-01-21 RX ORDER — ACETAMINOPHEN 325 MG/1
650 TABLET ORAL EVERY 6 HOURS PRN
Status: DISCONTINUED | OUTPATIENT
Start: 2022-01-21 | End: 2022-01-23 | Stop reason: HOSPADM

## 2022-01-21 RX ORDER — OXYCODONE HYDROCHLORIDE 5 MG/1
5 TABLET ORAL EVERY 6 HOURS PRN
Status: DISCONTINUED | OUTPATIENT
Start: 2022-01-21 | End: 2022-01-23 | Stop reason: HOSPADM

## 2022-01-21 RX ADMIN — UMECLIDINIUM 1 PUFF: 62.5 AEROSOL, POWDER ORAL at 08:35

## 2022-01-21 RX ADMIN — HEPARIN SODIUM 5000 UNITS: 5000 INJECTION INTRAVENOUS; SUBCUTANEOUS at 14:53

## 2022-01-21 RX ADMIN — MORPHINE SULFATE 2 MG: 2 INJECTION, SOLUTION INTRAMUSCULAR; INTRAVENOUS at 18:10

## 2022-01-21 RX ADMIN — OXYCODONE HYDROCHLORIDE 5 MG: 5 TABLET ORAL at 21:59

## 2022-01-21 RX ADMIN — Medication 125 MG: at 18:07

## 2022-01-21 RX ADMIN — CARVEDILOL 3.12 MG: 3.12 TABLET, FILM COATED ORAL at 08:25

## 2022-01-21 RX ADMIN — FLUOXETINE 20 MG: 20 CAPSULE ORAL at 08:25

## 2022-01-21 RX ADMIN — CEFTRIAXONE SODIUM 1000 MG: 10 INJECTION, POWDER, FOR SOLUTION INTRAVENOUS at 13:06

## 2022-01-21 RX ADMIN — Medication 125 MG: at 10:32

## 2022-01-21 RX ADMIN — MORPHINE SULFATE 2 MG: 2 INJECTION, SOLUTION INTRAMUSCULAR; INTRAVENOUS at 08:29

## 2022-01-21 RX ADMIN — ZOLPIDEM TARTRATE 2.5 MG: 5 TABLET ORAL at 22:08

## 2022-01-21 RX ADMIN — OXYCODONE HYDROCHLORIDE 5 MG: 5 TABLET ORAL at 15:00

## 2022-01-21 RX ADMIN — METRONIDAZOLE 500 MG: 500 TABLET ORAL at 05:15

## 2022-01-21 RX ADMIN — HEPARIN SODIUM 5000 UNITS: 5000 INJECTION INTRAVENOUS; SUBCUTANEOUS at 05:15

## 2022-01-21 RX ADMIN — CARVEDILOL 3.12 MG: 3.12 TABLET, FILM COATED ORAL at 16:41

## 2022-01-21 RX ADMIN — FLUTICASONE FUROATE AND VILANTEROL TRIFENATATE 1 PUFF: 100; 25 POWDER RESPIRATORY (INHALATION) at 08:35

## 2022-01-21 RX ADMIN — ATORVASTATIN CALCIUM 20 MG: 40 TABLET, FILM COATED ORAL at 16:41

## 2022-01-21 RX ADMIN — Medication 125 MG: at 23:20

## 2022-01-21 RX ADMIN — HEPARIN SODIUM 5000 UNITS: 5000 INJECTION INTRAVENOUS; SUBCUTANEOUS at 21:59

## 2022-01-21 NOTE — UTILIZATION REVIEW
Initial Clinical Review    Admission: Date/Time/Statement:   Admission Orders (From admission, onward)     Ordered        01/20/22 1002  Inpatient Admission  Once                      Orders Placed This Encounter   Procedures    Inpatient Admission     Standing Status:   Standing     Number of Occurrences:   1     Order Specific Question:   Level of Care     Answer:   Med Surg [16]     Order Specific Question:   Estimated length of stay     Answer:   More than 2 Midnights     Order Specific Question:   Certification     Answer:   I certify that inpatient services are medically necessary for this patient for a duration of greater than two midnights  See H&P and MD Progress Notes for additional information about the patient's course of treatment  ED Arrival Information     Expected Arrival Acuity    - 1/20/2022 06:26 Urgent         Means of arrival Escorted by Service Admission type    Walk-In Self Hospitalist Urgent         Arrival complaint    FEVER/DIARRHEA/VOMITING        Chief Complaint   Patient presents with    Diarrhea     pt states he has had abdominal pain and diarrhea since 1/15  tonight patient states he began vomitng  pt c/o of generlized fatigue and weakness  Initial Presentation:   73-year-old male patient with hx of diverticulitis, hypertension, GERD, , HLD, COVID 12/2021 presents to ED from home with abdominal pain, diarrhea for the last 5 days with recent onset of vomiting and generalized weakness  Patient had abdominal pain worse on the left lower quadrant, patient had multiple episodes of watery diarrhea for the last few days, no hematochezia  On exam , pt tachycardic, normal bowel sounds  Labs WBC 17 80  , low sodium 130, creat 1 47  CT A/P shows sigmoid diverticulitis with superimposed pancolitis  ECG- ST   Pt given IVF, IV antiemetic, dual IV abx in ED  Pt admitted as Inpatient with acute sigmoid diverticulitis, ELKE   Plan - IVF, IV abx - ceftriaxone and Flagyl, CL diet, stool studies  Hold lisinopril, BMP 1/21  Date: 1/21 Day 2:   C diff +PCR but neg EIA, Pt started on po vanco , treat as presumed C diff colitis with underlying sigmoid diverticulitis given recent abx use and sudden onset diarrhea  D/C Flagyl, continue IV ceftriaxone  Blood cultures neg at 24 hrs  WBC 14 71 ,pt afebrile  Pt continues with IVF, continue CL diet and advance as tolerated  Added Tylenol, toradol and Oxycodone for pain control  Pt reports continued abdominal pain and diarrhea about every hour  Pt wants to eat more  Creat improving with IVF, down to 1 16 today  Continue to hold lisinopril  Sodium improved with IVF , up to 134 from 130 on admission  ED Triage Vitals [01/20/22 0634]   Temperature Pulse Respirations Blood Pressure SpO2   97 5 °F (36 4 °C) (!) 118 20 107/59 99 %      Temp Source Heart Rate Source Patient Position - Orthostatic VS BP Location FiO2 (%)   Oral Monitor Sitting Left arm --      Pain Score       5          Wt Readings from Last 1 Encounters:   01/20/22 103 kg (227 lb 1 2 oz)     Additional Vital Signs:   Date/Time Temp Pulse Resp BP MAP (mmHg) SpO2   01/21/22 07:58:13 97 5 °F (36 4 °C) 91 -- 140/84 103 95 %   01/21/22 07:58:04 97 5 °F (36 4 °C) 91 -- 140/84 103 96 %   01/20/22 20:29:55 99 4 °F (37 4 °C) 97 -- 141/82 102 94 %   01/20/22 1958 -- 98 18 126/73 -- 94 %   01/20/22 1223 -- 103 18 117/73 -- 96 %       Pertinent Labs/Diagnostic Test Results:   1/20   CT abdomen-Findings of acute uncomplicated sigmoid diverticulitis with a superimposed nonspecific pancolitis    ECG- Sinus rhythm  Nonspecific ST abnormality          Results from last 7 days   Lab Units 01/21/22  0441 01/20/22  0652   WBC Thousand/uL 14 71* 17 80*   HEMOGLOBIN g/dL 13 8 15 3   HEMATOCRIT % 40 4 44 1   PLATELETS Thousands/uL 225 276   BANDS PCT %  --  3         Results from last 7 days   Lab Units 01/21/22  0441 01/20/22  0652   SODIUM mmol/L 134* 130*   POTASSIUM mmol/L 3 9 3 9   CHLORIDE mmol/L 100 96* CO2 mmol/L 24 18*   ANION GAP mmol/L 10 16*   BUN mg/dL 14 17   CREATININE mg/dL 1 16 1 47*   EGFR ml/min/1 73sq m 69 51   CALCIUM mg/dL 8 6 9 4   MAGNESIUM mg/dL 2 2 1 7     Results from last 7 days   Lab Units 01/20/22  0652   AST U/L 24   ALT U/L 36   ALK PHOS U/L 92   TOTAL PROTEIN g/dL 8 2   ALBUMIN g/dL 3 7   TOTAL BILIRUBIN mg/dL 1 43*         Results from last 7 days   Lab Units 01/21/22  0441 01/20/22  0652   GLUCOSE RANDOM mg/dL 110 156*               Results from last 7 days   Lab Units 01/20/22  1141 01/20/22  0914 01/20/22  0652   HS TNI 0HR ng/L  --   --  3   HS TNI 2HR ng/L  --  4  --    HSTNI D2 ng/L  --  1  --    HS TNI 4HR ng/L 2  --   --    HSTNI D4 ng/L -1  --   --                      Results from last 7 days   Lab Units 01/20/22  0836   LACTIC ACID mmol/L 1 7           Results from last 7 days   Lab Units 01/20/22  0652   LIPASE u/L 49*               Results from last 7 days   Lab Units 01/20/22  1020   C DIFF TOXIN B BY PCR  Positive*     Results from last 7 days   Lab Units 01/20/22  1219   SALMONELLA SP PCR  None Detected   SHIGELLA SP/ENTEROINVASIVE E  COLI (EIEC)  None Detected   CAMPYLOBACTER SP (JEJUNI AND COLI)  None Detected   SHIGA TOXIN 1/SHIGA TOXIN 2  None Detected         Results from last 7 days   Lab Units 01/20/22  0914 01/20/22  0836   BLOOD CULTURE  No Growth at 24 hrs  No Growth at 24 hrs                 ED Treatment:   Medication Administration from 01/20/2022 0626 to 01/20/2022 2021       Date/Time Order Dose Route Action     01/20/2022 0642 ondansetron (ZOFRAN-ODT) dispersible tablet 4 mg 4 mg Oral Given     01/20/2022 0839 lactated ringers bolus 1,000 mL 1,000 mL Intravenous New Bag     01/20/2022 1015 vancomycin (VANCOCIN) oral solution 125 mg 125 mg Oral Given     01/20/2022 1016 piperacillin-tazobactam (ZOSYN) 3 375 g in sodium chloride 0 9 % 100 mL IVPB 3 375 g Intravenous New Bag     01/20/2022 0948 ondansetron (ZOFRAN) injection 4 mg 4 mg Intravenous Given 01/20/2022 1216 multi-electrolyte (PLASMALYTE-A/ISOLYTE-S PH 7 4) IV solution 125 mL/hr Intravenous New Bag     01/20/2022 1301 heparin (porcine) subcutaneous injection 5,000 Units 5,000 Units Subcutaneous Given     01/20/2022 1412 ondansetron (ZOFRAN) injection 4 mg 4 mg Intravenous Given     01/20/2022 1128 ceftriaxone (ROCEPHIN) 1 g/50 mL in dextrose IVPB 1,000 mg Intravenous New Bag     01/20/2022 1301 metroNIDAZOLE (FLAGYL) tablet 500 mg 500 mg Oral Given     01/20/2022 1613 carvedilol (COREG) tablet 3 125 mg 3 125 mg Oral Given     01/20/2022 1215 FLUoxetine (PROzac) capsule 20 mg 20 mg Oral Given     01/20/2022 1149 fluticasone-vilanterol (BREO ELLIPTA) 100-25 mcg/inh inhaler 1 puff 1 puff Inhalation Given     01/20/2022 1613 atorvastatin (LIPITOR) tablet 20 mg 20 mg Oral Given     01/20/2022 1149 umeclidinium bromide (INCRUSE ELLIPTA) 62 5 mcg/inh inhaler AEPB 1 puff 1 puff Inhalation Given     01/20/2022 1657 morphine injection 2 mg 2 mg Intravenous Given     01/20/2022 1224 morphine injection 2 mg 2 mg Intravenous Given     01/20/2022 1613 promethazine (PHENERGAN) injection 25 mg 25 mg Intramuscular Given        Past Medical History:   Diagnosis Date    Depression     Diverticulitis     Diverticulosis     GERD (gastroesophageal reflux disease)     Hypertension     Sleep apnea     no cpap    Umbilical hernia      Present on Admission:  **None**      Admitting Diagnosis: Diarrhea [R19 7]  Dehydration [E86 0]  Fever [R50 9]  ELKE (acute kidney injury) ([facility] [address] ) [N17 9]  Acute colitis [K52 9]  Sepsis ([facility] [address] ) [A41 9]  Age/Sex: 60 y o  male  Admission Orders:  Scheduled Medications:  atorvastatin, 20 mg, Oral, Daily With Dinner  carvedilol, 3 125 mg, Oral, BID With Meals  FLUoxetine, 20 mg, Oral, Daily  fluticasone-vilanterol, 1 puff, Inhalation, Daily  heparin (porcine), 5,000 Units, Subcutaneous, Q8H AIDEN  umeclidinium bromide, 1 puff, Inhalation, Daily  vancomycin, 125 mg, Oral, Q6H AIDEN  metroNIDAZOLE (FLAGYL) tablet 500 mg  Dose: 500 mg  Freq: Every 8 hours scheduled Route: PO  Start: 01/20/22 1400 End: 01/21/22 0740  ceftriaxone (ROCEPHIN) 1 g/50 mL in dextrose IVPB  Dose: 1,000 mg  Freq: Every 24 hours Route: IV  Last Dose: Stopped (01/20/22 1216)  Start: 01/20/22 1100   Continuous IV Infusions:  multi-electrolyte, 100 mL/hr, Intravenous, Continuous      PRN Meds:  albuterol, 2 puff, Inhalation, Q4H PRN  morphine injection, 2 mg, Intravenous, Q4H PRN x1 1/20, x1 1/21  ondansetron, 4 mg, Intravenous, Q6H PRN  promethazine, 25 mg, Intramuscular, Q6H PRN  zolpidem, 2 5 mg, Oral, HS PRN x1 1/20  oxyCODONE (ROXICODONE) IR tablet 5 mg  Dose: 5 mg  Freq: Every 6 hours PRN Route: PO  PRN Reason: moderate pain  Start: 01/21/22 1146  ketorolac (TORADOL) injection 15 mg  Dose: 15 mg  Freq: Every 6 hours PRN Route: IV  acetaminophen (TYLENOL) tablet 650 mg  Dose: 650 mg  Freq: Every 6 hours PRN Route: PO  CL diet   OOB as joan   SCD's      Network Utilization Review Department  ATTENTION: Please call with any questions or concerns to [phone] and carefully listen to the prompts so that you are directed to the right person  All voicemails are confidential   Gema Tinoco all requests for admission clinical reviews, approved or denied determinations and any other requests to dedicated fax number below belonging to the campus where the patient is receiving treatment   List of dedicated fax numbers for the Facilities:  [address] DENIALS (Administrative/Medical Necessity) [phone]   [address] (Maternity/NICU/Pediatrics) [phone] [address] 125 [facility]  [address] [facility] [place] [address]   [address] [address] [address] [address] [address] [phone]

## 2022-01-21 NOTE — ASSESSMENT & PLAN NOTE
· Acute sigmoid diverticulitis and diffuse pancolitis  · C diff + PCR but negative EIA however no known history of C diff and therefore, given recent antibiotic use, sudden onset diarrhea after antibiotic use and positive PCR and thus will treat as presumed C diff colitis with underlying sigmoid diverticulitis  · Discontinue Flagyl  Continue ceftriaxone  · Add oral vancomycin q i d  [place] · Continue clear liquid diet  Advance as tolerated  · Add Tylenol, Toradol and oxycodone  Continue pain control  · Still with frequent bowel movements every hour  · Negative for Salmonella, Shigella and Campylobacter  · Blood cultures negative at 24 hour  · Last colonoscopy noted to be in 2019   We will follow-up with GI for colonoscopy in 6-8 weeks with Dr Amanda Hayes

## 2022-01-21 NOTE — ASSESSMENT & PLAN NOTE
· Most likely prerenal in the setting of diarrhea and vomiting  · Continue IV fluid  · Continue to hold lisinopril  · Creatinine improving with the use of IV fluids  · 1 4 on admission and currently 1  16

## 2022-01-21 NOTE — PROGRESS NOTES
[facility]  Progress Note - Johanna Leticia 1965, 60 y o  male MRN: [id number]  Unit/Bed#: W -01 Encounter: [id number]  Primary Care Provider: Annette Layne DO   Date and time admitted to hospital: 1/20/2022  7:46 AM    * Acute Sigmoid diverticulitis  Assessment & Plan  · Acute sigmoid diverticulitis and diffuse pancolitis  · C diff + PCR but negative EIA however no known history of C diff and therefore, given recent antibiotic use, sudden onset diarrhea after antibiotic use and positive PCR and thus will treat as presumed C diff colitis with underlying sigmoid diverticulitis  · Discontinue Flagyl  Continue ceftriaxone  · Add oral vancomycin q i d  Marguerittyvrose Jayde · Continue clear liquid diet  Advance as tolerated  · Add Tylenol, Toradol and oxycodone  Continue pain control  · Still with frequent bowel movements every hour  · Negative for Salmonella, Shigella and Campylobacter  · Blood cultures negative at 24 hour  · Last colonoscopy noted to be in 2019  We will follow-up with GI for colonoscopy in 6-8 weeks with Dr Jessica Tillman [facility])  Assessment & Plan  · Sepsis evidenced by tachycardia, leukocytosis  · Source of infection is diverticulitis and pancolitis  · Discontinue Flagyl  Initiate oral vancomycin  Continue IV ceftriaxone  · Blood cultures negative at 24 hours    ELKE (acute kidney injury) ([facility] [address] )  Assessment & Plan  · Most likely prerenal in the setting of diarrhea and vomiting  · Continue IV fluid  · Continue to hold lisinopril  · Creatinine improving with the use of IV fluids  · 1 4 on admission and currently 1 16  Hyponatremia  Assessment & Plan  · Improved with IV fluid resuscitation  · 130 on admission and currently 134      VTE Pharmacologic Prophylaxis: VTE Score: 4 Moderate Risk (Score 3-4) - Pharmacological DVT Prophylaxis Ordered: heparin  Patient Centered Rounds: I performed bedside rounds with nursing staff today    Discussions with Specialists or Other Care Team Provider: nursing    Education and Discussions with Family / Patient: Updated  (wife) at bedside  Time Spent for Care: 30 minutes  More than 50% of total time spent on counseling and coordination of care as described above  Current Length of Stay: 1 day(s)  Current Patient Status: Inpatient   Certification Statement: The patient will continue to require additional inpatient hospital stay due to IV abx  Discharge Plan: Anticipate discharge in 24-48 hrs to home  Code Status: Level 1 - Full Code    Subjective:   Still having significant abdominal pain and diarrhea  Wants to be able to eat more  Having diarrhea every hour  Objective:     Vitals:   Temp (24hrs), Av 1 °F (36 7 °C), Min:97 5 °F (36 4 °C), Max:99 4 °F (37 4 °C)    Temp:  [97 5 °F (36 4 °C)-99 4 °F (37 4 °C)] 97 5 °F (36 4 °C)  HR:  [91-98] 91  Resp:  [18] 18  BP: (126-141)/(73-84) 140/84  SpO2:  [94 %-96 %] 95 %  Body mass index is 29 96 kg/m²  Input and Output Summary (last 24 hours): Intake/Output Summary (Last 24 hours) at 2022 1429  Last data filed at 2022 0601  Gross per 24 hour   Intake 550 ml   Output --   Net 550 ml       Physical Exam:   Physical Exam  Vitals and nursing note reviewed  Constitutional:       General: He is not in acute distress  Appearance: Normal appearance  He is not ill-appearing or diaphoretic  HENT:      Head: Normocephalic and atraumatic  Mouth/Throat:      Mouth: Mucous membranes are dry  Cardiovascular:      Rate and Rhythm: Normal rate and regular rhythm  Pulmonary:      Effort: Pulmonary effort is normal       Breath sounds: Normal breath sounds  No stridor  No wheezing, rhonchi or rales  Abdominal:      General: Bowel sounds are normal       Palpations: Abdomen is soft  There is no mass  Tenderness: There is abdominal tenderness  There is no guarding  Musculoskeletal:      Right lower leg: No edema  Left lower leg: No edema  Skin:     General: Skin is warm and dry  Neurological:      Mental Status: He is alert  Psychiatric:         Mood and Affect: Mood normal          Behavior: Behavior normal         Additional Data:    Labs:  Results from last 7 days   Lab Units 01/21/22  0441 01/20/22  0652 01/20/22  0652   WBC Thousand/uL 14 71*   < > 17 80*   HEMOGLOBIN g/dL 13 8   < > 15 3   HEMATOCRIT % 40 4   < > 44 1   PLATELETS Thousands/uL 225   < > 276   BANDS PCT %  --   --  3   LYMPHO PCT %  --   --  13*   MONO PCT %  --   --  12   EOS PCT %  --   --  0    < > = values in this interval not displayed  Results from last 7 days   Lab Units 01/21/22  0441 01/20/22  0652 01/20/22  0652   SODIUM mmol/L 134*   < > 130*   POTASSIUM mmol/L 3 9   < > 3 9   CHLORIDE mmol/L 100   < > 96*   CO2 mmol/L 24   < > 18*   BUN mg/dL 14   < > 17   CREATININE mg/dL 1 16   < > 1 47*   ANION GAP mmol/L 10   < > 16*   CALCIUM mg/dL 8 6   < > 9 4   ALBUMIN g/dL  --   --  3 7   TOTAL BILIRUBIN mg/dL  --   --  1 43*   ALK PHOS U/L  --   --  92   ALT U/L  --   --  36   AST U/L  --   --  24   GLUCOSE RANDOM mg/dL 110   < > 156*    < > = values in this interval not displayed  Results from last 7 days   Lab Units 01/20/22  0836   LACTIC ACID mmol/L 1 7       Lines/Drains:  Invasive Devices  Report    Peripheral Intravenous Line            Peripheral IV 12/19/21 Left;Ventral (anterior) Forearm 33 days    Peripheral IV 01/20/22 Right Antecubital 1 day              Imaging: Reviewed radiology reports from this admission including: abdominal/pelvic CT    Recent Cultures (last 7 days):   Results from last 7 days   Lab Units 01/20/22  1020 01/20/22  0914 01/20/22  0836   BLOOD CULTURE   --  No Growth at 24 hrs  No Growth at 24 hrs     C DIFF TOXIN B BY PCR  Positive*  --   --        Last 24 Hours Medication List:   Current Facility-Administered Medications   Medication Dose Route Frequency Provider Last Rate    acetaminophen  650 mg Oral Q6H PRN Patti Kline PA-C      albuterol  2 puff Inhalation Q4H PRN Sierra Chua MD      atorvastatin  20 mg Oral Daily With Juve Stone MD      carvedilol  3 125 mg Oral BID With Meals Sierra Chua MD      cefTRIAXone  1,000 mg Intravenous Q24H Patti Kline PA-C 1,000 mg (01/21/22 1306)    FLUoxetine  20 mg Oral Daily Kahlil Beltran MD      fluticasone-vilanterol  1 puff Inhalation Daily Kahlil Beltran MD      heparin (porcine)  5,000 Units Subcutaneous Q8H [address] Kahlil Beltran MD      ketorolac  15 mg Intravenous Q6H PRN Patti Kline PA-C      morphine injection  2 mg Intravenous Q4H PRN Sierra Chua MD      multi-electrolyte  100 mL/hr Intravenous Continuous Patti Kline PA-C      ondansetron  4 mg Intravenous Q6H PRN Sierra Chua MD      oxyCODONE  5 mg Oral Q6H PRN Patti Kline PA-C      promethazine  25 mg Intramuscular Q6H PRN Kahlil Beltran MD      umeclidinium bromide  1 puff Inhalation Daily Kahlil Beltran MD      vancomycin  125 mg Oral Q6H [address] Kehinde Villalobos PA-C      zolpidem  2 5 mg Oral HS PRN Sierra Chua MD          Today, Patient Was Seen By: Kehinde Villalobos PA-C    **Please Note: This note may have been constructed using a voice recognition system  **

## 2022-01-22 PROBLEM — A04.72 C. DIFFICILE COLITIS: Status: ACTIVE | Noted: 2022-01-20

## 2022-01-22 PROCEDURE — 99232 SBSQ HOSP IP/OBS MODERATE 35: CPT | Performed by: PHYSICIAN ASSISTANT

## 2022-01-22 RX ORDER — SACCHAROMYCES BOULARDII 250 MG
250 CAPSULE ORAL 2 TIMES DAILY
Status: DISCONTINUED | OUTPATIENT
Start: 2022-01-22 | End: 2022-01-23 | Stop reason: HOSPADM

## 2022-01-22 RX ADMIN — HEPARIN SODIUM 5000 UNITS: 5000 INJECTION INTRAVENOUS; SUBCUTANEOUS at 13:20

## 2022-01-22 RX ADMIN — Medication 125 MG: at 23:46

## 2022-01-22 RX ADMIN — HEPARIN SODIUM 5000 UNITS: 5000 INJECTION INTRAVENOUS; SUBCUTANEOUS at 05:45

## 2022-01-22 RX ADMIN — SODIUM CHLORIDE, SODIUM GLUCONATE, SODIUM ACETATE, POTASSIUM CHLORIDE, MAGNESIUM CHLORIDE, SODIUM PHOSPHATE, DIBASIC, AND POTASSIUM PHOSPHATE 100 ML/HR: .53; .5; .37; .037; .03; .012; .00082 INJECTION, SOLUTION INTRAVENOUS at 13:52

## 2022-01-22 RX ADMIN — FLUOXETINE 20 MG: 20 CAPSULE ORAL at 09:08

## 2022-01-22 RX ADMIN — Medication 125 MG: at 05:45

## 2022-01-22 RX ADMIN — OXYCODONE HYDROCHLORIDE 5 MG: 5 TABLET ORAL at 17:53

## 2022-01-22 RX ADMIN — Medication 125 MG: at 17:53

## 2022-01-22 RX ADMIN — HEPARIN SODIUM 5000 UNITS: 5000 INJECTION INTRAVENOUS; SUBCUTANEOUS at 21:03

## 2022-01-22 RX ADMIN — ATORVASTATIN CALCIUM 20 MG: 40 TABLET, FILM COATED ORAL at 16:09

## 2022-01-22 RX ADMIN — Medication 125 MG: at 12:29

## 2022-01-22 RX ADMIN — FLUTICASONE FUROATE AND VILANTEROL TRIFENATATE 1 PUFF: 100; 25 POWDER RESPIRATORY (INHALATION) at 09:09

## 2022-01-22 RX ADMIN — CARVEDILOL 3.12 MG: 3.12 TABLET, FILM COATED ORAL at 16:09

## 2022-01-22 RX ADMIN — Medication 250 MG: at 17:53

## 2022-01-22 RX ADMIN — CARVEDILOL 3.12 MG: 3.12 TABLET, FILM COATED ORAL at 09:08

## 2022-01-22 RX ADMIN — UMECLIDINIUM 1 PUFF: 62.5 AEROSOL, POWDER ORAL at 09:08

## 2022-01-22 RX ADMIN — ZOLPIDEM TARTRATE 2.5 MG: 5 TABLET ORAL at 21:03

## 2022-01-22 RX ADMIN — CEFTRIAXONE SODIUM 1000 MG: 10 INJECTION, POWDER, FOR SOLUTION INTRAVENOUS at 12:28

## 2022-01-22 NOTE — ASSESSMENT & PLAN NOTE
· CT AP: Acute sigmoid diverticulitis and diffuse pancolitis  · C diff + PCR but negative EIA however no known history of C diff and therefore, given recent antibiotic use, sudden onset diarrhea after antibiotic use and positive PCR and thus will treat as presumed C diff colitis with underlying sigmoid diverticulitis  · Continue ceftriaxone given diverticulitis  · Continue oral vancomycin QID  · Advance diet to full liquid  · Continue pain control  Pain improving today  · Still with frequent bowel movements however decreased in frequency as compared to yesterday  · Negative for Salmonella, Shigella and Campylobacter  · Blood cultures negative at 48 hours  · Add banana flakes for stool bulking  · Last colonoscopy noted to be in 2019   We will follow-up with GI for colonoscopy in 6-8 weeks with Dr Milla Melendez

## 2022-01-22 NOTE — ASSESSMENT & PLAN NOTE
· Improved with IV fluid resuscitation  · 130 on admission and 134 as of yesterday  · Repeat laboratory studies tomorrow

## 2022-01-22 NOTE — ASSESSMENT & PLAN NOTE
· Sepsis evidenced by tachycardia, leukocytosis  · Source of infection is diverticulitis and C diff induced pancolitis    · Continue oral vancomycin and IV ceftriaxone  · Blood cultures negative at 48 hours

## 2022-01-22 NOTE — PROGRESS NOTES
[facility]  Progress Note - Ruthe Pouch 1965, 60 y o  male MRN: [id number]  Unit/Bed#: W -01 Encounter: [id number]  Primary Care Provider: [place], DO   Date and time admitted to hospital: 1/20/2022  7:46 AM    * C  difficile colitis and Sigmoid Diverticulitis  Assessment & Plan  · CT AP: Acute sigmoid diverticulitis and diffuse pancolitis  · C diff + PCR but negative EIA however no known history of C diff and therefore, given recent antibiotic use, sudden onset diarrhea after antibiotic use and positive PCR and thus will treat as presumed C diff colitis with underlying sigmoid diverticulitis  · Continue ceftriaxone given diverticulitis  · Continue oral vancomycin QID  · Advance diet to full liquid  · Continue pain control  Pain improving today  · Still with frequent bowel movements however decreased in frequency as compared to yesterday  · Negative for Salmonella, Shigella and Campylobacter  · Blood cultures negative at 48 hours  · Add banana flakes for stool bulking  · Last colonoscopy noted to be in 2019  We will follow-up with GI for colonoscopy in 6-8 weeks with Dr Ariella Lau [facility])  Assessment & Plan  · Sepsis evidenced by tachycardia, leukocytosis  · Source of infection is diverticulitis and C diff induced pancolitis  · Continue oral vancomycin and IV ceftriaxone  · Blood cultures negative at 48 hours    ELKE (acute kidney injury) ([facility] [address] )  Assessment & Plan  · Most likely prerenal in the setting of diarrhea and vomiting    · Continue IV fluid  · Continue to hold lisinopril  · Creatinine improving with the use of IV fluids  · 1 4 on admission and most recently 1 16  · Repeat BMP in the morning    Hyponatremia  Assessment & Plan  · Improved with IV fluid resuscitation  · 130 on admission and 134 as of yesterday  · Repeat laboratory studies tomorrow      VTE Pharmacologic Prophylaxis: VTE Score: 4 Moderate Risk (Score 3-4) - Pharmacological DVT Prophylaxis Ordered: heparin  Patient Centered Rounds: I performed bedside rounds with nursing staff today  Discussions with Specialists or Other Care Team Provider: nursing    Education and Discussions with Family / Patient: Updated  (wife) at bedside  Time Spent for Care: 45 minutes  More than 50% of total time spent on counseling and coordination of care as described above  Current Length of Stay: 2 day(s)  Current Patient Status: Inpatient   Certification Statement: The patient will continue to require additional inpatient hospital stay due to IV fluids, IV ABX, monitor diarrhea, advance diet  Discharge Plan: Anticipate discharge in 24-48 hrs to home  Code Status: Level 1 - Full Code    Subjective:   Reports that he has less abdominal pain than yesterday  He is still having frequent bowel movements however there slightly less frequent than yesterday  He does not have any pain when he eats  He wants to try eating a little bit more  Objective:     Vitals:   Temp (24hrs), Av 9 °F (36 6 °C), Min:97 9 °F (36 6 °C), Max:98 °F (36 7 °C)    Temp:  [97 9 °F (36 6 °C)-98 °F (36 7 °C)] 97 9 °F (36 6 °C)  HR:  [80-91] 80  Resp:  [18] 18  BP: (139-141)/(86-88) 139/88  SpO2:  [94 %-97 %] 97 %  Body mass index is 29 96 kg/m²  Input and Output Summary (last 24 hours): Intake/Output Summary (Last 24 hours) at 2022 1103  Last data filed at 2022 1858  Gross per 24 hour   Intake 4186 ml   Output --   Net 4186 ml       Physical Exam:   Physical Exam  Vitals and nursing note reviewed  Constitutional:       General: He is not in acute distress  Appearance: Normal appearance  He is not diaphoretic  HENT:      Head: Normocephalic and atraumatic  Mouth/Throat:      Mouth: Mucous membranes are dry  Cardiovascular:      Rate and Rhythm: Normal rate and regular rhythm  Pulmonary:      Effort: Pulmonary effort is normal       Breath sounds: Normal breath sounds  No stridor   No wheezing, rhonchi or rales  Abdominal:      General: Bowel sounds are normal       Palpations: Abdomen is soft  There is no mass  Tenderness: There is no abdominal tenderness  There is no guarding  Comments: Normoactive bowel sounds  Nontender palpation  Musculoskeletal:      Right lower leg: No edema  Left lower leg: No edema  Skin:     General: Skin is warm and dry  Neurological:      Mental Status: He is alert  Psychiatric:         Mood and Affect: Mood normal          Behavior: Behavior normal           Additional Data:     Labs:  Results from last 7 days   Lab Units 01/21/22 0441 01/20/22  0652 01/20/22  0652   WBC Thousand/uL 14 71*   < > 17 80*   HEMOGLOBIN g/dL 13 8   < > 15 3   HEMATOCRIT % 40 4   < > 44 1   PLATELETS Thousands/uL 225   < > 276   BANDS PCT %  --   --  3   LYMPHO PCT %  --   --  13*   MONO PCT %  --   --  12   EOS PCT %  --   --  0    < > = values in this interval not displayed  Results from last 7 days   Lab Units 01/21/22 0441 01/20/22  0652 01/20/22  0652   SODIUM mmol/L 134*   < > 130*   POTASSIUM mmol/L 3 9   < > 3 9   CHLORIDE mmol/L 100   < > 96*   CO2 mmol/L 24   < > 18*   BUN mg/dL 14   < > 17   CREATININE mg/dL 1 16   < > 1 47*   ANION GAP mmol/L 10   < > 16*   CALCIUM mg/dL 8 6   < > 9 4   ALBUMIN g/dL  --   --  3 7   TOTAL BILIRUBIN mg/dL  --   --  1 43*   ALK PHOS U/L  --   --  92   ALT U/L  --   --  36   AST U/L  --   --  24   GLUCOSE RANDOM mg/dL 110   < > 156*    < > = values in this interval not displayed  Results from last 7 days   Lab Units 01/20/22  0836   LACTIC ACID mmol/L 1 7       Lines/Drains:  Invasive Devices  Report    Peripheral Intravenous Line            Peripheral IV 12/19/21 Left;Ventral (anterior) Forearm 34 days    Peripheral IV 01/20/22 Right Antecubital 2 days                      Imaging: No pertinent imaging reviewed      Recent Cultures (last 7 days):   Results from last 7 days   Lab Units 01/20/22  1020 01/20/22  0914 01/20/22  0836   BLOOD CULTURE   --  No Growth at 24 hrs  No Growth at 24 hrs  C DIFF TOXIN B BY PCR  Positive*  --   --        Last 24 Hours Medication List:   Current Facility-Administered Medications   Medication Dose Route Frequency Provider Last Rate    acetaminophen  650 mg Oral Q6H PRN Patti Kline PA-C      albuterol  2 puff Inhalation Q4H PRN Shaheen Watts MD      atorvastatin  20 mg Oral Daily With Dinner Shaheen Watts MD      carvedilol  3 125 mg Oral BID With Meals Shaheen Watts MD      cefTRIAXone  1,000 mg Intravenous Q24H Patti Kline PA-C Stopped (01/21/22 1336)    FLUoxetine  20 mg Oral Daily Kahlil Beltran MD      fluticasone-vilanterol  1 puff Inhalation Daily Kahlil Beltran MD      heparin (porcine)  5,000 Units Subcutaneous Q8H [facility] Kahlil Beltran MD      ketorolac  15 mg Intravenous Q6H PRN Patti Kline PA-C      morphine injection  2 mg Intravenous Q4H PRN Shaheen Watts MD      multi-electrolyte  100 mL/hr Intravenous Continuous Patti Kline PA-C 100 mL/hr (01/21/22 1457)    ondansetron  4 mg Intravenous Q6H PRN Shaheen Watts MD      oxyCODONE  5 mg Oral Q6H PRN Patti Kline PA-C      promethazine  25 mg Intramuscular Q6H PRN Kahlil Beltran MD      saccharomyces boulardii  250 mg Oral BID Patti Kline PA-C      umeclidinium bromide  1 puff Inhalation Daily Kahlil Beltran MD      vancomycin  125 mg Oral Q6H [facility] Gabby Hoang PA-C      zolpidem  2 5 mg Oral HS PRN Shaheen Watts MD          Today, Patient Was Seen By: Gabby Hoang PA-C    **Please Note: This note may have been constructed using a voice recognition system  **

## 2022-01-22 NOTE — ASSESSMENT & PLAN NOTE
· Most likely prerenal in the setting of diarrhea and vomiting    · Continue IV fluid  · Continue to hold lisinopril  · Creatinine improving with the use of IV fluids  · 1 4 on admission and most recently 1 16  · Repeat BMP in the morning

## 2022-01-22 NOTE — PLAN OF CARE
Problem: Nutrition/Hydration-ADULT  Goal: Nutrient/Hydration intake appropriate for improving, restoring or maintaining nutritional needs  Description: Monitor and assess patient's nutrition/hydration status for malnutrition  Collaborate with interdisciplinary team and initiate plan and interventions as ordered  Monitor patient's weight and dietary intake as ordered or per policy  Utilize nutrition screening tool and intervene as necessary  Determine patient's food preferences and provide high-protein, high-caloric foods as appropriate       INTERVENTIONS:  - Monitor oral intake, urinary output, labs, and treatment plans  - Assess nutrition and hydration status and recommend course of action  - Evaluate amount of meals eaten  - Assist patient with eating if necessary   - Allow adequate time for meals  - Recommend/ encourage appropriate diets, oral nutritional supplements, and vitamin/mineral supplements  - Order, calculate, and assess calorie counts as needed  - Recommend, monitor, and adjust tube feedings and TPN/PPN based on assessed needs  - Assess need for intravenous fluids  - Provide specific nutrition/hydration education as appropriate  - Include patient/family/caregiver in decisions related to nutrition  Outcome: Progressing     Problem: INFECTION - ADULT  Goal: Absence or prevention of progression during hospitalization  Description: INTERVENTIONS:  - Assess and monitor for signs and symptoms of infection  - Monitor lab/diagnostic results  - Monitor all insertion sites, i e  indwelling lines, tubes, and drains  - Monitor endotracheal if appropriate and nasal secretions for changes in amount and color  - [place] appropriate cooling/warming therapies per order  - Administer medications as ordered  - Instruct and encourage patient and family to use good hand hygiene technique  - Identify and instruct in appropriate isolation precautions for identified infection/condition  Outcome: Progressing  Goal: Absence of fever/infection during neutropenic period  Description: INTERVENTIONS:  - Monitor WBC    Outcome: Progressing     Problem: SAFETY ADULT  Goal: Patient will remain free of falls  Description: INTERVENTIONS:  - Educate patient/family on patient safety including physical limitations  - Instruct patient to call for assistance with activity   - Consult OT/PT to assist with strengthening/mobility   - Keep Call bell within reach  - Keep bed low and locked with side rails adjusted as appropriate  - Keep care items and personal belongings within reach  - Initiate and maintain comfort rounds  - Make Fall Risk Sign visible to staff  - Offer Toileting every 2 Hours, in advance of need  - Initiate/Maintain alarm  - Obtain necessary fall risk management equipment:   - Apply yellow socks and bracelet for high fall risk patients  - Consider moving patient to room near nurses station  Outcome: Progressing  Goal: Maintain or return to baseline ADL function  Description: INTERVENTIONS:  -  Assess patient's ability to carry out ADLs; assess patient's baseline for ADL function and identify physical deficits which impact ability to perform ADLs (bathing, care of mouth/teeth, toileting, grooming, dressing, etc )  - Assess/evaluate cause of self-care deficits   - Assess range of motion  - Assess patient's mobility; develop plan if impaired  - Assess patient's need for assistive devices and provide as appropriate  - Encourage maximum independence but intervene and supervise when necessary  - Involve family in performance of ADLs  - Assess for home care needs following discharge   - Consider OT consult to assist with ADL evaluation and planning for discharge  - Provide patient education as appropriate  Outcome: Progressing  Goal: Maintains/Returns to pre admission functional level  Description: INTERVENTIONS:  - Perform BMAT or MOVE assessment daily    - Set and communicate daily mobility goal to care team and patient/family/caregiver  - Collaborate with rehabilitation services on mobility goals if consulted  - Perform Range of Motion 3 times a day  - Reposition patient every 2 hours  - Dangle patient 3 times a day  - Stand patient 3 times a day  - Ambulate patient 3 times a day  - Out of bed to chair 3 times a day   - Out of bed for meals 3 times a day  - Out of bed for toileting  - Record patient progress and toleration of activity level   Outcome: Progressing     Problem: Knowledge Deficit  Goal: Patient/family/caregiver demonstrates understanding of disease process, treatment plan, medications, and discharge instructions  Description: Complete learning assessment and assess knowledge base    Interventions:  - Provide teaching at level of understanding  - Provide teaching via preferred learning methods  Outcome: Progressing

## 2022-01-23 VITALS
TEMPERATURE: 97.7 F | OXYGEN SATURATION: 96 % | HEIGHT: 73 IN | SYSTOLIC BLOOD PRESSURE: 139 MMHG | BODY MASS INDEX: 30.09 KG/M2 | WEIGHT: 227.07 LBS | DIASTOLIC BLOOD PRESSURE: 91 MMHG | RESPIRATION RATE: 17 BRPM | HEART RATE: 83 BPM

## 2022-01-23 PROBLEM — N17.9 AKI (ACUTE KIDNEY INJURY) (HCC): Status: RESOLVED | Noted: 2022-01-20 | Resolved: 2022-01-23

## 2022-01-23 PROBLEM — E87.1 HYPONATREMIA: Status: RESOLVED | Noted: 2022-01-21 | Resolved: 2022-01-23

## 2022-01-23 LAB
ALBUMIN SERPL BCP-MCNC: 2.9 G/DL (ref 3.5–5)
ALP SERPL-CCNC: 65 U/L (ref 46–116)
ALT SERPL W P-5'-P-CCNC: 45 U/L (ref 12–78)
ANION GAP SERPL CALCULATED.3IONS-SCNC: 10 MMOL/L (ref 4–13)
AST SERPL W P-5'-P-CCNC: 39 U/L (ref 5–45)
BASOPHILS # BLD AUTO: 0.08 THOUSANDS/ΜL (ref 0–0.1)
BASOPHILS NFR BLD AUTO: 1 % (ref 0–1)
BILIRUB SERPL-MCNC: 0.35 MG/DL (ref 0.2–1)
BUN SERPL-MCNC: 8 MG/DL (ref 5–25)
CALCIUM ALBUM COR SERPL-MCNC: 9.7 MG/DL (ref 8.3–10.1)
CALCIUM SERPL-MCNC: 8.8 MG/DL (ref 8.3–10.1)
CHLORIDE SERPL-SCNC: 105 MMOL/L (ref 100–108)
CO2 SERPL-SCNC: 25 MMOL/L (ref 21–32)
CREAT SERPL-MCNC: 1.14 MG/DL (ref 0.6–1.3)
EOSINOPHIL # BLD AUTO: 0.25 THOUSAND/ΜL (ref 0–0.61)
EOSINOPHIL NFR BLD AUTO: 3 % (ref 0–6)
ERYTHROCYTE [DISTWIDTH] IN BLOOD BY AUTOMATED COUNT: 12.9 % (ref 11.6–15.1)
GFR SERPL CREATININE-BSD FRML MDRD: 70 ML/MIN/1.73SQ M
GLUCOSE SERPL-MCNC: 101 MG/DL (ref 65–140)
HCT VFR BLD AUTO: 38.5 % (ref 36.5–49.3)
HGB BLD-MCNC: 12.9 G/DL (ref 12–17)
IMM GRANULOCYTES # BLD AUTO: 0.15 THOUSAND/UL (ref 0–0.2)
IMM GRANULOCYTES NFR BLD AUTO: 2 % (ref 0–2)
LYMPHOCYTES # BLD AUTO: 2.06 THOUSANDS/ΜL (ref 0.6–4.47)
LYMPHOCYTES NFR BLD AUTO: 25 % (ref 14–44)
MCH RBC QN AUTO: 30.6 PG (ref 26.8–34.3)
MCHC RBC AUTO-ENTMCNC: 33.5 G/DL (ref 31.4–37.4)
MCV RBC AUTO: 91 FL (ref 82–98)
MONOCYTES # BLD AUTO: 1.09 THOUSAND/ΜL (ref 0.17–1.22)
MONOCYTES NFR BLD AUTO: 13 % (ref 4–12)
NEUTROPHILS # BLD AUTO: 4.55 THOUSANDS/ΜL (ref 1.85–7.62)
NEUTS SEG NFR BLD AUTO: 56 % (ref 43–75)
NRBC BLD AUTO-RTO: 0 /100 WBCS
PLATELET # BLD AUTO: 290 THOUSANDS/UL (ref 149–390)
PMV BLD AUTO: 9.8 FL (ref 8.9–12.7)
POTASSIUM SERPL-SCNC: 3.4 MMOL/L (ref 3.5–5.3)
PROT SERPL-MCNC: 6.5 G/DL (ref 6.4–8.2)
RBC # BLD AUTO: 4.22 MILLION/UL (ref 3.88–5.62)
SODIUM SERPL-SCNC: 140 MMOL/L (ref 136–145)
WBC # BLD AUTO: 8.18 THOUSAND/UL (ref 4.31–10.16)

## 2022-01-23 PROCEDURE — 85025 COMPLETE CBC W/AUTO DIFF WBC: CPT | Performed by: PHYSICIAN ASSISTANT

## 2022-01-23 PROCEDURE — 80053 COMPREHEN METABOLIC PANEL: CPT | Performed by: PHYSICIAN ASSISTANT

## 2022-01-23 PROCEDURE — 99239 HOSP IP/OBS DSCHRG MGMT >30: CPT | Performed by: PHYSICIAN ASSISTANT

## 2022-01-23 RX ORDER — POTASSIUM CHLORIDE 20 MEQ/1
20 TABLET, EXTENDED RELEASE ORAL ONCE
Status: COMPLETED | OUTPATIENT
Start: 2022-01-23 | End: 2022-01-23

## 2022-01-23 RX ORDER — CEFDINIR 300 MG/1
300 CAPSULE ORAL EVERY 12 HOURS SCHEDULED
Qty: 6 CAPSULE | Refills: 0 | Status: SHIPPED | OUTPATIENT
Start: 2022-01-24 | End: 2022-01-27

## 2022-01-23 RX ORDER — ACETAMINOPHEN 325 MG/1
650 TABLET ORAL EVERY 6 HOURS PRN
Refills: 0
Start: 2022-01-23 | End: 2022-02-10

## 2022-01-23 RX ORDER — VANCOMYCIN HYDROCHLORIDE 125 MG/1
125 CAPSULE ORAL 4 TIMES DAILY
Qty: 30 CAPSULE | Refills: 0 | Status: SHIPPED | OUTPATIENT
Start: 2022-01-23 | End: 2022-01-31

## 2022-01-23 RX ADMIN — POTASSIUM CHLORIDE 20 MEQ: 1500 TABLET, EXTENDED RELEASE ORAL at 14:34

## 2022-01-23 RX ADMIN — CARVEDILOL 3.12 MG: 3.12 TABLET, FILM COATED ORAL at 08:43

## 2022-01-23 RX ADMIN — FLUOXETINE 20 MG: 20 CAPSULE ORAL at 08:43

## 2022-01-23 RX ADMIN — Medication 250 MG: at 08:43

## 2022-01-23 RX ADMIN — UMECLIDINIUM 1 PUFF: 62.5 AEROSOL, POWDER ORAL at 08:44

## 2022-01-23 RX ADMIN — Medication 125 MG: at 05:49

## 2022-01-23 RX ADMIN — FLUTICASONE FUROATE AND VILANTEROL TRIFENATATE 1 PUFF: 100; 25 POWDER RESPIRATORY (INHALATION) at 08:44

## 2022-01-23 RX ADMIN — SODIUM CHLORIDE, SODIUM GLUCONATE, SODIUM ACETATE, POTASSIUM CHLORIDE, MAGNESIUM CHLORIDE, SODIUM PHOSPHATE, DIBASIC, AND POTASSIUM PHOSPHATE 100 ML/HR: .53; .5; .37; .037; .03; .012; .00082 INJECTION, SOLUTION INTRAVENOUS at 05:53

## 2022-01-23 RX ADMIN — HEPARIN SODIUM 5000 UNITS: 5000 INJECTION INTRAVENOUS; SUBCUTANEOUS at 05:48

## 2022-01-23 NOTE — DISCHARGE SUMMARY
[facility]  Discharge- Jacklyn Neither 1965, 60 y o  male MRN: [id number]  Unit/Bed#: W -01 Encounter: [id number]  Primary Care Provider: Lien Browne DO   Date and time admitted to hospital: 1/20/2022  7:46 AM    * C  difficile colitis and Sigmoid Diverticulitis  Assessment & Plan  · CT AP: Acute sigmoid diverticulitis and diffuse pancolitis  · C diff + PCR but negative EIA however no known history of C diff and therefore, given recent antibiotic use, sudden onset diarrhea after antibiotic use and positive PCR and thus will treat as presumed C diff colitis with underlying sigmoid diverticulitis  · Was treated with ceftriaxone given diverticulitis while inpatient  Changed to SON NAIMA upon discharge for a total 7 day course  Of note, oral vancomycin will continue for over 3 days after discontinuation of Omnicef  · Continue oral vancomycin QID upon discharge for a total of 10 days  Patient has an additional 30 doses upon discharge  · Diet advanced and tolerated  · Continue low-fiber diet on discharge  · Patient's bowel movements decreased in frequency and became more formed during his hospitalization  · Negative for Salmonella, Shigella and Campylobacter  · Blood cultures negative at 72 hours  · Last colonoscopy noted to be in 2019  We will follow-up with GI for colonoscopy in 6-8 weeks with Dr Tabatha Blum [facility])  Assessment & Plan  · Sepsis evidenced by tachycardia, leukocytosis  · Source of infection is diverticulitis and C diff induced pancolitis  · Continue oral vancomycin on discharge and p o  Omnicef  · Blood cultures negative at 72 hours    ELKE (acute kidney injury) (HCC)-resolved as of 1/23/2022  Assessment & Plan  · Most likely prerenal in the setting of diarrhea and vomiting    · Treated with IV fluid resuscitation  · Can resume lisinopril on discharge  · Resolved with IV fluids    Hyponatremia-resolved as of 1/23/2022  Assessment & Plan  · Improved with IV fluid resuscitation  · Now resolved    Centrilobular emphysema ([facility] [address] )  Assessment & Plan  · Continue outpatient follow-up and inhaler regimen      Medical Problems             Resolved Problems  Date Reviewed: 1/23/2022          Resolved    ELKE (acute kidney injury) ([facility] [address] ) 1/23/2022     Resolved by  Rachna Murillo PA-C    Hyponatremia 1/23/2022     Resolved by  Rachna Murillo PA-C              Discharging Physician / Practitioner: Rachna Murillo PA-C  PCP: Leif Vallejo DO  Admission Date:   Admission Orders (From admission, onward)     Ordered        01/20/22 1002  Inpatient Admission  Once                      Discharge Date: 01/23/22    Consultations During Hospital Stay:  · none    Procedures Performed:   · none    Significant Findings / Test Results:   · CT: Findings of acute uncomplicated sigmoid diverticulitis with a superimposed nonspecific pancolitis  Incidental Findings:   · none     Test Results Pending at Discharge (will require follow up): · Final blood cultures     Outpatient Tests Requested:  · Colonoscopy  · Follow up with GI    Complications:  none    Reason for Admission: diarrhea    Hospital Course:   Sandee Whitman is a 60 y o  male patient who originally presented to the hospital on 1/20/2022 due to abdominal pain and diarrhea  Patient has a known history of prior diverticulitis, hypertension, GERD hyperlipidemia who presented to the emergency department with abdominal pain and diarrhea for 5 days as an outpatient  The patient also began having vomiting and generalized weakness  The pain was predominantly in his left lower quadrant  The patient had taken Keflex a few days prior to onset of diarrhea secondary to a right periorbital cellulitis  He had completed antibiotics and then began having the onset of this diarrhea  He was noted to take approximately 5 tabs of Imodium as an outpatient    When his diarrhea still persisted, he came to the emergency department for further evaluation  He was noted to have acute kidney injury, tachycardia and leukocytosis  CT scan was noted to show sigmoid diverticulitis with superimposed nonspecific pancolitis  Patient was checked for C diff which was noted to be PCR positive but EIA negative however given onset of diarrhea, recent antibiotic use and clinical condition, this was felt to likely represent C diff colitis  He did have a superimposed sigmoid diverticulitis and therefore was continued on ceftriaxone while admitted  He will be discharged with Omnicef to complete total 7 day course and then in addition he will be discharged with vancomycin 125 mg 4 times a day for total of 10 days, this will continue for greater than 3 days after the discontinuation of Omnicef  He was noted to have improvement in bowel movements which became more firm  He had less abdominal pain  He will follow-up as an outpatient with GI with plan for follow-up and eventual colonoscopy  Last was in 2019  Please see above list of diagnoses and related plan for additional information  Condition at Discharge: stable    Discharge Day Visit / Exam:   Subjective:  No nausea or vomiting  Bowel movements more formed and less frequent  Tolerating diet  Vitals: Blood Pressure: 139/91 (01/23/22 0721)  Pulse: 83 (01/23/22 0721)  Temperature: 97 7 °F (36 5 °C) (01/23/22 0721)  Temp Source: Oral (01/20/22 7231)  Respirations: 17 (01/22/22 2145)  Height: 6' 1" (185 4 cm) (01/20/22 2029)  Weight - Scale: 103 kg (227 lb 1 2 oz) (01/20/22 2029)  SpO2: 96 % (01/23/22 0721)  Exam:   Physical Exam  Vitals and nursing note reviewed  Constitutional:       General: He is not in acute distress  Appearance: Normal appearance  He is not ill-appearing or diaphoretic  HENT:      Head: Normocephalic and atraumatic  Mouth/Throat:      Mouth: Mucous membranes are dry  Cardiovascular:      Rate and Rhythm: Normal rate and regular rhythm     Pulmonary:      Effort: Pulmonary effort is normal       Breath sounds: Normal breath sounds  No stridor  No wheezing, rhonchi or rales  Abdominal:      General: Bowel sounds are normal       Palpations: Abdomen is soft  Tenderness: There is no abdominal tenderness  There is no guarding  Comments: hypoactive   Musculoskeletal:      Right lower leg: No edema  Left lower leg: No edema  Skin:     General: Skin is warm and dry  Neurological:      Mental Status: He is alert and oriented to person, place, and time  Psychiatric:         Mood and Affect: Mood normal          Behavior: Behavior normal         Discussion with Family: Patient declined call to   Discharge instructions/Information to patient and family:   See after visit summary for information provided to patient and family  Provisions for Follow-Up Care:  See after visit summary for information related to follow-up care and any pertinent home health orders  Disposition:   Home    Planned Readmission: none     Discharge Statement:  I spent 45 minutes discharging the patient  This time was spent on the day of discharge  I had direct contact with the patient on the day of discharge  Greater than 50% of the total time was spent examining patient, answering all patient questions, arranging and discussing plan of care with patient as well as directly providing post-discharge instructions  Additional time then spent on discharge activities  Discharge Medications:  See after visit summary for reconciled discharge medications provided to patient and/or family        **Please Note: This note may have been constructed using a voice recognition system**

## 2022-01-23 NOTE — ASSESSMENT & PLAN NOTE
· Sepsis evidenced by tachycardia, leukocytosis  · Source of infection is diverticulitis and C diff induced pancolitis  · Continue oral vancomycin on discharge and p o   Omnicef  · Blood cultures negative at 72 hours

## 2022-01-23 NOTE — DISCHARGE INSTRUCTIONS
Diverticulitis Diet   WHAT YOU NEED TO KNOW:   A diverticulitis diet includes foods that allow your intestines to rest while you have diverticulitis  Diverticulitis is a condition that causes small pockets along your intestine called diverticula to become inflamed or infected  This is caused by hard bowel movement, food, or bacteria that get stuck in the pockets  DISCHARGE INSTRUCTIONS:   Foods that may be recommended while you have diverticulitis:   · A clear liquid diet may be recommended for 2 to 3 days  A clear liquid diet includes clear liquids, and foods that are liquid at room temperature  Examples include the following:     ? Water and clear juices (such as apple, cranberry, or grape), strained citrus juices or fruit punch    ? Coffee or tea (without cream or milk)    ? Clear sports drinks or soft drinks, such as ginger ale, lemon-lime soda, or club soda (no cola or root beer)    ? Clear broth, bouillon, or consommé    ? Plain popsicles (no popsicles with pureed fruit or fiber)    ? Flavored gelatin without fruit    · Low-fiber foods may be recommended until your symptoms improve  Examples include the following:     ? Cream of wheat and finely ground grits    ? White bread, white pasta, and white rice    ? Canned and well-cooked fruit without skins or seeds, and juice without pulp    ? Canned and well-cooked vegetables without skins or seeds, and vegetable juice    ? Cow's milk, lactose-free milk, soy milk, and rice milk    ? Yogurt, cottage cheese, and sherbet    ? Eggs, poultry (such as chicken and turkey), fish, and tender, ground, well-cooked beef     ? Tofu and smooth nut butters, such as peanut butter    ? Broth and strained soups made of low-fiber foods    High-fiber foods  can help prevent diverticulosis and diverticulitis  Your healthcare provider will tell you when you can add high-fiber foods back into your diet   Examples include the following:  · Whole grains and breads, and cereals made with whole grains    · Dried fruit, fresh fruit with skin, and fruit pulp    · Raw vegetables    · Cooked greens, such as spinach    · Tough meat and meat with gristle    · Legumes, such as burton beans and lentils       Call your doctor or dietitian if:   · Your symptoms do not get better, or they get worse  · You have questions about the foods you should eat  · You have questions or concerns about your condition or care  © Copyright [employer] 2021 Information is for End User's use only and may not be sold, redistributed or otherwise used for commercial purposes  All illustrations and images included in CareNotes® are the copyrighted property of A D A M , Inc  or [facility] Joaquim Siegel   The above information is an  only  It is not intended as medical advice for individual conditions or treatments  Talk to your doctor, nurse or pharmacist before following any medical regimen to see if it is safe and effective for you  C  Diff (Clostridioides Difficile) Infection   WHAT YOU NEED TO KNOW:   Clostridioides difficile, Clostridium difficile, or C  diff, is a bacterium that causes diarrhea, irritation, and swelling of the colon  Antibiotic use is the most common cause of CDI  The bowel movement of a person with a CDI contains C  diff  Infected people who do not wash their hands properly after having a bowel movement can spread C  diff  The bacteria can live a long time on surfaces you touch, such as the tops of tables  DISCHARGE INSTRUCTIONS:   Call your local emergency number (911 in the [address] [facility]) if:   · You have a fever and stomach cramps that get worse, or do not go away  · Your abdomen is hard or feels swollen  · You have black or bright red bowel movements  · You vomit blood  · You are short of breath, or feel like you are going to faint  · You have any of the following signs of dehydration:    ? Dizziness or weakness, or extreme sleepiness    ?  Dry mouth, cracked lips, or you feel very thirsty    ? Fast heartbeat or rapid breathing    ? Very little urine or no urine    ? Sunken eyes    Call your doctor if:   · You have a fever  · Your signs and symptoms get worse  · Your signs and symptoms do not go away, or they come back, even after treatment  · You cannot eat or drink  · You have questions or concerns about your condition or care  Medicines:   · Antibiotics  may be given to keep the C  diff bacteria from growing  · Take your medicine as directed  Contact your healthcare provider if you think your medicine is not helping or if you have side effects  Tell him or her if you are allergic to any medicine  Keep a list of the medicines, vitamins, and herbs you take  Include the amounts, and when and why you take them  Bring the list or the pill bottles to follow-up visits  Carry your medicine list with you in case of an emergency  What you can do to manage or prevent a CDI:   · Wash your hands often  Wash your hands several times each day  Wash after you use the bathroom, change a child's diaper, and before you prepare or eat food  Use soap and water every time  Rub your soapy hands together, lacing your fingers  Wash the front and back of your hands, and in between your fingers  Use the fingers of one hand to scrub under the fingernails of the other hand  Wash for at least 20 seconds  Rinse with warm, running water for several seconds  Then dry your hands with a clean towel or paper towel  Use hand  that contains alcohol if soap and water are not available  Do not touch your eyes, nose, or mouth without washing your hands first          · Clean surfaces often  Clean doorknobs, countertops, cell phones, and other surfaces that are touched often  Use a disinfecting wipe, a single-use sponge, or a cloth you can wash and reuse  Use disinfecting  if you do not have wipes   You can create a disinfecting  by mixing 1 part bleach with 10 parts water  · Prevent the spread of C  diff  Do not share any items with other people  Use as many disposable items as you can, such as paper plates  Do this until your diarrhea has stopped  · Ask about probiotics  Probiotics are also called good bacteria  They can help protect you from harmful bacteria  If you develop more than one CDI, probiotics may help prevent more infections  Ask your healthcare provider if probiotics are right for you  You may be able to eat yogurt or other foods high in probiotics  Your provider may instead recommend a pill or liquid form  · Drink more liquids to prevent dehydration  You may also drink an oral rehydration solution (ORS)  An ORS has the right amounts of water, salts, and sugar needed to replace body fluids  Ask your healthcare provider where to buy ORS and how much to drink  What you need to know about correct antibiotic use:   · Take your antibiotic as directed  Do not skip a dose of your antibiotic  Do not stop taking your antibiotic, even if you feel better  Finish the entire dose of your antibiotic unless your healthcare provider tells you to stop  · Get rid of any antibiotics you did not use  Ask your healthcare provider or pharmacist how to get rid of antibiotics  Do not share your antibiotic with another person  Do not take an antibiotic from another illness without talking to your healthcare provider  · Prevent infections caused by bacteria  This will help prevent your need for an antibiotic  Ask about vaccines that you need  Wash your hands frequently to prevent the spread of infection  · Ask your healthcare provider how to manage your symptoms without antibiotics  Your healthcare provider can recommend other treatments based on your illness  An example includes over-the-counter medicines such as acetaminophen or NSAIDs  Follow up with your doctor in 1 to 2 days: You may need to have an antibiotic changed if it caused your CDI   Write down your questions so you remember to ask them during your visits  © Copyright [employer] 2021 Information is for End User's use only and may not be sold, redistributed or otherwise used for commercial purposes  All illustrations and images included in CareNotes® are the copyrighted property of [employer] M , Inc  or Larry Martinez  The above information is an  only  It is not intended as medical advice for individual conditions or treatments  Talk to your doctor, nurse or pharmacist before following any medical regimen to see if it is safe and effective for you

## 2022-01-23 NOTE — DISCHARGE INSTR - AVS FIRST PAGE
Dear Chase Barraza,     It was our pleasure to care for you here at [facility]  It is our hope that we were always able to exceed the expected standards for your care during your stay  You were hospitalized due to C diff colitis and sigmoid diverticulitis  You were cared for on the [facility] 4th floor by Sourav Sellers PA-C under the service of Karin Mckeon MD with the [facility] who covers for your primary care physician (PCP), Obed Headley DO, while you were hospitalized  If you have any questions or concerns related to this hospitalization, you may contact us at [phone]  For follow up as well as any medication refills, we recommend that you follow up with your primary care physician  A registered nurse will reach out to you by phone within a few days after your discharge to answer any additional questions that you may have after going home  However, at this time we provide for you here, the most important instructions / recommendations at discharge:     Notable Medication Adjustments -   Take Omnicef 300 mg by mouth twice a day beginning on 1/24/2022  This is for 3 days  This is an antibiotic  Take vancomycin 125 mg by mouth 4 times a day beginning later tonight  This is for C diff  You will take this for an additional 30 total doses which is approximately 7 and half days  I called the pharmacy to check the price as this can be expensive  This should only be 10 dollars  Testing Required after Discharge -   You will eventually need a colonoscopy  Important follow up information -   Follow-up with GI  Their office should be calling you to schedule a follow-up appointment however if you do not hear from them, please call the number provided to do so  They are aware that you wish to see Dr Joyce Perez  Other Instructions -   Follow a low-fiber diet at this time until further directed by GI    If you develop fever, chills, blood in your stool, worsening abdominal pain or any other concerning symptoms, please return to the emergency department immediately  Please review this entire after visit summary as additional general instructions including medication list, appointments, activity, diet, any pertinent wound care, and other additional recommendations from your care team that may be provided for you      Sincerely,   Wanda Stanton PA-C

## 2022-01-23 NOTE — PLAN OF CARE
Problem: Nutrition/Hydration-ADULT  Goal: Nutrient/Hydration intake appropriate for improving, restoring or maintaining nutritional needs  Description: Monitor and assess patient's nutrition/hydration status for malnutrition  Collaborate with interdisciplinary team and initiate plan and interventions as ordered  Monitor patient's weight and dietary intake as ordered or per policy  Utilize nutrition screening tool and intervene as necessary  Determine patient's food preferences and provide high-protein, high-caloric foods as appropriate       INTERVENTIONS:  - Monitor oral intake, urinary output, labs, and treatment plans  - Assess nutrition and hydration status and recommend course of action  - Evaluate amount of meals eaten  - Assist patient with eating if necessary   - Allow adequate time for meals  - Recommend/ encourage appropriate diets, oral nutritional supplements, and vitamin/mineral supplements  - Order, calculate, and assess calorie counts as needed  - Recommend, monitor, and adjust tube feedings and TPN/PPN based on assessed needs  - Assess need for intravenous fluids  - Provide specific nutrition/hydration education as appropriate  - Include patient/family/caregiver in decisions related to nutrition  Outcome: Progressing     Problem: INFECTION - ADULT  Goal: Absence or prevention of progression during hospitalization  Description: INTERVENTIONS:  - Assess and monitor for signs and symptoms of infection  - Monitor lab/diagnostic results  - Monitor all insertion sites, i e  indwelling lines, tubes, and drains  - Monitor endotracheal if appropriate and nasal secretions for changes in amount and color  - [place] appropriate cooling/warming therapies per order  - Administer medications as ordered  - Instruct and encourage patient and family to use good hand hygiene technique  - Identify and instruct in appropriate isolation precautions for identified infection/condition  Outcome: Progressing  Goal: Absence of fever/infection during neutropenic period  Description: INTERVENTIONS:  - Monitor WBC    Outcome: Progressing     Problem: SAFETY ADULT  Goal: Patient will remain free of falls  Description: INTERVENTIONS:  - Educate patient/family on patient safety including physical limitations  - Instruct patient to call for assistance with activity   - Consult OT/PT to assist with strengthening/mobility   - Keep Call bell within reach  - Keep bed low and locked with side rails adjusted as appropriate  - Keep care items and personal belongings within reach  - Initiate and maintain comfort rounds  - Make Fall Risk Sign visible to staff  - Offer Toileting every  Hours, in advance of need  - Initiate/Maintain alarm  - Obtain necessary fall risk management equipment:   - Apply yellow socks and bracelet for high fall risk patients  - Consider moving patient to room near nurses station  Outcome: Progressing  Goal: Maintain or return to baseline ADL function  Description: INTERVENTIONS:  -  Assess patient's ability to carry out ADLs; assess patient's baseline for ADL function and identify physical deficits which impact ability to perform ADLs (bathing, care of mouth/teeth, toileting, grooming, dressing, etc )  - Assess/evaluate cause of self-care deficits   - Assess range of motion  - Assess patient's mobility; develop plan if impaired  - Assess patient's need for assistive devices and provide as appropriate  - Encourage maximum independence but intervene and supervise when necessary  - Involve family in performance of ADLs  - Assess for home care needs following discharge   - Consider OT consult to assist with ADL evaluation and planning for discharge  - Provide patient education as appropriate  Outcome: Progressing  Goal: Maintains/Returns to pre admission functional level  Description: INTERVENTIONS:  - Perform BMAT or MOVE assessment daily    - Set and communicate daily mobility goal to care team and patient/family/caregiver  - Collaborate with rehabilitation services on mobility goals if consulted  - Perform Range of Motion  times a day  - Reposition patient every  hours  - Dangle patient  times a day  - Stand patient  times a day  - Ambulate patient  times a day  - Out of bed to chair  times a day   - Out of bed for meals times a day  - Out of bed for toileting  - Record patient progress and toleration of activity level   Outcome: Progressing     Problem: Knowledge Deficit  Goal: Patient/family/caregiver demonstrates understanding of disease process, treatment plan, medications, and discharge instructions  Description: Complete learning assessment and assess knowledge base    Interventions:  - Provide teaching at level of understanding  - Provide teaching via preferred learning methods  Outcome: Progressing

## 2022-01-23 NOTE — ASSESSMENT & PLAN NOTE
· CT AP: Acute sigmoid diverticulitis and diffuse pancolitis  · C diff + PCR but negative EIA however no known history of C diff and therefore, given recent antibiotic use, sudden onset diarrhea after antibiotic use and positive PCR and thus will treat as presumed C diff colitis with underlying sigmoid diverticulitis  · Was treated with ceftriaxone given diverticulitis while inpatient  Changed to HUY MCNAMARA upon discharge for a total 7 day course  Of note, oral vancomycin will continue for over 3 days after discontinuation of Omnicef  · Continue oral vancomycin QID upon discharge for a total of 10 days  Patient has an additional 30 doses upon discharge  · Diet advanced and tolerated  · Continue low-fiber diet on discharge  · Patient's bowel movements decreased in frequency and became more formed during his hospitalization  · Negative for Salmonella, Shigella and Campylobacter  · Blood cultures negative at 72 hours  · Last colonoscopy noted to be in 2019   We will follow-up with GI for colonoscopy in 6-8 weeks with Dr Milla Melendez

## 2022-01-23 NOTE — ASSESSMENT & PLAN NOTE
· Most likely prerenal in the setting of diarrhea and vomiting    · Treated with IV fluid resuscitation  · Can resume lisinopril on discharge  · Resolved with IV fluids

## 2022-01-24 ENCOUNTER — TRANSITIONAL CARE MANAGEMENT (OUTPATIENT)
Dept: FAMILY MEDICINE CLINIC | Facility: CLINIC | Age: 57
End: 2022-01-24

## 2022-01-25 ENCOUNTER — OFFICE VISIT (OUTPATIENT)
Dept: FAMILY MEDICINE CLINIC | Facility: CLINIC | Age: 57
End: 2022-01-25
Payer: COMMERCIAL

## 2022-01-25 VITALS — WEIGHT: 227 LBS | HEIGHT: 73 IN | BODY MASS INDEX: 30.09 KG/M2

## 2022-01-25 DIAGNOSIS — A04.72 C. DIFFICILE COLITIS: Primary | ICD-10-CM

## 2022-01-25 LAB
BACTERIA BLD CULT: NORMAL
BACTERIA BLD CULT: NORMAL

## 2022-01-25 PROCEDURE — 99496 TRANSJ CARE MGMT HIGH F2F 7D: CPT | Performed by: FAMILY MEDICINE

## 2022-01-25 PROCEDURE — 1111F DSCHRG MED/CURRENT MED MERGE: CPT | Performed by: FAMILY MEDICINE

## 2022-01-25 NOTE — PROGRESS NOTES
Assessment/Plan:     Problem List Items Addressed This Visit        Unprioritized    C  difficile colitis and Sigmoid Diverticulitis - Primary        Tolerating food and fluids well   Resting   Continue watching diet   he has appointment tomorrow with GI   To have colonoscopy in few months - reassured him regarding this  Reviewed signs/symptoms that should prompt him to return to the office or ER     Return for Keep scheduled appointment  Subjective:   Shirin Martins is a 60 y o  male here today for a hospital follow-up    Patient Active Problem List   Diagnosis    GERD (gastroesophageal reflux disease)    Diverticulosis    Sleep apnea    Major depressive disorder, single episode, mild (HCC)    Mixed hyperlipidemia    Benign hypertension with CKD (chronic kidney disease), stage II    Primary insomnia    Elbow pain, left    Psoriasis    Vitamin D deficiency    Low libido    Class 1 obesity due to excess calories with serious comorbidity and body mass index (BMI) of 31 0 to 31 9 in adult    Arthritis of both feet    Abnormal stress test    Centrilobular emphysema (HCC)    SOB (shortness of breath)    Stage 3a chronic kidney disease (HCC)    Hallux rigidus, right foot    Hallux rigidus, left foot    Primary osteoarthritis of right foot    Primary osteoarthritis of left foot    COVID-19    C  difficile colitis and Sigmoid Diverticulitis    Sepsis ([facility] [address] )        Current medications:  Current Outpatient Medications   Medication Sig Dispense Refill    acetaminophen (TYLENOL) 325 mg tablet Take 2 tablets (650 mg total) by mouth every 6 (six) hours as needed for mild pain, headaches or fever (Patient not taking: Reported on 1/26/2022 )  0    albuterol (PROVENTIL HFA,VENTOLIN HFA) 90 mcg/act inhaler Inhale 2 puffs every 4 (four) hours as needed for wheezing or shortness of breath 18 g 5    carvedilol (COREG) 3 125 mg tablet TAKE 1 TABLET(3 125 MG) BY MOUTH TWICE DAILY WITH MEALS 60 tablet 1    eszopiclone (LUNESTA) 3 MG tablet Take 1 tablet (3 mg total) by mouth daily at bedtime 90 tablet 1    FLUoxetine (PROzac) 20 MG tablet Take 1 tablet (20 mg total) by mouth daily 90 tablet 1    lisinopril (ZESTRIL) 30 mg tablet Take 1 tablet (30 mg total) by mouth daily (Patient taking differently: Take 30 mg by mouth daily at bedtime  ) 90 tablet 1    naloxone (NARCAN) 4 mg/0 1 mL nasal spray Administer 1 spray into a nostril  If no response after 2-3 minutes, give another dose in the other nostril using a new spray  (Patient not taking: Reported on 1/26/2022 ) 1 each 1    rosuvastatin (CRESTOR) 10 MG tablet Take 1 tablet (10 mg total) by mouth daily 90 tablet 3    triamcinolone (KENALOG) 0 1 % ointment Apply topically 2 (two) times a day 15 g 1    vancomycin (VANCOCIN) 125 MG capsule Take 1 capsule (125 mg total) by mouth 4 (four) times a day for 30 doses 30 capsule 0    b complex vitamins capsule Take 1 capsule by mouth daily      fluticasone-umeclidinium-vilanterol (Trelegy Ellipta) 100-62 5-25 MCG/INH inhaler Inhale 1 puff daily Rinse mouth after use  60 blister 11    multivitamin (THERAGRAN) TABS Take 1 tablet by mouth daily      polyethylene glycol (GOLYTELY) 4000 mL solution Take 4,000 mL by mouth once for 1 dose 4000 mL 0    VITAMIN D PO Take by mouth      Zinc Sulfate (ZINC 15 PO) Take by mouth       No current facility-administered medications for this visit  HPI:   Chief Complaint   Patient presents with    Transition of Care Management     -- Above per clinical staff and reviewed   --    TCM Call (since 12/29/2021)     Date and time call was made  1/24/2022  [facility] reviewed  Records reviewed        Patient was hospitialized at  [address] [place] [address]        Date of Admission  01/20/22    Date of discharge  01/23/22    Diagnosis  C  difficile colitis and Sigmoid Diverticulitis    Disposition  Home    Were the patients medications reviewed and updated  Yes    Current Symptoms  None TCM Call (since 12/29/2021)     Post hospital issues  None    Should patient be enrolled in anticoag monitoring? No    Scheduled for follow up? Yes    Did you obtain your prescribed medications  Yes    Do you need help managing your prescriptions or medications  No    Is transportation to your appointment needed  No    I have advised the patient to call PCP with any new or worsening symptoms  MIMI SWAIN MA     Living Arrangements  Spouse or Significiant other    Support System  Spouse    The type of support provided  Emotional; Physical    Do you have social support  Yes, as much as I need    Are you recieving any outpatient services  No    Are you recieving home care services  No    Are you using any community resources  No          Hospital Course:  Hospital Course:   Sallie Pike is a 60 y o  male patient who originally presented to the hospital on 1/20/2022 due to abdominal pain and diarrhea  Patient has a known history of prior diverticulitis, hypertension, GERD hyperlipidemia who presented to the emergency department with abdominal pain and diarrhea for 5 days as an outpatient  The patient also began having vomiting and generalized weakness  The pain was predominantly in his left lower quadrant  The patient had taken Keflex a few days prior to onset of diarrhea secondary to a right periorbital cellulitis  He had completed antibiotics and then began having the onset of this diarrhea  He was noted to take approximately 5 tabs of Imodium as an outpatient  When his diarrhea still persisted, he came to the emergency department for further evaluation  He was noted to have acute kidney injury, tachycardia and leukocytosis  CT scan was noted to show sigmoid diverticulitis with superimposed nonspecific pancolitis    Patient was checked for C diff which was noted to be PCR positive but EIA negative however given onset of diarrhea, recent antibiotic use and clinical condition, this was felt to likely represent C diff colitis  He did have a superimposed sigmoid diverticulitis and therefore was continued on ceftriaxone while admitted  He will be discharged with Omnicef to complete total 7 day course and then in addition he will be discharged with vancomycin 125 mg 4 times a day for total of 10 days, this will continue for greater than 3 days after the discontinuation of Omnicef      He was noted to have improvement in bowel movements which became more firm  He had less abdominal pain      He will follow-up as an outpatient with GI with plan for follow-up and eventual colonoscopy  Last was in 2019  [facility]  Discharge- Chase Barraza 1965, 60 y o  male MRN: [id number]  Unit/Bed#: W -01 Encounter: [id number]  Primary Care Provider: Obed Headley DO   Date and time admitted to hospital: 1/20/2022  7:46 AM     * C  difficile colitis and Sigmoid Diverticulitis  Assessment & Plan  · CT AP: Acute sigmoid diverticulitis and diffuse pancolitis  · C diff + PCR but negative EIA however no known history of C diff and therefore, given recent antibiotic use, sudden onset diarrhea after antibiotic use and positive PCR and thus will treat as presumed C diff colitis with underlying sigmoid diverticulitis  · Was treated with ceftriaxone given diverticulitis while inpatient  Changed to HUY MCNAMARA upon discharge for a total 7 day course  Of note, oral vancomycin will continue for over 3 days after discontinuation of Omnicef  · Continue oral vancomycin QID upon discharge for a total of 10 days  Patient has an additional 30 doses upon discharge  · Diet advanced and tolerated  · Continue low-fiber diet on discharge  · Patient's bowel movements decreased in frequency and became more formed during his hospitalization  · Negative for Salmonella, Shigella and Campylobacter  · Blood cultures negative at 72 hours  · Last colonoscopy noted to be in 2019   We will follow-up with GI for colonoscopy in 6-8 weeks with Dr Key Khanna     Sepsis [facility])  Assessment & Plan  · Sepsis evidenced by tachycardia, leukocytosis  · Source of infection is diverticulitis and C diff induced pancolitis  · Continue oral vancomycin on discharge and p o  Omnicef  · Blood cultures negative at 72 hours     ELKE (acute kidney injury) (HCC)-resolved as of 1/23/2022  Assessment & Plan  · Most likely prerenal in the setting of diarrhea and vomiting  · Treated with IV fluid resuscitation  · Can resume lisinopril on discharge  · Resolved with IV fluids     Hyponatremia-resolved as of 1/23/2022  Assessment & Plan  · Improved with IV fluid resuscitation  · Now resolved     Centrilobular emphysema ([address] )  Assessment & Plan  · Continue outpatient follow-up and inhaler regimen       Today:  Diarrhea better, twice today   More formed   Eating better  Dx with colitis, diverticulitis and c diff   Was given a work on Monday   Had a bad experience with last colonoscopy - bad pain after       Raymona Meuse presents today for hospital follow-up visit  Patient was contacted within 2 business days  Medications were reconciled  Hospital discharge summary was reviewed  As part of this post-hospital visit, records from the hospital, including history and physical examination, discharge summary, all laboratory tests and radiographic studies was reviewed with this patient      The following portions of the patient's history were reviewed and updated as appropriate: allergies, current medications, past family history, past medical history, past social history, past surgical history and problem list     Objective:  Vitals:  Ht 6' 1" (1 854 m)   Wt 103 kg (227 lb)   BMI 29 95 kg/m²    Wt Readings from Last 3 Encounters:   01/26/22 106 kg (234 lb)   01/25/22 103 kg (227 lb)   01/20/22 103 kg (227 lb 1 2 oz)      BP Readings from Last 3 Encounters:   01/26/22 140/82   01/23/22 139/91   12/19/21 120/66        Review of Systems   He has no other concerns  No unexpected weight changes  No chest pain, SOB, or palpitations  No GERD  normal bladder  + diarrhea, no fevers Sleeping well  No mood changes  Physical Exam   Constitutional:  he appears well-developed and well-nourished  HENT: Head: Normocephalic  Neck: Neck supple  Cardiovascular: Normal rate, regular rhythm and normal heart sounds  Pulmonary/Chest: Effort normal and breath sounds normal  No wheezes, rales, or rhonchi  Abdominal: Soft  Bowel sounds are normal  There is no tenderness  No hepatosplenomegaly  Musculoskeletal: he exhibits no edema  Lymphadenopathy: he has no cervical adenopathy  Neurological: he is alert and oriented to person, place, and time  Skin: Skin is warm and dry  Psychiatric: he has a normal mood and affect   his behavior is normal  Thought content normal

## 2022-01-26 ENCOUNTER — CONSULT (OUTPATIENT)
Dept: GASTROENTEROLOGY | Facility: AMBULARY SURGERY CENTER | Age: 57
End: 2022-01-26
Payer: COMMERCIAL

## 2022-01-26 VITALS
BODY MASS INDEX: 31.01 KG/M2 | HEART RATE: 94 BPM | DIASTOLIC BLOOD PRESSURE: 82 MMHG | OXYGEN SATURATION: 97 % | SYSTOLIC BLOOD PRESSURE: 140 MMHG | WEIGHT: 234 LBS | HEIGHT: 73 IN

## 2022-01-26 DIAGNOSIS — A04.72 C. DIFFICILE COLITIS: Primary | ICD-10-CM

## 2022-01-26 DIAGNOSIS — K21.9 GASTROESOPHAGEAL REFLUX DISEASE WITHOUT ESOPHAGITIS: ICD-10-CM

## 2022-01-26 PROCEDURE — 99204 OFFICE O/P NEW MOD 45 MIN: CPT | Performed by: INTERNAL MEDICINE

## 2022-01-26 PROCEDURE — 1036F TOBACCO NON-USER: CPT | Performed by: INTERNAL MEDICINE

## 2022-01-26 PROCEDURE — 3008F BODY MASS INDEX DOCD: CPT | Performed by: INTERNAL MEDICINE

## 2022-01-26 RX ORDER — VITAMIN B COMPLEX
1 CAPSULE ORAL DAILY
COMMUNITY

## 2022-01-26 RX ORDER — DIPHENOXYLATE HYDROCHLORIDE AND ATROPINE SULFATE 2.5; .025 MG/1; MG/1
1 TABLET ORAL DAILY
COMMUNITY

## 2022-01-26 NOTE — LETTER
January 26, 2022     Fabian Rivera PA-C  [address] [facility] [address]    Patient: Be Read   YOB: 1965   Date of Visit: 1/26/2022       Dear Dr Verena Tobias: Thank you for referring Shailesh Gallardo to me for evaluation  Below are my notes for this consultation  If you have questions, please do not hesitate to call me  I look forward to following your patient along with you  Sincerely,        David Buckley MD        CC: DO David Church MD  1/26/2022 10:13 AM  Sign when Signing Visit  Consultation - [facility]  Be Read 60 y o  male MRN: [id number]          Assessment & Plan:    Pleasant 40-year-old gentleman, recently admitted to the hospital with acute uncomplicated sigmoid diverticulitis and likely superimposed C diff colitis    1  Acute sigmoid diverticulitis and C diff colitis:  -continue current antibiotic regimen of cefdinir, vancomycin  -continue probiotic  -continue low residue/low fiber diet, resume fiber supplementation gradually after 4 weeks  -we will proceed with repeat colonoscopy to exclude any underlying GI pathology, inflammatory bowel disease, malignancy    2  Chronic GERD:  Patient is on Pepcid at night, had previously been on PPI therapy to but stopped this due to kidney disease  -we will proceed with an upper endoscopy the same time as his colonoscopy to exclude any underlying Whipple's esophagus  -discussed with him risks of procedures including bleeding, surgery, perforation, missed polyp detection rate  Ruel Sharath was seen today for ulcerative colitis  Diagnoses and all orders for this visit:    C  difficile colitis and Sigmoid Diverticulitis  -     Ambulatory referral to Gastroenterology  -     polyethylene glycol (GOLYTELY) 4000 mL solution; Take 4,000 mL by mouth once for 1 dose  -     Colonoscopy;  Future    Gastroesophageal reflux disease without esophagitis  -     EGD; Future            _____________________________________________________________        CC: Follow-up from hospital stay    HPI:  Pratima Hankins is a 60 y o male who was referred for evaluation of follow-up from hospital stay  This is a 77-year-old gentleman, history of hypertension, hyperlipidemia, recently underwent foot surgery, then had a COVID infection, then had eye infection was treated with antibiotics, then had acute onset of diarrhea with vomiting abdominal pain, was admitted to the hospital, CT scan demonstrated acute uncomplicated sigmoid diverticulitis with pancolitis, stool studies were positive for C diff  He has been discharged home on cefdinir and vancomycin, reports having 1 formed bowel movement daily now  Denies any further abdominal pain  Tolerating his diet  Still feels weak and fatigued but otherwise is overall doing much better  Has longstanding history of heartburn, typically time he eats Pepcid at night  Previously was on PPI therapy but stopped this due to chronic kidney disease  Patient denies any recurrent nausea, vomiting, dysphagia  Denies any melena rectal bleeding  He had a similar episode in 2019, also had a colonoscopy at that time he reports that his colonoscopy in precipitated bout of diverticulitis  Has not had normal colonoscopies prior to that  Surgical history is notable for foot surgery, cholecystectomy, arthroscopic knee surgery, hernia repair, cataract surgery  Thirty-five pack-year smoker but quit, drinks about 6 drinks per week  He works in human resources at [employer]  Family history father noted to have passed away from pancreatic cancer  Patient does take daily probiotics  Previously was also on daily fiber supplementation  ROS:  The remainder of the ROS was negative except for the pertinent positives mentioned in HPI           Allergies: Allopurinol    Medications:   Current Outpatient Medications:     albuterol (PROVENTIL HFA,VENTOLIN HFA) 90 mcg/act inhaler, Inhale 2 puffs every 4 (four) hours as needed for wheezing or shortness of breath, Disp: 18 g, Rfl: 5    b complex vitamins capsule, Take 1 capsule by mouth daily, Disp: , Rfl:     carvedilol (COREG) 3 125 mg tablet, TAKE 1 TABLET(3 125 MG) BY MOUTH TWICE DAILY WITH MEALS, Disp: 60 tablet, Rfl: 1    cefdinir (OMNICEF) 300 mg capsule, Take 1 capsule (300 mg total) by mouth every 12 (twelve) hours for 3 days, Disp: 6 capsule, Rfl: 0    eszopiclone (LUNESTA) 3 MG tablet, Take 1 tablet (3 mg total) by mouth daily at bedtime, Disp: 90 tablet, Rfl: 1    FLUoxetine (PROzac) 20 MG tablet, Take 1 tablet (20 mg total) by mouth daily, Disp: 90 tablet, Rfl: 1    fluticasone-umeclidinium-vilanterol (Trelegy Ellipta) 100-62 5-25 MCG/INH inhaler, Inhale 1 puff daily Rinse mouth after use , Disp: 60 blister, Rfl: 11    lisinopril (ZESTRIL) 30 mg tablet, Take 1 tablet (30 mg total) by mouth daily (Patient taking differently: Take 30 mg by mouth daily at bedtime  ), Disp: 90 tablet, Rfl: 1    multivitamin (THERAGRAN) TABS, Take 1 tablet by mouth daily, Disp: , Rfl:     rosuvastatin (CRESTOR) 10 MG tablet, Take 1 tablet (10 mg total) by mouth daily, Disp: 90 tablet, Rfl: 3    triamcinolone (KENALOG) 0 1 % ointment, Apply topically 2 (two) times a day, Disp: 15 g, Rfl: 1    vancomycin (VANCOCIN) 125 MG capsule, Take 1 capsule (125 mg total) by mouth 4 (four) times a day for 30 doses, Disp: 30 capsule, Rfl: 0    VITAMIN D PO, Take by mouth, Disp: , Rfl:     Zinc Sulfate (ZINC 15 PO), Take by mouth, Disp: , Rfl:     acetaminophen (TYLENOL) 325 mg tablet, Take 2 tablets (650 mg total) by mouth every 6 (six) hours as needed for mild pain, headaches or fever (Patient not taking: Reported on 1/26/2022 ), Disp: , Rfl: 0    naloxone (NARCAN) 4 mg/0 1 mL nasal spray, Administer 1 spray into a nostril  If no response after 2-3 minutes, give another dose in the other nostril using a new spray   (Patient not taking: Reported on 1/26/2022 ), Disp: 1 each, Rfl: 1    polyethylene glycol (GOLYTELY) 4000 mL solution, Take 4,000 mL by mouth once for 1 dose, Disp: 4000 mL, Rfl: 0'    Past Medical History:   Diagnosis Date    Depression     Diverticulitis     Diverticulosis     GERD (gastroesophageal reflux disease)     Hypertension     Sleep apnea     no cpap    Umbilical hernia        Past Surgical History:   Procedure Laterality Date    CATARACT EXTRACTION Bilateral     CHOLECYSTECTOMY      COLONOSCOPY      ESOPHAGOGASTRODUODENOSCOPY      HERNIA REPAIR  09/19/2019    Open repair of incarcerated incisional hernia with mesh - Dr Bernice Torres ARTHROSCOPY Left     TN HALLUX RIGIDUS W/CHEILECTOMY 1ST MP JT W/O IMPLT Bilateral 12/10/2021    Procedure: CHEILECTOMY;  Surgeon: Benton Hernandez DPM;  Location: [address] [place] [address];  Service: Podiatry    TN TENOTOMY ELBOW LATERAL/MEDIAL DEBRIDE REPAIR Left 11/17/2020    Procedure: lateral epicondyle elbow debridement, release, and repair;  Surgeon: Ekta Zelaya MD;  Location: [facility];  Service: Orthopedics    WISDOM TOOTH EXTRACTION         Family History   Problem Relation Age of Onset    Anemia Mother     Hypertension Mother     Pancreatic cancer Father     Diabetes Father     Hypertension Father     Abdominal aortic aneurysm Father     Diabetes Maternal Grandmother     Emphysema Maternal Grandmother     Diabetes Maternal Grandfather     Diabetes Paternal Grandmother     Diabetes Paternal Grandfather     Emphysema Brother         reports that he quit smoking about 9 years ago  His smoking use included cigarettes  He started smoking about 35 years ago  He has a 35 00 pack-year smoking history  He has never used smokeless tobacco  He reports current alcohol use  He reports that he does not use drugs            Physical Exam:     /82 (BP Location: Left arm, Patient Position: Sitting, Cuff Size: Standard)   Pulse 94   Ht 6' 1" (1 854 m)   Wt 106 kg (234 lb)   SpO2 97%   BMI 30 87 kg/m²     Gen: wn/wd, NAD, healthy-appearing gentleman  HEENT: anicteric, MMM, no cervical LAD  CVS: RRR, no m/r/g  CHEST: CTA b/l  ABD: +BS, soft, NT,ND, no hepatosplenomegaly  EXT: no c/c/e  NEURO: aaox3  SKIN: NO rashes

## 2022-01-26 NOTE — PROGRESS NOTES
Consultation - [facility]  Pratima Hankins 60 y o  male MRN: [id number]          Assessment & Plan:    Pleasant 49-year-old gentleman, recently admitted to the hospital with acute uncomplicated sigmoid diverticulitis and likely superimposed C diff colitis    1  Acute sigmoid diverticulitis and C diff colitis:  -continue current antibiotic regimen of cefdinir, vancomycin  -continue probiotic  -continue low residue/low fiber diet, resume fiber supplementation gradually after 4 weeks  -we will proceed with repeat colonoscopy to exclude any underlying GI pathology, inflammatory bowel disease, malignancy    2  Chronic GERD:  Patient is on Pepcid at night, had previously been on PPI therapy to but stopped this due to kidney disease  -we will proceed with an upper endoscopy the same time as his colonoscopy to exclude any underlying Whipple's esophagus  -discussed with him risks of procedures including bleeding, surgery, perforation, missed polyp detection rate  Sasha Marrero was seen today for ulcerative colitis  Diagnoses and all orders for this visit:    C  difficile colitis and Sigmoid Diverticulitis  -     Ambulatory referral to Gastroenterology  -     polyethylene glycol (GOLYTELY) 4000 mL solution; Take 4,000 mL by mouth once for 1 dose  -     Colonoscopy; Future    Gastroesophageal reflux disease without esophagitis  -     EGD; Future            _____________________________________________________________        CC: Follow-up from hospital stay    HPI:  Pratima Hankins is a 60 y o male who was referred for evaluation of follow-up from hospital stay    This is a 49-year-old gentleman, history of hypertension, hyperlipidemia, recently underwent foot surgery, then had a COVID infection, then had eye infection was treated with antibiotics, then had acute onset of diarrhea with vomiting abdominal pain, was admitted to the hospital, CT scan demonstrated acute uncomplicated sigmoid diverticulitis with pancolitis, stool studies were positive for C diff  He has been discharged home on cefdinir and vancomycin, reports having 1 formed bowel movement daily now  Denies any further abdominal pain  Tolerating his diet  Still feels weak and fatigued but otherwise is overall doing much better  Has longstanding history of heartburn, typically time he eats Pepcid at night  Previously was on PPI therapy but stopped this due to chronic kidney disease  Patient denies any recurrent nausea, vomiting, dysphagia  Denies any melena rectal bleeding  He had a similar episode in 2019, also had a colonoscopy at that time he reports that his colonoscopy in precipitated bout of diverticulitis  Has not had normal colonoscopies prior to that  Surgical history is notable for foot surgery, cholecystectomy, arthroscopic knee surgery, hernia repair, cataract surgery  Thirty-five pack-year smoker but quit, drinks about 6 drinks per week  He works in human resources at [employer]  Family history father noted to have passed away from pancreatic cancer  Patient does take daily probiotics  Previously was also on daily fiber supplementation  ROS:  The remainder of the ROS was negative except for the pertinent positives mentioned in HPI           Allergies: Allopurinol    Medications:   Current Outpatient Medications:     albuterol (PROVENTIL HFA,VENTOLIN HFA) 90 mcg/act inhaler, Inhale 2 puffs every 4 (four) hours as needed for wheezing or shortness of breath, Disp: 18 g, Rfl: 5    b complex vitamins capsule, Take 1 capsule by mouth daily, Disp: , Rfl:     carvedilol (COREG) 3 125 mg tablet, TAKE 1 TABLET(3 125 MG) BY MOUTH TWICE DAILY WITH MEALS, Disp: 60 tablet, Rfl: 1    cefdinir (OMNICEF) 300 mg capsule, Take 1 capsule (300 mg total) by mouth every 12 (twelve) hours for 3 days, Disp: 6 capsule, Rfl: 0    eszopiclone (LUNESTA) 3 MG tablet, Take 1 tablet (3 mg total) by mouth daily at bedtime, Disp: 90 tablet, Rfl: 1    FLUoxetine (PROzac) 20 MG tablet, Take 1 tablet (20 mg total) by mouth daily, Disp: 90 tablet, Rfl: 1    fluticasone-umeclidinium-vilanterol (Trelegy Ellipta) 100-62 5-25 MCG/INH inhaler, Inhale 1 puff daily Rinse mouth after use , Disp: 60 blister, Rfl: 11    lisinopril (ZESTRIL) 30 mg tablet, Take 1 tablet (30 mg total) by mouth daily (Patient taking differently: Take 30 mg by mouth daily at bedtime  ), Disp: 90 tablet, Rfl: 1    multivitamin (THERAGRAN) TABS, Take 1 tablet by mouth daily, Disp: , Rfl:     rosuvastatin (CRESTOR) 10 MG tablet, Take 1 tablet (10 mg total) by mouth daily, Disp: 90 tablet, Rfl: 3    triamcinolone (KENALOG) 0 1 % ointment, Apply topically 2 (two) times a day, Disp: 15 g, Rfl: 1    vancomycin (VANCOCIN) 125 MG capsule, Take 1 capsule (125 mg total) by mouth 4 (four) times a day for 30 doses, Disp: 30 capsule, Rfl: 0    VITAMIN D PO, Take by mouth, Disp: , Rfl:     Zinc Sulfate (ZINC 15 PO), Take by mouth, Disp: , Rfl:     acetaminophen (TYLENOL) 325 mg tablet, Take 2 tablets (650 mg total) by mouth every 6 (six) hours as needed for mild pain, headaches or fever (Patient not taking: Reported on 1/26/2022 ), Disp: , Rfl: 0    naloxone (NARCAN) 4 mg/0 1 mL nasal spray, Administer 1 spray into a nostril  If no response after 2-3 minutes, give another dose in the other nostril using a new spray   (Patient not taking: Reported on 1/26/2022 ), Disp: 1 each, Rfl: 1    polyethylene glycol (GOLYTELY) 4000 mL solution, Take 4,000 mL by mouth once for 1 dose, Disp: 4000 mL, Rfl: 0'    Past Medical History:   Diagnosis Date    Depression     Diverticulitis     Diverticulosis     GERD (gastroesophageal reflux disease)     Hypertension     Sleep apnea     no cpap    Umbilical hernia        Past Surgical History:   Procedure Laterality Date    CATARACT EXTRACTION Bilateral     CHOLECYSTECTOMY      COLONOSCOPY      ESOPHAGOGASTRODUODENOSCOPY      HERNIA REPAIR  09/19/2019 Open repair of incarcerated incisional hernia with mesh - Dr Debora Chandler ARTHROSCOPY Left     CO HALLUX RIGIDUS W/CHEILECTOMY 1ST MP JT W/O IMPLT Bilateral 12/10/2021    Procedure: CHEILECTOMY;  Surgeon: Hannah Humphreys DPM;  Location: [facility];  Service: Podiatry    CO TENOTOMY ELBOW LATERAL/MEDIAL DEBRIDE REPAIR Left 11/17/2020    Procedure: lateral epicondyle elbow debridement, release, and repair;  Surgeon: Roya Pineda MD;  Location[facility] MAIN OR;  Service: Orthopedics    WISDOM TOOTH EXTRACTION         Family History   Problem Relation Age of Onset    Anemia Mother     Hypertension Mother     Pancreatic cancer Father     Diabetes Father     Hypertension Father     Abdominal aortic aneurysm Father     Diabetes Maternal Grandmother     Emphysema Maternal Grandmother     Diabetes Maternal Grandfather     Diabetes Paternal Grandmother     Diabetes Paternal Grandfather     Emphysema Brother         reports that he quit smoking about 9 years ago  His smoking use included cigarettes  He started smoking about 35 years ago  He has a 35 00 pack-year smoking history  He has never used smokeless tobacco  He reports current alcohol use  He reports that he does not use drugs            Physical Exam:     /82 (BP Location: Left arm, Patient Position: Sitting, Cuff Size: Standard)   Pulse 94   Ht 6' 1" (1 854 m)   Wt 106 kg (234 lb)   SpO2 97%   BMI 30 87 kg/m²     Gen: wn/wd, NAD, healthy-appearing gentleman  HEENT: anicteric, MMM, no cervical LAD  CVS: RRR, no m/r/g  CHEST: CTA b/l  ABD: +BS, soft, NT,ND, no hepatosplenomegaly  EXT: no c/c/e  NEURO: aaox3  SKIN: NO rashes

## 2022-01-26 NOTE — PATIENT INSTRUCTIONS
Scheduled date of EGD/colonoscopy (as of today):3/24/2022  Physician performing EGD/colonoscopy: DR MARTIN  Location of EGD/colonoscopy:[facility]  Desired bowel prep reviewed with patient:KATHERYN PER DR MARTIN  Instructions reviewed with patient by:UMAIR DUMONT  Clearances:  COVID TEST ORDERED FOR 3/18/2022

## 2022-02-06 ENCOUNTER — APPOINTMENT (OUTPATIENT)
Dept: LAB | Facility: CLINIC | Age: 57
End: 2022-02-06
Payer: COMMERCIAL

## 2022-02-06 DIAGNOSIS — E78.2 MIXED HYPERLIPIDEMIA: ICD-10-CM

## 2022-02-06 DIAGNOSIS — N18.31 STAGE 3A CHRONIC KIDNEY DISEASE (HCC): ICD-10-CM

## 2022-02-06 DIAGNOSIS — I10 HTN (HYPERTENSION), BENIGN: ICD-10-CM

## 2022-02-06 LAB
ALBUMIN SERPL BCP-MCNC: 3.8 G/DL (ref 3.5–5)
ALP SERPL-CCNC: 64 U/L (ref 46–116)
ALT SERPL W P-5'-P-CCNC: 59 U/L (ref 12–78)
ANION GAP SERPL CALCULATED.3IONS-SCNC: 8 MMOL/L (ref 4–13)
AST SERPL W P-5'-P-CCNC: 23 U/L (ref 5–45)
BILIRUB SERPL-MCNC: 1.17 MG/DL (ref 0.2–1)
BUN SERPL-MCNC: 21 MG/DL (ref 5–25)
CALCIUM SERPL-MCNC: 9.2 MG/DL (ref 8.3–10.1)
CHLORIDE SERPL-SCNC: 104 MMOL/L (ref 100–108)
CHOLEST SERPL-MCNC: 169 MG/DL
CO2 SERPL-SCNC: 26 MMOL/L (ref 21–32)
CREAT SERPL-MCNC: 1.11 MG/DL (ref 0.6–1.3)
CREAT UR-MCNC: 87.4 MG/DL
GFR SERPL CREATININE-BSD FRML MDRD: 72 ML/MIN/1.73SQ M
GLUCOSE P FAST SERPL-MCNC: 122 MG/DL (ref 65–99)
HDLC SERPL-MCNC: 68 MG/DL
LDLC SERPL CALC-MCNC: 88 MG/DL (ref 0–100)
MICROALBUMIN UR-MCNC: 5.7 MG/L (ref 0–20)
MICROALBUMIN/CREAT 24H UR: 7 MG/G CREATININE (ref 0–30)
NONHDLC SERPL-MCNC: 101 MG/DL
POTASSIUM SERPL-SCNC: 4.5 MMOL/L (ref 3.5–5.3)
PROT SERPL-MCNC: 7.5 G/DL (ref 6.4–8.2)
SODIUM SERPL-SCNC: 138 MMOL/L (ref 136–145)
TRIGL SERPL-MCNC: 66 MG/DL

## 2022-02-06 PROCEDURE — 82570 ASSAY OF URINE CREATININE: CPT

## 2022-02-06 PROCEDURE — 80053 COMPREHEN METABOLIC PANEL: CPT

## 2022-02-06 PROCEDURE — 36415 COLL VENOUS BLD VENIPUNCTURE: CPT

## 2022-02-06 PROCEDURE — 80061 LIPID PANEL: CPT

## 2022-02-06 PROCEDURE — 82043 UR ALBUMIN QUANTITATIVE: CPT

## 2022-02-10 ENCOUNTER — TELEPHONE (OUTPATIENT)
Dept: GASTROENTEROLOGY | Facility: CLINIC | Age: 57
End: 2022-02-10

## 2022-02-10 ENCOUNTER — OFFICE VISIT (OUTPATIENT)
Dept: FAMILY MEDICINE CLINIC | Facility: CLINIC | Age: 57
End: 2022-02-10
Payer: COMMERCIAL

## 2022-02-10 VITALS
BODY MASS INDEX: 31.38 KG/M2 | WEIGHT: 236.8 LBS | HEIGHT: 73 IN | SYSTOLIC BLOOD PRESSURE: 120 MMHG | OXYGEN SATURATION: 97 % | HEART RATE: 85 BPM | TEMPERATURE: 97.6 F | RESPIRATION RATE: 18 BRPM | DIASTOLIC BLOOD PRESSURE: 78 MMHG

## 2022-02-10 DIAGNOSIS — Z12.5 SCREENING PSA (PROSTATE SPECIFIC ANTIGEN): ICD-10-CM

## 2022-02-10 DIAGNOSIS — Z00.00 WELL ADULT EXAM: Primary | ICD-10-CM

## 2022-02-10 DIAGNOSIS — I10 ESSENTIAL HYPERTENSION: ICD-10-CM

## 2022-02-10 DIAGNOSIS — S91.301A WOUND OF RIGHT FOOT: ICD-10-CM

## 2022-02-10 DIAGNOSIS — F51.01 PRIMARY INSOMNIA: ICD-10-CM

## 2022-02-10 DIAGNOSIS — E55.9 VITAMIN D DEFICIENCY: ICD-10-CM

## 2022-02-10 DIAGNOSIS — E78.2 MIXED HYPERLIPIDEMIA: ICD-10-CM

## 2022-02-10 DIAGNOSIS — F32.0 MAJOR DEPRESSIVE DISORDER, SINGLE EPISODE, MILD (HCC): ICD-10-CM

## 2022-02-10 DIAGNOSIS — R73.01 IFG (IMPAIRED FASTING GLUCOSE): ICD-10-CM

## 2022-02-10 DIAGNOSIS — J43.9 PULMONARY EMPHYSEMA, UNSPECIFIED EMPHYSEMA TYPE (HCC): ICD-10-CM

## 2022-02-10 PROCEDURE — 3725F SCREEN DEPRESSION PERFORMED: CPT | Performed by: FAMILY MEDICINE

## 2022-02-10 PROCEDURE — 3008F BODY MASS INDEX DOCD: CPT | Performed by: FAMILY MEDICINE

## 2022-02-10 PROCEDURE — 99396 PREV VISIT EST AGE 40-64: CPT | Performed by: FAMILY MEDICINE

## 2022-02-10 PROCEDURE — 99214 OFFICE O/P EST MOD 30 MIN: CPT | Performed by: FAMILY MEDICINE

## 2022-02-10 PROCEDURE — 1111F DSCHRG MED/CURRENT MED MERGE: CPT | Performed by: FAMILY MEDICINE

## 2022-02-10 PROCEDURE — 1036F TOBACCO NON-USER: CPT | Performed by: FAMILY MEDICINE

## 2022-02-10 RX ORDER — ROSUVASTATIN CALCIUM 10 MG/1
10 TABLET, COATED ORAL DAILY
Qty: 30 TABLET | Refills: 3 | Status: SHIPPED | OUTPATIENT
Start: 2022-02-10 | End: 2022-07-12 | Stop reason: SDUPTHER

## 2022-02-10 RX ORDER — ESZOPICLONE 3 MG/1
3 TABLET, FILM COATED ORAL
Qty: 30 TABLET | Refills: 1 | Status: SHIPPED | OUTPATIENT
Start: 2022-02-10 | End: 2022-05-16 | Stop reason: SDUPTHER

## 2022-02-10 RX ORDER — FLUOXETINE 20 MG/1
20 TABLET, FILM COATED ORAL DAILY
Qty: 90 TABLET | Refills: 1 | Status: SHIPPED | OUTPATIENT
Start: 2022-02-10

## 2022-02-10 RX ORDER — CARVEDILOL 3.12 MG/1
3.12 TABLET ORAL 2 TIMES DAILY WITH MEALS
Qty: 60 TABLET | Refills: 1 | Status: SHIPPED | OUTPATIENT
Start: 2022-02-10 | End: 2022-04-25 | Stop reason: SDUPTHER

## 2022-02-10 RX ORDER — LISINOPRIL 30 MG/1
30 TABLET ORAL DAILY
Qty: 30 TABLET | Refills: 1 | Status: SHIPPED | OUTPATIENT
Start: 2022-02-10 | End: 2022-06-06 | Stop reason: SDUPTHER

## 2022-02-10 RX ORDER — ALBUTEROL SULFATE 90 UG/1
2 AEROSOL, METERED RESPIRATORY (INHALATION) EVERY 4 HOURS PRN
Qty: 18 G | Refills: 5 | Status: SHIPPED | OUTPATIENT
Start: 2022-02-10

## 2022-02-10 NOTE — PROGRESS NOTES
Assessment/Plan:     Problem List Items Addressed This Visit        Unprioritized    Major depressive disorder, single episode, mild (HCC)    Relevant Medications    eszopiclone (LUNESTA) 3 MG tablet    FLUoxetine (PROzac) 20 MG tablet    Mixed hyperlipidemia    Relevant Medications    carvedilol (COREG) 3 125 mg tablet    rosuvastatin (CRESTOR) 10 MG tablet    Other Relevant Orders    Comprehensive metabolic panel    Lipid panel    Primary insomnia    Relevant Medications    eszopiclone (LUNESTA) 3 MG tablet    Vitamin D deficiency    Relevant Orders    Vitamin D 25 hydroxy      Other Visit Diagnoses     Well adult exam    -  Primary    Pulmonary emphysema, unspecified emphysema type (HCC)        Relevant Medications    albuterol (PROVENTIL HFA,VENTOLIN HFA) 90 mcg/act inhaler    Essential hypertension        Relevant Medications    carvedilol (COREG) 3 125 mg tablet    lisinopril (ZESTRIL) 30 mg tablet    Screening PSA (prostate specific antigen)        Relevant Orders    PSA, Total Screen    IFG (impaired fasting glucose)        Relevant Orders    Hemoglobin A1C    Wound of right foot        Relevant Orders    Ambulatory Referral to Wound Care      reviewed fasting blood work   HTN well controlled   Work on diet and exercise to help sugars  Chol improved on higher dose of crestor, tolerating well   Urine microalbumin:creatinine ratio now normal    Doing well on prozac   Doing well with lunesta  Refills given today   Right foot non-healing wound - refer to wound healing center for care  PDMP Review       Value Time User    PDMP Reviewed  Yes 2/10/2022  8:29 AM Rafael Gonzalez DO      follow up one year with labs prior         Well adult exam  ·         Continue healthy diet   ·         Encourage exercise 4 times a week or more for minimum 30 minutes  ·         Continue to see dentist, wear seatbelt    ·         Health maintenance reviewed and up-to-date  Reviewed age appropriate health maintenance screenings and immunizations that are due, risks and benefits of these  Health Maintenance   Topic Date Due    COVID-19 Vaccine (1) 2022 (Originally 1970)    Pneumococcal Vaccine: Pediatrics (0 to 5 Years) and At-Risk Patients (6 to 59 Years) (1 of 2 - PPSV23) 02/10/2023 (Originally 1971)    Lung Cancer Screening  07/15/2022    BMI: Followup Plan  2023    BMI: Adult  02/10/2023    Annual Physical  02/10/2023    Depression Remission PHQ  02/10/2023    Colorectal Cancer Screening  2029    DTaP,Tdap,and Td Vaccines (2 - Td or Tdap) 2030    HIV Screening  Completed    Hepatitis C Screening  Completed    Influenza Vaccine  Completed    HIB Vaccine  Aged Out    Hepatitis B Vaccine  Aged Out    IPV Vaccine  Aged Out    Hepatitis A Vaccine  Aged Out    Meningococcal ACWY Vaccine  Aged Out    HPV Vaccine  Aged Out     Return for Annual physical, labs, CC   Subjective:    MICHAELA Villalpando is a 60 y o  male who presents today for a physical      Chief Complaint   Patient presents with    Physical Exam    FOOT SURGERY     RIGHT FOOT NOT HEALED ALL THE WAY      PHQ-2/9 Depression Screening    Little interest or pleasure in doing things: 0 - not at all  Feeling down, depressed, or hopeless: 0 - not at all  Trouble falling or staying asleep, or sleeping too much: 0 - not at all  Feeling tired or having little energy: 3 - nearly every day  Poor appetite or overeatin - not at all  Feeling bad about yourself - or that you are a failure or have let yourself or your family down: 0 - not at all  Trouble concentrating on things, such as reading the newspaper or watching television: 0 - not at all  Moving or speaking so slowly that other people could have noticed   Or the opposite - being so fidgety or restless that you have been moving around a lot more than usual: 0 - not at all  Thoughts that you would be better off dead, or of hurting yourself in some way: 0 - not at all  PHQ-9 Score: 3   PHQ-9 Interpretation: No or Minimal depression         ---Above per clinical staff & reviewed  ---  Patient here today for a physical:    Diet: diet healthy - not a lot of veggies   Exercise:  Planning for more exercise with walking in the spring  Dental visits:  Up to date   Seatbelt: yes     Concerns today:  Right incision one spot not healing and weeping and 2 months now   No pain   Has not had a lot of exercise them   Did not do PT but was not told he had PT   Little stiff but not bad   Just a little pain   No meds now     No lingering covid symptoms   Bowels back to normal   Diet normal   No fevers  6 day work week   Mood good                 The following portions of the patient's history were reviewed and updated as appropriate: allergies, current medications, past family history, past medical history, past social history, past surgical history and problem list      Current Medications:  Current Outpatient Medications   Medication Sig Dispense Refill    albuterol (PROVENTIL HFA,VENTOLIN HFA) 90 mcg/act inhaler Inhale 2 puffs every 4 (four) hours as needed for wheezing or shortness of breath 18 g 5    b complex vitamins capsule Take 1 capsule by mouth daily      carvedilol (COREG) 3 125 mg tablet Take 1 tablet (3 125 mg total) by mouth 2 (two) times a day with meals 60 tablet 1    eszopiclone (LUNESTA) 3 MG tablet Take 1 tablet (3 mg total) by mouth daily at bedtime 30 tablet 1    FLUoxetine (PROzac) 20 MG tablet Take 1 tablet (20 mg total) by mouth daily 90 tablet 1    fluticasone-umeclidinium-vilanterol (Trelegy Ellipta) 100-62 5-25 MCG/INH inhaler Inhale 1 puff daily Rinse mouth after use   60 blister 11    lisinopril (ZESTRIL) 30 mg tablet Take 1 tablet (30 mg total) by mouth daily 30 tablet 1    multivitamin (THERAGRAN) TABS Take 1 tablet by mouth daily      rosuvastatin (CRESTOR) 10 MG tablet Take 1 tablet (10 mg total) by mouth daily 30 tablet 3    triamcinolone (KENALOG) 0 1 % ointment Apply topically 2 (two) times a day 15 g 1    VITAMIN D PO Take 1 tablet by mouth in the morning        Zinc Sulfate (ZINC 15 PO) Take 1 tablet by mouth in the morning         No current facility-administered medications for this visit  Objective:      /78   Pulse 85   Temp 97 6 °F (36 4 °C)   Resp 18   Ht 6' 1" (1 854 m)   Wt 107 kg (236 lb 12 8 oz)   SpO2 97%   BMI 31 24 kg/m²   BP Readings from Last 3 Encounters:   02/10/22 120/78   01/26/22 140/82   01/23/22 139/91     Wt Readings from Last 3 Encounters:   02/10/22 107 kg (236 lb 12 8 oz)   01/26/22 106 kg (234 lb)   01/25/22 103 kg (227 lb)       Review of Systems  He has no other concerns  No unexpected weight changes  No chest pain, SOB, or palpitations  No GERD  No changes in bowels or bladder  Sleeping well with meds   No mood changes       Physical Exam   Constitutional:  he appears well-developed and well-nourished  HENT: Head: Normocephalic  Neck: Neck supple  Cardiovascular: Normal rate, regular rhythm and normal heart sounds    Pulmonary/Chest: Effort normal and breath sounds normal  No wheezes, rales, or rhonchi  Abdominal: Soft  Bowel sounds are normal  There is no tenderness  No hepatosplenomegaly  Musculoskeletal: he exhibits no edema  Lymphadenopathy: he has no cervical adenopathy  Neurological: he is alert and oriented to person, place, and time  Skin: Skin is warm and dry           Psychiatric: he has a normal mood and affect   his behavior is normal  Thought content normal

## 2022-02-10 NOTE — PROGRESS NOTES
Assessment/Plan:     1  Well adult exam  See other note     2  Pulmonary emphysema, unspecified emphysema type (HCC)  Stable   - albuterol (PROVENTIL HFA,VENTOLIN HFA) 90 mcg/act inhaler; Inhale 2 puffs every 4 (four) hours as needed for wheezing or shortness of breath  Dispense: 18 g; Refill: 5    3  Mixed hyperlipidemia  Improved   - carvedilol (COREG) 3 125 mg tablet; Take 1 tablet (3 125 mg total) by mouth 2 (two) times a day with meals  Dispense: 60 tablet; Refill: 1  - rosuvastatin (CRESTOR) 10 MG tablet; Take 1 tablet (10 mg total) by mouth daily  Dispense: 30 tablet; Refill: 3  - Comprehensive metabolic panel; Future  - Lipid panel; Future    4  Primary insomnia  Well controlled   - eszopiclone (LUNESTA) 3 MG tablet; Take 1 tablet (3 mg total) by mouth daily at bedtime  Dispense: 30 tablet; Refill: 1    5  Major depressive disorder, single episode, mild (HCC)  Well controlled    - FLUoxetine (PROzac) 20 MG tablet; Take 1 tablet (20 mg total) by mouth daily  Dispense: 90 tablet; Refill: 1    6  Essential hypertension  Well controlled   - lisinopril (ZESTRIL) 30 mg tablet; Take 1 tablet (30 mg total) by mouth daily  Dispense: 30 tablet; Refill: 1    7  Screening PSA (prostate specific antigen)  Update labs   - PSA, Total Screen; Future    8  Vitamin D deficiency  Update labs   - Vitamin D 25 hydroxy; Future    9  IFG (impaired fasting glucose)  Update labs   - Hemoglobin A1C; Future    10  Wound of right foot  Non-healing wound since surgery 2 months ago - see picture  Refer to wound healing center  - Ambulatory Referral to Wound Care; Future    reviewed fasting blood work   HTN well controlled   Work on diet and exercise to help sugars  Chol improved on higher dose of crestor, tolerating well   Urine microalbumin:creatinine ratio now normal    Doing well on prozac   Doing well with lunesta  Refills given today   Right foot non-healing wound - refer to wound healing center for care     Return for Annual physical, labs, CC   Subjective:   Chantel Mac is a 60 y o  male here today for a follow-up on his current medical conditions:  Patient Active Problem List   Diagnosis    GERD (gastroesophageal reflux disease)    Diverticulosis    Sleep apnea    Major depressive disorder, single episode, mild ([facility] )    Mixed hyperlipidemia    Benign hypertension with CKD (chronic kidney disease), stage II    Primary insomnia    Elbow pain, left    Psoriasis    Vitamin D deficiency    Low libido    Class 1 obesity due to excess calories with serious comorbidity and body mass index (BMI) of 31 0 to 31 9 in adult    Arthritis of both feet    Abnormal stress test    Centrilobular emphysema (HCC)    SOB (shortness of breath)    Stage 3a chronic kidney disease (HCC)    Hallux rigidus, right foot    Hallux rigidus, left foot    Primary osteoarthritis of right foot    Primary osteoarthritis of left foot    COVID-19    C  difficile colitis and Sigmoid Diverticulitis    Sepsis ([facility] )        Current Medications:  Current Outpatient Medications   Medication Sig Dispense Refill    albuterol (PROVENTIL HFA,VENTOLIN HFA) 90 mcg/act inhaler Inhale 2 puffs every 4 (four) hours as needed for wheezing or shortness of breath 18 g 5    b complex vitamins capsule Take 1 capsule by mouth daily      carvedilol (COREG) 3 125 mg tablet Take 1 tablet (3 125 mg total) by mouth 2 (two) times a day with meals 60 tablet 1    eszopiclone (LUNESTA) 3 MG tablet Take 1 tablet (3 mg total) by mouth daily at bedtime 30 tablet 1    FLUoxetine (PROzac) 20 MG tablet Take 1 tablet (20 mg total) by mouth daily 90 tablet 1    fluticasone-umeclidinium-vilanterol (Trelegy Ellipta) 100-62 5-25 MCG/INH inhaler Inhale 1 puff daily Rinse mouth after use   60 blister 11    lisinopril (ZESTRIL) 30 mg tablet Take 1 tablet (30 mg total) by mouth daily 30 tablet 1    multivitamin (THERAGRAN) TABS Take 1 tablet by mouth daily      rosuvastatin (CRESTOR) 10 MG tablet Take 1 tablet (10 mg total) by mouth daily 30 tablet 3    triamcinolone (KENALOG) 0 1 % ointment Apply topically 2 (two) times a day 15 g 1    VITAMIN D PO Take 1 tablet by mouth in the morning        Zinc Sulfate (ZINC 15 PO) Take 1 tablet by mouth in the morning         No current facility-administered medications for this visit  HPI:  Chief Complaint   Patient presents with    Physical Exam    FOOT SURGERY     RIGHT FOOT NOT HEALED ALL THE WAY      -- Above per clinical staff and reviewed  --    PHQ-2/9 Depression Screening    Little interest or pleasure in doing things: 0 - not at all  Feeling down, depressed, or hopeless: 0 - not at all  Trouble falling or staying asleep, or sleeping too much: 0 - not at all  Feeling tired or having little energy: 3 - nearly every day  Poor appetite or overeatin - not at all  Feeling bad about yourself - or that you are a failure or have let yourself or your family down: 0 - not at all  Trouble concentrating on things, such as reading the newspaper or watching television: 0 - not at all  Moving or speaking so slowly that other people could have noticed  Or the opposite - being so fidgety or restless that you have been moving around a lot more than usual: 0 - not at all  Thoughts that you would be better off dead, or of hurting yourself in some way: 0 - not at all  PHQ-9 Score: 3   PHQ-9 Interpretation: No or Minimal depression             aug labs lipids A1C CMP PSA vit D   2022 labs urine micro neg  fbw 122, T bili 1 17 (has been 1 43 in past)   21 HbA1C  increased crestor 5 to 10 mg   chol some improvment  watch bP     follows with nephrology proteinuria  Niyah ortiz last 2019 - d/c HCTZ due to gout, recommend d/c omerazole and NSAIDS   started Norvasc  low salt diet   cardio stopped norvasc started coreg low EF & crestor     Today:  Right incision one spot not healing and weeping and 2 months now   No pain   Has not had a lot of exercise them   Did not do PT but was not told he had PT   Little stiff but not bad   Just a little pain   No meds now      No lingering covid symptoms   Bowels back to normal   Diet normal   No fevers  6 day work week   Mood good    area on right foot that is not healing     The following portions of the patient's history were reviewed and updated as appropriate: allergies, current medications, past family history, past medical history, past social history, past surgical history and problem list     Objective:  Vitals:  /78   Pulse 85   Temp 97 6 °F (36 4 °C)   Resp 18   Ht 6' 1" (1 854 m)   Wt 107 kg (236 lb 12 8 oz)   SpO2 97%   BMI 31 24 kg/m²    Wt Readings from Last 3 Encounters:   02/10/22 107 kg (236 lb 12 8 oz)   01/26/22 106 kg (234 lb)   01/25/22 103 kg (227 lb)      BP Readings from Last 3 Encounters:   02/10/22 120/78   01/26/22 140/82   01/23/22 139/91        Review of Systems   He has no other concerns  No unexpected weight changes  No chest pain, SOB, or palpitations  No GERD  No changes in bowels or bladder  Sleeping well with meds   No mood changes  Physical Exam   Constitutional:  he appears well-developed and well-nourished  HENT: Head: Normocephalic  Neck: Neck supple  Cardiovascular: Normal rate, regular rhythm and normal heart sounds  Pulmonary/Chest: Effort normal and breath sounds normal  No wheezes, rales, or rhonchi  Abdominal: Soft  Bowel sounds are normal  There is no tenderness  No hepatosplenomegaly  Musculoskeletal: he exhibits no edema  Lymphadenopathy: he has no cervical adenopathy  Neurological: he is alert and oriented to person, place, and time  Skin: Skin is warm and dry  Psychiatric: he has a normal mood and affect   his behavior is normal  Thought content normal

## 2022-02-10 NOTE — TELEPHONE ENCOUNTER
Patients GI provider:  Dr Marjan Herrmann    Number to return call: (  [phone]    Reason for call: Pt went to pharmacy to get his colon prep but the order was cancelled, would like a call back as to what he needs to do    Scheduled procedure/appointment date if applicable: 7-37-56 procedure

## 2022-02-24 ENCOUNTER — OFFICE VISIT (OUTPATIENT)
Dept: WOUND CARE | Facility: HOSPITAL | Age: 57
End: 2022-02-24
Payer: COMMERCIAL

## 2022-02-24 VITALS
SYSTOLIC BLOOD PRESSURE: 130 MMHG | BODY MASS INDEX: 31.14 KG/M2 | DIASTOLIC BLOOD PRESSURE: 91 MMHG | TEMPERATURE: 98.1 F | WEIGHT: 235 LBS | HEART RATE: 88 BPM | RESPIRATION RATE: 16 BRPM | HEIGHT: 73 IN

## 2022-02-24 DIAGNOSIS — T81.89XA NON-HEALING SURGICAL WOUND, INITIAL ENCOUNTER: Primary | ICD-10-CM

## 2022-02-24 PROCEDURE — 11042 DBRDMT SUBQ TIS 1ST 20SQCM/<: CPT | Performed by: NURSE PRACTITIONER

## 2022-02-24 PROCEDURE — 99203 OFFICE O/P NEW LOW 30 MIN: CPT | Performed by: NURSE PRACTITIONER

## 2022-02-24 PROCEDURE — G0463 HOSPITAL OUTPT CLINIC VISIT: HCPCS | Performed by: NURSE PRACTITIONER

## 2022-02-24 PROCEDURE — 99213 OFFICE O/P EST LOW 20 MIN: CPT | Performed by: NURSE PRACTITIONER

## 2022-02-24 RX ORDER — LIDOCAINE HYDROCHLORIDE 40 MG/ML
5 SOLUTION TOPICAL ONCE
Status: COMPLETED | OUTPATIENT
Start: 2022-02-24 | End: 2022-02-24

## 2022-02-24 RX ADMIN — LIDOCAINE HYDROCHLORIDE 5 ML: 40 SOLUTION TOPICAL at 14:38

## 2022-02-24 NOTE — PROGRESS NOTES
Patient ID: Theo Aguilar is a 60 y o  male Date of Birth 1965     Chief Complaint  Chief Complaint   Patient presents with    New Patient Visit     right foot       Allergies  Allopurinol    Assessment:     Diagnoses and all orders for this visit:    Non-healing surgical wound, initial encounter  -     lidocaine (XYLOCAINE) 4 % topical solution 5 mL  -     Wound cleansing and dressings; Future  -     Debridement              Debridement   Wound 02/24/22 Surgical Foot Anterior;Right    Universal Protocol:  Consent: Written consent obtained  Consent given by: patient  Time out: Immediately prior to procedure a "time out" was called to verify the correct patient, procedure, equipment, support staff and site/side marked as required  Timeout called at: 2/24/2022 2:58 PM   Patient understanding: patient states understanding of the procedure being performed  Patient consent: the patient's understanding of the procedure matches consent given  Procedure consent matches procedure scheduled: N/A  Relevant documents present and verified: N/A  Test results available and properly labeled: N/A  Site marked: the operative site was marked  Imaging studies available: N/A  Required blood products, implants, devices, and special equipment available: N/A    Patient identity confirmed: verbally with patient      Performed by: NP  Debridement type: surgical  Level of debridement: subcutaneous tissue  Pain control: lidocaine 4%  Pre-debridement measurements  Length (cm): 0 6  Width (cm): 0 3  Depth (cm): 0 1  Surface Area (cm^2): 0 18  Volume (cm^3): 0 02    Post-debridement measurements  Length (cm): 0 6  Width (cm): 0 3  Depth (cm): 0 5  Percent debrided: 100%  Surface Area (cm^2): 0 18  Area debrided (cm^2): 0 18  Volume (cm^3): 0 09  Tissue and other material debrided: subcutaneous tissue  Devitalized tissue debrided: biofilm, fibrin and slough  Instrument(s) utilized: curette  Bleeding: small  Hemostasis obtained with: pressure  Procedural pain (0-10): 0  Post-procedural pain: 0   Response to treatment: procedure was tolerated well          Plan:  1  Initial visit  Wounds debrided  Was able to debride down to exposed tendon  Wound not showing any s/s of infection  REJI's completed in office today: 1 01 RLE and 1 07 LLE  Will have Purocol Ag applied to wound covered with a hydrocolloid dressing changed 3x per week  Patient will follow up in 2 weeks  Wound 02/24/22 Surgical Foot Anterior;Right (Active)   Wound Image Images linked 02/24/22 1444   Wound Description Yellow;Slough;Dry 02/24/22 1417   Brenna-wound Assessment Intact 02/24/22 1417   Wound Length (cm) 0 6 cm 02/24/22 1417   Wound Width (cm) 0 3 cm 02/24/22 1417   Wound Depth (cm) 0 1 cm 02/24/22 1417   Wound Surface Area (cm^2) 0 18 cm^2 02/24/22 1417   Wound Volume (cm^3) 0 018 cm^3 02/24/22 1417   Calculated Wound Volume (cm^3) 0 02 cm^3 02/24/22 1417   Drainage Amount Scant 02/24/22 1417   Drainage Description Yellow 02/24/22 1417   Non-staged Wound Description Full thickness 02/24/22 1417   Dressing Status Intact (upon arrival) 02/24/22 1417       Wound 02/24/22 Surgical Foot Anterior;Right (Active)   Date First Assessed/Time First Assessed: 02/24/22 1417   Primary Wound Type: Surgical  Location: Foot  Wound Location Orientation: Anterior;Right       Subjective:     Patient is a 60year old male who presents to the [facility] wound center as a new patient for a non-healing surgical wound of his right foot  Patient reports he underwent a cheilectomy of his bilateral feet with Dr Jenifer Jaeger on 12/10  He reports 5 days later he developed COVID  He was unable to have his sutures removed until 12/28  As a result, his sutures became buried underneath his skin which caused one portion of his surgical incision to never heal  He reports his wound drains a small amount of drainage  He has been managing his wound at home with neosporin and band aids changing his dressing daily   He denies any pain, fevers, or chills  The following portions of the patient's history were reviewed and updated as appropriate:   He  has a past medical history of Depression, Diverticulitis, Diverticulosis, GERD (gastroesophageal reflux disease), Hypertension, Sleep apnea, and Umbilical hernia  He   Patient Active Problem List    Diagnosis Date Noted    C  difficile colitis and Sigmoid Diverticulitis 01/20/2022    Sepsis ([facility] [address] ) 01/20/2022    COVID-19 12/17/2021    Hallux rigidus, right foot 12/10/2021    Hallux rigidus, left foot 12/10/2021    Primary osteoarthritis of right foot 12/10/2021    Primary osteoarthritis of left foot 12/10/2021    Stage 3a chronic kidney disease ([facility] [address] ) 12/04/2021    [facility] 11/21/2021    SOB (shortness of breath) 11/21/2021    Abnormal stress test 09/11/2021    Arthritis of both feet 12/13/2020    Class 1 obesity due to excess calories with serious comorbidity and body mass index (BMI) of 31 0 to 31 9 in adult 11/05/2020    Low libido 09/24/2020    Elbow pain, left 06/04/2020    Psoriasis 06/04/2020    Vitamin D deficiency 06/04/2020    GERD (gastroesophageal reflux disease) 05/07/2020    Diverticulosis 05/07/2020    Sleep apnea 05/07/2020    Major depressive disorder, single episode, mild ([facility] [address] ) 05/07/2020    Mixed hyperlipidemia 05/07/2020    Benign hypertension with CKD (chronic kidney disease), stage II 05/07/2020    Primary insomnia 05/07/2020     He  has a past surgical history that includes Cholecystectomy; Cataract extraction (Bilateral); Knee arthroscopy (Left); Colonoscopy; Esophagogastroduodenoscopy; [place] tooth extraction; Hernia repair (09/19/2019); pr tenotomy elbow lateral/medial debride repair (Left, 11/17/2020); and pr hallux rigidus w/cheilectomy 1st mp jt w/o implt (Bilateral, 12/10/2021)  His family history includes Abdominal aortic aneurysm in his father;  Anemia in his mother; Diabetes in his father, maternal grandfather, maternal grandmother, paternal grandfather, and paternal grandmother; Emphysema in his brother and maternal grandmother; Hypertension in his father and mother; Pancreatic cancer in his father  He  reports that he quit smoking about 9 years ago  His smoking use included cigarettes  He started smoking about 35 years ago  He has a 35 00 pack-year smoking history  He has never used smokeless tobacco  He reports current alcohol use  He reports that he does not use drugs  Current Outpatient Medications   Medication Sig Dispense Refill    albuterol (PROVENTIL HFA,VENTOLIN HFA) 90 mcg/act inhaler Inhale 2 puffs every 4 (four) hours as needed for wheezing or shortness of breath 18 g 5    b complex vitamins capsule Take 1 capsule by mouth daily      carvedilol (COREG) 3 125 mg tablet Take 1 tablet (3 125 mg total) by mouth 2 (two) times a day with meals 60 tablet 1    eszopiclone (LUNESTA) 3 MG tablet Take 1 tablet (3 mg total) by mouth daily at bedtime 30 tablet 1    FLUoxetine (PROzac) 20 MG tablet Take 1 tablet (20 mg total) by mouth daily 90 tablet 1    fluticasone-umeclidinium-vilanterol (Trelegy Ellipta) 100-62 5-25 MCG/INH inhaler Inhale 1 puff daily Rinse mouth after use  60 blister 11    lisinopril (ZESTRIL) 30 mg tablet Take 1 tablet (30 mg total) by mouth daily 30 tablet 1    multivitamin (THERAGRAN) TABS Take 1 tablet by mouth daily      rosuvastatin (CRESTOR) 10 MG tablet Take 1 tablet (10 mg total) by mouth daily 30 tablet 3    VITAMIN D PO Take 1 tablet by mouth in the morning        Zinc Sulfate (ZINC 15 PO) Take 1 tablet by mouth in the morning        triamcinolone (KENALOG) 0 1 % ointment Apply topically 2 (two) times a day (Patient not taking: Reported on 2/24/2022 ) 15 g 1     No current facility-administered medications for this visit  He is allergic to allopurinol       Review of Systems   Constitutional: Negative  HENT: Negative for ear pain and hearing loss      Eyes: Negative for pain    Respiratory: Negative for chest tightness and shortness of breath  Cardiovascular: Negative for chest pain, palpitations and leg swelling  Gastrointestinal: Negative for diarrhea, nausea and vomiting  Genitourinary: Negative for dysuria  Musculoskeletal: Negative for gait problem  Skin: Positive for wound  Neurological: Negative for tremors and weakness  Psychiatric/Behavioral: Negative for behavioral problems, confusion and suicidal ideas  Objective:       Wound 02/24/22 Surgical Foot Anterior;Right (Active)   Wound Image Images linked 02/24/22 1444   Wound Description Yellow;Slough;Dry 02/24/22 1417   Brenna-wound Assessment Intact 02/24/22 1417   Wound Length (cm) 0 6 cm 02/24/22 1417   Wound Width (cm) 0 3 cm 02/24/22 1417   Wound Depth (cm) 0 1 cm 02/24/22 1417   Wound Surface Area (cm^2) 0 18 cm^2 02/24/22 1417   Wound Volume (cm^3) 0 018 cm^3 02/24/22 1417   Calculated Wound Volume (cm^3) 0 02 cm^3 02/24/22 1417   Drainage Amount Scant 02/24/22 1417   Drainage Description Yellow 02/24/22 1417   Non-staged Wound Description Full thickness 02/24/22 1417   Dressing Status Intact (upon arrival) 02/24/22 1417       /91   Pulse 88   Temp 98 1 °F (36 7 °C)   Resp 16   Ht 6' 1" (1 854 m)   Wt 107 kg (235 lb)   BMI 31 00 kg/m²     Physical Exam  Vitals and nursing note reviewed  Constitutional:       General: He is not in acute distress  Appearance: Normal appearance  He is normal weight  HENT:      Head: Normocephalic and atraumatic  Eyes:      General:         Right eye: No discharge  Left eye: No discharge  Pulmonary:      Effort: Pulmonary effort is normal  No respiratory distress  Musculoskeletal:         General: Normal range of motion  Cervical back: Normal range of motion and neck supple  No rigidity  Right lower leg: No edema  Left lower leg: No edema  Feet:    Skin:     General: Skin is warm and dry        Findings: Wound present  No erythema  Neurological:      General: No focal deficit present  Mental Status: He is alert and oriented to person, place, and time  Mental status is at baseline  Psychiatric:         Mood and Affect: Mood normal          Behavior: Behavior normal          Thought Content: Thought content normal          Judgment: Judgment normal                    Wound Instructions:  Orders Placed This Encounter   Procedures    Wound cleansing and dressings     Right foot wound:    Wash your hands with soap and water  Remove old dressing, discard into plastic bag and place in trash  Cleanse the wound with soap and water prior to applying a clean dressing  Do not use tissue or cotton balls  Do not scrub the wound  Pat dry using gauze  Shower yes if Hydrocolloid dressing is on  Apply  to skin surrounding wound  Apply Puracol AG to the foot wound  Cover with Hydrocolloid  Change dressing every 3 days  This was done today  Standing Status:   Future     Standing Expiration Date:   2/24/2023    Debridement     This order was created via procedure documentation        Diagnosis ICD-10-CM Associated Orders   1   Non-healing surgical wound, initial encounter  T81 89XA lidocaine (XYLOCAINE) 4 % topical solution 5 mL     Wound cleansing and dressings     Debridement

## 2022-02-24 NOTE — PATIENT INSTRUCTIONS
Orders Placed This Encounter   Procedures    Wound cleansing and dressings     Right foot wound:    Wash your hands with soap and water  Remove old dressing, discard into plastic bag and place in trash  Cleanse the wound with soap and water prior to applying a clean dressing  Do not use tissue or cotton balls  Do not scrub the wound  Pat dry using gauze  Shower yes if Hydrocolloid dressing is on  Apply  to skin surrounding wound  Apply Puracol AG to the foot wound  Cover with Hydrocolloid  Change dressing every 3 days  This was done today       Standing Status:   Future     Standing Expiration Date:   2/24/2023

## 2022-03-10 ENCOUNTER — OFFICE VISIT (OUTPATIENT)
Dept: WOUND CARE | Facility: HOSPITAL | Age: 57
End: 2022-03-10
Payer: COMMERCIAL

## 2022-03-10 VITALS
HEART RATE: 82 BPM | SYSTOLIC BLOOD PRESSURE: 135 MMHG | DIASTOLIC BLOOD PRESSURE: 90 MMHG | TEMPERATURE: 98 F | RESPIRATION RATE: 18 BRPM

## 2022-03-10 DIAGNOSIS — T81.89XA NON-HEALING SURGICAL WOUND, INITIAL ENCOUNTER: Primary | ICD-10-CM

## 2022-03-10 PROCEDURE — 11042 DBRDMT SUBQ TIS 1ST 20SQCM/<: CPT | Performed by: NURSE PRACTITIONER

## 2022-03-10 RX ORDER — LIDOCAINE HYDROCHLORIDE 40 MG/ML
5 SOLUTION TOPICAL ONCE
Status: COMPLETED | OUTPATIENT
Start: 2022-03-10 | End: 2022-03-10

## 2022-03-10 RX ADMIN — LIDOCAINE HYDROCHLORIDE 5 ML: 40 SOLUTION TOPICAL at 10:51

## 2022-03-10 NOTE — PROGRESS NOTES
Patient ID: Hershal Baumgarten is a 60 y o  male Date of Birth 1965     Chief Complaint  Chief Complaint   Patient presents with    Follow Up Wound Care Visit     Right foot wound       Allergies  Allopurinol    Assessment:     Diagnoses and all orders for this visit:    Non-healing surgical wound, initial encounter  -     lidocaine (XYLOCAINE) 4 % topical solution 5 mL  -     Wound cleansing and dressings; Future  -     Debridement              Debridement   Wound 02/24/22 Surgical Foot Anterior;Right    Universal Protocol:  Consent: Written consent obtained  Consent given by: patient  Time out: Immediately prior to procedure a "time out" was called to verify the correct patient, procedure, equipment, support staff and site/side marked as required  Timeout called at: 3/10/2022 11:52 AM   Patient understanding: patient states understanding of the procedure being performed  Patient consent: the patient's understanding of the procedure matches consent given  Procedure consent matches procedure scheduled: N/A  Relevant documents present and verified: N/A  Test results available and properly labeled: N/A  Site marked: the operative site was marked  Imaging studies available: N/A  Required blood products, implants, devices, and special equipment available: N/A    Patient identity confirmed: verbally with patient      Performed by: NP  Debridement type: surgical  Level of debridement: subcutaneous tissue  Pain control: lidocaine 4%  Pre-debridement measurements  Length (cm): 0 4  Width (cm): 0 3  Depth (cm): 0 2  Surface Area (cm^2): 0 12  Volume (cm^3): 0 02    Post-debridement measurements  Length (cm): 0 4  Width (cm): 0 3  Depth (cm): 0 5  Percent debrided: 100%  Surface Area (cm^2): 0 12  Area debrided (cm^2): 0 12  Volume (cm^3): 0 06  Tissue and other material debrided: subcutaneous tissue  Devitalized tissue debrided: biofilm, fibrin and slough  Instrument(s) utilized: curette  Bleeding: small  Hemostasis obtained with: pressure  Procedural pain (0-10): 0  Post-procedural pain: 0   Response to treatment: procedure was tolerated well          Plan:  1  F/u visit  Wound debrided  Wound measuring slightly smaller but still has some tendon exposed  Will change treatment to Medihoney with dry gauze and tram changed daily  Counseled patient to protect dressing while showering  Patient verbalized agreement and understanding  Patient will follow up in 2 weeks  Wound 02/24/22 Surgical Foot Anterior;Right (Active)   Wound Image Images linked 03/10/22 1108   Wound Description Dry;Exposed tendon;Slough; Yellow 03/10/22 1047   Wound Length (cm) 0 4 cm 03/10/22 1047   Wound Width (cm) 0 3 cm 03/10/22 1047   Wound Depth (cm) 0 2 cm 03/10/22 1047   Wound Surface Area (cm^2) 0 12 cm^2 03/10/22 1047   Wound Volume (cm^3) 0 024 cm^3 03/10/22 1047   Calculated Wound Volume (cm^3) 0 02 cm^3 03/10/22 1047   Change in Wound Size % 0 03/10/22 1047   Drainage Amount Small 03/10/22 1047   Drainage Description Yellow 03/10/22 1047   Non-staged Wound Description Full thickness 03/10/22 1047   Dressing Status Intact 03/10/22 1047       Wound 02/24/22 Surgical Foot Anterior;Right (Active)   Date First Assessed/Time First Assessed: 02/24/22 1417   Primary Wound Type: Surgical  Location: Foot  Wound Location Orientation: Anterior;Right       Subjective:     F/u visit non-healing surgical wound of his right foot  Patient reports the hydrocolloid dressing would only stay on for a day or 2 d/t his excessive drainage from his wound  Besides this he has no other complaints  He denies any pain, fevers, or chills  The following portions of the patient's history were reviewed and updated as appropriate:   He  has a past medical history of Depression, Diverticulitis, Diverticulosis, GERD (gastroesophageal reflux disease), Hypertension, Sleep apnea, and Umbilical hernia    He   Patient Active Problem List    Diagnosis Date Noted    C  difficile colitis and Sigmoid Diverticulitis 01/20/2022    [facility] 01/20/[facility]22    COVID-19 12/17/2021    Hallux rigidus, right foot 12/10/2021    Hallux rigidus, left foot 12/10/2021    Primary osteoarthritis of right foot 12/10/2021    Primary osteoarthritis of left foot 12/10/2021    Stage 3a chronic kidney disease ([facility] [address] ) 12/04/2021    Centrilobular emphysema ([place] [id number] ) 11/21/2021    SOB (shortness of breath) 11/21/2021    Abnormal stress test 09/11/2021    Arthritis of both feet 12/13/2020    Class 1 obesity due to excess calories with serious comorbidity and body mass index (BMI) of 31 0 to 31 9 in adult 11/05/2020    Low libido 09/24/2020    Elbow pain, left 06/04/2020    Psoriasis 06/04/2020    Vitamin D deficiency 06/04/2020    GERD (gastroesophageal reflux disease) 05/07/2020    Diverticulosis 05/07/2020    Sleep apnea 05/07/2020    Major depressive disorder, single episode, mild ([place] [address] ) 05/07/2020    Mixed hyperlipidemia 05/07/2020    Benign hypertension with CKD (chronic kidney disease), stage II 05/07/2020    Primary insomnia 05/07/2020     He  has a past surgical history that includes Cholecystectomy; Cataract extraction (Bilateral); Knee arthroscopy (Left); Colonoscopy; Esophagogastroduodenoscopy; [place] tooth extraction; Hernia repair (09/19/2019); pr tenotomy elbow lateral/medial debride repair (Left, 11/17/2020); and pr hallux rigidus w/cheilectomy 1st mp jt w/o implt (Bilateral, 12/10/2021)  His family history includes Abdominal aortic aneurysm in his father; Anemia in his mother; Diabetes in his father, maternal grandfather, maternal grandmother, paternal grandfather, and paternal grandmother; Emphysema in his brother and maternal grandmother; Hypertension in his father and mother; Pancreatic cancer in his father  He  reports that he quit smoking about 9 years ago  His smoking use included cigarettes  He started smoking about 35 years ago  He has a 35 00 pack-year smoking history   He has never used smokeless tobacco  He reports current alcohol use  He reports that he does not use drugs  Current Outpatient Medications   Medication Sig Dispense Refill    albuterol (PROVENTIL HFA,VENTOLIN HFA) 90 mcg/act inhaler Inhale 2 puffs every 4 (four) hours as needed for wheezing or shortness of breath 18 g 5    b complex vitamins capsule Take 1 capsule by mouth daily      carvedilol (COREG) 3 125 mg tablet Take 1 tablet (3 125 mg total) by mouth 2 (two) times a day with meals 60 tablet 1    eszopiclone (LUNESTA) 3 MG tablet Take 1 tablet (3 mg total) by mouth daily at bedtime 30 tablet 1    FLUoxetine (PROzac) 20 MG tablet Take 1 tablet (20 mg total) by mouth daily 90 tablet 1    fluticasone-umeclidinium-vilanterol (Trelegy Ellipta) 100-62 5-25 MCG/INH inhaler Inhale 1 puff daily Rinse mouth after use  60 blister 11    lisinopril (ZESTRIL) 30 mg tablet Take 1 tablet (30 mg total) by mouth daily 30 tablet 1    multivitamin (THERAGRAN) TABS Take 1 tablet by mouth daily      rosuvastatin (CRESTOR) 10 MG tablet Take 1 tablet (10 mg total) by mouth daily 30 tablet 3    triamcinolone (KENALOG) 0 1 % ointment Apply topically 2 (two) times a day (Patient not taking: Reported on 2/24/2022 ) 15 g 1    VITAMIN D PO Take 1 tablet by mouth in the morning        Zinc Sulfate (ZINC 15 PO) Take 1 tablet by mouth in the morning         No current facility-administered medications for this visit  He is allergic to allopurinol       Review of Systems   Constitutional: Negative  HENT: Negative for ear pain and hearing loss  Eyes: Negative for pain  Respiratory: Negative for chest tightness and shortness of breath  Cardiovascular: Negative for chest pain, palpitations and leg swelling  Gastrointestinal: Negative for diarrhea, nausea and vomiting  Genitourinary: Negative for dysuria  Musculoskeletal: Negative for gait problem  Skin: Positive for wound     Neurological: Negative for tremors and weakness  Psychiatric/Behavioral: Negative for behavioral problems, confusion and suicidal ideas  Objective:       Wound 02/24/22 Surgical Foot Anterior;Right (Active)   Wound Image Images linked 03/10/22 1108   Wound Description Dry;Exposed tendon;Slough; Yellow 03/10/22 1047   Wound Length (cm) 0 4 cm 03/10/22 1047   Wound Width (cm) 0 3 cm 03/10/22 1047   Wound Depth (cm) 0 2 cm 03/10/22 1047   Wound Surface Area (cm^2) 0 12 cm^2 03/10/22 1047   Wound Volume (cm^3) 0 024 cm^3 03/10/22 1047   Calculated Wound Volume (cm^3) 0 02 cm^3 03/10/22 1047   Change in Wound Size % 0 03/10/22 1047   Drainage Amount Small 03/10/22 1047   Drainage Description Yellow 03/10/22 1047   Non-staged Wound Description Full thickness 03/10/22 1047   Dressing Status Intact 03/10/22 1047       /90   Pulse 82   Temp 98 °F (36 7 °C)   Resp 18                 Wound Instructions:  Orders Placed This Encounter   Procedures    Wound cleansing and dressings     Right foot wound:     Wash your hands with soap and water  Remove old dressing, discard into plastic bag and place in trash  Cleanse the wound with soap and water prior to applying a clean dressing  Do not use tissue or cotton balls  Do not scrub the wound  Pat dry using gauze  Shower yes with protection - purchase cast cover  Apply Medihoney to the foot wound  Cover with gauze  Secure with roll gauze and tape  Change dressing every day     This was done today  Standing Status:   Future     Standing Expiration Date:   3/10/2023    Debridement     This order was created via procedure documentation        Diagnosis ICD-10-CM Associated Orders   1   Non-healing surgical wound, initial encounter  T81 89XA lidocaine (XYLOCAINE) 4 % topical solution 5 mL     Wound cleansing and dressings     Debridement

## 2022-03-10 NOTE — PATIENT INSTRUCTIONS
Orders Placed This Encounter   Procedures    Wound cleansing and dressings     Right foot wound:     Wash your hands with soap and water  Remove old dressing, discard into plastic bag and place in trash  Cleanse the wound with soap and water prior to applying a clean dressing  Do not use tissue or cotton balls  Do not scrub the wound  Pat dry using gauze  Shower yes with protection - purchase cast cover  Apply Medihoney to the foot wound  Cover with gauze  Secure with roll gauze and tape  Change dressing every day     This was done today       Standing Status:   Future     Standing Expiration Date:   3/10/2023

## 2022-03-21 PROCEDURE — U0003 INFECTIOUS AGENT DETECTION BY NUCLEIC ACID (DNA OR RNA); SEVERE ACUTE RESPIRATORY SYNDROME CORONAVIRUS 2 (SARS-COV-2) (CORONAVIRUS DISEASE [COVID-19]), AMPLIFIED PROBE TECHNIQUE, MAKING USE OF HIGH THROUGHPUT TECHNOLOGIES AS DESCRIBED BY CMS-2020-01-R: HCPCS | Performed by: INTERNAL MEDICINE

## 2022-03-21 PROCEDURE — U0005 INFEC AGEN DETEC AMPLI PROBE: HCPCS | Performed by: INTERNAL MEDICINE

## 2022-03-23 ENCOUNTER — OFFICE VISIT (OUTPATIENT)
Dept: WOUND CARE | Facility: HOSPITAL | Age: 57
End: 2022-03-23
Payer: COMMERCIAL

## 2022-03-23 VITALS
HEART RATE: 77 BPM | SYSTOLIC BLOOD PRESSURE: 129 MMHG | DIASTOLIC BLOOD PRESSURE: 90 MMHG | TEMPERATURE: 97.4 F | RESPIRATION RATE: 16 BRPM

## 2022-03-23 DIAGNOSIS — T81.89XA NON-HEALING SURGICAL WOUND, INITIAL ENCOUNTER: Primary | ICD-10-CM

## 2022-03-23 PROCEDURE — 97597 DBRDMT OPN WND 1ST 20 CM/<: CPT | Performed by: NURSE PRACTITIONER

## 2022-03-23 RX ORDER — LIDOCAINE HYDROCHLORIDE 40 MG/ML
5 SOLUTION TOPICAL ONCE
Status: COMPLETED | OUTPATIENT
Start: 2022-03-23 | End: 2022-03-23

## 2022-03-23 RX ADMIN — LIDOCAINE HYDROCHLORIDE 5 ML: 40 SOLUTION TOPICAL at 15:27

## 2022-03-23 NOTE — PATIENT INSTRUCTIONS
Orders Placed This Encounter   Procedures    Wound cleansing and dressings     Right foot wound:     Wash your hands with soap and water  Remove old dressing, discard into plastic bag and place in trash  Cleanse the wound with soap and water prior to applying a clean dressing  Do not use tissue or cotton balls  Do not scrub the wound  Pat dry using gauze  Shower yes with protection - purchase cast cover  Apply Medihoney to the foot wound  Cover with gauze  Secure with roll gauze and tape  Change dressing every day     This was done today       Standing Status:   Future     Standing Expiration Date:   3/23/2023

## 2022-03-23 NOTE — PROGRESS NOTES
Patient ID: Cherelle Arriola is a 60 y o  male Date of Birth 1965     Chief Complaint  Chief Complaint   Patient presents with    Follow Up Wound Care Visit     Right foot wound       Allergies  Allopurinol    Assessment:    No problem-specific Assessment & Plan notes found for this encounter  Diagnoses and all orders for this visit:    Non-healing surgical wound, initial encounter  -     lidocaine (XYLOCAINE) 4 % topical solution 5 mL  -     Wound cleansing and dressings; Future    Other orders  -     Debridement              Debridement   Wound 02/24/22 Surgical Foot Anterior;Right    Universal Protocol:  Consent: Written consent obtained  Consent given by: patient  Time out: Immediately prior to procedure a "time out" was called to verify the correct patient, procedure, equipment, support staff and site/side marked as required  Timeout called at: 3/23/2022 3:43 PM   Patient understanding: patient states understanding of the procedure being performed  Patient consent: the patient's understanding of the procedure matches consent given  Procedure consent matches procedure scheduled: N/A  Relevant documents present and verified: N/A  Test results available and properly labeled: N/A  Site marked: the operative site was marked  Imaging studies available: N/A  Required blood products, implants, devices, and special equipment available: N/A    Patient identity confirmed: verbally with patient      Performed by: NP  Debridement type: selective  Pain control: lidocaine 4%  Pre-debridement measurements  Length (cm): 0 4  Width (cm): 0 2  Depth (cm): 0 1  Surface Area (cm^2): 0 08  Volume (cm^3): 0 01    Post-debridement measurements  Length (cm): 0 4  Width (cm): 0 2  Depth (cm): 0 1  Percent debrided: 100%  Surface Area (cm^2): 0 08  Area debrided (cm^2): 0 08  Volume (cm^3): 0 01  Devitalized tissue debrided: biofilm, fibrin and slough  Instrument(s) utilized: curette  Bleeding: small  Hemostasis obtained with: pressure  Procedural pain (0-10): 0  Post-procedural pain: 0   Response to treatment: procedure was tolerated well          Plan:  1  F/U visit  Wound debrided  Wound improving and measuring slightly smaller  Continue current plan of care  Patient will followup in 2 weeks  Wound 02/24/22 Surgical Foot Anterior;Right (Active)   Wound Image Images linked 03/23/22 1523   Wound Description Dry;Exposed tendon;Slough; Yellow 03/23/22 1523   Brenna-wound Assessment Intact 03/23/22 1523   Wound Length (cm) 0 4 cm 03/23/22 1523   Wound Width (cm) 0 2 cm 03/23/22 1523   Wound Depth (cm) 0 1 cm 03/23/22 1523   Wound Surface Area (cm^2) 0 08 cm^2 03/23/22 1523   Wound Volume (cm^3) 0 008 cm^3 03/23/22 1523   Calculated Wound Volume (cm^3) 0 01 cm^3 03/23/22 1523   Change in Wound Size % 50 03/23/22 1523   Drainage Amount Scant 03/23/22 1523   Drainage Description Yellow 03/23/22 1523   Non-staged Wound Description Full thickness 03/23/22 1523   Dressing Status Intact 03/23/22 1523       Wound 02/24/22 Surgical Foot Anterior;Right (Active)   Date First Assessed/Time First Assessed: 02/24/22 1417   Primary Wound Type: Surgical  Location: Foot  Wound Location Orientation: Anterior;Right       Subjective:     F/U visit for nonhealing surgical wound of right anterior foot  No new complaints  He denies any pain, fevers, or chills  Has been using Medihoney daily to wound as recommended  The following portions of the patient's history were reviewed and updated as appropriate:   He  has a past medical history of COVID-19, Depression, Diverticulitis, Diverticulosis, GERD (gastroesophageal reflux disease), Hypertension, Sleep apnea, and Umbilical hernia    He   Patient Active Problem List    Diagnosis Date Noted    C  difficile colitis and Sigmoid Diverticulitis 01/20/2022    Sepsis ([facility] [address] ) 01/20/2022    COVID-19 12/17/2021    Hallux rigidus, right foot 12/10/2021    Hallux rigidus, left foot 12/10/2021    Primary osteoarthritis of right foot 12/10/2021    Primary osteoarthritis of left foot 12/10/2021    Stage 3a chronic kidney disease ([facility] [address] ) 12/04/2021    [facility] 11/21/2021    SOB (shortness of breath) 11/21/2021    Abnormal stress test 09/11/2021    Arthritis of both feet 12/13/2020    Class 1 obesity due to excess calories with serious comorbidity and body mass index (BMI) of 31 0 to 31 9 in adult 11/05/2020    Low libido 09/24/2020    Elbow pain, left 06/04/2020    Psoriasis 06/04/2020    Vitamin D deficiency 06/04/2020    GERD (gastroesophageal reflux disease) 05/07/2020    Diverticulosis 05/07/2020    Sleep apnea 05/07/2020    Major depressive disorder, single episode, mild ([facility] [address] ) 05/07/2020    Mixed hyperlipidemia 05/07/2020    Benign hypertension with CKD (chronic kidney disease), stage II 05/07/2020    Primary insomnia 05/07/2020     He  has a past surgical history that includes Cholecystectomy; Cataract extraction (Bilateral); Knee arthroscopy (Left); Colonoscopy; Esophagogastroduodenoscopy; [place] tooth extraction; Hernia repair (09/19/2019); pr tenotomy elbow lateral/medial debride repair (Left, 11/17/2020); and pr hallux rigidus w/cheilectomy 1st mp jt w/o implt (Bilateral, 12/10/2021)  His family history includes Abdominal aortic aneurysm in his father; Anemia in his mother; Diabetes in his father, maternal grandfather, maternal grandmother, paternal grandfather, and paternal grandmother; Emphysema in his brother and maternal grandmother; Hypertension in his father and mother; Pancreatic cancer in his father  He  reports that he quit smoking about 9 years ago  His smoking use included cigarettes  He started smoking about 35 years ago  He has a 35 00 pack-year smoking history  He has never used smokeless tobacco  He reports current alcohol use  He reports that he does not use drugs    Current Outpatient Medications   Medication Sig Dispense Refill    albuterol (PROVENTIL HFA,VENTOLIN HFA) 90 mcg/act inhaler Inhale 2 puffs every 4 (four) hours as needed for wheezing or shortness of breath 18 g 5    b complex vitamins capsule Take 1 capsule by mouth daily      carvedilol (COREG) 3 125 mg tablet Take 1 tablet (3 125 mg total) by mouth 2 (two) times a day with meals 60 tablet 1    eszopiclone (LUNESTA) 3 MG tablet Take 1 tablet (3 mg total) by mouth daily at bedtime 30 tablet 1    FLUoxetine (PROzac) 20 MG tablet Take 1 tablet (20 mg total) by mouth daily 90 tablet 1    fluticasone-umeclidinium-vilanterol (Trelegy Ellipta) 100-62 5-25 MCG/INH inhaler Inhale 1 puff daily Rinse mouth after use  60 blister 11    lisinopril (ZESTRIL) 30 mg tablet Take 1 tablet (30 mg total) by mouth daily (Patient taking differently: Take 30 mg by mouth every evening  ) 30 tablet 1    multivitamin (THERAGRAN) TABS Take 1 tablet by mouth daily      rosuvastatin (CRESTOR) 10 MG tablet Take 1 tablet (10 mg total) by mouth daily 30 tablet 3    triamcinolone (KENALOG) 0 1 % ointment Apply topically 2 (two) times a day 15 g 1    VITAMIN D PO Take 1 tablet by mouth in the morning        Zinc Sulfate (ZINC 15 PO) Take 1 tablet by mouth in the morning         No current facility-administered medications for this visit  He is allergic to allopurinol       Review of Systems   Constitutional: Negative  HENT: Negative for ear pain and hearing loss  Eyes: Negative for pain  Respiratory: Negative for chest tightness and shortness of breath  Cardiovascular: Negative for chest pain, palpitations and leg swelling  Gastrointestinal: Negative for diarrhea, nausea and vomiting  Genitourinary: Negative for dysuria  Musculoskeletal: Negative for gait problem  Skin: Positive for wound  Neurological: Negative for tremors and weakness  Psychiatric/Behavioral: Negative for behavioral problems, confusion and suicidal ideas           Objective:       Wound 02/24/22 Surgical Foot Anterior;Right (Active)   Wound Image Images linked 03/23/22 1523   Wound Description Dry;Exposed tendon;Slough; Yellow 03/23/22 1523   Brenna-wound Assessment Intact 03/23/22 1523   Wound Length (cm) 0 4 cm 03/23/22 1523   Wound Width (cm) 0 2 cm 03/23/22 1523   Wound Depth (cm) 0 1 cm 03/23/22 1523   Wound Surface Area (cm^2) 0 08 cm^2 03/23/22 1523   Wound Volume (cm^3) 0 008 cm^3 03/23/22 1523   Calculated Wound Volume (cm^3) 0 01 cm^3 03/23/22 1523   Change in Wound Size % 50 03/23/22 1523   Drainage Amount Scant 03/23/22 1523   Drainage Description Yellow 03/23/22 1523   Non-staged Wound Description Full thickness 03/23/22 1523   Dressing Status Intact 03/23/22 1523       /90   Pulse 77   Temp (!) 97 4 °F (36 3 °C)   Resp 16             Wound Instructions:  Orders Placed This Encounter   Procedures    Wound cleansing and dressings     Right foot wound:     Wash your hands with soap and water  Remove old dressing, discard into plastic bag and place in trash  Cleanse the wound with soap and water prior to applying a clean dressing  Do not use tissue or cotton balls  Do not scrub the wound  Pat dry using gauze  Shower yes with protection - purchase cast cover  Apply Medihoney to the foot wound  Cover with gauze  Secure with roll gauze and tape  Change dressing every day     This was done today  Standing Status:   Future     Standing Expiration Date:   3/23/2023    Debridement     This order was created via procedure documentation        Diagnosis ICD-10-CM Associated Orders   1   Non-healing surgical wound, initial encounter  T81 89XA lidocaine (XYLOCAINE) 4 % topical solution 5 mL     Wound cleansing and dressings

## 2022-03-24 ENCOUNTER — HOSPITAL ENCOUNTER (OUTPATIENT)
Dept: GASTROENTEROLOGY | Facility: AMBULARY SURGERY CENTER | Age: 57
Setting detail: OUTPATIENT SURGERY
Discharge: HOME | End: 2022-03-24
Attending: INTERNAL MEDICINE
Payer: COMMERCIAL

## 2022-03-24 ENCOUNTER — ANESTHESIA (OUTPATIENT)
Dept: GASTROENTEROLOGY | Facility: AMBULARY SURGERY CENTER | Age: 57
End: 2022-03-24

## 2022-03-24 ENCOUNTER — ANESTHESIA EVENT (OUTPATIENT)
Dept: GASTROENTEROLOGY | Facility: AMBULARY SURGERY CENTER | Age: 57
End: 2022-03-24

## 2022-03-24 VITALS
DIASTOLIC BLOOD PRESSURE: 68 MMHG | RESPIRATION RATE: 18 BRPM | WEIGHT: 235 LBS | HEART RATE: 67 BPM | SYSTOLIC BLOOD PRESSURE: 139 MMHG | HEIGHT: 73 IN | OXYGEN SATURATION: 99 % | TEMPERATURE: 97.1 F | BODY MASS INDEX: 31.14 KG/M2

## 2022-03-24 DIAGNOSIS — A04.72 C. DIFFICILE COLITIS: ICD-10-CM

## 2022-03-24 DIAGNOSIS — K21.9 GASTROESOPHAGEAL REFLUX DISEASE WITHOUT ESOPHAGITIS: ICD-10-CM

## 2022-03-24 DIAGNOSIS — K21.00 GASTROESOPHAGEAL REFLUX DISEASE WITH ESOPHAGITIS WITHOUT HEMORRHAGE: Primary | ICD-10-CM

## 2022-03-24 PROBLEM — Z86.0100 HISTORY OF COLON POLYPS: Status: ACTIVE | Noted: 2022-03-24

## 2022-03-24 PROBLEM — U07.1 COVID-19: Status: RESOLVED | Noted: 2021-12-17 | Resolved: 2022-03-24

## 2022-03-24 PROBLEM — M25.522 ELBOW PAIN, LEFT: Status: RESOLVED | Noted: 2020-06-04 | Resolved: 2022-03-24

## 2022-03-24 PROBLEM — A41.9 SEPSIS (HCC): Status: RESOLVED | Noted: 2022-01-20 | Resolved: 2022-03-24

## 2022-03-24 PROBLEM — Z86.010 HISTORY OF COLON POLYPS: Status: ACTIVE | Noted: 2022-03-24

## 2022-03-24 PROCEDURE — 43239 EGD BIOPSY SINGLE/MULTIPLE: CPT | Performed by: INTERNAL MEDICINE

## 2022-03-24 PROCEDURE — 88305 TISSUE EXAM BY PATHOLOGIST: CPT | Performed by: PATHOLOGY

## 2022-03-24 PROCEDURE — 45380 COLONOSCOPY AND BIOPSY: CPT | Performed by: INTERNAL MEDICINE

## 2022-03-24 RX ORDER — ONDANSETRON 2 MG/ML
4 INJECTION INTRAMUSCULAR; INTRAVENOUS ONCE AS NEEDED
Status: CANCELLED | OUTPATIENT
Start: 2022-03-24

## 2022-03-24 RX ORDER — PROPOFOL 10 MG/ML
INJECTION, EMULSION INTRAVENOUS CONTINUOUS PRN
Status: DISCONTINUED | OUTPATIENT
Start: 2022-03-24 | End: 2022-03-24

## 2022-03-24 RX ORDER — PANTOPRAZOLE SODIUM 40 MG/1
40 TABLET, DELAYED RELEASE ORAL 2 TIMES DAILY
Qty: 60 TABLET | Refills: 3 | Status: SHIPPED | OUTPATIENT
Start: 2022-03-24

## 2022-03-24 RX ORDER — SODIUM CHLORIDE, SODIUM LACTATE, POTASSIUM CHLORIDE, CALCIUM CHLORIDE 600; 310; 30; 20 MG/100ML; MG/100ML; MG/100ML; MG/100ML
125 INJECTION, SOLUTION INTRAVENOUS CONTINUOUS
Status: DISCONTINUED | OUTPATIENT
Start: 2022-03-24 | End: 2022-03-28 | Stop reason: HOSPADM

## 2022-03-24 RX ORDER — PROPOFOL 10 MG/ML
INJECTION, EMULSION INTRAVENOUS AS NEEDED
Status: DISCONTINUED | OUTPATIENT
Start: 2022-03-24 | End: 2022-03-24

## 2022-03-24 RX ORDER — LIDOCAINE HYDROCHLORIDE 10 MG/ML
INJECTION, SOLUTION EPIDURAL; INFILTRATION; INTRACAUDAL; PERINEURAL AS NEEDED
Status: DISCONTINUED | OUTPATIENT
Start: 2022-03-24 | End: 2022-03-24

## 2022-03-24 RX ADMIN — PROPOFOL 150 MG: 10 INJECTION, EMULSION INTRAVENOUS at 12:44

## 2022-03-24 RX ADMIN — PROPOFOL 50 MG: 10 INJECTION, EMULSION INTRAVENOUS at 12:47

## 2022-03-24 RX ADMIN — PROPOFOL 30 MG: 10 INJECTION, EMULSION INTRAVENOUS at 13:01

## 2022-03-24 RX ADMIN — PROPOFOL 30 MG: 10 INJECTION, EMULSION INTRAVENOUS at 12:53

## 2022-03-24 RX ADMIN — PROPOFOL 100 MCG/KG/MIN: 10 INJECTION, EMULSION INTRAVENOUS at 12:49

## 2022-03-24 RX ADMIN — PROPOFOL 150 MCG/KG/MIN: 10 INJECTION, EMULSION INTRAVENOUS at 12:44

## 2022-03-24 RX ADMIN — SODIUM CHLORIDE, SODIUM LACTATE, POTASSIUM CHLORIDE, AND CALCIUM CHLORIDE 125 ML/HR: .6; .31; .03; .02 INJECTION, SOLUTION INTRAVENOUS at 12:13

## 2022-03-24 RX ADMIN — LIDOCAINE HYDROCHLORIDE 50 MG: 10 INJECTION, SOLUTION EPIDURAL; INFILTRATION; INTRACAUDAL; PERINEURAL at 12:44

## 2022-03-24 NOTE — ANESTHESIA POSTPROCEDURE EVALUATION
Post-Op Assessment Note    CV Status:  Stable  Pain Score: 0    Pain management: adequate     Mental Status:  Awake   Hydration Status:  Stable   PONV Controlled:  None   Airway Patency:  Patent      Post Op Vitals Reviewed: Yes      Staff: CRNA   Comments: spontaneously breathing, HOB @ 30 degrees, vss, protecting airway, fully endorsed to recovery w/o AC        No complications documented      BP      Temp     Pulse     Resp      SpO2   98

## 2022-03-24 NOTE — H&P
History and Physical -  [facility]  Be Read 60 y o  male MRN: [id number]    HPI: Be Read is a 60y o  year old male who presents with chronic GERD, diverticulitis and diarrhea       Review of Systems    Historical Information   Past Medical History:   Diagnosis Date    COVID-19     positive test on 21    Depression     Diverticulitis     Diverticulosis     GERD (gastroesophageal reflux disease)     Hypertension     Sleep apnea     no cpap    Umbilical hernia      Past Surgical History:   Procedure Laterality Date    CATARACT EXTRACTION Bilateral     CHOLECYSTECTOMY      COLONOSCOPY      ESOPHAGOGASTRODUODENOSCOPY      HERNIA REPAIR  2019    Open repair of incarcerated incisional hernia with mesh - Dr Marilyn Goldstein ARTHROSCOPY Left     IL HALLUX RIGIDUS W/CHEILECTOMY 1ST MP JT W/O IMPLT Bilateral 12/10/2021    Procedure: CHEILECTOMY;  Surgeon: Madeleine Dewitt DPM;  Location: [facility];  Service: Podiatry    IL TENOTOMY ELBOW LATERAL/MEDIAL DEBRIDE REPAIR Left 2020    Procedure: lateral epicondyle elbow debridement, release, and repair;  Surgeon: Isaiah Ventura MD;  Location: [facility];  Service: Orthopedics    WISDOM TOOTH EXTRACTION       Social History   Social History     Substance and Sexual Activity   Alcohol Use Yes    Comment: "few times a week"     Social History     Substance and Sexual Activity   Drug Use Never     Social History     Tobacco Use   Smoking Status Former Smoker    Packs/day: 1 00    Years: 35 00    Pack years: 35 00    Types: Cigarettes    Start date: 1986   Ana Peters Quit date: 2013    Years since quittin 2   Smokeless Tobacco Never Used   Tobacco Comment    quit 6 5 years ago - As per [place]      Family History   Problem Relation Age of Onset    Anemia Mother     Hypertension Mother     Pancreatic cancer Father     Diabetes Father     Hypertension Father     Abdominal aortic aneurysm Father     Diabetes Maternal Grandmother     Emphysema Maternal Grandmother     Diabetes Maternal Grandfather     Diabetes Paternal Grandmother     Diabetes Paternal Grandfather     Emphysema Brother        Meds/Allergies     (Not in a hospital admission)      Allergies   Allergen Reactions    Allopurinol Rash       Objective     /78   Pulse 71   Temp (!) 97 1 °F (36 2 °C) (Tympanic)   Resp 16   Ht 6' 1" (1 854 m)   Wt 107 kg (235 lb)   SpO2 98%   BMI 31 00 kg/m²       PHYSICAL EXAM    Gen: NAD  CV: RRR  CHEST: Clear  ABD: soft, NT/ND  EXT: no edema  Neuro: AAO      ASSESSMENT/PLAN:  This is a 60y o  year old male here for  chronic GERD, diverticulitis and diarrhea         PLAN:   Procedure: egd/colonoscopy

## 2022-03-24 NOTE — ANESTHESIA PREPROCEDURE EVALUATION
Procedure:  EGD  COLONOSCOPY    Relevant Problems   ANESTHESIA (within normal limits)      CARDIO   (+) Mixed hyperlipidemia      GI/HEPATIC   (+) GERD (gastroesophageal reflux disease)      /RENAL   (+) Benign hypertension with CKD (chronic kidney disease), stage II   (+) Stage 3a chronic kidney disease (HCC)      MUSCULOSKELETAL   (+) Arthritis of both feet   (+) Primary osteoarthritis of left foot   (+) Primary osteoarthritis of right foot      NEURO/PSYCH   (+) Major depressive disorder, single episode, mild (HCC)      PULMONARY   (+) Centrilobular emphysema (HCC)   (+) SOB (shortness of breath)   (+) Sleep apnea        Physical Exam    Airway    Mallampati score: II  TM Distance: >3 FB  Neck ROM: full     Dental   No notable dental hx     Cardiovascular  Rhythm: regular, Rate: normal,     Pulmonary  Breath sounds clear to auscultation,     Other Findings        Anesthesia Plan  ASA Score- 3     Anesthesia Type- IV sedation with anesthesia with ASA Monitors  Additional Monitors:   Airway Plan:           Plan Factors-Exercise tolerance (METS): >4 METS  Chart reviewed  Existing labs reviewed  Patient summary reviewed  Patient is not a current smoker  Induction-     Postoperative Plan-     Informed Consent- Anesthetic plan and risks discussed with patient  I personally reviewed this patient with the CRNA  Discussed and agreed on the Anesthesia Plan with the CRNA  Rosita Schuster

## 2022-03-25 ENCOUNTER — NURSE TRIAGE (OUTPATIENT)
Dept: OTHER | Facility: OTHER | Age: 57
End: 2022-03-25

## 2022-03-25 ENCOUNTER — TELEPHONE (OUTPATIENT)
Dept: NEPHROLOGY | Facility: CLINIC | Age: 57
End: 2022-03-25

## 2022-03-25 DIAGNOSIS — N18.31 STAGE 3A CHRONIC KIDNEY DISEASE (HCC): Primary | ICD-10-CM

## 2022-03-25 DIAGNOSIS — K21.9 GASTROESOPHAGEAL REFLUX DISEASE WITHOUT ESOPHAGITIS: Primary | ICD-10-CM

## 2022-03-25 RX ORDER — PANTOPRAZOLE SODIUM 40 MG/1
40 TABLET, DELAYED RELEASE ORAL DAILY
Qty: 60 TABLET | Refills: 0 | Status: SHIPPED | OUTPATIENT
Start: 2022-03-25 | End: 2022-07-12

## 2022-03-25 NOTE — TELEPHONE ENCOUNTER
----- Message from Shilpa Webb MD sent at 3/25/2022  1:09 PM EDT -----  He was last seen in the office in 2019  Can you please have him get routine office appointment in next several months whenever spot is available?    He should do repeat BMP, urine microalbumin/creatinine ratio, UA with microscopy before his office appointment   ----- Message -----  From: Eran Govea MD  Sent: 3/24/2022   1:16 PM EDT  To: Shilpa Webb MD    This patient has a history of chronic kidney disease, which from your note appears to have been done to NSAIDs  He is convinced that it is due to PPI therapy, he has a fairly large esophageal ulceration which occurred despite taking famotidineI have prescribed pantoprazole 40 mg twice daily, could you please follow up on his renal function  Overall I believe PPI therapy is safe for patients with renal disease and unlikely to be the causes dysfunction but please help me manage this given his necessity for the medication

## 2022-03-25 NOTE — TELEPHONE ENCOUNTER
Okay per Dr Mary Jane Souza to call in daily protonix 40mg while waiting for prior auth  Pt advised to take twice a day per Dr Rocio Rajput until prior Soha Garay is obtained for BID order

## 2022-03-25 NOTE — TELEPHONE ENCOUNTER
Reason for Disposition   [1] Prescription not at pharmacy AND [2] was prescribed by PCP recently (Exception: triager has access to EMR and prescription is recorded there  Go to Home Care and confirm for pharmacy )    Answer Assessment - Initial Assessment Questions  1  NAME of MEDICATION: "What medicine are you calling about?"      Protonix    2  QUESTION: "What is your question?" (e g , medication refill, side effect)      Prior auth not yet obtained  Pt told he needed to start protonix post scope  3  PRESCRIBING HCP: "Who prescribed it?" Reason: if prescribed by specialist, call should be referred to that group        Dr Marjan Herrmann- GI    Protocols used: MEDICATION QUESTION CALL-ADULT-

## 2022-03-25 NOTE — TELEPHONE ENCOUNTER
Regarding: Need piror authorization for medication  ----- Message from Trent Blanc sent at 3/25/2022  3:46 PM EDT -----  '' I went to  my medication from the pharmacy that my doctor had prescribed for me and the pharmacy told me that the medication need piror authorization  ''

## 2022-04-07 ENCOUNTER — OFFICE VISIT (OUTPATIENT)
Dept: PODIATRY | Facility: CLINIC | Age: 57
End: 2022-04-07
Payer: COMMERCIAL

## 2022-04-07 ENCOUNTER — OFFICE VISIT (OUTPATIENT)
Dept: WOUND CARE | Facility: HOSPITAL | Age: 57
End: 2022-04-07
Payer: COMMERCIAL

## 2022-04-07 VITALS — WEIGHT: 235 LBS | HEIGHT: 73 IN | RESPIRATION RATE: 17 BRPM | BODY MASS INDEX: 31.14 KG/M2

## 2022-04-07 VITALS
RESPIRATION RATE: 15 BRPM | DIASTOLIC BLOOD PRESSURE: 91 MMHG | SYSTOLIC BLOOD PRESSURE: 150 MMHG | TEMPERATURE: 97.8 F | HEART RATE: 72 BPM

## 2022-04-07 DIAGNOSIS — M25.571 ARTHRALGIA OF RIGHT FOOT: ICD-10-CM

## 2022-04-07 DIAGNOSIS — T81.89XA NON-HEALING SURGICAL WOUND, INITIAL ENCOUNTER: Primary | ICD-10-CM

## 2022-04-07 DIAGNOSIS — M86.271 SUBACUTE OSTEOMYELITIS OF RIGHT FOOT (HCC): Primary | ICD-10-CM

## 2022-04-07 DIAGNOSIS — L97.516 ULCER OF RIGHT FOOT WITH BONE INVOLVEMENT WITHOUT EVIDENCE OF NECROSIS (HCC): ICD-10-CM

## 2022-04-07 DIAGNOSIS — L03.115 CELLULITIS OF RIGHT LOWER EXTREMITY: ICD-10-CM

## 2022-04-07 PROCEDURE — 11042 DBRDMT SUBQ TIS 1ST 20SQCM/<: CPT | Performed by: PODIATRIST

## 2022-04-07 PROCEDURE — 97597 DBRDMT OPN WND 1ST 20 CM/<: CPT | Performed by: NURSE PRACTITIONER

## 2022-04-07 PROCEDURE — 99212 OFFICE O/P EST SF 10 MIN: CPT | Performed by: PODIATRIST

## 2022-04-07 PROCEDURE — 73620 X-RAY EXAM OF FOOT: CPT | Performed by: PODIATRIST

## 2022-04-07 RX ORDER — LIDOCAINE HYDROCHLORIDE 40 MG/ML
5 SOLUTION TOPICAL ONCE
Status: COMPLETED | OUTPATIENT
Start: 2022-04-07 | End: 2022-04-07

## 2022-04-07 RX ORDER — MELOXICAM 15 MG/1
15 TABLET ORAL DAILY
Qty: 30 TABLET | Refills: 0 | Status: SHIPPED | OUTPATIENT
Start: 2022-04-07 | End: 2022-07-12

## 2022-04-07 RX ADMIN — LIDOCAINE HYDROCHLORIDE 5 ML: 40 SOLUTION TOPICAL at 08:40

## 2022-04-07 NOTE — PROGRESS NOTES
Patient ID: Leah Santo is a 60 y o  male Date of Birth 1965     Chief Complaint  Chief Complaint   Patient presents with    Follow Up Wound Care Visit     right foot       Allergies  Allopurinol    Assessment:     Diagnoses and all orders for this visit:    Non-healing surgical wound, initial encounter  -     lidocaine (XYLOCAINE) 4 % topical solution 5 mL  -     Wound cleansing and dressings; Future  -     Wound miscellaneous orders; Future  -     Debridement              Debridement   Wound 02/24/22 Surgical Foot Anterior;Right    Universal Protocol:  Consent: Written consent obtained  Consent given by: patient  Time out: Immediately prior to procedure a "time out" was called to verify the correct patient, procedure, equipment, support staff and site/side marked as required  Timeout called at: 4/7/2022 8:59 AM   Patient understanding: patient states understanding of the procedure being performed  Patient consent: the patient's understanding of the procedure matches consent given  Procedure consent matches procedure scheduled: N/A  Relevant documents present and verified: N/A  Test results available and properly labeled: N/A  Site marked: the operative site was marked  Imaging studies available: N/A  Required blood products, implants, devices, and special equipment available: N/A    Patient identity confirmed: verbally with patient      Performed by: NP  Debridement type: selective  Pain control: lidocaine 4%  Pre-debridement measurements  Length (cm): 0 4  Width (cm): 0 4  Depth (cm): 0 5  Surface Area (cm^2): 0 16  Volume (cm^3): 0 08    Post-debridement measurements  Length (cm): 0 4  Width (cm): 0 4  Depth (cm): 0 5  Percent debrided: 100%  Surface Area (cm^2): 0 16  Area debrided (cm^2): 0 16  Volume (cm^3): 0 08  Devitalized tissue debrided: biofilm, fibrin and slough  Instrument(s) utilized: curette  Bleeding: none  Hemostasis obtained with: not applicable  Procedural pain (0-10): 0  Post-procedural pain: 0   Response to treatment: procedure was tolerated well          Plan:  1  F/U visit  Wound debrided  Wound measuring larger with slough completely covering the wound base and wound producing more drainage  Will change treatment to Iodosorb gel changed daily covered with gauze and tape  Patient is scheduled to see Dr Lewis Zavala later today  Will await for input  Patient will followup in 2 weeks  Wound 02/24/22 Surgical Foot Anterior;Right (Active)   Wound Description Slough; Yellow 04/07/22 0836   Brenna-wound Assessment Intact;Pink; Other (Comment) (pink surgical scar) 04/07/22 0836   Wound Length (cm) 0 4 cm 04/07/22 0836   Wound Width (cm) 0 4 cm 04/07/22 0836   Wound Depth (cm) 0 5 cm 04/07/22 0836   Wound Surface Area (cm^2) 0 16 cm^2 04/07/22 0836   Wound Volume (cm^3) 0 08 cm^3 04/07/22 0836   Calculated Wound Volume (cm^3) 0 08 cm^3 04/07/22 0836   Change in Wound Size % -300 04/07/22 0836   Undermining 0 3 04/07/22 0836   Undermining is depth extending from 12-12 o'clock 04/07/22 0836   Drainage Amount Scant (States having "a lot of drainage at home ") 04/07/22 0836   Drainage Description Yellow 04/07/22 0836   Non-staged Wound Description Full thickness 04/07/22 0836   Dressing Status Intact (upon arrival) 04/07/22 0836       Wound 02/24/22 Surgical Foot Anterior;Right (Active)   Date First Assessed/Time First Assessed: 02/24/22 1417   Primary Wound Type: Surgical  Location: Foot  Wound Location Orientation: Anterior;Right       Subjective:     F/U visit for nonhealing surgical wound of right anterior foot  Patient reports he has been having increased pain and increased drainage from his wound which started this past Friday  He is concerned that the joint might be failing  He denies any history of trauma to wound  He reports his pain is present with ambulation and by the end of the day his pain is constant  He also reports his dressing is saturated by the end of the day  Patient is scheduled to see Dr Salima Naidu in his office later this morning  He denies any fevers or chills  The following portions of the patient's history were reviewed and updated as appropriate:   He  has a past medical history of COVID-19, Depression, Diverticulitis, Diverticulosis, GERD (gastroesophageal reflux disease), Hypertension, Sleep apnea, and Umbilical hernia  He   Patient Active Problem List    Diagnosis Date Noted    History of colon polyps 03/24/2022    C  difficile colitis and Sigmoid Diverticulitis 01/20/2022    Hallux rigidus, right foot 12/10/2021    Hallux rigidus, left foot 12/10/2021    Primary osteoarthritis of right foot 12/10/2021    Primary osteoarthritis of left foot 12/10/2021    Stage 3a chronic kidney disease ([facility] [address] ) 12/04/2021    Centrilobular emphysema ([facility] [address] ) 11/21/2021    SOB (shortness of breath) 11/21/2021    Abnormal stress test 09/11/2021    Arthritis of both feet 12/13/2020    Class 1 obesity due to excess calories with serious comorbidity and body mass index (BMI) of 31 0 to 31 9 in adult 11/05/2020    Low libido 09/24/2020    Psoriasis 06/04/2020    Vitamin D deficiency 06/04/2020    GERD (gastroesophageal reflux disease) 05/07/2020    Diverticulosis 05/07/2020    Sleep apnea 05/07/2020    Major depressive disorder, single episode, mild ([facility] [address] ) 05/07/2020    Mixed hyperlipidemia 05/07/2020    Benign hypertension with CKD (chronic kidney disease), stage II 05/07/2020    Primary insomnia 05/07/2020     He  has a past surgical history that includes Cholecystectomy; Cataract extraction (Bilateral); Knee arthroscopy (Left); Colonoscopy; Esophagogastroduodenoscopy; [place] tooth extraction; Hernia repair (09/19/2019); pr tenotomy elbow lateral/medial debride repair (Left, 11/17/2020); and pr hallux rigidus w/cheilectomy 1st mp jt w/o implt (Bilateral, 12/10/2021)  His family history includes Abdominal aortic aneurysm in his father;  Anemia in his mother; Diabetes in his father, maternal grandfather, maternal grandmother, paternal grandfather, and paternal grandmother; Emphysema in his brother and maternal grandmother; Hypertension in his father and mother; Pancreatic cancer in his father  He  reports that he quit smoking about 9 years ago  His smoking use included cigarettes  He started smoking about 35 years ago  He has a 35 00 pack-year smoking history  He has never used smokeless tobacco  He reports current alcohol use  He reports that he does not use drugs  Current Outpatient Medications   Medication Sig Dispense Refill    albuterol (PROVENTIL HFA,VENTOLIN HFA) 90 mcg/act inhaler Inhale 2 puffs every 4 (four) hours as needed for wheezing or shortness of breath 18 g 5    b complex vitamins capsule Take 1 capsule by mouth daily      carvedilol (COREG) 3 125 mg tablet Take 1 tablet (3 125 mg total) by mouth 2 (two) times a day with meals 60 tablet 1    eszopiclone (LUNESTA) 3 MG tablet Take 1 tablet (3 mg total) by mouth daily at bedtime 30 tablet 1    FLUoxetine (PROzac) 20 MG tablet Take 1 tablet (20 mg total) by mouth daily 90 tablet 1    fluticasone-umeclidinium-vilanterol (Trelegy Ellipta) 100-62 5-25 MCG/INH inhaler Inhale 1 puff daily Rinse mouth after use   60 blister 11    lisinopril (ZESTRIL) 30 mg tablet Take 1 tablet (30 mg total) by mouth daily (Patient taking differently: Take 30 mg by mouth every evening  ) 30 tablet 1    meloxicam (MOBIC) 15 mg tablet Take 1 tablet (15 mg total) by mouth daily 30 tablet 0    multivitamin (THERAGRAN) TABS Take 1 tablet by mouth daily      pantoprazole (PROTONIX) 40 mg tablet Take 1 tablet (40 mg total) by mouth 2 (two) times a day 60 tablet 3    pantoprazole (PROTONIX) 40 mg tablet Take 1 tablet (40 mg total) by mouth daily 60 tablet 0    rosuvastatin (CRESTOR) 10 MG tablet Take 1 tablet (10 mg total) by mouth daily 30 tablet 3    triamcinolone (KENALOG) 0 1 % ointment Apply topically 2 (two) times a day 15 g 1    VITAMIN D PO Take 1 tablet by mouth in the morning        Zinc Sulfate (ZINC 15 PO) Take 1 tablet by mouth in the morning         No current facility-administered medications for this visit  He is allergic to allopurinol       Review of Systems   Constitutional: Negative  HENT: Negative for ear pain and hearing loss  Eyes: Negative for pain  Respiratory: Negative for chest tightness and shortness of breath  Cardiovascular: Negative for chest pain, palpitations and leg swelling  Gastrointestinal: Negative for diarrhea, nausea and vomiting  Genitourinary: Negative for dysuria  Musculoskeletal: Negative for gait problem  Skin: Positive for wound  Neurological: Negative for tremors and weakness  Psychiatric/Behavioral: Negative for behavioral problems, confusion and suicidal ideas  Objective:       Wound 02/24/22 Surgical Foot Anterior;Right (Active)   Wound Description Slough; Yellow 04/07/22 0836   Brenna-wound Assessment Intact;Pink; Other (Comment) (pink surgical scar) 04/07/22 0836   Wound Length (cm) 0 4 cm 04/07/22 0836   Wound Width (cm) 0 4 cm 04/07/22 0836   Wound Depth (cm) 0 5 cm 04/07/22 0836   Wound Surface Area (cm^2) 0 16 cm^2 04/07/22 0836   Wound Volume (cm^3) 0 08 cm^3 04/07/22 0836   Calculated Wound Volume (cm^3) 0 08 cm^3 04/07/22 0836   Change in Wound Size % -300 04/07/22 0836   Undermining 0 3 04/07/22 0836   Undermining is depth extending from 12-12 o'clock 04/07/22 0836   Drainage Amount Scant (States having "a lot of drainage at home ") 04/07/22 0836   Drainage Description Yellow 04/07/22 0836   Non-staged Wound Description Full thickness 04/07/22 0836   Dressing Status Intact (upon arrival) 04/07/22 0836       /91   Pulse 72   Temp 97 8 °F (36 6 °C)   Resp 15              Wound Instructions:  Orders Placed This Encounter   Procedures    Wound cleansing and dressings     Right foot wound:     Wash your hands with soap and water    Remove old dressing, discard into plastic bag and place in trash  Cleanse the wound with soap and water prior to applying a clean dressing  Do not use tissue or cotton balls  Do not scrub the wound  Pat dry using gauze  Shower yes with protection - purchase cast cover  Apply Iodosorb to the foot wound  Cover with gauze  Secure with roll gauze and tape  Change dressing every day     This was done today  Standing Status:   Future     Standing Expiration Date:   4/7/2023    Wound miscellaneous orders     Go to previously scheduled appt today with Dr Michael Scott  Standing Status:   Future     Standing Expiration Date:   4/7/2023    Debridement     This order was created via procedure documentation        Diagnosis ICD-10-CM Associated Orders   1   Non-healing surgical wound, initial encounter  T81 [id number] lidocaine (XYLOCAINE) 4 % topical solution 5 mL     Wound cleansing and dressings     Wound miscellaneous orders     Debridement

## 2022-04-07 NOTE — PATIENT INSTRUCTIONS
Orders Placed This Encounter   Procedures    Wound cleansing and dressings     Right foot wound:     Wash your hands with soap and water  Remove old dressing, discard into plastic bag and place in trash  Cleanse the wound with soap and water prior to applying a clean dressing  Do not use tissue or cotton balls  Do not scrub the wound  Pat dry using gauze  Shower yes with protection - purchase cast cover  Apply Iodosorb to the foot wound  Cover with gauze  Secure with roll gauze and tape  Change dressing every day     This was done today  Standing Status:   Future     Standing Expiration Date:   4/7/2023    Wound miscellaneous orders     Go to previously scheduled appt today with Dr Anjum Stevens       Standing Status:   Future     Standing Expiration Date:   4/7/2023

## 2022-04-07 NOTE — PROGRESS NOTES
Assessment/Plan:  Chronic wound/ulcer dorsum right foot  Rule out cellulitis  Rule out osteomyelitis  Arthralgia  Pain  Plan  Chart reviewed  Patient examined in advised on condition  Patient needs aggressive debridement of wound at this time  So after achieving successful anesthesia the dorsum of the right foot with 3 cc 2% lidocaine plain  Sharp debridement via 15 blade and tissue Nipper was done to remove all devitalized tissue and granular tissue from the wound of the dorsum right foot  Culture and sensitivity done  Dry sterile dressing applied  Patient advised on aftercare  MRI being ordered to rule out osteomyelitis  Rule out hematogenous osteomyelitis  X-rays were done today  Return 1 week  Watch for signs of infection  Diagnoses and all orders for this visit:    Subacute osteomyelitis of right foot ([address] )    Ulcer of right foot with bone involvement without evidence of necrosis ([address] )    Cellulitis of right lower extremity    Arthralgia of right foot  -     meloxicam (MOBIC) 15 mg tablet; Take 1 tablet (15 mg total) by mouth daily          Subjective:  Patient has protracted history about a wound of his right foot  Patient had right foot surgery approximately 5 months ago  Postoperative course was uneventful but then patient was admitted with COVID and became very sick  He was made to the hospital   He was septic  He had several infections because of this  During this time he noticed that a wound developed on the dorsum of the right foot  This was in the area of the right foot incision  Patient went to primary care  Primary care referred him to [address] [place] [address]  Patient has been treated for the last 6 weeks the [address] [place] [address] without event or success  At this time he has no history of fever night sweats  He does have pain in the big toe joint      Allergies   Allergen Reactions    Allopurinol Rash         Current Outpatient Medications:     albuterol (PROVENTIL HFA,VENTOLIN HFA) 90 mcg/act inhaler, Inhale 2 puffs every 4 (four) hours as needed for wheezing or shortness of breath, Disp: 18 g, Rfl: 5    b complex vitamins capsule, Take 1 capsule by mouth daily, Disp: , Rfl:     carvedilol (COREG) 3 125 mg tablet, Take 1 tablet (3 125 mg total) by mouth 2 (two) times a day with meals, Disp: 60 tablet, Rfl: 1    eszopiclone (LUNESTA) 3 MG tablet, Take 1 tablet (3 mg total) by mouth daily at bedtime, Disp: 30 tablet, Rfl: 1    FLUoxetine (PROzac) 20 MG tablet, Take 1 tablet (20 mg total) by mouth daily, Disp: 90 tablet, Rfl: 1    fluticasone-umeclidinium-vilanterol (Trelegy Ellipta) 100-62 5-25 MCG/INH inhaler, Inhale 1 puff daily Rinse mouth after use , Disp: 60 blister, Rfl: 11    lisinopril (ZESTRIL) 30 mg tablet, Take 1 tablet (30 mg total) by mouth daily (Patient taking differently: Take 30 mg by mouth every evening  ), Disp: 30 tablet, Rfl: 1    meloxicam (MOBIC) 15 mg tablet, Take 1 tablet (15 mg total) by mouth daily, Disp: 30 tablet, Rfl: 0    multivitamin (THERAGRAN) TABS, Take 1 tablet by mouth daily, Disp: , Rfl:     pantoprazole (PROTONIX) 40 mg tablet, Take 1 tablet (40 mg total) by mouth 2 (two) times a day, Disp: 60 tablet, Rfl: 3    pantoprazole (PROTONIX) 40 mg tablet, Take 1 tablet (40 mg total) by mouth daily, Disp: 60 tablet, Rfl: 0    rosuvastatin (CRESTOR) 10 MG tablet, Take 1 tablet (10 mg total) by mouth daily, Disp: 30 tablet, Rfl: 3    triamcinolone (KENALOG) 0 1 % ointment, Apply topically 2 (two) times a day, Disp: 15 g, Rfl: 1    VITAMIN D PO, Take 1 tablet by mouth in the morning  , Disp: , Rfl:     Zinc Sulfate (ZINC 15 PO), Take 1 tablet by mouth in the morning  , Disp: , Rfl:   No current facility-administered medications for this visit      Patient Active Problem List   Diagnosis    GERD (gastroesophageal reflux disease)    Diverticulosis    Sleep apnea    Major depressive disorder, single episode, mild ([facility] [address] )    Mixed hyperlipidemia    Benign hypertension with CKD (chronic kidney disease), stage II    Primary insomnia    Psoriasis    Vitamin D deficiency    Low libido    Class 1 obesity due to excess calories with serious comorbidity and body mass index (BMI) of 31 0 to 31 9 in adult    Arthritis of both feet    Abnormal stress test    Centrilobular emphysema (HCC)    SOB (shortness of breath)    Stage 3a chronic kidney disease (HCC)    Hallux rigidus, right foot    Hallux rigidus, left foot    Primary osteoarthritis of right foot    Primary osteoarthritis of left foot    C  difficile colitis and Sigmoid Diverticulitis    History of colon polyps          Patient ID: Chaitanya Fajardo is a 60 y o  male  HPI    The following portions of the patient's history were reviewed and updated as appropriate:     family history includes Abdominal aortic aneurysm in his father; Anemia in his mother; Diabetes in his father, maternal grandfather, maternal grandmother, paternal grandfather, and paternal grandmother; Emphysema in his brother and maternal grandmother; Hypertension in his father and mother; Pancreatic cancer in his father  reports that he quit smoking about 9 years ago  His smoking use included cigarettes  He started smoking about 35 years ago  He has a 35 00 pack-year smoking history  He has never used smokeless tobacco  He reports current alcohol use  He reports that he does not use drugs  Vitals:    04/07/22 1133   Resp: 17       Review of Systems      Objective:  Patient's shoes and socks removed  Foot ExamPhysical Exam  Vitals and nursing note reviewed  Constitutional:       Appearance: Normal appearance  Cardiovascular:      Rate and Rhythm: Normal rate and regular rhythm  Musculoskeletal:      Comments: Patient has pes planus bilateral   Right hallux is rectus  X-ray demonstrates no evidence of occult fracture osteomyelitis     Skin:     Capillary Refill: Capillary refill takes less than 2 seconds  Comments: Dorsal aspect right 1st ray in the area of the 1st metatarsal head demonstrates 0 5 centimeter squared full-thickness ulcer  At this time is mild erythema in the area but no evidence of occult pus or abscess  Wound is fill with fibrogranular tissue  After achieving anesthesia and wound debridement wound was debrided down to bleeding base  Serous exudate noted  No evidence of occult pus  Tendon not noted wound  Neurological:      Mental Status: He is alert  Psychiatric:         Mood and Affect: Mood normal          Behavior: Behavior normal          Thought Content:  Thought content normal          Judgment: Judgment normal

## 2022-04-08 ENCOUNTER — TELEPHONE (OUTPATIENT)
Dept: GASTROENTEROLOGY | Facility: CLINIC | Age: 57
End: 2022-04-08

## 2022-04-08 NOTE — TELEPHONE ENCOUNTER
Called pt to confirm that he read results  He did  Pt is agreeable to  JAYDEN  [place]

## 2022-04-08 NOTE — TELEPHONE ENCOUNTER
----- Message from Ivon Schulz MD sent at 4/7/2022 10:09 AM EDT -----  Schedule patient for repeat EGD in 3 months time     Biopsies from recent upper endoscopy were negative for H pylori  The small polyp removed was a lymphoid aggregate  These have no cancer potential     As we discussed, recommend repeat upper endoscopy in 3 months time to make sure your esophageal inflammation has healed    Repeat colonoscopy in 3 years with a 2 day bowel prep  Please call with any questions or concerns

## 2022-04-08 NOTE — TELEPHONE ENCOUNTER
Patient is scheduled for EGD on July 21 , 2022 at [facility]  with Claudetta Boston, MD  Patient is aware of pre-procedure prep of Nothing to eat of drink after midnight and they will be called the day prior between 2 and 6 pm for time to report for procedure  Pre-procedure prep has been given to the patient  via [address] mail on April 8 , 2022

## 2022-04-11 ENCOUNTER — TELEPHONE (OUTPATIENT)
Dept: GASTROENTEROLOGY | Facility: CLINIC | Age: 57
End: 2022-04-11

## 2022-04-11 LAB
BACTERIA SPEC AEROBE CULT: NORMAL
Lab: NORMAL

## 2022-04-11 NOTE — TELEPHONE ENCOUNTER
Patients GI provider:  Dr Chata Banerjee    Number to return call: [phone]    Reason for call: Pt calling stating prior auth is needed for pantoprazole 40 mg     Scheduled procedure/appointment date if applicable: Procedure 9/94/77

## 2022-04-12 NOTE — TELEPHONE ENCOUNTER
Recieved the PA Approval for the Pantoprazole 40 mg bid via covermymeds for prime [employer]    Date Range:  04/12/2022 til 04/12/2023      Called and relayed the approval to [facility] they are filling and contacting the patient when ready for      Copay is $10

## 2022-04-12 NOTE — TELEPHONE ENCOUNTER
Submitted the Auth via covermymeds for the Pantoprazole 40 mg bid awaiting for the determination     Key: [id number]

## 2022-04-14 ENCOUNTER — OFFICE VISIT (OUTPATIENT)
Dept: PODIATRY | Facility: CLINIC | Age: 57
End: 2022-04-14
Payer: COMMERCIAL

## 2022-04-14 VITALS
BODY MASS INDEX: 31.14 KG/M2 | WEIGHT: 235 LBS | RESPIRATION RATE: 17 BRPM | SYSTOLIC BLOOD PRESSURE: 139 MMHG | DIASTOLIC BLOOD PRESSURE: 68 MMHG | HEIGHT: 73 IN

## 2022-04-14 DIAGNOSIS — L03.115 CELLULITIS OF RIGHT LOWER EXTREMITY: ICD-10-CM

## 2022-04-14 DIAGNOSIS — M86.271 SUBACUTE OSTEOMYELITIS OF RIGHT FOOT (HCC): Primary | ICD-10-CM

## 2022-04-14 DIAGNOSIS — L97.516 ULCER OF RIGHT FOOT WITH BONE INVOLVEMENT WITHOUT EVIDENCE OF NECROSIS (HCC): ICD-10-CM

## 2022-04-14 PROCEDURE — 99213 OFFICE O/P EST LOW 20 MIN: CPT | Performed by: PODIATRIST

## 2022-04-14 NOTE — PROGRESS NOTES
Assessment/Plan:  Chronic wound dorsum right foot  Rule out cellulitis  Rule out osteomyelitis  Plan  Foot exam performed  Patient educated on condition  MRI reordered  Wound debrided today  Deep cultures taken  Patient will bandage daily  Pending results of MRI patient may need tertiary closure or further wound debridement  Return for follow-up         Diagnoses and all orders for this visit:    Subacute osteomyelitis of right foot ([facility] [address] )  -     MRI foot/forefoot toes right wo contrast; Future    Ulcer of right foot with bone involvement without evidence of necrosis ([facility] [address] )  -     MRI foot/forefoot toes right wo contrast; Future    Cellulitis of right lower extremity  -     MRI foot/forefoot toes right wo contrast; Future          Subjective:  Patient is continue wound of the right foot  He has no pain her history of fever or night sweats  However it wound leaks on a daily basis  Patient try to schedule MRI but central scheduling did not answer  Still awaiting MRI      Allergies   Allergen Reactions    Allopurinol Rash         Current Outpatient Medications:     albuterol (PROVENTIL HFA,VENTOLIN HFA) 90 mcg/act inhaler, Inhale 2 puffs every 4 (four) hours as needed for wheezing or shortness of breath, Disp: 18 g, Rfl: 5    b complex vitamins capsule, Take 1 capsule by mouth daily, Disp: , Rfl:     carvedilol (COREG) 3 125 mg tablet, Take 1 tablet (3 125 mg total) by mouth 2 (two) times a day with meals, Disp: 60 tablet, Rfl: 1    eszopiclone (LUNESTA) 3 MG tablet, Take 1 tablet (3 mg total) by mouth daily at bedtime, Disp: 30 tablet, Rfl: 1    FLUoxetine (PROzac) 20 MG tablet, Take 1 tablet (20 mg total) by mouth daily, Disp: 90 tablet, Rfl: 1    fluticasone-umeclidinium-vilanterol (Trelegy Ellipta) 100-62 5-25 MCG/INH inhaler, Inhale 1 puff daily Rinse mouth after use , Disp: 60 blister, Rfl: 11    lisinopril (ZESTRIL) 30 mg tablet, Take 1 tablet (30 mg total) by mouth daily (Patient taking differently: Take 30 mg by mouth every evening  ), Disp: 30 tablet, Rfl: 1    meloxicam (MOBIC) 15 mg tablet, Take 1 tablet (15 mg total) by mouth daily, Disp: 30 tablet, Rfl: 0    multivitamin (THERAGRAN) TABS, Take 1 tablet by mouth daily, Disp: , Rfl:     pantoprazole (PROTONIX) 40 mg tablet, Take 1 tablet (40 mg total) by mouth 2 (two) times a day, Disp: 60 tablet, Rfl: 3    pantoprazole (PROTONIX) 40 mg tablet, Take 1 tablet (40 mg total) by mouth daily, Disp: 60 tablet, Rfl: 0    rosuvastatin (CRESTOR) 10 MG tablet, Take 1 tablet (10 mg total) by mouth daily, Disp: 30 tablet, Rfl: 3    triamcinolone (KENALOG) 0 1 % ointment, Apply topically 2 (two) times a day, Disp: 15 g, Rfl: 1    VITAMIN D PO, Take 1 tablet by mouth in the morning  , Disp: , Rfl:     Zinc Sulfate (ZINC 15 PO), Take 1 tablet by mouth in the morning  , Disp: , Rfl:     Patient Active Problem List   Diagnosis    GERD (gastroesophageal reflux disease)    Diverticulosis    Sleep apnea    Major depressive disorder, single episode, mild (HCC)    Mixed hyperlipidemia    Benign hypertension with CKD (chronic kidney disease), stage II    Primary insomnia    Psoriasis    Vitamin D deficiency    Low libido    Class 1 obesity due to excess calories with serious comorbidity and body mass index (BMI) of 31 0 to 31 9 in adult    Arthritis of both feet    Abnormal stress test    Centrilobular emphysema (HCC)    SOB (shortness of breath)    Stage 3a chronic kidney disease (HCC)    Hallux rigidus, right foot    Hallux rigidus, left foot    Primary osteoarthritis of right foot    Primary osteoarthritis of left foot    C  difficile colitis and Sigmoid Diverticulitis    History of colon polyps          Patient ID: Marilou Damon is a 60 y o  male  HPI    The following portions of the patient's history were reviewed and updated as appropriate:     family history includes Abdominal aortic aneurysm in his father;  Anemia in his mother; Diabetes in his father, maternal grandfather, maternal grandmother, paternal grandfather, and paternal grandmother; Emphysema in his brother and maternal grandmother; Hypertension in his father and mother; Pancreatic cancer in his father  reports that he quit smoking about 9 years ago  His smoking use included cigarettes  He started smoking about 35 years ago  He has a 35 00 pack-year smoking history  He has never used smokeless tobacco  He reports current alcohol use  He reports that he does not use drugs  Vitals:    04/14/22 1548   BP: 139/68   Resp: 17       Review of Systems      Objective:  Patient's shoes and socks removed  Foot ExamPhysical Exam      Vitals and nursing note reviewed  Constitutional:       Appearance: Normal appearance  Cardiovascular:      Rate and Rhythm: Normal rate and regular rhythm  Musculoskeletal:      Comments: Patient has pes planus bilateral   Right hallux is rectus  X-ray demonstrates no evidence of occult fracture osteomyelitis  Skin:     Capillary Refill: Capillary refill takes less than 2 seconds  Comments: Dorsal aspect right 1st ray in the area of the 1st metatarsal head demonstrates 0 5 centimeter squared full-thickness ulcer  At this time is mild erythema in the area but no evidence of occult pus or abscess  Wound is fill with fibrogranular tissue  After achieving anesthesia and wound debridement wound was debrided down to bleeding base  Serous exudate noted  No evidence of occult pus  Tendon not noted wound  culture sensitivity demonstrates no growth  Neurological:      Mental Status: He is alert  Psychiatric:         Mood and Affect: Mood normal          Behavior: Behavior normal          Thought Content:  Thought content normal          Judgment: Judgment normal

## 2022-04-16 ENCOUNTER — HOSPITAL ENCOUNTER (OUTPATIENT)
Dept: MRI IMAGING | Facility: HOSPITAL | Age: 57
Discharge: HOME | End: 2022-04-16
Payer: COMMERCIAL

## 2022-04-16 DIAGNOSIS — L03.115 CELLULITIS OF RIGHT LOWER EXTREMITY: ICD-10-CM

## 2022-04-16 DIAGNOSIS — L97.516 ULCER OF RIGHT FOOT WITH BONE INVOLVEMENT WITHOUT EVIDENCE OF NECROSIS (HCC): ICD-10-CM

## 2022-04-16 DIAGNOSIS — M86.271 SUBACUTE OSTEOMYELITIS OF RIGHT FOOT (HCC): ICD-10-CM

## 2022-04-16 PROCEDURE — 73718 MRI LOWER EXTREMITY W/O DYE: CPT

## 2022-04-16 PROCEDURE — G1004 CDSM NDSC: HCPCS

## 2022-04-18 ENCOUNTER — TELEPHONE (OUTPATIENT)
Dept: PODIATRY | Facility: CLINIC | Age: 57
End: 2022-04-18

## 2022-04-18 DIAGNOSIS — F32.0 MAJOR DEPRESSIVE DISORDER, SINGLE EPISODE, MILD (HCC): ICD-10-CM

## 2022-04-18 RX ORDER — FLUOXETINE 20 MG/1
TABLET, FILM COATED ORAL
Qty: 90 TABLET | Refills: 1 | OUTPATIENT
Start: 2022-04-18

## 2022-04-18 NOTE — TELEPHONE ENCOUNTER
Medication refill requested: prozac  Last office visit: 2/10/22  Next office visit: 2/23/22  Last refilled: 2/10/22 #90x1  Ordering Provider: [facility] [address] (select pharmacy send RX to):   [place] [address] [address] [facility], [address] [place] [id number]  Phone: [phone] Fax: [phone]          Placed call to pharmacy, refill on file and they will fill

## 2022-04-20 LAB
BACTERIA SPEC AEROBE CULT: NORMAL
Lab: NORMAL

## 2022-04-21 ENCOUNTER — OFFICE VISIT (OUTPATIENT)
Dept: PODIATRY | Facility: CLINIC | Age: 57
End: 2022-04-21
Payer: COMMERCIAL

## 2022-04-21 VITALS
BODY MASS INDEX: 31.14 KG/M2 | RESPIRATION RATE: 17 BRPM | HEIGHT: 73 IN | SYSTOLIC BLOOD PRESSURE: 139 MMHG | WEIGHT: 235 LBS | DIASTOLIC BLOOD PRESSURE: 86 MMHG

## 2022-04-21 DIAGNOSIS — L97.516 ULCER OF RIGHT FOOT WITH BONE INVOLVEMENT WITHOUT EVIDENCE OF NECROSIS (HCC): Primary | ICD-10-CM

## 2022-04-21 PROCEDURE — 99212 OFFICE O/P EST SF 10 MIN: CPT | Performed by: PODIATRIST

## 2022-04-21 NOTE — PROGRESS NOTES
Assessment/Plan:  Patient with chronic wound of right foot  Full-thickness wound extending the capsule noted  Negative cellulitis at this time  Negative osteomyelitis noted on MRI    Plan  Foot exam performed  Patient educated on condition  Wound debrided today  Gentian violet dry sterile dressing applied  Patient has been dispensed Iodosorb  He will bandage daily  He will return for tertiary closure of wound  Culture and sensitivity done today for bandaging  We will look for signs of infection  Diagnoses and all orders for this visit:    Ulcer of right foot with bone involvement without evidence of necrosis ([facility] [address] )          Subjective:  Patient has chronic wound of right foot  Patient had MRI as directed  At this time he is having no pain or history of fever night sweats      Allergies   Allergen Reactions    Allopurinol Rash         Current Outpatient Medications:     albuterol (PROVENTIL HFA,VENTOLIN HFA) 90 mcg/act inhaler, Inhale 2 puffs every 4 (four) hours as needed for wheezing or shortness of breath, Disp: 18 g, Rfl: 5    b complex vitamins capsule, Take 1 capsule by mouth daily, Disp: , Rfl:     carvedilol (COREG) 3 125 mg tablet, Take 1 tablet (3 125 mg total) by mouth 2 (two) times a day with meals, Disp: 60 tablet, Rfl: 1    eszopiclone (LUNESTA) 3 MG tablet, Take 1 tablet (3 mg total) by mouth daily at bedtime, Disp: 30 tablet, Rfl: 1    FLUoxetine (PROzac) 20 MG tablet, Take 1 tablet (20 mg total) by mouth daily, Disp: 90 tablet, Rfl: 1    fluticasone-umeclidinium-vilanterol (Trelegy Ellipta) 100-62 5-25 MCG/INH inhaler, Inhale 1 puff daily Rinse mouth after use , Disp: 60 blister, Rfl: 11    lisinopril (ZESTRIL) 30 mg tablet, Take 1 tablet (30 mg total) by mouth daily (Patient taking differently: Take 30 mg by mouth every evening  ), Disp: 30 tablet, Rfl: 1    meloxicam (MOBIC) 15 mg tablet, Take 1 tablet (15 mg total) by mouth daily, Disp: 30 tablet, Rfl: 0   multivitamin (THERAGRAN) TABS, Take 1 tablet by mouth daily, Disp: , Rfl:     pantoprazole (PROTONIX) 40 mg tablet, Take 1 tablet (40 mg total) by mouth 2 (two) times a day, Disp: 60 tablet, Rfl: 3    pantoprazole (PROTONIX) 40 mg tablet, Take 1 tablet (40 mg total) by mouth daily, Disp: 60 tablet, Rfl: 0    rosuvastatin (CRESTOR) 10 MG tablet, Take 1 tablet (10 mg total) by mouth daily, Disp: 30 tablet, Rfl: 3    triamcinolone (KENALOG) 0 1 % ointment, Apply topically 2 (two) times a day, Disp: 15 g, Rfl: 1    VITAMIN D PO, Take 1 tablet by mouth in the morning  , Disp: , Rfl:     Zinc Sulfate (ZINC 15 PO), Take 1 tablet by mouth in the morning  , Disp: , Rfl:     Patient Active Problem List   Diagnosis    GERD (gastroesophageal reflux disease)    Diverticulosis    Sleep apnea    Major depressive disorder, single episode, mild (HCC)    Mixed hyperlipidemia    Benign hypertension with CKD (chronic kidney disease), stage II    Primary insomnia    Psoriasis    Vitamin D deficiency    Low libido    Class 1 obesity due to excess calories with serious comorbidity and body mass index (BMI) of 31 0 to 31 9 in adult    Arthritis of both feet    Abnormal stress test    Centrilobular emphysema (HCC)    SOB (shortness of breath)    Stage 3a chronic kidney disease (HCC)    Hallux rigidus, right foot    Hallux rigidus, left foot    Primary osteoarthritis of right foot    Primary osteoarthritis of left foot    C  difficile colitis and Sigmoid Diverticulitis    History of colon polyps          Patient ID: Inocencio Wang is a 60 y o  male  HPI    The following portions of the patient's history were reviewed and updated as appropriate:     family history includes Abdominal aortic aneurysm in his father;  Anemia in his mother; Diabetes in his father, maternal grandfather, maternal grandmother, paternal grandfather, and paternal grandmother; Emphysema in his brother and maternal grandmother; Hypertension in his father and mother; Pancreatic cancer in his father  reports that he quit smoking about 9 years ago  His smoking use included cigarettes  He started smoking about 35 years ago  He has a 35 00 pack-year smoking history  He has never used smokeless tobacco  He reports current alcohol use  He reports that he does not use drugs  Vitals:    04/21/22 1202   BP: 139/86   Resp: 17       Review of Systems      Objective:  Patient's shoes and socks removed  Foot ExamPhysical Exam        Vitals and nursing note reviewed  Constitutional:       Appearance: Normal appearance  Cardiovascular:      Rate and Rhythm: Normal rate and regular rhythm  Musculoskeletal:      Comments: Patient has pes planus bilateral   Right hallux is rectus   X-ray demonstrates no evidence of occult fracture osteomyelitis    Skin:     Capillary Refill: Capillary refill takes less than 2 seconds       Comments: Dorsal aspect right 1st ray in the area of the 1st metatarsal head demonstrates 0 5 centimeter squared full-thickness ulcer   At this time is mild erythema in the area but no evidence of occult pus or abscess   Wound is fill with fibrogranular tissue   After achieving anesthesia and wound debridement wound was debrided down to bleeding base   Serous exudate noted   No evidence of occult pus   Tendon not noted wound   culture sensitivity demonstrates no growth  Neurological:      Mental Status: He is alert  Psychiatric:         Mood and Affect: Mood normal          BehaviorLois Ventura         Thought Content:  Thought content normal          Judgment: Judgment normal

## 2022-04-23 LAB
BACTERIA SPEC AEROBE CULT: NORMAL
Lab: NORMAL

## 2022-04-24 DIAGNOSIS — E78.2 MIXED HYPERLIPIDEMIA: ICD-10-CM

## 2022-04-25 ENCOUNTER — PATIENT MESSAGE (OUTPATIENT)
Dept: FAMILY MEDICINE CLINIC | Facility: CLINIC | Age: 57
End: 2022-04-25

## 2022-04-25 DIAGNOSIS — E78.2 MIXED HYPERLIPIDEMIA: ICD-10-CM

## 2022-04-25 RX ORDER — CARVEDILOL 3.12 MG/1
TABLET ORAL
Qty: 60 TABLET | Refills: 1 | OUTPATIENT
Start: 2022-04-25

## 2022-04-25 RX ORDER — CARVEDILOL 3.12 MG/1
3.12 TABLET ORAL 2 TIMES DAILY WITH MEALS
Qty: 60 TABLET | Refills: 5 | Status: SHIPPED | OUTPATIENT
Start: 2022-04-25 | End: 2022-07-14

## 2022-04-25 RX ORDER — CARVEDILOL 3.12 MG/1
3.12 TABLET ORAL 2 TIMES DAILY WITH MEALS
Qty: 60 TABLET | Refills: 5 | OUTPATIENT
Start: 2022-04-25 | End: 2022-06-24

## 2022-05-04 ENCOUNTER — PROCEDURE VISIT (OUTPATIENT)
Dept: PODIATRY | Facility: CLINIC | Age: 57
End: 2022-05-04
Payer: COMMERCIAL

## 2022-05-04 VITALS
HEIGHT: 73 IN | BODY MASS INDEX: 31.14 KG/M2 | DIASTOLIC BLOOD PRESSURE: 89 MMHG | WEIGHT: 235 LBS | SYSTOLIC BLOOD PRESSURE: 146 MMHG | RESPIRATION RATE: 16 BRPM

## 2022-05-04 DIAGNOSIS — M79.661 PAIN OF RIGHT LOWER LEG: ICD-10-CM

## 2022-05-04 DIAGNOSIS — M79.671 RIGHT FOOT PAIN: ICD-10-CM

## 2022-05-04 DIAGNOSIS — L97.516 ULCER OF RIGHT FOOT WITH BONE INVOLVEMENT WITHOUT EVIDENCE OF NECROSIS (HCC): Primary | ICD-10-CM

## 2022-05-04 DIAGNOSIS — R26.2 DIFFICULTY WALKING: ICD-10-CM

## 2022-05-04 PROCEDURE — 88305 TISSUE EXAM BY PATHOLOGIST: CPT | Performed by: PATHOLOGY

## 2022-05-04 PROCEDURE — 88312 SPECIAL STAINS GROUP 1: CPT | Performed by: PATHOLOGY

## 2022-05-04 PROCEDURE — 88342 IMHCHEM/IMCYTCHM 1ST ANTB: CPT | Performed by: PATHOLOGY

## 2022-05-04 PROCEDURE — 12041 INTMD RPR N-HF/GENIT 2.5CM/<: CPT | Performed by: PODIATRIST

## 2022-05-04 PROCEDURE — 88313 SPECIAL STAINS GROUP 2: CPT | Performed by: PATHOLOGY

## 2022-05-04 PROCEDURE — L4361 PNEUMA/VAC WALK BOOT PRE OTS: HCPCS | Performed by: PODIATRIST

## 2022-05-04 RX ORDER — KETOROLAC TROMETHAMINE 10 MG/1
10 TABLET, FILM COATED ORAL EVERY 6 HOURS PRN
Qty: 20 TABLET | Refills: 0 | Status: SHIPPED | OUTPATIENT
Start: 2022-05-04 | End: 2022-05-04 | Stop reason: CLARIF

## 2022-05-04 NOTE — PROGRESS NOTES
Procedures   Foot Exam       Vitals and nursing note reviewed  Constitutional:       Appearance: Normal appearance  Cardiovascular:      Rate and Rhythm: Normal rate and regular rhythm  Musculoskeletal:      Comments: Patient has pes planus bilateral   Right hallux is rectus   X-ray demonstrates no evidence of occult fracture osteomyelitis    Skin:     Capillary Refill: Capillary refill takes less than 2 seconds       Comments: Dorsal aspect right 1st ray in the area of the 1st metatarsal head demonstrates 0 5 centimeter squared full-thickness ulcer   At this time is mild erythema in the area but no evidence of occult pus or abscess   Wound is fill with fibrogranular tissue   After achieving anesthesia and wound debridement wound was debrided down to bleeding base   Serous exudate noted   No evidence of occult pus   Tendon not noted wound   culture sensitivity demonstrates no growth  Neurological:      Mental Status: He is alert  Psychiatric:         Mood and Affect: Mood normal          BehaviorTodd Purdy         Thought Content: Thought content normal          Judgment: Judgment normal      Patient has chronic wound of right foot  He has demonstrate no evidence of cellulitis at this time  Radiographic studies negative for osteomyelitis  Plan  Plan for tertiary closure of wound  Patient has consented to this  He understands all the possible risks benefits alternatives and complications understanding no guarantees have been given  Procedure  Patient was addressed in the operative suite of the office  The following was done under aseptic technique  After achieving anesthesia with regional block of approximately 6 cc 2% lidocaine with epinephrine attention was then directed to the dorsum of the 1st ray of the right foot  There is existed a full thickness, down to a capsule wound  Wound is approximately 0 3 centimeters squared in size  It had a fibrogranular base    No evidence of cellulitis was noted  Utilizing a 15 blade scalpel the wound was excised via 2 semi elliptical converging incisions  This created a wedge of skin was excised in toto  At this time utilizing sharp and blunt dissection all devitalized tissue from the area was debrided  There was a small piece of what appeared to be bone or cartilage within the wound  This was removed at this time and examined  It appeared to have tophaceous gouty crystals adherent to it  This was sent to pathology for specimen evaluation  At this time it appeared that all devitalized tissue from the wound was removed closure could be obtained so after copiously flushing large amounts of sterile saline, closure was attempted  Deep structures reapproximated with 4-0 chromic in the skin with 4-0 Prolene  Steri-Strips applied  Dry sterile dressing applied  At no time during the procedure was tourniquet used  Vascular status to all digits remained within normal limits  Patient was given postoperative instructions  He was placed in [employer]   He is to return the office within 5 days for evaluation and bandage change    He will take Tylenol for pain

## 2022-05-05 ENCOUNTER — LAB REQUISITION (OUTPATIENT)
Dept: LAB | Facility: HOSPITAL | Age: 57
End: 2022-05-05
Payer: COMMERCIAL

## 2022-05-05 DIAGNOSIS — M10.071 IDIOPATHIC GOUT, RIGHT ANKLE AND FOOT: ICD-10-CM

## 2022-05-09 ENCOUNTER — OFFICE VISIT (OUTPATIENT)
Dept: PODIATRY | Facility: CLINIC | Age: 57
End: 2022-05-09

## 2022-05-09 VITALS — RESPIRATION RATE: 17 BRPM | HEIGHT: 73 IN | WEIGHT: 235 LBS | BODY MASS INDEX: 31.14 KG/M2

## 2022-05-09 DIAGNOSIS — M79.671 RIGHT FOOT PAIN: Primary | ICD-10-CM

## 2022-05-09 PROCEDURE — 99024 POSTOP FOLLOW-UP VISIT: CPT | Performed by: PODIATRIST

## 2022-05-09 NOTE — PROGRESS NOTES
Patient is doing rather well status post right foot procedure  No history of fever night sweats  Patient does have some pain in the foot  He has been working in street shoe  0  Neurovascular status appears intact  Dry sterile dressing right foot removed  There is some maceration noted around the incision however no evidence of cellulitis or gross dehiscence  Plan  Gentian violet dry sterile dressing applied  Maxorb applied  Under dry sterile dressing  Patient will leave this intact for 3 days    Return for follow-up

## 2022-05-13 ENCOUNTER — OFFICE VISIT (OUTPATIENT)
Dept: PODIATRY | Facility: CLINIC | Age: 57
End: 2022-05-13

## 2022-05-13 VITALS — RESPIRATION RATE: 17 BRPM | WEIGHT: 235 LBS | HEIGHT: 73 IN | BODY MASS INDEX: 31.14 KG/M2

## 2022-05-13 DIAGNOSIS — M79.671 RIGHT FOOT PAIN: Primary | ICD-10-CM

## 2022-05-13 PROCEDURE — 99024 POSTOP FOLLOW-UP VISIT: CPT | Performed by: PODIATRIST

## 2022-05-13 RX ORDER — SULINDAC 200 MG/1
200 TABLET ORAL 2 TIMES DAILY
Qty: 40 TABLET | Refills: 0 | Status: SHIPPED | OUTPATIENT
Start: 2022-05-13 | End: 2022-07-12

## 2022-05-13 NOTE — PROGRESS NOTES
Patient is status post tertiary closure foot wound  Patient does have pain in the area  No history of fever night sweats  0   Neurovascular status intact  Incision mild the inflamed however no evidence of abscess or infection  Histology report demonstrates fibrogranular tissue consistent with inflammation and possible allergic reaction  Plan  Change of dry sterile dressing  Patient will use Maxorb with Betadine with a dry daily  He has been dispensed this  He can get foot wet  We will add anti-inflammatory medication to control pain  Return for follow-up    Watch for signs of infection

## 2022-05-16 DIAGNOSIS — F51.01 PRIMARY INSOMNIA: ICD-10-CM

## 2022-05-16 NOTE — TELEPHONE ENCOUNTER
Medication refill requested: eszopiclone 3mg  Last office visit: 02/10/2022  Next office visit: 02/23/2023  Last refilled: 02/10/2022  Labs: No  [facility]):   [place] [address] [address] [facility], [address] [place] [address] [address] [facility] [address]  Phone: [phone] Fax: [phone]

## 2022-05-17 RX ORDER — ESZOPICLONE 3 MG/1
3 TABLET, FILM COATED ORAL
Qty: 30 TABLET | Refills: 5 | Status: SHIPPED | OUTPATIENT
Start: 2022-05-17

## 2022-05-19 ENCOUNTER — OFFICE VISIT (OUTPATIENT)
Dept: PODIATRY | Facility: CLINIC | Age: 57
End: 2022-05-19
Payer: COMMERCIAL

## 2022-05-19 ENCOUNTER — APPOINTMENT (OUTPATIENT)
Dept: LAB | Facility: CLINIC | Age: 57
End: 2022-05-19
Payer: COMMERCIAL

## 2022-05-19 VITALS — HEIGHT: 73 IN | BODY MASS INDEX: 31.14 KG/M2 | WEIGHT: 235 LBS | RESPIRATION RATE: 17 BRPM

## 2022-05-19 DIAGNOSIS — M25.571 PAIN IN JOINTS OF BOTH FEET: ICD-10-CM

## 2022-05-19 DIAGNOSIS — M25.572 PAIN IN JOINTS OF BOTH FEET: ICD-10-CM

## 2022-05-19 DIAGNOSIS — M25.571 PAIN IN JOINTS OF BOTH FEET: Primary | ICD-10-CM

## 2022-05-19 DIAGNOSIS — M25.572 PAIN IN JOINTS OF BOTH FEET: Primary | ICD-10-CM

## 2022-05-19 DIAGNOSIS — M10.9 ACUTE GOUTY ARTHRITIS: ICD-10-CM

## 2022-05-19 DIAGNOSIS — S91.301A OPEN WOUND OF RIGHT FOOT, INITIAL ENCOUNTER: ICD-10-CM

## 2022-05-19 LAB
BASOPHILS # BLD AUTO: 0.04 THOUSANDS/ΜL (ref 0–0.1)
BASOPHILS NFR BLD AUTO: 1 % (ref 0–1)
EOSINOPHIL # BLD AUTO: 0.16 THOUSAND/ΜL (ref 0–0.61)
EOSINOPHIL NFR BLD AUTO: 2 % (ref 0–6)
ERYTHROCYTE [DISTWIDTH] IN BLOOD BY AUTOMATED COUNT: 12.7 % (ref 11.6–15.1)
ERYTHROCYTE [SEDIMENTATION RATE] IN BLOOD: 7 MM/HOUR (ref 0–19)
HCT VFR BLD AUTO: 46.6 % (ref 36.5–49.3)
HGB BLD-MCNC: 15.5 G/DL (ref 12–17)
IMM GRANULOCYTES # BLD AUTO: 0.02 THOUSAND/UL (ref 0–0.2)
IMM GRANULOCYTES NFR BLD AUTO: 0 % (ref 0–2)
LYMPHOCYTES # BLD AUTO: 1.69 THOUSANDS/ΜL (ref 0.6–4.47)
LYMPHOCYTES NFR BLD AUTO: 21 % (ref 14–44)
MCH RBC QN AUTO: 30.5 PG (ref 26.8–34.3)
MCHC RBC AUTO-ENTMCNC: 33.3 G/DL (ref 31.4–37.4)
MCV RBC AUTO: 92 FL (ref 82–98)
MONOCYTES # BLD AUTO: 0.65 THOUSAND/ΜL (ref 0.17–1.22)
MONOCYTES NFR BLD AUTO: 8 % (ref 4–12)
NEUTROPHILS # BLD AUTO: 5.51 THOUSANDS/ΜL (ref 1.85–7.62)
NEUTS SEG NFR BLD AUTO: 68 % (ref 43–75)
NRBC BLD AUTO-RTO: 0 /100 WBCS
PLATELET # BLD AUTO: 262 THOUSANDS/UL (ref 149–390)
PMV BLD AUTO: 10.2 FL (ref 8.9–12.7)
RBC # BLD AUTO: 5.08 MILLION/UL (ref 3.88–5.62)
URATE SERPL-MCNC: 5.3 MG/DL (ref 3.5–8.5)
WBC # BLD AUTO: 8.07 THOUSAND/UL (ref 4.31–10.16)

## 2022-05-19 PROCEDURE — 86038 ANTINUCLEAR ANTIBODIES: CPT

## 2022-05-19 PROCEDURE — 73620 X-RAY EXAM OF FOOT: CPT | Performed by: PODIATRIST

## 2022-05-19 PROCEDURE — 86430 RHEUMATOID FACTOR TEST QUAL: CPT

## 2022-05-19 PROCEDURE — 36415 COLL VENOUS BLD VENIPUNCTURE: CPT

## 2022-05-19 PROCEDURE — 85652 RBC SED RATE AUTOMATED: CPT

## 2022-05-19 PROCEDURE — 85025 COMPLETE CBC W/AUTO DIFF WBC: CPT

## 2022-05-19 PROCEDURE — 84550 ASSAY OF BLOOD/URIC ACID: CPT

## 2022-05-19 PROCEDURE — 86618 LYME DISEASE ANTIBODY: CPT

## 2022-05-19 NOTE — PROGRESS NOTES
Assessment/Plan:  Arthralgia foot bilateral   Rule out inflammatory arthropathy  Cystic formation/ganglion 1st MPJ right foot  Mild dehiscence of incision  Negative cellulitis  Plan  Foot exam performed  Half of the right foot incisions sutures removed  There was a ganglionic cyst development of the incision  Incision and drainage done  Culture and sensitivity done  Area cauterized with gentian violet and silver nitrate  Patient will continue bandage daily Maxorb  Blood work ordered to rule out inflammatory arthropathy  Return 1 week         Diagnoses and all orders for this visit:    Pain in joints of both feet  -     Uric acid; Future  -     Lyme Antibody Profile with reflex to WB; Future  -     Rheumatoid factor; Future  -     Antinuclear Antibodies (NAVI), IFA; Future  -     Sedimentation rate, automated; Future  -     CBC and differential; Future    Acute gouty arthritis  -     Uric acid; Future  -     Lyme Antibody Profile with reflex to WB; Future  -     Rheumatoid factor; Future  -     Antinuclear Antibodies (NAVI), IFA; Future  -     Sedimentation rate, automated; Future  -     CBC and differential; Future    Open wound of right foot, initial encounter          Subjective:  Patient has continued pain of his right and left foot  He is taking medication as prescribed    No history of fever night sweats    Allergies   Allergen Reactions    Allopurinol Rash         Current Outpatient Medications:     albuterol (PROVENTIL HFA,VENTOLIN HFA) 90 mcg/act inhaler, Inhale 2 puffs every 4 (four) hours as needed for wheezing or shortness of breath, Disp: 18 g, Rfl: 5    b complex vitamins capsule, Take 1 capsule by mouth daily, Disp: , Rfl:     carvedilol (COREG) 3 125 mg tablet, Take 1 tablet (3 125 mg total) by mouth 2 (two) times a day with meals, Disp: 60 tablet, Rfl: 5    eszopiclone (LUNESTA) 3 MG tablet, Take 1 tablet (3 mg total) by mouth daily at bedtime, Disp: 30 tablet, Rfl: 5    FLUoxetine (PROzac) 20 MG tablet, Take 1 tablet (20 mg total) by mouth daily, Disp: 90 tablet, Rfl: 1    fluticasone-umeclidinium-vilanterol (Trelegy Ellipta) 100-62 5-25 MCG/INH inhaler, Inhale 1 puff daily Rinse mouth after use , Disp: 60 blister, Rfl: 11    lisinopril (ZESTRIL) 30 mg tablet, Take 1 tablet (30 mg total) by mouth daily (Patient taking differently: Take 30 mg by mouth every evening  ), Disp: 30 tablet, Rfl: 1    meloxicam (MOBIC) 15 mg tablet, Take 1 tablet (15 mg total) by mouth daily, Disp: 30 tablet, Rfl: 0    multivitamin (THERAGRAN) TABS, Take 1 tablet by mouth daily, Disp: , Rfl:     pantoprazole (PROTONIX) 40 mg tablet, Take 1 tablet (40 mg total) by mouth 2 (two) times a day, Disp: 60 tablet, Rfl: 3    pantoprazole (PROTONIX) 40 mg tablet, Take 1 tablet (40 mg total) by mouth daily, Disp: 60 tablet, Rfl: 0    rosuvastatin (CRESTOR) 10 MG tablet, Take 1 tablet (10 mg total) by mouth daily, Disp: 30 tablet, Rfl: 3    sulindac (CLINORIL) 200 MG tablet, Take 1 tablet (200 mg total) by mouth in the morning and 1 tablet (200 mg total) in the evening  Do all this for 20 days  , Disp: 40 tablet, Rfl: 0    triamcinolone (KENALOG) 0 1 % ointment, Apply topically 2 (two) times a day, Disp: 15 g, Rfl: 1    VITAMIN D PO, Take 1 tablet by mouth in the morning  , Disp: , Rfl:     Zinc Sulfate (ZINC 15 PO), Take 1 tablet by mouth in the morning  , Disp: , Rfl:     Patient Active Problem List   Diagnosis    GERD (gastroesophageal reflux disease)    Diverticulosis    Sleep apnea    Major depressive disorder, single episode, mild (HCC)    Mixed hyperlipidemia    Benign hypertension with CKD (chronic kidney disease), stage II    Primary insomnia    Psoriasis    Vitamin D deficiency    Low libido    Class 1 obesity due to excess calories with serious comorbidity and body mass index (BMI) of 31 0 to 31 9 in adult    Arthritis of both feet    Abnormal stress test    Centrilobular emphysema (HCC)    SOB (shortness of breath)    Stage 3a chronic kidney disease (HCC)    Hallux rigidus, right foot    Hallux rigidus, left foot    Primary osteoarthritis of right foot    Primary osteoarthritis of left foot    C  difficile colitis and Sigmoid Diverticulitis    History of colon polyps          Patient ID: Chaitanya Fajardo is a 60 y o  male  HPI    The following portions of the patient's history were reviewed and updated as appropriate:     family history includes Abdominal aortic aneurysm in his father; Anemia in his mother; Diabetes in his father, maternal grandfather, maternal grandmother, paternal grandfather, and paternal grandmother; Emphysema in his brother and maternal grandmother; Hypertension in his father and mother; Pancreatic cancer in his father  reports that he quit smoking about 9 years ago  His smoking use included cigarettes  He started smoking about 35 years ago  He has a 35 00 pack-year smoking history  He has never used smokeless tobacco  He reports current alcohol use  He reports that he does not use drugs  Vitals:    05/19/22 1153   Resp: 17       Review of Systems      Objective:  Patient's shoes and socks removed  Foot ExamPhysical Exam  Vitals and nursing note reviewed  Constitutional:       Appearance: Normal appearance  Cardiovascular:      Rate and Rhythm: Normal rate and regular rhythm  Musculoskeletal:      Comments: Right foot demonstrates grossly coapted incision  There is a small area of bubbling/bullae on the distal 2/3 aspect of the incision  This is consistent with ganglion cyst   Incision and drainage done  Straw-colored fluid exuded  No evidence of occult pus  Culture and sensitivity done  X-rays demonstrate no evidence of osteomyelitis or fracture  Rule out inflammatory arthropathy  Skin:     Capillary Refill: Capillary refill takes less than 2 seconds  Neurological:      Mental Status: He is alert     Psychiatric:         Mood and Affect: Mood normal          Behavior: Behavior normal          Thought Content:  Thought content normal          Judgment: Judgment normal

## 2022-05-20 LAB
ANA TITR SER IF: NEGATIVE {TITER}
B BURGDOR IGG+IGM SER-ACNC: 0
RHEUMATOID FACT SER QL LA: NEGATIVE

## 2022-05-21 LAB
BACTERIA SPEC AEROBE CULT: NORMAL
Lab: NORMAL

## 2022-05-24 ENCOUNTER — TELEPHONE (OUTPATIENT)
Dept: OBGYN CLINIC | Facility: HOSPITAL | Age: 57
End: 2022-05-24

## 2022-05-26 ENCOUNTER — OFFICE VISIT (OUTPATIENT)
Dept: PODIATRY | Facility: CLINIC | Age: 57
End: 2022-05-26

## 2022-05-26 VITALS — BODY MASS INDEX: 31.14 KG/M2 | WEIGHT: 235 LBS | HEIGHT: 73 IN

## 2022-05-26 DIAGNOSIS — M25.572 PAIN IN JOINTS OF BOTH FEET: Primary | ICD-10-CM

## 2022-05-26 DIAGNOSIS — S91.301A OPEN WOUND OF RIGHT FOOT, INITIAL ENCOUNTER: ICD-10-CM

## 2022-05-26 DIAGNOSIS — M79.671 RIGHT FOOT PAIN: ICD-10-CM

## 2022-05-26 DIAGNOSIS — M25.571 PAIN IN JOINTS OF BOTH FEET: Primary | ICD-10-CM

## 2022-05-26 PROCEDURE — 99024 POSTOP FOLLOW-UP VISIT: CPT | Performed by: PODIATRIST

## 2022-05-26 NOTE — PROGRESS NOTES
Assessment/Plan:  Osteoarthritis right 1st MPJ  Chronic wound  Pain  Plan  Foot exam performed  Sutures removed from wound  Wound debrided  Gentian violet dry sterile dressing applied  We will start home wound care consisting of Iodosorb gel and dry sterile dressing daily  Patient has been advised of culture results  He has been advised of lab results  He has been referred to Rheumatology for workup of osteoarthritis  Because patient has continued pain big toe joints right greater than left we will consider had arthroplasty of joint with francois implant placement  Patient has been advised of this  He will consider this as well as 2nd opinion  In the meantime we will keep an eye out a watch for development of osteomyelitis  Still x-rays to be done  Return for follow-up  Patient will also try Lyrica  This has been prescribed  He will remain on anti-inflammatory as directed  Patient be given referral to orthopedics for 2nd opinion  Diagnoses and all orders for this visit:    Pain in joints of both feet  -     Ambulatory Referral to Orthopedic Surgery; Future          Subjective:  Patient is chronic pain of feet  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 however he has pain in all times  He is concerned about redevelopment of wound of the right foot      Allergies   Allergen Reactions    Allopurinol Rash         Current Outpatient Medications:     albuterol (PROVENTIL HFA,VENTOLIN HFA) 90 mcg/act inhaler, Inhale 2 puffs every 4 (four) hours as needed for wheezing or shortness of breath, Disp: 18 g, Rfl: 5    b complex vitamins capsule, Take 1 capsule by mouth daily, Disp: , Rfl:     carvedilol (COREG) 3 125 mg tablet, Take 1 tablet (3 125 mg total) by mouth 2 (two) times a day with meals, Disp: 60 tablet, Rfl: 5    eszopiclone (LUNESTA) 3 MG tablet, Take 1 tablet (3 mg total) by mouth daily at bedtime, Disp: 30 tablet, Rfl: 5    FLUoxetine (PROzac) 20 MG tablet, Take 1 tablet (20 mg total) by mouth daily, Disp: 90 tablet, Rfl: 1    fluticasone-umeclidinium-vilanterol (Trelegy Ellipta) 100-62 5-25 MCG/INH inhaler, Inhale 1 puff daily Rinse mouth after use , Disp: 60 blister, Rfl: 11    lisinopril (ZESTRIL) 30 mg tablet, Take 1 tablet (30 mg total) by mouth daily (Patient taking differently: Take 30 mg by mouth every evening  ), Disp: 30 tablet, Rfl: 1    meloxicam (MOBIC) 15 mg tablet, Take 1 tablet (15 mg total) by mouth daily, Disp: 30 tablet, Rfl: 0    multivitamin (THERAGRAN) TABS, Take 1 tablet by mouth daily, Disp: , Rfl:     pantoprazole (PROTONIX) 40 mg tablet, Take 1 tablet (40 mg total) by mouth 2 (two) times a day, Disp: 60 tablet, Rfl: 3    pantoprazole (PROTONIX) 40 mg tablet, Take 1 tablet (40 mg total) by mouth daily, Disp: 60 tablet, Rfl: 0    rosuvastatin (CRESTOR) 10 MG tablet, Take 1 tablet (10 mg total) by mouth daily, Disp: 30 tablet, Rfl: 3    sulindac (CLINORIL) 200 MG tablet, Take 1 tablet (200 mg total) by mouth in the morning and 1 tablet (200 mg total) in the evening  Do all this for 20 days  , Disp: 40 tablet, Rfl: 0    triamcinolone (KENALOG) 0 1 % ointment, Apply topically 2 (two) times a day, Disp: 15 g, Rfl: 1    VITAMIN D PO, Take 1 tablet by mouth in the morning  , Disp: , Rfl:     Zinc Sulfate (ZINC 15 PO), Take 1 tablet by mouth in the morning  , Disp: , Rfl:     Patient Active Problem List   Diagnosis    GERD (gastroesophageal reflux disease)    Diverticulosis    Sleep apnea    Major depressive disorder, single episode, mild (HCC)    Mixed hyperlipidemia    Benign hypertension with CKD (chronic kidney disease), stage II    Primary insomnia    Psoriasis    Vitamin D deficiency    Low libido    Class 1 obesity due to excess calories with serious comorbidity and body mass index (BMI) of 31 0 to 31 9 in adult    Arthritis of both feet    Abnormal stress test    Centrilobular emphysema (HCC)    SOB (shortness of breath)    Stage 3a chronic kidney disease (HCC)    Hallux rigidus, right foot    Hallux rigidus, left foot    Primary osteoarthritis of right foot    Primary osteoarthritis of left foot    C  difficile colitis and Sigmoid Diverticulitis    History of colon polyps          Patient ID: Ra Rodriges is a 60 y o  male  HPI    The following portions of the patient's history were reviewed and updated as appropriate:     family history includes Abdominal aortic aneurysm in his father; Anemia in his mother; Diabetes in his father, maternal grandfather, maternal grandmother, paternal grandfather, and paternal grandmother; Emphysema in his brother and maternal grandmother; Hypertension in his father and mother; Pancreatic cancer in his father  reports that he quit smoking about 9 years ago  His smoking use included cigarettes  He started smoking about 35 years ago  He has a 35 00 pack-year smoking history  He has never used smokeless tobacco  He reports current alcohol use  He reports that he does not use drugs  There were no vitals filed for this visit  Review of Systems      Objective:  Patient's shoes and socks removed  Foot ExamPhysical Exam  Vitals and nursing note reviewed  Constitutional:       Appearance: Normal appearance  Cardiovascular:      Rate and Rhythm: Normal rate and regular rhythm  Musculoskeletal:      Comments: Patient demonstrates significant osteoarthritis of the 1st MPJ bilateral   There is decrease in the joint space noted  At this time there is no evidence of fracture osteomyelitis  Skin:     Capillary Refill: Capillary refill takes less than 2 seconds  Comments: Dorsal aspect right 1st MPJ demonstrates small wound approximately 0 5 centimeter squared in size  No evidence of cellulitis at this time her last culture demonstrated skin growth only  Wound has granular bed  No evidence of sinus a this time    This appears to be residual from incision and drainage of ganglion cyst/mucoid cyst of skin  Neurological:      Mental Status: He is alert  Psychiatric:         Mood and Affect: Mood normal          Behavior: Behavior normal          Thought Content:  Thought content normal          Judgment: Judgment normal

## 2022-06-02 ENCOUNTER — OFFICE VISIT (OUTPATIENT)
Dept: PODIATRY | Facility: CLINIC | Age: 57
End: 2022-06-02

## 2022-06-02 VITALS — BODY MASS INDEX: 31.14 KG/M2 | WEIGHT: 235 LBS | RESPIRATION RATE: 17 BRPM | HEIGHT: 73 IN

## 2022-06-02 DIAGNOSIS — M25.571 PAIN IN JOINTS OF BOTH FEET: Primary | ICD-10-CM

## 2022-06-02 DIAGNOSIS — M25.572 PAIN IN JOINTS OF BOTH FEET: Primary | ICD-10-CM

## 2022-06-02 PROCEDURE — 99024 POSTOP FOLLOW-UP VISIT: CPT | Performed by: PODIATRIST

## 2022-06-02 NOTE — PROGRESS NOTES
Patient is doing better  He was unable tolerate Iodosorb gel  He is now just bandaging with Band-Aid  Patient still has pain in both big toe joints  He is to see Rheumatology  He is to see orthopedics  No history of fever night sweats    0  Neurovascular status intact  Dorsum 1st MPJ right foot demonstrates superficial wound  Moderate amount of serous exudate noted  No evidence of cellulitis  Joint has minimal range of motion at this time  Plan  Continue wound care  Today gentian violet dry sterile dressing applied  Patient will bandage daily  I will watch for signs of infection  He will follow up with Rheumatology and Orthopedics  Patient may need joint arthroplasty  Return for wound check

## 2022-06-06 DIAGNOSIS — I10 ESSENTIAL HYPERTENSION: ICD-10-CM

## 2022-06-07 RX ORDER — LISINOPRIL 30 MG/1
30 TABLET ORAL EVERY EVENING
Qty: 90 TABLET | Refills: 1 | Status: SHIPPED | OUTPATIENT
Start: 2022-06-07

## 2022-06-07 NOTE — TELEPHONE ENCOUNTER
Medication refill requested: lisinopril (ZESTRIL) 30 mg tablet  Last office visit: 2/10/22  Next office visit: 2/23/23  Last refilled: 2/10/22  Labs: Yes, describe: 5/19/22  [facility]ing [facility]   Tamika Marvin [address] [facility], [facility] [address] [place] [id number]  Phone: [phone] Fax: [phone]

## 2022-06-08 ENCOUNTER — OFFICE VISIT (OUTPATIENT)
Dept: OBGYN CLINIC | Facility: CLINIC | Age: 57
End: 2022-06-08
Payer: COMMERCIAL

## 2022-06-08 VITALS
HEART RATE: 88 BPM | HEIGHT: 73 IN | DIASTOLIC BLOOD PRESSURE: 110 MMHG | WEIGHT: 235 LBS | SYSTOLIC BLOOD PRESSURE: 155 MMHG | BODY MASS INDEX: 31.14 KG/M2

## 2022-06-08 DIAGNOSIS — M20.21 HALLUX RIGIDUS OF BOTH FEET: ICD-10-CM

## 2022-06-08 DIAGNOSIS — M20.22 HALLUX RIGIDUS OF BOTH FEET: ICD-10-CM

## 2022-06-08 PROCEDURE — 99243 OFF/OP CNSLTJ NEW/EST LOW 30: CPT | Performed by: ORTHOPAEDIC SURGERY

## 2022-06-08 RX ORDER — PREGABALIN 75 MG/1
75 CAPSULE ORAL 2 TIMES DAILY
COMMUNITY
Start: 2022-05-26

## 2022-06-08 NOTE — PROGRESS NOTES
JONY Casanova  Attending, Orthopaedic Surgery  [facility] [address] [facility]      ORTHOPAEDIC FOOT AND ANKLE CLINIC VISIT       Assessment:     Encounter Diagnosis   Name Primary?  Hallux rigidus of both feet             Plan:   · The patient verbalized understanding of exam findings and treatment plan  We engaged in the shared decision-making process and treatment options were discussed at length with the patient  · Mei Larson has bilateral hallux rigidus with a wound over the right 1st MTP joint  · He is here for a second opinion from Dr Brandt Paz  · He had bilateral cheilectomies and the right side wound did not completely heal   · He has a sinus track which appears to communicate with the 1st MTP joint and has joint fluid escaping the wound  Dr Brandt Paz attempted a bedside debridement in the clinic of this wound about a month ago but this did not alleviate the issue  · He needs fusions of his 1st MTP joints bilaterally but I would not do this to the right side until the wound is healed  · To close his wound, I believe he requires a return trip to the OR for a formal washout, debridement of any friable or necrotic tissue, attempted closure of the joint capsule with possible periosteal patch, and then multi-layered closure with application of incisional VAC to assist with closure  I may also place a drain in the joint to redirect the joint fluid away from the wound  He will discuss with Dr Brandt Paz  · There is not a lot of forgiveness in the soft tissues around the great toe and so I would not hesitate to get plastics involved if need be  Return if symptoms worsen or fail to improve  History of Present Illness:   Chief Complaint:   Chief Complaint   Patient presents with   [address] [place] [address] St is a 60 y o  male who is being seen for bilateral great toe/MTP joint pain  The patient reports a history of BL 1st MTP pain for several years   He had BL cheilectomy in December 2021  He continued with pain and developed a nonhealing wound over the right foot incision  He had a revision of the wound but continues with drainage  Pain is localized at [facility] 1st MTP joints with minimal radiating and described as sharp and severe  Patient denies numbness, tingling or radicular pain  Denies history of neuropathy  Patient does not smoke, does not have diabetes and does not take blood thinners  Patient denies family history of anesthesia complications and has not had any complications with anesthesia       Pain/symptom timing:  Worse during the day when active  Pain/symptom context:  Worse with activites and work  Pain/symptom modifying factors:  Rest makes better, activities make worse  Pain/symptom associated signs/symptoms: none    Prior treatment   · NSAIDsYes    · Injections No   · Bracing/Orthotics No    · Physical Therapy No     Orthopedic Surgical History:   See below    Past Medical History:  Past Medical History:   Diagnosis Date    COVID-19     positive test on 12/16/21    Depression     Diverticulitis     Diverticulosis     GERD (gastroesophageal reflux disease)     Hypertension     Sleep apnea     no cpap    Umbilical hernia        Past Surgical History:   Procedure Laterality Date    CATARACT EXTRACTION Bilateral     CHOLECYSTECTOMY      COLONOSCOPY      ESOPHAGOGASTRODUODENOSCOPY      HERNIA REPAIR  09/19/2019    Open repair of incarcerated incisional hernia with mesh - Dr Barraza Nine ARTHROSCOPY Left     AK HALLUX RIGIDUS W/CHEILECTOMY 1ST MP JT W/O IMPLT Bilateral 12/10/2021    Procedure: CHEILECTOMY;  Surgeon: Aurelio Cook DPM;  Location: [address] [place] [address];  Service: Podiatry    AK TENOTOMY ELBOW LATERAL/MEDIAL DEBRIDE REPAIR Left 11/17/2020    Procedure: lateral epicondyle elbow debridement, release, and repair;  Surgeon: Kristian Abraham MD;  Location: [facility];  Service: Orthopedics    WISDOM TOOTH EXTRACTION         The patient has a family history of        Current Outpatient Medications:     albuterol (PROVENTIL HFA,VENTOLIN HFA) 90 mcg/act inhaler, Inhale 2 puffs every 4 (four) hours as needed for wheezing or shortness of breath, Disp: 18 g, Rfl: 5    b complex vitamins capsule, Take 1 capsule by mouth daily, Disp: , Rfl:     carvedilol (COREG) 3 125 mg tablet, Take 1 tablet (3 125 mg total) by mouth 2 (two) times a day with meals, Disp: 60 tablet, Rfl: 5    eszopiclone (LUNESTA) 3 MG tablet, Take 1 tablet (3 mg total) by mouth daily at bedtime, Disp: 30 tablet, Rfl: 5    FLUoxetine (PROzac) 20 MG tablet, Take 1 tablet (20 mg total) by mouth daily, Disp: 90 tablet, Rfl: 1    lisinopril (ZESTRIL) 30 mg tablet, Take 1 tablet (30 mg total) by mouth every evening, Disp: 90 tablet, Rfl: 1    multivitamin (THERAGRAN) TABS, Take 1 tablet by mouth daily, Disp: , Rfl:     pantoprazole (PROTONIX) 40 mg tablet, Take 1 tablet (40 mg total) by mouth 2 (two) times a day, Disp: 60 tablet, Rfl: 3    pantoprazole (PROTONIX) 40 mg tablet, Take 1 tablet (40 mg total) by mouth daily, Disp: 60 tablet, Rfl: 0    pregabalin (LYRICA) 75 mg capsule, Take 75 mg by mouth 2 (two) times a day, Disp: , Rfl:     rosuvastatin (CRESTOR) 10 MG tablet, Take 1 tablet (10 mg total) by mouth daily, Disp: 30 tablet, Rfl: 3    triamcinolone (KENALOG) 0 1 % ointment, Apply topically 2 (two) times a day, Disp: 15 g, Rfl: 1    VITAMIN D PO, Take 1 tablet by mouth in the morning  , Disp: , Rfl:     Zinc Sulfate (ZINC 15 PO), Take 1 tablet by mouth in the morning  , Disp: , Rfl:     fluticasone-umeclidinium-vilanterol (Trelegy Ellipta) 100-62 5-25 MCG/INH inhaler, Inhale 1 puff daily Rinse mouth after use , Disp: 60 blister, Rfl: 11    meloxicam (MOBIC) 15 mg tablet, Take 1 tablet (15 mg total) by mouth daily, Disp: 30 tablet, Rfl: 0    sulindac (CLINORIL) 200 MG tablet, Take 1 tablet (200 mg total) by mouth in the morning and 1 tablet (200 mg total) in the evening  Do all this for 20 days  , Disp: 40 tablet, Rfl: 0      Allergies   Allergen Reactions    Allopurinol Rash       Review of Systems:  A comprehensive 14 point ROS was performed, reviewed, and the pertinent orthopaedic findings are documented in the HPI  Physical Exam:   BP (!) 155/110 (BP Location: Left arm, Patient Position: Sitting, Cuff Size: Adult)   Pulse 88   Ht 6' 1" (1 854 m)   Wt 107 kg (235 lb)   BMI 31 00 kg/m²   General/Constitutional: No apparent distress: well-nourished and well developed  Eyes: normal ocular motion  Lymphatic: No appreciable lymphadenopathy  Respiratory: Non-labored breathing  Vascular: No edema, swelling or tenderness, except as noted in detailed exam   Integumentary: No impressive skin lesions present, except as noted in detailed exam   Neuro: No ataxia or tremors noted  Psych: Normal mood and affect, oriented to person, place and time  Appropriate affect  Musculoskeletal: Normal, except as noted in detailed exam and in HPI  Examination      Right Left   Gait Antalgic   Musculoskeletal Tender to palpation at 1st MTP  Tender to palpation at 1st MTP joint   Skin Small open draining wound over the dorsum of the MTP joint in the surgical site  Normal- well healed incision   Nails Normal Normal   Range of Motion  Limited ROM of the 1st MTP joint Limited ROM of the 1st MTP joint with pain     Stability Stable Stable   Muscle Strength 5/5 tibialis anterior  5/5 gastrocnemius-soleus  5/5 posterior tibialis  5/5 peroneal/eversion strength  5/5 EHL  5/5 FHL 5/5 tibialis anterior  5/5 gastrocnemius-soleus  5/5 posterior tibialis  5/5 peroneal/eversion strength  5/5 EHL  5/5 FHL   Neurologic Normal Normal   Sensation  Intact to light touch throughout sural, saphenous, superficial peroneal, deep peroneal and medial/lateral plantar nerve distributions  Mission-Pat 5 07 filament (10g) testing was deferred   Intact to light touch throughout sural, saphenous, superficial peroneal, deep peroneal and medial/lateral plantar nerve distributions  Lester-Pat 5 07 filament (10g) testing was deferred  Cardiovascular Brisk capillary refill < 2 seconds,intact DP and PT pulses Brisk capillary refill < 2 seconds,intact DP and PT pulses   Special Tests None None       Imaging Studies:   3 views of the BL feet were available, reviewed and interpreted independently that demonstrate moderate to severe arthritis at the 1st MTP joints  Reviewed by me personally  MRI of the right foot was available and reviewed  This demonstrates arthritis at the 1st MTP joint without signs of deep infection  Scribe Attestation    I,:  Osman Ortiz PA-C am acting as a scribe while in the presence of the attending physician :       I,:  Doris Bright MD personally performed the services described in this documentation    as scribed in my presence :             Heron Nevin Lachman, MD  Attending, [facility]      I personally performed the service  Heron Nevin Lachman, MD

## 2022-06-08 NOTE — PATIENT INSTRUCTIONS
Hallux Rigidus     What is hallux rigidus? Hallux rigidus is arthritis of the joint at the base of the big toe  It is the most common arthritic condition of the foot, affecting 1 in 40 people over the age of 48  Hallux rigidus tends to affect females more often than males and typically develops in adults over the age of 27  What are the symptoms of hallux rigidus? Most patients present with a complaint of pain in the big toe joint while active, especially when pushing off to walk  Often, there also is swelling around the big toe joint or difficulty moving and bending the toe  A bump, like a bunion (hallux valgus) or bone spur, can develop on top of the big toe joint and be aggravated by rubbing against the inside of a shoe  What causes hallux rigidus? The cause of hallux rigidus is not known; however, several risks factors have been identified  Risk factors include a long or elevated first foot bone (metatarsal) or other differences in foot anatomy, prior injury to the big toe, and family history  These can lead to excessive wear of the joint, which in turn leads to arthritis  Anatomy   The big toe joint is called the hallux metatarsal phalangeal or MTP joint  This joint connects the head of the first foot bone (metatarsal) with the base of the first toe bone (proximal phalanx) and the two tiny bones (sesamoids) underneath the metatarsal  Usually the greatest area of wear is at the top of the joint  Location of MTP joint      How is hallux rigidus diagnosed? In many cases, the diagnosis of hallux rigidus can be made by physical examination alone  Your doctor examine the MTP joint to see how much you are able to move and where the pain occurs  The doctor also will check your foot for evidence of bone spurs  X-rays may be performed to identify the extent of joint degeneration and to show the location and size of bone spurs  These X-rays are best taken with you standing and putting weight on your foot  MRI and CT scans are rarely necessary  How is hallux rigidus treated? Non-surgical Treatment  Non-surgical management is always the first line treatment for hallux rigidus  A physician may suggest pain relievers and anti-inflammatory medicines and ice or heat packs to reduce pain  Platelet-rich plasma injections and similar injections into the joint are promising but vary in effectiveness  Changes in footwear may also be suggested  Avoiding thin-soled shoes or higher-heeled shoes can minimize the compression at the top of the joint  Shoes with a stiff sole, curved sole (rocker bottom), or both may also minimize joint pain  You may also benefit from shoe inserts as well as arch supports (orthoses) that limit motion at the MTP joint  A list of shoe stores in the area is included below where you can be fit for quality supportive shoes by an expert  We recommend a [employer] orthotic (Pictured below and available on [employer]  com typing in the name to the left )       Although these treatments may help decrease the symptoms, they do not stop the condition from progressing  Surgical Treatment   If pain persists after the above non-surgical treatments, surgical treatments will be considered  The type of surgery would be determined by the extent of arthritis and deformity of the toe  Bone Spur Removal (Cheilectomy)  For mild to moderate damage, removing some bone and the bone spur on top of the metatarsal and proximal phalanx can be sufficient  This procedure is called a cheilectomy  Removing the bone spur allows more room for the toe to bend up and alleviates pain caused when pushing off the toe  This procedure also can be combined with other bone cuts that change the position of the big toe and further relieve pressure at the top of the joint  The advantages of this procedure are that it maintains stability and motion, and preserves the joint itself              Cheilectomy     Joint Fusion (Arthrodesis)  Advanced stages of hallux rigidus with severe joint damage are often treated by "welding" the big toe joint  This procedure is called arthrodesis or joint fusion  In this procedure, the damaged cartilage is removed and the two bones are fixed together with screws and/or plates to enable them to grow together  The main advantage of this procedure is that it is a permanent correction to reduce pain  The major disadvantage is that it restricts movement of the big toe, although most patients can still be active  Joint Resurfacing (Interpositional Arthroplasty)  For the patient with moderate to severe hallux rigidus who wants to avoid loss of motion, an interpositional arthroplasty may be an option  This procedure consists of taking away some of the damaged bone (similar to a cheilectomy) and placing a spacer between the two bones to minimize contact on either side of the joint  Interpositional arthroplasty is primarily performed in two ways  In one technique, a piece of soft tissue is used as the spacer in an attempt to resurface the joint  This soft tissue can come from your foot, another part of your body, or even prepared cadaver tissue  The operation does preserve some motion but is not as predictable for pain relief  Alternatively, another technique has been developed that uses a synthetic cartilage implant plug made out of polyvinyl alcohol as the spacer  The advantages of this procedure are that it requires less bone to be removed and it is much easier to convert to fusion if it fails  It also has shown to be as effective as fusion in relieving pain, while preserving motion of the joint  This is a newer procedure; however, current studies have demonstrated good results that appear likely to hold up over time       Joint Replacement (Arthroplasty)  Arthroplasty, or replacing one or both sides of the joint with metal or plastic parts, is intriguing due to the success of these procedures in the knee and hip  While there are studies that support this technique with particular implants, orthopaedic surgeons are cautious to recommend it at this time due to reports of higher complication rates and unpredictable short- and long-term results  Researchers continue to study this technique and types of implants  Consult with a foot and ankle orthopaedic surgeon for more information  How long is recovery after surgery? Recovery depends upon the type of surgery performed  For cheilectomy and interpositional arthroplasty, most surgeons recommend wearing a hard-soled sandal and allowing weightbearing as tolerated for about two weeks before a gradual return to normal footwear  For a fusion or procedure that cuts the bone, the foot may be immobilized with a cast for four to eight weeks, and limited weightbearing may be allowed with crutches for two to three months  You can expect some foot swelling, stiffness, and aching for several months after the procedure, depending on your level of activity  After recovery, most patients are able to exercise, run,and wear most shoes comfortably  Patients may still find stiff-soled, rocker bottom shoes more comfortable for exercise  What are the potential complications? When surgery is warranted, the typical risks of operation apply, including scarring, infection, and failure to relieve symptoms  However, these risks are very infrequent with the above procedures unless there are other factors such as cigarette use or a poor immune system  Frequently Asked Questions  What type of activity is allowed after fusion surgery? Most patients are able to return to their usual level of activity, although running and jumping will be more difficult for some patients  Wearing a heel higher than one and a half inches may be less comfortable      [employer] which Specialize in [address] [place] [address] are good brands but I recommend going to a dedicate shoe store (not [employer] or [employer] ) At these types of stores, they have experts that can fit you for shoes appropriate for your foot problem  [employer]  [address] [facility], [address]  [address], [address], [address] [place] [address]     [employer]  [address] [facility] [address], [place], [address] [place] [address] [address] [place] [address]

## 2022-06-09 ENCOUNTER — OFFICE VISIT (OUTPATIENT)
Dept: PODIATRY | Facility: CLINIC | Age: 57
End: 2022-06-09

## 2022-06-09 VITALS — WEIGHT: 235 LBS | RESPIRATION RATE: 17 BRPM | BODY MASS INDEX: 31.14 KG/M2 | HEIGHT: 73 IN

## 2022-06-09 DIAGNOSIS — M79.671 RIGHT FOOT PAIN: Primary | ICD-10-CM

## 2022-06-09 PROCEDURE — 99024 POSTOP FOLLOW-UP VISIT: CPT | Performed by: PODIATRIST

## 2022-06-09 NOTE — PROGRESS NOTES
Patient presents for evaluation  Patient still has pain in big toe bilateral   He sought 2nd opinion from orthopedics  They wanted to arthrodesis 1st MPJ bilateral   Patient has wounds at this time  He is bandaging daily  At this time he has no history of fever night sweats  0   Neurovascular status grossly within normal limits  Right foot demonstrates small superficial ulcer over the 1st MPJ  Wounds approximally 1 3 centimeter squared in size  It is partial thickness  At this time there is serous exudate noted, minimal   No evidence of cellulitis  1st MPJ demonstrates only minimal range of motion  Plan  Continue wound care  Gentian violet dry sterile dressing applied today  Patient will bandage daily  Watch for signs of infection  Patient is still considering whether he wants to proceed with surgery that would either be 1st MPJ francois implant versus arthrodesis  He is advised to seek 3rd opinion    Return for follow-up

## 2022-06-23 ENCOUNTER — OFFICE VISIT (OUTPATIENT)
Dept: PODIATRY | Facility: CLINIC | Age: 57
End: 2022-06-23

## 2022-06-23 VITALS — RESPIRATION RATE: 17 BRPM | WEIGHT: 235 LBS | BODY MASS INDEX: 31.14 KG/M2 | HEIGHT: 73 IN

## 2022-06-23 DIAGNOSIS — S91.301A OPEN WOUND OF RIGHT FOOT, INITIAL ENCOUNTER: Primary | ICD-10-CM

## 2022-06-23 PROCEDURE — 87070 CULTURE OTHR SPECIMN AEROBIC: CPT | Performed by: PODIATRIST

## 2022-06-23 PROCEDURE — 87186 SC STD MICRODIL/AGAR DIL: CPT | Performed by: PODIATRIST

## 2022-06-23 PROCEDURE — 87205 SMEAR GRAM STAIN: CPT | Performed by: PODIATRIST

## 2022-06-23 PROCEDURE — 99024 POSTOP FOLLOW-UP VISIT: CPT | Performed by: PODIATRIST

## 2022-06-23 NOTE — PROGRESS NOTES
Patient still has pain of both big toes bilateral   Patient still has wound of right foot  At this time he has no history of fever night sweats  Patient is contemplating surgery at this time  0  Neurovascular status grossly within normal limits  Right foot demonstrates wound over dorsal aspect right 1st MPJ  Is approximately 0 5 centimeter squared in size  Serous exudate noted  Wound explored and debrided  Fibrous granular tissue noted within the wound  No evidence of abscess or cellulitis  Culture sensitivity done  Plan  Long discussion with patient concerning treatment and possibilities in outcomes  At this time patient feels like he wants to proceed with surgery  It is our recommendation he follow up with Orthopedics  This may require staged procedure  With wound debridement and closure and then arthrodesis

## 2022-06-24 ENCOUNTER — LAB REQUISITION (OUTPATIENT)
Dept: LAB | Facility: HOSPITAL | Age: 57
End: 2022-06-24
Payer: COMMERCIAL

## 2022-06-24 DIAGNOSIS — S91.301A UNSPECIFIED OPEN WOUND, RIGHT FOOT, INITIAL ENCOUNTER: ICD-10-CM

## 2022-06-27 LAB
BACTERIA WND AEROBE CULT: ABNORMAL
BACTERIA WND AEROBE CULT: ABNORMAL
GRAM STN SPEC: ABNORMAL
GRAM STN SPEC: ABNORMAL

## 2022-06-28 ENCOUNTER — OFFICE VISIT (OUTPATIENT)
Dept: OBGYN CLINIC | Facility: CLINIC | Age: 57
End: 2022-06-28
Payer: COMMERCIAL

## 2022-06-28 VITALS
SYSTOLIC BLOOD PRESSURE: 145 MMHG | HEART RATE: 82 BPM | HEIGHT: 73 IN | BODY MASS INDEX: 31.14 KG/M2 | WEIGHT: 235 LBS | DIASTOLIC BLOOD PRESSURE: 99 MMHG

## 2022-06-28 DIAGNOSIS — M20.21 HALLUX RIGIDUS OF BOTH FEET: Primary | ICD-10-CM

## 2022-06-28 DIAGNOSIS — M20.22 HALLUX RIGIDUS OF BOTH FEET: Primary | ICD-10-CM

## 2022-06-28 DIAGNOSIS — S91.101D OPEN WOUND OF RIGHT GREAT TOE, SUBSEQUENT ENCOUNTER: ICD-10-CM

## 2022-06-28 PROCEDURE — 99213 OFFICE O/P EST LOW 20 MIN: CPT | Performed by: ORTHOPAEDIC SURGERY

## 2022-06-28 PROCEDURE — 1036F TOBACCO NON-USER: CPT | Performed by: ORTHOPAEDIC SURGERY

## 2022-06-28 PROCEDURE — 3008F BODY MASS INDEX DOCD: CPT | Performed by: ORTHOPAEDIC SURGERY

## 2022-06-28 RX ORDER — CHLORHEXIDINE GLUCONATE 4 G/100ML
SOLUTION TOPICAL DAILY PRN
Status: CANCELLED | OUTPATIENT
Start: 2022-06-28

## 2022-06-28 RX ORDER — CHLORHEXIDINE GLUCONATE 0.12 MG/ML
15 RINSE ORAL ONCE
Status: CANCELLED | OUTPATIENT
Start: 2022-06-28 | End: 2022-06-28

## 2022-06-28 NOTE — PATIENT INSTRUCTIONS
JONY Baires  Attending, [address] [place] [address] Office Phone: [phone] ? Fax: [phone]  [facility] Office Phone: [phone] ? PDH:[phone]    : Javon Faria) [place], [place]     Surgery Coordinators [facility]: Jenna Tillman, [phone]  Carashavon Moreno, [phone]  Surgery Coordinator [place]:  Bala Almonte, [address], [phone]  www [email or web address]   PRE-OPERATIVE AND POST-OPERATIVE INSTRUCTIONS    General Information:  Your surgery is with Dr Katarina Kumar  Dates can change (although rare) depending on emergencies  Typical post operative visits are at the following intervals:  3 weeks post surgery(except 1 week for bunions and wound monitoring), 6 weeks post surgery, 3 months post surgery, 6 months post surgery, and then on a yearly basis  However, this may change based on Dr Issa Hernandez recommendation  #1 post-operative rule for foot/ankle surgery:  ONCE YOU ARE OUT OF YOUR CAST AND/OR REMOVABLE BOOT, SWELLING MAY PERSIST FOR MANY MONTHS  YOU MIGHT ALSO EXPERIENCE A BLUISH DISCOLORATION OF YOUR LEG  THIS IS NORMAL AND PART OF THE USUAL POSTOPERATIVE EXPERIENCE  SMOKING:  Smoking results in incomplete healing of fractures (broken bones) and joints that my have been fused  Smoking and nicotine also prevents the growth of bone into ankle replacements and bone healing  It also slows the healing of muscles and skin (soft tissue)  Therefore, please do not have surgery if you continue to smoke  We reserve the right to cancel your surgery if we suspect that you are smoking  DO NOT use nicorette gum or other patches  Please find an alternative method to quit smoking before your surgery  Pre-Operative Information:  Surgery date and preoperative visits:   If you have medical problems, such as an abnormal EKG, history of BLOOD CLOT, ANEURYSM, and any other heart condition, please inform us so that we can get your medical clearance several weeks before the surgery  Please bring any important medical information, such as an EKG, chest x-ray, or echocardiogram, with you to ensure that your surgery will not be delayed  If needed, you will receive your preoperative appointments in the mail or by phone from our scheduling office  The location of the preoperative appointment will be given to you also  You may not eat after midnight the night before surgery  If you do, your surgery will be cancelled  You will receive a phone call from your surgery center the day before your surgery (if your surgery is on a Monday, you will get a call the Friday before)  If you do not hear from someone by 4pm the day before your surgery, please call the Surgical coordinator (number above) to notify us  Start taking Vitamin D3 4000 units per day and Calcium 1200mg per day immediately  You will continue this until your 3 month post-op visit  These are over the counter and available at all pharmacies and supermarkets  FOR THOSE HAVING SURGERY AT [address] [place] [address] Avenue WILL NEED CRUTCHES OR A ROLLING WALKER AFTER SURGERY, ASK FOR A PRESCRIPTION FOR THIS FROM OUR OFFICE TODAY  THIS CANNOT BE HANDLED THE DAY OF SURGERY AS [facility] DOES NOT STOCK THESE  Because bacterial can often enter any defect in the skin, it is important to avoid any cuts before surgery  Any breaks in the skin on the leg will often result in your surgery being postponed  Please avoid going on a very long walk the day prior to surgery, or doing other activities that could lead to irritation of the skin, including yard work, extra athletic activity, or shaving  This could result in surgery cancellation  You MUST be fasting the day of your surgery  Therefore, please do not consume any foot or beverage after midnight the night before surgery  The morning of surgery you may take your usual medications with a sip of water  It is important not to take anti-inflammatory medication like Ibuprofen, Motrin, Naproxen (Aleve), or Aspirin 7-10 days before surgery because they will make you bleed more than usual   Vitamin, E, Plavix and Coumadin also have the same effect  Stop Aspirin and Vitamin E two weeks before surgery  YOUR MEDICAL DOCTOR SHOULD TELL YOU WHEN TO STOP COUMADIN OR PLAVIX  If your surgery involves any bone healing, please do not take anti-inflammatories for at least 6 weeks after surgery  This can impede bone healing (ibuprofen, Aleve, Relafen, iodine)  Tylenol is fine to take  PREOPERATIVE BATHING INSTRUCTIONS:    Before your surgery, bathe with Hibiclens (4% Chlorhexidene) as instructed below  This skin cleanser will help reduce the bacteria on your skin before surgery  To avoid irritating your eyes, do not apply Hibiclens above the level of your neck  On the evening before AND the morning of surgery, bathe your entire body except the face and scalp, then rinse freely  DO NOT apply to your face or scalp, as Hibiclens can irritate your eyes  Purchasing information:   Hibiclens is available without a prescription at [facility]  ADDITIONAL INSTRUCTIONS:  PATIENTS HAVING FOOT/ANKLE SURGERY     In preparation for your upcoming surgery, we kindly request and advise the following:  Notify our office if you are taking any of the following:  Coumadin (warfarin):  Persantine (dipyridamole); Pletal (cilostazol); Plavix (clopidogrel); Ticlid (ticlopidine); Agrylin (anagrelide); Aggrenox (dipyridamole and aspirin) or other blood thinners,  In addition, stop taking Vitamin E and herbal supplements  Do not schedule any elective dental work for at least 6 months after surgery  If you had an ankle replacement, you will need to take antibiotics before any future dental procedures   Your dentist or our office can prescribe these for you  1000mg of Amoxicillin 1 hour prior to any dental procedure is the recommended dosing  THREE RULES:    After surgery you will most likely be given the instructions KEEP YOUR TOES ABOVE YOUR NOSE    This means that you MUST have your feet elevated higher than your heart  Keeping your toes above your nose helps to heal the muscles and skin (soft tissues) by reducing swelling in your leg  This position also helps to prevent infection, and is very important in avoiding deep venous thrombosis (blood clots)  In order to keep the blood circulating in your legs and in order to avoid deep vein   thrombosis (blood clots), we ask patients to GET UP ONCE AN HOUR during the day  This means you should at least cross the room and come back  It does not mean you have to be up for long periods of time  In most cases we will not have people immediately put any weight on their operated part  This is important to prevent loosening of metal or other devices holding the bones together  It also prevents irritation of the soft tissues which can lead to prolonged healing  When we say get up once an hour, please walk, hop or move with an assisted device  This is important! Do not do any excessive walking during the first few days after surgery  Recovering from surgery is a full-time task for the patient  Postoperative care is important to avoid irritating the skin incision, which can lead to infection  Please do not plan activities or go out of town for several weeks after surgery  If you are unsure about your future activities, please schedule surgery only when you know it is acceptable for you  Scheduling surgery and then canceling the date, prevents other people from having surgery on that date as it takes time to line everything up effectively  If you cancel your surgery the week of your planned surgery, we reserve the right to cancel all future surgical procedures      THE DAY OF SURGERY:    Arrival to the hospital or outpatient surgical center on time is imperative  If you arrive late, then your surgery will be cancelled  You MUST have a family member/friend bring you, stay with you throughout the DURATION of your surgery, and drive you home  You MUST be fasting the day of your surgery  Therefore, do not consume any food or beverage after midnight the night before surgery  At your pre-operative visit with the anesthesia staff, or during your phone screen, a nurse will instruct you what medications you will need to take the day of surgery  MAKE SURE THAT THE PHARMACY LISTED IN THE ELECTRONIC MEDICAL RECORD (EPIC) IS YOUR PREFERRED PHARMACY  For example, if you are staying with family or a friend, and will not be near your preferred pharmacy, YOU MUST, tell the nurses checking you in the day of surgery so that this can be changed in the system  If your prescriptions are sent to a pharmacy, this cannot be changed  AFTER YOUR SURGERY:  Bleeding through the bandage almost always occurs  Do not let this alarm you  Simply add more gauze or a towel, call us, and come in for a dressing change  If you think it is excessive, contact us immediately or go to the local emergency room  Do not get the bandage wet  Showering is possible with plastic protectors  Be very careful, as the bathroom can be wet and slippery  If you do get your dressing wet, it should be changed immediately  Please contact us  ONCE YOUR ARE OUT OF YOUR CAST AND/OR REMOVABLE BOOT, SWELLING MAY PERSIST FOR MANY MONTHS  YOU MIGHT ALSO EXPERIENCE A BLUISH DISCOLORATION OF YOUR LEG  THIS IS NORMAL AND PART OF THE USUAL POSTOPERATIVE EXPERIENCE  WEARING COMPRESSION HOSE (ELASTIC STOCKINGS) CAN HELP AVOID SOME OF THIS SWELLING  DRESSING:   The purpose of the surgical dressing is to keep your wound and the surgical site protected from the environment    Most dressings contain splints, which help to hold your foot and ankle in a corrected position, and also allow the surgical site to heal properly  Dressings will remain in place and undisturbed until the first postop visit  If you have a drain in place, this will need to be removed in 1-3 days after surgery  The time for the drain to be pulled will be written on your discharge instruction sheet  CAST  INSTRUCTIONS:  You may or may not get a cast following surgery  If you do, pay close attention to the following:    After application of a splint or cast, it is very important to elevate your leg for 24 to 72 hours  The injured area should be elevated well above the heart  Remember Toes above your Nose  Rest and elevation greatly reduce pain and speed the healing process by minimizing early swelling  CALL YOUR DOCTORS OFFICE OR VISIT LOCATION EMERGENCY ROOM IF YOU HAVE ANY OF THE FOLLOWING:    Significant increased pain, which may be caused by swelling, and the feeling that the splint or cast is too tight  Numbness and tingling in your hand or foot, which may be caused by too much pressure on the nerves  Burning and stinging, which may be caused by too much pressure on the skin  Excessive swelling below the cast, which may mean the cast is slowing your blood circulation  Loss of active movement of toes, which request an urgent evaluation  Loss of capillary refill  Pinch the tip of toes and tricia the skin  Release pressure and if the skin does not return pink then call the office immediately  DO NOT GET YOUR CAST WET  Bacteria thrive in moist dark areas  We do not want this  If your cast becomes wet, return to the office and we will apply another one  PAIN AFTER SURGERY:  Narcotic pain medication can and will depress your respiratory system if taken in excess  The goal of pain management with narcotics is to be comfortable not pain free  If you take enough narcotics to be pain free then you run the risk of stopping breathing    If this happens, call [phone] immediately! Pain in the heel is often  caused by pressure from the weight of your foot on the bed  Make sure your heel is suspended off the bed by keeping a pillow underneath your calf not your knee  Medications: You will be given narcotic pain medication  Do NOT drive while taking narcotic medications  Medications such as Darvocet, Percocet, Vicoden or Tylenol #3, also contain acetaminophen (Tylenol)  Do not take acetaminophen or Tylenol from home when taking theses medications  When you fill your prescription, you may ask the pharmacist if your pain medication has acetaminophen/Tylenol in it  It is okay to take Tylenol with Oxycontin/Oxycodone  Should you have pain after taking your prescription medication, ibuprophen (Motrin, Advil, and Alleve) is a common over the counter preparation and may often be taken with the prescription pain medication as long as you take them with food  These medications can irritate the stomach lining  Unless you are allergic to aspirin or currently taking a blood thinner, Dr May patients are requested to take one 325 mg aspirin every 12 hours until you are back to walking normally after surgery (This can be up to 6 weeks)  Narcotic medications commonly cause nausea  Taking them with food will decrease this side effect  If you are having extreme nausea, please contact us for an alternative medication or for something that can be taken with this medication to decrease the nausea  Also, narcotic medications frequently cause constipation  An increase of fiber, fruits and vegetables in your diet may alleviate this problem, or if necessary, you may use an over-the-counter medication such as senekot, colace, or Fibercon for constipation problems  You should resume all medications you were taking prior to the surgery unless otherwise specified  Activity:   Because of your recent foot surgery, your activity level will decrease   You will need to elevate your foot ABOVE the level of your heart for a minimum of four days  The length of time necessary for the swelling to go down, and for your wounds to heal properly depends greatly on your efforts here  Elevation is extremely important to avoid compromising the blood supply to your foot  Remember when your foot is down it will swell, which will increase pain and slow healing  Wiggle your toes frequently if possible  If you go home with a regional block, (a type of anesthesia) the foot and leg will be numb  Think of ways to get into your house and around the house until the block wears off  Keep in mind that it may be a legal issue if you drive while in a cast or splint, especially when the splint is on the right foot  You may call the [employer] to schedule a road test if you have adaptive equipment applied to your car  The amount of weight you are allowed to bear on your foot will be written on your discharge sheet filled out at the time of surgery  The following is an explanation of the possibilities:       [address] has the following policies when it comes to ELECTIVE surgery  No elective surgery requiring anesthesia until 7 weeks after a patient tested positive for COVID-19   No elective surgery requiring anesthesia until 3 months after a patient was hospitalized for COVID-19      Non-weight bearing: You are to put NO weight whatsoever on your foot  When using crutches or a walker, your foot should not touch the ground, except when you are standing  Then, it may rest on the ground  If you are to be non-weight bearing, and you are not compliant, you could compromise the surgery  Some of our patients have been requesting prescriptions for a roll-a-bout knee scooter  [employer] and other insurances have been denying these claims, and you may either have to rent one or pay out of pocket to purchase one    THIS SHOULD BE PURCHASED PRIOR TO THE SURGERY AND YOU SHOULD BRING IT WITH YOU THE DAY OF THE SURGERY TO AIDE IN GETTING FROM THE CAR INTO THE HOUSE AFTER SURGERY

## 2022-06-28 NOTE — PROGRESS NOTES
JONY Serna  Attending, Orthopaedic Surgery  [facility] [address] [facility]      ORTHOPAEDIC FOOT AND ANKLE CLINIC VISIT       Assessment:     Encounter Diagnoses   Name Primary?  Hallux rigidus of both feet Yes    Open wound of right great toe, subsequent encounter             Plan:   · The patient verbalized understanding of exam findings and treatment plan  We engaged in the shared decision-making process and treatment options were discussed at length with the patient  · Kathya Tian has bilateral hallux rigidus with a wound over the right 1st MTP joint  · He had bilateral cheilectomies and the right side wound did not completely heal   · He has a sinus track which appears to communicate with the 1st MTP joint and has joint fluid escaping the wound  Dr Nilo Glover attempted a bedside debridement in the clinic of this wound about a month ago but this did not alleviate the issue  · He needs fusions of his 1st MTP joints bilaterally however the wound on the right side needs to heal first    · To close his wound, I believe he requires a return trip to the OR for a formal washout, debridement of any friable or necrotic tissue, attempted closure of the joint capsule with possible periosteal patch, and then multi-layered closure with application of incisional VAC to assist with closure  · He would like to proceed with washout of the wound and application of negative wound vac  Consent was obtained today  · He will follow up in 1 week for evaluation of wound vac  Return in about 1 week (around 7/5/2022)  CONSENT FOR SOFT TISSUE PROCEDURES:   Patient understands that there is no guarantee that the surgery will relieve all of their pain and also understands that there may be a prolonged course of protected weight-bearing status required which will restrict them from driving and other activities as discussed at today's visit   Patient recognizes that there are risks with surgery including bleeding, numbness, nerve irritation, wound complications, infection, continued pain, anesthetic complications, death, failure of procedure and possible need for further surgery  The patient understands that there is no guarantee that this surgery will relieve all of His pain and symptoms  Patient understands that there is no guarantee that they will return to full function after the procedure  Patient has provided informed consent for the procedure  History of Present Illness:   Chief Complaint:   Chief Complaint   Patient presents with    Right Foot - Follow-up    Left Foot - Follow-up       Sweta Coleman is a 60 y o  male who is being seen for bilateral great toe/MTP joint pain  The patient reports a history of BL 1st MTP pain for several years  He had BL cheilectomy in December 2021  He continued with pain and developed a nonhealing wound over the right foot incision  He had a revision of the wound but continues with drainage  Pain is localized at [facility] 1st MTP joints with minimal radiating and described as sharp and severe  Patient denies numbness, tingling or radicular pain  He states at this point he would like to proced Denies history of neuropathy  Patient does not smoke, does not have diabetes and does not take blood thinners  Patient denies family history of anesthesia complications and has not had any complications with anesthesia       Pain/symptom timing:  Worse during the day when active  Pain/symptom context:  Worse with activites and work  Pain/symptom modifying factors:  Rest makes better, activities make worse  Pain/symptom associated signs/symptoms: none    Prior treatment   · NSAIDsYes    · Injections No   · Bracing/Orthotics No    · Physical Therapy No     Orthopedic Surgical History:   See below    Past Medical History:  Past Medical History:   Diagnosis Date    COVID-19     positive test on 12/16/21    Depression     Diverticulitis     Diverticulosis     GERD (gastroesophageal reflux disease)     Hypertension     Sleep apnea     no cpap    Umbilical hernia        Past Surgical History:   Procedure Laterality Date    CATARACT EXTRACTION Bilateral     CHOLECYSTECTOMY      COLONOSCOPY      ESOPHAGOGASTRODUODENOSCOPY      HERNIA REPAIR  09/19/2019    Open repair of incarcerated incisional hernia with mesh - Dr Marvin Sequeira ARTHROSCOPY Left     SD HALLUX RIGIDUS W/CHEILECTOMY 1ST MP JT W/O IMPLT Bilateral 12/10/2021    Procedure: CHEILECTOMY;  Surgeon: Jordan Woodard DPM;  Location: [address] [place] [address];  Service: Podiatry    SD TENOTOMY ELBOW LATERAL/MEDIAL DEBRIDE REPAIR Left 11/17/2020    Procedure: lateral epicondyle elbow debridement, release, and repair;  Surgeon: Lela Perez MD;  [facility];  Service: Orthopedics    WISDOM TOOTH EXTRACTION         The patient has a family history of        Current Outpatient Medications:     albuterol (PROVENTIL HFA,VENTOLIN HFA) 90 mcg/act inhaler, Inhale 2 puffs every 4 (four) hours as needed for wheezing or shortness of breath, Disp: 18 g, Rfl: 5    b complex vitamins capsule, Take 1 capsule by mouth daily, Disp: , Rfl:     eszopiclone (LUNESTA) 3 MG tablet, Take 1 tablet (3 mg total) by mouth daily at bedtime, Disp: 30 tablet, Rfl: 5    FLUoxetine (PROzac) 20 MG tablet, Take 1 tablet (20 mg total) by mouth daily, Disp: 90 tablet, Rfl: 1    lisinopril (ZESTRIL) 30 mg tablet, Take 1 tablet (30 mg total) by mouth every evening, Disp: 90 tablet, Rfl: 1    multivitamin (THERAGRAN) TABS, Take 1 tablet by mouth daily, Disp: , Rfl:     pantoprazole (PROTONIX) 40 mg tablet, Take 1 tablet (40 mg total) by mouth 2 (two) times a day, Disp: 60 tablet, Rfl: 3    pantoprazole (PROTONIX) 40 mg tablet, Take 1 tablet (40 mg total) by mouth daily, Disp: 60 tablet, Rfl: 0    pregabalin (LYRICA) 75 mg capsule, Take 75 mg by mouth 2 (two) times a day, Disp: , Rfl:     rosuvastatin (CRESTOR) 10 MG tablet, Take 1 tablet (10 mg total) by mouth daily, Disp: 30 tablet, Rfl: 3    triamcinolone (KENALOG) 0 1 % ointment, Apply topically 2 (two) times a day, Disp: 15 g, Rfl: 1    VITAMIN D PO, Take 1 tablet by mouth in the morning  , Disp: , Rfl:     Zinc Sulfate (ZINC 15 PO), Take 1 tablet by mouth in the morning  , Disp: , Rfl:     carvedilol (COREG) 3 125 mg tablet, Take 1 tablet (3 125 mg total) by mouth 2 (two) times a day with meals, Disp: 60 tablet, Rfl: 5    fluticasone-umeclidinium-vilanterol (Trelegy Ellipta) 100-62 5-25 MCG/INH inhaler, Inhale 1 puff daily Rinse mouth after use , Disp: 60 blister, Rfl: 11    meloxicam (MOBIC) 15 mg tablet, Take 1 tablet (15 mg total) by mouth daily, Disp: 30 tablet, Rfl: 0    sulindac (CLINORIL) 200 MG tablet, Take 1 tablet (200 mg total) by mouth in the morning and 1 tablet (200 mg total) in the evening  Do all this for 20 days  , Disp: 40 tablet, Rfl: 0      Allergies   Allergen Reactions    Allopurinol Rash       Review of Systems:  A comprehensive 14 point ROS was performed, reviewed, and the pertinent orthopaedic findings are documented in the HPI  Physical Exam:   /99 (BP Location: Right arm, Patient Position: Sitting, Cuff Size: Adult)   Pulse 82   Ht 6' 1" (1 854 m)   Wt 107 kg (235 lb)   BMI 31 00 kg/m²   General/Constitutional: No apparent distress: well-nourished and well developed  Eyes: normal ocular motion  Lymphatic: No appreciable lymphadenopathy  Respiratory: Non-labored breathing  Heart: regular rate and rhythm  Lungs: clear to auscultation   Vascular: No edema, swelling or tenderness, except as noted in detailed exam   Integumentary: No impressive skin lesions present, except as noted in detailed exam   Neuro: No ataxia or tremors noted  Psych: Normal mood and affect, oriented to person, place and time  Appropriate affect  Musculoskeletal: Normal, except as noted in detailed exam and in HPI      Examination      Right Left   Gait Antalgic Musculoskeletal Tender to palpation at 1st MTP  Tender to palpation at 1st MTP joint   Skin Small open draining wound over the dorsum of the MTP joint in the surgical site  Normal- well healed incision   Nails Normal Normal   Range of Motion  Limited ROM of the 1st MTP joint Limited ROM of the 1st MTP joint with pain     Stability Stable Stable   Muscle Strength 5/5 tibialis anterior  5/5 gastrocnemius-soleus  5/5 posterior tibialis  5/5 peroneal/eversion strength  5/5 EHL  5/5 FHL 5/5 tibialis anterior  5/5 gastrocnemius-soleus  5/5 posterior tibialis  5/5 peroneal/eversion strength  5/5 EHL  5/5 FHL   Neurologic Normal Normal   Sensation  Intact to light touch throughout sural, saphenous, superficial peroneal, deep peroneal and medial/lateral plantar nerve distributions  Volcano-Pat 5 07 filament (10g) testing was deferred  Intact to light touch throughout sural, saphenous, superficial peroneal, deep peroneal and medial/lateral plantar nerve distributions  Volcano-Pat 5 07 filament (10g) testing was deferred  Cardiovascular Brisk capillary refill < 2 seconds,intact DP and PT pulses Brisk capillary refill < 2 seconds,intact DP and PT pulses   Special Tests None None       Imaging Studies:   3 views of the BL feet were available, reviewed and interpreted independently that demonstrate moderate to severe arthritis at the 1st MTP joints  Reviewed by me personally  MRI of the right foot was available and reviewed  This demonstrates arthritis at the 1st MTP joint without signs of deep infection  Scribe Attestation    I,:  Kelly Mendez PA-C am acting as a scribe while in the presence of the attending physician :       I,:  Kisha Callahan MD personally performed the services described in this documentation    as scribed in my presence :             Bernida Donate Lachman, MD  Attending, [facility]      I personally performed the service  Michela Mo Lachman, MD

## 2022-06-28 NOTE — H&P (VIEW-ONLY)
JONY Cabral  Attending, Orthopaedic Surgery  [facility] [address] [address] [facility]      ORTHOPAEDIC FOOT AND ANKLE CLINIC VISIT       Assessment:     Encounter Diagnoses   Name Primary?  Hallux rigidus of both feet Yes    Open wound of right great toe, subsequent encounter             Plan:   · The patient verbalized understanding of exam findings and treatment plan  We engaged in the shared decision-making process and treatment options were discussed at length with the patient  · Geni Jovel has bilateral hallux rigidus with a wound over the right 1st MTP joint  · He had bilateral cheilectomies and the right side wound did not completely heal   · He has a sinus track which appears to communicate with the 1st MTP joint and has joint fluid escaping the wound  Dr Clayton Bradley attempted a bedside debridement in the clinic of this wound about a month ago but this did not alleviate the issue  · He needs fusions of his 1st MTP joints bilaterally however the wound on the right side needs to heal first    · To close his wound, I believe he requires a return trip to the OR for a formal washout, debridement of any friable or necrotic tissue, attempted closure of the joint capsule with possible periosteal patch, and then multi-layered closure with application of incisional VAC to assist with closure  · He would like to proceed with washout of the wound and application of negative wound vac  Consent was obtained today  · He will follow up in 1 week for evaluation of wound vac  Return in about 1 week (around 7/5/2022)  CONSENT FOR SOFT TISSUE PROCEDURES:   Patient understands that there is no guarantee that the surgery will relieve all of their pain and also understands that there may be a prolonged course of protected weight-bearing status required which will restrict them from driving and other activities as discussed at today's visit   Patient recognizes that there are risks with surgery including bleeding, numbness, nerve irritation, wound complications, infection, continued pain, anesthetic complications, death, failure of procedure and possible need for further surgery  The patient understands that there is no guarantee that this surgery will relieve all of His pain and symptoms  Patient understands that there is no guarantee that they will return to full function after the procedure  Patient has provided informed consent for the procedure  History of Present Illness:   Chief Complaint:   Chief Complaint   Patient presents with    Right Foot - Follow-up    Left Foot - Follow-up       James Tracy is a 60 y o  male who is being seen for bilateral great toe/MTP joint pain  The patient reports a history of BL 1st MTP pain for several years  He had BL cheilectomy in December 2021  He continued with pain and developed a nonhealing wound over the right foot incision  He had a revision of the wound but continues with drainage  Pain is localized at [facility] 1st MTP joints with minimal radiating and described as sharp and severe  Patient denies numbness, tingling or radicular pain  He states at this point he would like to proced Denies history of neuropathy  Patient does not smoke, does not have diabetes and does not take blood thinners  Patient denies family history of anesthesia complications and has not had any complications with anesthesia       Pain/symptom timing:  Worse during the day when active  Pain/symptom context:  Worse with activites and work  Pain/symptom modifying factors:  Rest makes better, activities make worse  Pain/symptom associated signs/symptoms: none    Prior treatment   · NSAIDsYes    · Injections No   · Bracing/Orthotics No    · Physical Therapy No     Orthopedic Surgical History:   See below    Past Medical History:  Past Medical History:   Diagnosis Date    COVID-19     positive test on 12/16/21    Depression     Diverticulitis     Diverticulosis     GERD (gastroesophageal reflux disease)     Hypertension     Sleep apnea     no cpap    Umbilical hernia        Past Surgical History:   Procedure Laterality Date    CATARACT EXTRACTION Bilateral     CHOLECYSTECTOMY      COLONOSCOPY      ESOPHAGOGASTRODUODENOSCOPY      HERNIA REPAIR  09/19/2019    Open repair of incarcerated incisional hernia with mesh - Dr Vanessa Tabor ARTHROSCOPY Left     NV HALLUX RIGIDUS W/CHEILECTOMY 1ST MP JT W/O IMPLT Bilateral 12/10/2021    Procedure: CHEILECTOMY;  Surgeon: María Elena Shine DPM;  Location: [address] [place] [address];  Service: Podiatry    NV TENOTOMY ELBOW LATERAL/MEDIAL DEBRIDE REPAIR Left 11/17/2020    Procedure: lateral epicondyle elbow debridement, release, and repair;  Surgeon: Shailesh Gustafson MD;  [facility];  Service: Orthopedics    WISDOM TOOTH EXTRACTION         The patient has a family history of        Current Outpatient Medications:     albuterol (PROVENTIL HFA,VENTOLIN HFA) 90 mcg/act inhaler, Inhale 2 puffs every 4 (four) hours as needed for wheezing or shortness of breath, Disp: 18 g, Rfl: 5    b complex vitamins capsule, Take 1 capsule by mouth daily, Disp: , Rfl:     eszopiclone (LUNESTA) 3 MG tablet, Take 1 tablet (3 mg total) by mouth daily at bedtime, Disp: 30 tablet, Rfl: 5    FLUoxetine (PROzac) 20 MG tablet, Take 1 tablet (20 mg total) by mouth daily, Disp: 90 tablet, Rfl: 1    lisinopril (ZESTRIL) 30 mg tablet, Take 1 tablet (30 mg total) by mouth every evening, Disp: 90 tablet, Rfl: 1    multivitamin (THERAGRAN) TABS, Take 1 tablet by mouth daily, Disp: , Rfl:     pantoprazole (PROTONIX) 40 mg tablet, Take 1 tablet (40 mg total) by mouth 2 (two) times a day, Disp: 60 tablet, Rfl: 3    pantoprazole (PROTONIX) 40 mg tablet, Take 1 tablet (40 mg total) by mouth daily, Disp: 60 tablet, Rfl: 0    pregabalin (LYRICA) 75 mg capsule, Take 75 mg by mouth 2 (two) times a day, Disp: , Rfl:     rosuvastatin (CRESTOR) 10 MG tablet, Take 1 tablet (10 mg total) by mouth daily, Disp: 30 tablet, Rfl: 3    triamcinolone (KENALOG) 0 1 % ointment, Apply topically 2 (two) times a day, Disp: 15 g, Rfl: 1    VITAMIN D PO, Take 1 tablet by mouth in the morning  , Disp: , Rfl:     Zinc Sulfate (ZINC 15 PO), Take 1 tablet by mouth in the morning  , Disp: , Rfl:     carvedilol (COREG) 3 125 mg tablet, Take 1 tablet (3 125 mg total) by mouth 2 (two) times a day with meals, Disp: 60 tablet, Rfl: 5    fluticasone-umeclidinium-vilanterol (Trelegy Ellipta) 100-62 5-25 MCG/INH inhaler, Inhale 1 puff daily Rinse mouth after use , Disp: 60 blister, Rfl: 11    meloxicam (MOBIC) 15 mg tablet, Take 1 tablet (15 mg total) by mouth daily, Disp: 30 tablet, Rfl: 0    sulindac (CLINORIL) 200 MG tablet, Take 1 tablet (200 mg total) by mouth in the morning and 1 tablet (200 mg total) in the evening  Do all this for 20 days  , Disp: 40 tablet, Rfl: 0      Allergies   Allergen Reactions    Allopurinol Rash       Review of Systems:  A comprehensive 14 point ROS was performed, reviewed, and the pertinent orthopaedic findings are documented in the HPI  Physical Exam:   /99 (BP Location: Right arm, Patient Position: Sitting, Cuff Size: Adult)   Pulse 82   Ht 6' 1" (1 854 m)   Wt 107 kg (235 lb)   BMI 31 00 kg/m²   General/Constitutional: No apparent distress: well-nourished and well developed  Eyes: normal ocular motion  Lymphatic: No appreciable lymphadenopathy  Respiratory: Non-labored breathing  Heart: regular rate and rhythm  Lungs: clear to auscultation   Vascular: No edema, swelling or tenderness, except as noted in detailed exam   Integumentary: No impressive skin lesions present, except as noted in detailed exam   Neuro: No ataxia or tremors noted  Psych: Normal mood and affect, oriented to person, place and time  Appropriate affect  Musculoskeletal: Normal, except as noted in detailed exam and in HPI      Examination      Right Left   Gait Antalgic Musculoskeletal Tender to palpation at 1st MTP  Tender to palpation at 1st MTP joint   Skin Small open draining wound over the dorsum of the MTP joint in the surgical site  Normal- well healed incision   Nails Normal Normal   Range of Motion  Limited ROM of the 1st MTP joint Limited ROM of the 1st MTP joint with pain     Stability Stable Stable   Muscle Strength 5/5 tibialis anterior  5/5 gastrocnemius-soleus  5/5 posterior tibialis  5/5 peroneal/eversion strength  5/5 EHL  5/5 FHL 5/5 tibialis anterior  5/5 gastrocnemius-soleus  5/5 posterior tibialis  5/5 peroneal/eversion strength  5/5 EHL  5/5 FHL   Neurologic Normal Normal   Sensation  Intact to light touch throughout sural, saphenous, superficial peroneal, deep peroneal and medial/lateral plantar nerve distributions  Carlin-Pat 5 07 filament (10g) testing was deferred  Intact to light touch throughout sural, saphenous, superficial peroneal, deep peroneal and medial/lateral plantar nerve distributions  Carlin-Pat 5 07 filament (10g) testing was deferred  Cardiovascular Brisk capillary refill < 2 seconds,intact DP and PT pulses Brisk capillary refill < 2 seconds,intact DP and PT pulses   Special Tests None None       Imaging Studies:   3 views of the BL feet were available, reviewed and interpreted independently that demonstrate moderate to severe arthritis at the 1st MTP joints  Reviewed by me personally  MRI of the right foot was available and reviewed  This demonstrates arthritis at the 1st MTP joint without signs of deep infection  Scribe Attestation    I,:  Jaleel Gallegos PA-C am acting as a scribe while in the presence of the attending physician :       I,:  Wally Quiroz MD personally performed the services described in this documentation    as scribed in my presence :             Rockwell Fireman Lachman, MD  Attending, [facility]      I personally performed the service  Arlyne Covert Lachman, MD

## 2022-06-30 ENCOUNTER — ANESTHESIA EVENT (OUTPATIENT)
Dept: PERIOP | Facility: AMBULARY SURGERY CENTER | Age: 57
End: 2022-06-30
Payer: COMMERCIAL

## 2022-07-07 ENCOUNTER — ANESTHESIA (OUTPATIENT)
Dept: ANESTHESIOLOGY | Facility: HOSPITAL | Age: 57
End: 2022-07-07

## 2022-07-07 ENCOUNTER — TELEPHONE (OUTPATIENT)
Dept: OBGYN CLINIC | Facility: HOSPITAL | Age: 57
End: 2022-07-07

## 2022-07-07 ENCOUNTER — ANESTHESIA EVENT (OUTPATIENT)
Dept: ANESTHESIOLOGY | Facility: HOSPITAL | Age: 57
End: 2022-07-07

## 2022-07-07 NOTE — TELEPHONE ENCOUNTER
Patient calling to see if office has received FMLA paperwork from [facility] # [phone]

## 2022-07-12 DIAGNOSIS — E78.2 MIXED HYPERLIPIDEMIA: ICD-10-CM

## 2022-07-12 RX ORDER — ROSUVASTATIN CALCIUM 10 MG/1
10 TABLET, COATED ORAL DAILY
Qty: 30 TABLET | Refills: 3 | Status: SHIPPED | OUTPATIENT
Start: 2022-07-12 | End: 2022-10-11 | Stop reason: SDUPTHER

## 2022-07-12 NOTE — PRE-PROCEDURE INSTRUCTIONS
Pre-Surgery Instructions:   Medication Instructions    albuterol (PROVENTIL HFA,VENTOLIN HFA) 90 mcg/act inhaler Continue as prescribed including DOS    b complex vitamins capsule Avoid 1 week prior to surgery     carvedilol (COREG) 3 125 mg tablet Continue to take as prescribed including DOS with a small sip of water, unless usually taken at night    eszopiclone (LUNESTA) 3 MG tablet continue as prescribed excluding DOS    FLUoxetine (PROzac) 20 MG tablet Continue to take as prescribed including DOS with a small sip of water, unless usually taken at night    fluticasone-umeclidinium-vilanterol (Trelegy Ellipta) 100-62 5-25 MCG/INH inhaler continue as prescribed including DOS    lisinopril (ZESTRIL) 30 mg tablet continue as prescribed excluding DOS    meloxicam (MOBIC) 15 mg tablet Hold for 3 days prior to DOS    multivitamin (THERAGRAN) TABS Avoid 1 week prior to surgery     pantoprazole (PROTONIX) 40 mg tablet Continue to take as prescribed including DOS with a small sip of water, unless usually taken at night    pregabalin (LYRICA) 75 mg capsule Continue to take as prescribed including DOS with a small sip of water, unless usually taken at night    rosuvastatin (CRESTOR) 10 MG tablet Continue to take as prescribed including DOS with a small sip of water, unless usually taken at night    sulindac (CLINORIL) 200 MG tablet Continue to take as prescribed including DOS with a small sip of water, unless usually taken at night    triamcinolone (KENALOG) 0 1 % ointment continue as prescribed excluding DOS    VITAMIN D PO Avoid 1 week prior to surgery     Zinc Sulfate (ZINC 15 PO) Avoid 1 week prior to surgery     Patient instructed to avoid ASPIRIN, OTC vitamins and NSAIDS prior to surgery  Tylenol okay PRN  Patient instructed to continue scheduled medications excluding DOS  Patient given NPO instructions  Patient given CHG bathing instruction per protocol    Patient instructed to avoid using lotions, powders, oils, etc  DOS  Patient given up to date visitor guidelines  Patient instructed to have a ride home after surgery  Patient instructed to remove jewelry and not to bring valuables DOS  Patient informed that dentures and contact lenses will have to be removed for surgery  Patient understands he or she will receive a call the afternoon before surgery regarding an arrival time  Patient verbalized understanding of all instructions

## 2022-07-12 NOTE — TELEPHONE ENCOUNTER
Medication refill requested: rosuvastatin 10mg  Last office visit: 2/10/22  Next office visit: 2/23/23  Last refilled: 2/10/22  Labs: Yes, describe: 5/19/22  Ordering Provider: [facility] [address] (select pharmacy send RX to):   Ramona Will [address] [facility], [facility] [address] [place] [address] [address] [facility] [address]  Phone: [phone] Fax: [phone]

## 2022-07-14 ENCOUNTER — ANESTHESIA (OUTPATIENT)
Dept: PERIOP | Facility: AMBULARY SURGERY CENTER | Age: 57
End: 2022-07-14
Payer: COMMERCIAL

## 2022-07-14 ENCOUNTER — HOSPITAL ENCOUNTER (OUTPATIENT)
Facility: AMBULARY SURGERY CENTER | Age: 57
Setting detail: OUTPATIENT SURGERY
Discharge: HOME | End: 2022-07-14
Attending: ORTHOPAEDIC SURGERY | Admitting: ORTHOPAEDIC SURGERY
Payer: COMMERCIAL

## 2022-07-14 VITALS
HEART RATE: 74 BPM | SYSTOLIC BLOOD PRESSURE: 142 MMHG | OXYGEN SATURATION: 95 % | WEIGHT: 240 LBS | TEMPERATURE: 97.2 F | BODY MASS INDEX: 31.81 KG/M2 | RESPIRATION RATE: 18 BRPM | HEIGHT: 73 IN | DIASTOLIC BLOOD PRESSURE: 84 MMHG

## 2022-07-14 DIAGNOSIS — M20.21 HALLUX RIGIDUS, RIGHT FOOT: Primary | ICD-10-CM

## 2022-07-14 PROBLEM — S91.101A OPEN WOUND OF RIGHT GREAT TOE: Status: ACTIVE | Noted: 2022-07-14

## 2022-07-14 PROCEDURE — C9290 INJ, BUPIVACAINE LIPOSOME: HCPCS | Performed by: ORTHOPAEDIC SURGERY

## 2022-07-14 PROCEDURE — 13160 SEC CLSR SURG WND/DEHSN XTN: CPT | Performed by: PHYSICIAN ASSISTANT

## 2022-07-14 PROCEDURE — 13160 SEC CLSR SURG WND/DEHSN XTN: CPT | Performed by: ORTHOPAEDIC SURGERY

## 2022-07-14 RX ORDER — HYDROMORPHONE HCL/PF 1 MG/ML
0.5 SYRINGE (ML) INJECTION
Status: COMPLETED | OUTPATIENT
Start: 2022-07-14 | End: 2022-07-14

## 2022-07-14 RX ORDER — EPHEDRINE SULFATE 50 MG/ML
INJECTION INTRAVENOUS AS NEEDED
Status: DISCONTINUED | OUTPATIENT
Start: 2022-07-14 | End: 2022-07-14

## 2022-07-14 RX ORDER — PROPOFOL 10 MG/ML
INJECTION, EMULSION INTRAVENOUS AS NEEDED
Status: DISCONTINUED | OUTPATIENT
Start: 2022-07-14 | End: 2022-07-14

## 2022-07-14 RX ORDER — CEFAZOLIN SODIUM 2 G/50ML
2000 SOLUTION INTRAVENOUS ONCE
Status: COMPLETED | OUTPATIENT
Start: 2022-07-14 | End: 2022-07-14

## 2022-07-14 RX ORDER — ONDANSETRON 2 MG/ML
INJECTION INTRAMUSCULAR; INTRAVENOUS AS NEEDED
Status: DISCONTINUED | OUTPATIENT
Start: 2022-07-14 | End: 2022-07-14

## 2022-07-14 RX ORDER — MAGNESIUM HYDROXIDE 1200 MG/15ML
LIQUID ORAL AS NEEDED
Status: DISCONTINUED | OUTPATIENT
Start: 2022-07-14 | End: 2022-07-14 | Stop reason: HOSPADM

## 2022-07-14 RX ORDER — OXYCODONE HYDROCHLORIDE 5 MG/1
5 TABLET ORAL EVERY 4 HOURS PRN
Qty: 10 TABLET | Refills: 0 | Status: SHIPPED | OUTPATIENT
Start: 2022-07-14 | End: 2022-07-19

## 2022-07-14 RX ORDER — OXYCODONE HYDROCHLORIDE 5 MG/1
5 TABLET ORAL ONCE AS NEEDED
Status: DISCONTINUED | OUTPATIENT
Start: 2022-07-14 | End: 2022-07-14 | Stop reason: HOSPADM

## 2022-07-14 RX ORDER — ACETAMINOPHEN 325 MG/1
975 TABLET ORAL EVERY 6 HOURS PRN
Status: DISCONTINUED | OUTPATIENT
Start: 2022-07-14 | End: 2022-07-14 | Stop reason: HOSPADM

## 2022-07-14 RX ORDER — VANCOMYCIN HYDROCHLORIDE 1 G/20ML
INJECTION, POWDER, LYOPHILIZED, FOR SOLUTION INTRAVENOUS AS NEEDED
Status: DISCONTINUED | OUTPATIENT
Start: 2022-07-14 | End: 2022-07-14 | Stop reason: HOSPADM

## 2022-07-14 RX ORDER — CHLORHEXIDINE GLUCONATE 4 G/100ML
SOLUTION TOPICAL DAILY PRN
Status: DISCONTINUED | OUTPATIENT
Start: 2022-07-14 | End: 2022-07-14 | Stop reason: HOSPADM

## 2022-07-14 RX ORDER — CHLORHEXIDINE GLUCONATE 0.12 MG/ML
15 RINSE ORAL ONCE
Status: DISCONTINUED | OUTPATIENT
Start: 2022-07-14 | End: 2022-07-14 | Stop reason: HOSPADM

## 2022-07-14 RX ORDER — LIDOCAINE HYDROCHLORIDE 10 MG/ML
INJECTION, SOLUTION EPIDURAL; INFILTRATION; INTRACAUDAL; PERINEURAL AS NEEDED
Status: DISCONTINUED | OUTPATIENT
Start: 2022-07-14 | End: 2022-07-14

## 2022-07-14 RX ORDER — FENTANYL CITRATE 50 UG/ML
INJECTION, SOLUTION INTRAMUSCULAR; INTRAVENOUS AS NEEDED
Status: DISCONTINUED | OUTPATIENT
Start: 2022-07-14 | End: 2022-07-14

## 2022-07-14 RX ORDER — DEXAMETHASONE SODIUM PHOSPHATE 10 MG/ML
INJECTION, SOLUTION INTRAMUSCULAR; INTRAVENOUS AS NEEDED
Status: DISCONTINUED | OUTPATIENT
Start: 2022-07-14 | End: 2022-07-14

## 2022-07-14 RX ORDER — ASPIRIN 325 MG
325 TABLET, DELAYED RELEASE (ENTERIC COATED) ORAL EVERY 12 HOURS
Qty: 84 TABLET | Refills: 0 | Status: SHIPPED | OUTPATIENT
Start: 2022-07-14 | End: 2022-08-25

## 2022-07-14 RX ORDER — BUPIVACAINE HYDROCHLORIDE 2.5 MG/ML
INJECTION, SOLUTION EPIDURAL; INFILTRATION; INTRACAUDAL AS NEEDED
Status: DISCONTINUED | OUTPATIENT
Start: 2022-07-14 | End: 2022-07-14 | Stop reason: HOSPADM

## 2022-07-14 RX ORDER — DIPHENHYDRAMINE HYDROCHLORIDE 50 MG/ML
12.5 INJECTION INTRAMUSCULAR; INTRAVENOUS ONCE AS NEEDED
Status: DISCONTINUED | OUTPATIENT
Start: 2022-07-14 | End: 2022-07-14 | Stop reason: HOSPADM

## 2022-07-14 RX ORDER — SODIUM CHLORIDE, SODIUM LACTATE, POTASSIUM CHLORIDE, CALCIUM CHLORIDE 600; 310; 30; 20 MG/100ML; MG/100ML; MG/100ML; MG/100ML
INJECTION, SOLUTION INTRAVENOUS CONTINUOUS PRN
Status: DISCONTINUED | OUTPATIENT
Start: 2022-07-14 | End: 2022-07-14

## 2022-07-14 RX ORDER — MIDAZOLAM HYDROCHLORIDE 2 MG/2ML
INJECTION, SOLUTION INTRAMUSCULAR; INTRAVENOUS AS NEEDED
Status: DISCONTINUED | OUTPATIENT
Start: 2022-07-14 | End: 2022-07-14

## 2022-07-14 RX ORDER — HYDROMORPHONE HCL/PF 1 MG/ML
0.25 SYRINGE (ML) INJECTION
Status: COMPLETED | OUTPATIENT
Start: 2022-07-14 | End: 2022-07-14

## 2022-07-14 RX ORDER — ONDANSETRON 4 MG/1
4 TABLET, FILM COATED ORAL EVERY 8 HOURS PRN
Qty: 20 TABLET | Refills: 0 | Status: SHIPPED | OUTPATIENT
Start: 2022-07-14 | End: 2022-08-23

## 2022-07-14 RX ORDER — KETOROLAC TROMETHAMINE 30 MG/ML
30 INJECTION, SOLUTION INTRAMUSCULAR; INTRAVENOUS ONCE
Status: COMPLETED | OUTPATIENT
Start: 2022-07-14 | End: 2022-07-14

## 2022-07-14 RX ORDER — ONDANSETRON 2 MG/ML
4 INJECTION INTRAMUSCULAR; INTRAVENOUS EVERY 4 HOURS PRN
Status: DISCONTINUED | OUTPATIENT
Start: 2022-07-14 | End: 2022-07-14 | Stop reason: HOSPADM

## 2022-07-14 RX ORDER — FENTANYL CITRATE/PF 50 MCG/ML
25 SYRINGE (ML) INJECTION
Status: COMPLETED | OUTPATIENT
Start: 2022-07-14 | End: 2022-07-14

## 2022-07-14 RX ORDER — MORPHINE SULFATE 4 MG/ML
4 INJECTION, SOLUTION INTRAMUSCULAR; INTRAVENOUS ONCE
Status: COMPLETED | OUTPATIENT
Start: 2022-07-14 | End: 2022-07-14

## 2022-07-14 RX ADMIN — MORPHINE SULFATE 4 MG: 4 INJECTION INTRAVENOUS at 11:49

## 2022-07-14 RX ADMIN — HYDROMORPHONE HYDROCHLORIDE 0.5 MG: 1 INJECTION, SOLUTION INTRAMUSCULAR; INTRAVENOUS; SUBCUTANEOUS at 10:51

## 2022-07-14 RX ADMIN — EPHEDRINE SULFATE 10 MG: 50 INJECTION, SOLUTION INTRAVENOUS at 09:07

## 2022-07-14 RX ADMIN — HYDROMORPHONE HYDROCHLORIDE 0.25 MG: 1 INJECTION, SOLUTION INTRAMUSCULAR; INTRAVENOUS; SUBCUTANEOUS at 09:56

## 2022-07-14 RX ADMIN — FENTANYL CITRATE 25 MCG: 50 INJECTION INTRAMUSCULAR; INTRAVENOUS at 10:10

## 2022-07-14 RX ADMIN — LIDOCAINE HYDROCHLORIDE 50 MG: 10 INJECTION, SOLUTION EPIDURAL; INFILTRATION; INTRACAUDAL; PERINEURAL at 08:30

## 2022-07-14 RX ADMIN — HYDROMORPHONE HYDROCHLORIDE 0.5 MG: 1 INJECTION, SOLUTION INTRAMUSCULAR; INTRAVENOUS; SUBCUTANEOUS at 10:28

## 2022-07-14 RX ADMIN — FENTANYL CITRATE 25 MCG: 50 INJECTION INTRAMUSCULAR; INTRAVENOUS at 08:39

## 2022-07-14 RX ADMIN — EPHEDRINE SULFATE 5 MG: 50 INJECTION, SOLUTION INTRAVENOUS at 08:41

## 2022-07-14 RX ADMIN — HYDROMORPHONE HYDROCHLORIDE 0.25 MG: 1 INJECTION, SOLUTION INTRAMUSCULAR; INTRAVENOUS; SUBCUTANEOUS at 09:51

## 2022-07-14 RX ADMIN — EPHEDRINE SULFATE 20 MG: 50 INJECTION, SOLUTION INTRAVENOUS at 08:44

## 2022-07-14 RX ADMIN — HYDROMORPHONE HYDROCHLORIDE 0.25 MG: 1 INJECTION, SOLUTION INTRAMUSCULAR; INTRAVENOUS; SUBCUTANEOUS at 09:44

## 2022-07-14 RX ADMIN — HYDROMORPHONE HYDROCHLORIDE 0.25 MG: 1 INJECTION, SOLUTION INTRAMUSCULAR; INTRAVENOUS; SUBCUTANEOUS at 10:01

## 2022-07-14 RX ADMIN — PROPOFOL 200 MG: 10 INJECTION, EMULSION INTRAVENOUS at 08:30

## 2022-07-14 RX ADMIN — HYDROMORPHONE HYDROCHLORIDE 0.5 MG: 1 INJECTION, SOLUTION INTRAMUSCULAR; INTRAVENOUS; SUBCUTANEOUS at 10:46

## 2022-07-14 RX ADMIN — SODIUM CHLORIDE, SODIUM LACTATE, POTASSIUM CHLORIDE, AND CALCIUM CHLORIDE: .6; .31; .03; .02 INJECTION, SOLUTION INTRAVENOUS at 08:21

## 2022-07-14 RX ADMIN — MIDAZOLAM 2 MG: 1 INJECTION INTRAMUSCULAR; INTRAVENOUS at 08:24

## 2022-07-14 RX ADMIN — FENTANYL CITRATE 25 MCG: 50 INJECTION INTRAMUSCULAR; INTRAVENOUS at 08:35

## 2022-07-14 RX ADMIN — FENTANYL CITRATE 25 MCG: 50 INJECTION INTRAMUSCULAR; INTRAVENOUS at 09:43

## 2022-07-14 RX ADMIN — FENTANYL CITRATE 25 MCG: 50 INJECTION INTRAMUSCULAR; INTRAVENOUS at 09:33

## 2022-07-14 RX ADMIN — HYDROMORPHONE HYDROCHLORIDE 0.5 MG: 1 INJECTION, SOLUTION INTRAMUSCULAR; INTRAVENOUS; SUBCUTANEOUS at 10:20

## 2022-07-14 RX ADMIN — DEXAMETHASONE SODIUM PHOSPHATE 10 MG: 10 INJECTION, SOLUTION INTRAMUSCULAR; INTRAVENOUS at 08:37

## 2022-07-14 RX ADMIN — KETOROLAC TROMETHAMINE 30 MG: 30 INJECTION, SOLUTION INTRAMUSCULAR; INTRAVENOUS at 10:15

## 2022-07-14 RX ADMIN — EPHEDRINE SULFATE 5 MG: 50 INJECTION, SOLUTION INTRAVENOUS at 09:17

## 2022-07-14 RX ADMIN — ONDANSETRON 4 MG: 2 INJECTION INTRAMUSCULAR; INTRAVENOUS at 08:24

## 2022-07-14 RX ADMIN — CEFAZOLIN SODIUM 2000 MG: 2 SOLUTION INTRAVENOUS at 08:24

## 2022-07-14 RX ADMIN — FENTANYL CITRATE 25 MCG: 50 INJECTION INTRAMUSCULAR; INTRAVENOUS at 09:38

## 2022-07-14 RX ADMIN — ACETAMINOPHEN 975 MG: 325 TABLET ORAL at 10:18

## 2022-07-14 NOTE — DISCHARGE INSTRUCTIONS
Arlys Cogan, M D  Attending, [address] [place] [address] Office Phone: [phone] ? Fax: [phone]  [address] [place] [address] Office Phone: [phone] ? ZRX:[phone]    : Sandra De La Fuente [place], [place]     Surgery Coordinators Flash Morales: Starla Tamia, [phone]   Efren  [phone]  Surgery Coordinator [address] [place] [address]:  [address], [phone]  www [email or web address]   PRE-OPERATIVE AND POST-OPERATIVE INSTRUCTIONS    General Information:  Typical post operative visits are at the following intervals:  2-3 weeks post surgery, 6 weeks post surgery, 3 months post surgery, 6 months post surgery, and then on a yearly basis  However, this may change based on Dr Salty Fan recommendation  #1 post-operative rule for foot/ankle surgery:  ONCE YOU ARE OUT OF YOUR CAST AND/OR REMOVABLE BOOT, SWELLING MAY PERSIST FOR MANY MONTHS  YOU MIGHT ALSO EXPERIENCE A BLUISH DISCOLORATION OF YOUR LEG  THIS IS NORMAL AND PART OF THE USUAL POSTOPERATIVE EXPERIENCE  DO NOT WAIT UNTIL YOUR BLOCK WEARS OFF TO TAKE YOUR PAIN MEDICATION  IT TAKES A FEW DOSES OF THE PAIN MEDICATION TO REACH A THERAPEUTIC LEVEL  TAKE A TABLET PROACTIVELY BEFORE YOU HAVE ANY PAIN AND AGAIN 4 HOURS LATER SO WHEN THE BLOCK WEARS OFF, YOU ARE NOT CAUGHT OFF GUARD  SMOKING:  Smoking results in incomplete healing of fractures (broken bones) and joints that my have been fused  Smoking and nicotine also prevents the growth of bone into ankle replacements and bone healing  It also slows the healing of muscles and skin (soft tissue)  Therefore, please do not have surgery if you continue to smoke  We reserve the right to cancel your surgery if we suspect that you are smoking  DO NOT use nicorette gum or other patches    Please find an alternative method to quit smoking before your surgery and do not restart after surgery to allow for healing  THREE RULES:    After surgery you will most likely be given the instructions KEEP YOUR TOES ABOVE YOUR NOSE    This means that you MUST have your feet elevated higher than your heart  Keeping your toes above your nose helps to heal the muscles and skin (soft tissues) by reducing swelling in your leg  This position also helps to prevent infection, and is very important in avoiding deep venous thrombosis (blood clots)  In order to keep the blood circulating in your legs and in order to avoid deep vein   thrombosis (blood clots), we ask patients to GET UP ONCE AN HOUR during the day  This means you should at least cross the room and come back  It does not mean you have to be up for long periods of time  In most cases we will not have people immediately put any weight on their operated part  This is important to prevent loosening of metal or other devices holding the bones together  It also prevents irritation of the soft tissues which can lead to prolonged healing  When we say get up once an hour, please walk, hop or move with an assisted device  This is important! Do not do any excessive walking during the first few days after surgery  Recovering from surgery is a full-time task for the patient  Postoperative care is important to avoid irritating the skin incision, which can lead to infection  Please do not plan activities or go out of town for several weeks after surgery  AFTER YOUR SURGERY:  Bleeding through the bandage almost always occurs  Do not let this alarm you  Simply add more gauze or a towel, call us, and come in for a dressing change  If you think it is excessive, contact us immediately or go to the local emergency room  Do not get the bandage wet  Showering is possible with plastic protectors  Be very careful, as the bathroom can be wet and slippery    If you do get your dressing wet, it should be changed immediately  Please contact us  ONCE YOUR ARE OUT OF YOUR CAST AND/OR REMOVABLE BOOT, SWELLING MAY PERSIST FOR MANY MONTHS  THERE WILL ALSO BE A BLUISH DISCOLORATION OF YOUR LEG FOR MONTHS  THIS IS NORMAL AND PART OF THE USUAL POSTOPERATIVE EXPERIENCE  WEARING COMPRESSION HOSE (ELASTIC STOCKINGS) CAN HELP AVOID SOME OF THIS SWELLING  Ice the area 20 minutes every hour once the nerve block wears off  If you are in a cast or a splint, you may need to leave the ice on longer than 20 minutes in order to feel any benefits  DRESSING:   The purpose of the surgical dressing is to keep your wound and the surgical site protected from the environment  Most dressings contain splints, which help to hold your foot and ankle in a corrected position, and also allow the surgical site to heal properly  If you have a drain in place, this will need to be removed in 1 day after surgery  The time for the drain to be pulled will be written on your discharge instruction sheet  CAST  INSTRUCTIONS:  You may or may not get a cast following surgery  If you do, pay close attention to the following:    After application of a splint or cast, it is very important to elevate your leg for 24 to 72 hours  The injured area should be elevated well above the heart  Remember Toes above your Nose  Rest and elevation greatly reduce pain and speed the healing process by minimizing early swelling      CALL YOUR DOCTORS OFFICE OR VISIT LOCATION EMERGENCY ROOM IF YOU HAVE ANY OF THE FOLLOWING:    Significant increased pain, which may be caused by swelling (Strict elevation will alleviate this)  Numbness and tingling in your hand or foot, which may be caused by too much pressure on the nerves (There is always some numbness after surgery due to nerve blocks)  Burning and stinging, which may be caused by too much pressure on the skin  Excessive swelling below the cast, which may mean the cast is slowing your blood circulation  Loss of active movement of toes, which request an urgent evaluation  Loss of capillary refill  Pinch the tip of toes and tricia the skin  Release pressure and if the skin does not return pink then call the office immediately  DO NOT GET YOUR CAST WET  Bacteria thrive in moist dark areas  We do not want this  If your cast becomes wet, return to the office and we will apply another one  PAIN AFTER SURGERY:  Narcotic pain medication can and will depress your respiratory system if taken in excess  The goal of pain management with narcotics is to be comfortable not pain free  If you take enough narcotics to be pain free then you run the risk of stopping breathing  If this happens, call 911 immediately! Pain in the heel is often  caused by pressure from the weight of your foot on the bed  Make sure your heel is suspended off the bed by keeping a pillow underneath your calf not your knee  Medications: You will be given narcotic pain medication  Do NOT drive while taking narcotic medications  Medications such as Darvocet, Percocet, Vicoden or Tylenol #3, also contain acetaminophen (Tylenol)  Do not take acetaminophen or Tylenol from home when taking theses medications  When you fill your prescription, you may ask the pharmacist if your pain medication has acetaminophen/Tylenol in it  It is okay to take Tylenol with Oxycontin/Oxycodone  Unless you are allergic to aspirin or currently taking a blood thinner, Dr Gisele Katz patients are requested to take one 325 mg aspirin every 12 hours until you are back to walking normally after surgery (This can be up to 6 weeks)  Ecotrin (Enteric-coated aspirin) is more sensitive to the stomach and we recommend purchasing this instead of regular aspirin to minimize the risk of stomach irritation  Narcotic medications commonly cause nausea  Taking them with food will decrease this side effect   If you are having extreme nausea, please contact us for an alternative medication or for something that can be taken with this medication to decrease the nausea  Also, narcotic medications frequently cause constipation  An increase of fiber, fruits and vegetables in your diet may alleviate this problem, or if necessary, you may use an over-the-counter medication such as senekot, colace, or Fibercon for constipation problems  You should resume all medications you were taking prior to the surgery unless otherwise specified  If you had fracture surgery, bony surgery like an osteotomy or fusion, or a surgery that requires bone healing, you are advised to take Vitamin D and Calcium to improve healing potential   Vitamin D3 4000 units/day and Calcium 1200mg/day  These are over the counter medications so please pick them up at the pharmacy when you are picking up your prescriptions  Activity:   Because of your recent foot surgery, your activity level will decrease  You will need to elevate your foot ABOVE the level of your heart for a minimum of four days  The length of time necessary for the swelling to go down, and for your wounds to heal properly depends greatly on your efforts here  Elevation is extremely important to avoid compromising the blood supply to your foot  Remember when your foot is down it will swell, which will increase pain and slow healing  Wiggle your toes frequently if possible  If you go home with a regional block, (a type of anesthesia) the foot and leg will be numb  Think of ways to get into your house and around the house until the block wears off  Keep in mind that it may be a legal issue if you drive while in a cast or splint, especially when the splint is on the right foot  You may call the Department of Motor Vehicles to schedule a road test if you have adaptive equipment applied to your car  The amount of weight you are allowed to bear on your foot will be written on your discharge sheet filled out at the time of surgery   The following is an explanation of the possibilities:       Weight bearing as tolerated (WBAT)   You may put your body weight on your foot as long as you tolerate the pain

## 2022-07-14 NOTE — OP NOTE
OPERATIVE REPORT  PATIENT NAME: Sallie Pike    :  1965  MRN: [id number]  Pt Location: [facility] OR [facility] 06    SURGERY DATE: 2022    Surgeon(s) and Role:     Tj Kay MD - Primary  OLIVA Alston- assisting    Preop Diagnosis:  Hallux rigidus of both feet [M20 21, M20 22]  Open wound of right great toe, subsequent encounter [S91 101D]    Post-Op Diagnosis Codes:     * Hallux rigidus of both feet [M20 21, M20 22]     * Open wound of right great toe, subsequent encounter [S91 101D]    Procedure(s) (LRB):  DEBRIDEMENT FOOT/TOE ([address] OUT), negative pressure wound dressing application (Right)    Specimen(s):  * No specimens in log *    Estimated Blood Loss:   Minimal    Drains:  * No LDAs found *    Anesthesia Type:   Choice    Operative Indications:  Hallux rigidus of both feet [M20 21, M20 22]  Open wound of right great toe, subsequent encounter [S91 101D]      Operative Findings:  Consistent with diagnosis    Complications:   None    Procedure and Technique:  PRE-OPERATIVE DIAGNOSIS:     PROCEDURES:  1  Scar revision dorsal 1st MTP joint  2  Irrigation and debridement down to the level of the bone 1st MTP dorsal incision  3  Advancement of periosteal flap to close joint capsule, 1st MTP joint  4  Application of negative pressure wound therapy dressing       OPERATIVE REPORT:    After informed consent and preoperative medical clearance were obtained the patient was taken the preoperative holding area where He was properly assessed  Patient was given appropriate perioperative IV antibiotics  Please see the anesthesia report for details of the anesthesia administered  Patient was taken to the operating room and placed supine on the operating room table  The operative lower extremity prepped and draped in sterile fashion  The lower extremity was exsanguinated with an elastic bandage and a tourniquet was utilized  A time-out was performed with the attending surgeon in the room   A dorsal incision was made over the 1st MTP joint, ellipsing the old incision and the open wound  Careful soft tissue dissection was maintained  Superficial peroneal nerve branch to the hallux was protected as was the extensor hallucis longus tendon  We excised the sinus tract to the 1st MTP joint shaply  Joint was fully exposed  We then began the irrigation using TURP tubing and 6L of NSS  After we were satisfied with the irrigation and debridement, we mobilized the lateral capsule and periosteal layer of the dorsal metatarsal including the extensor hallucis brevis and this enabled us to close the joint capsule  Prior to closure, we used vancomycin in the wound bed  The wound was then closed in layers with PDS suture for the deeper layers including the capsular tissue for the 1st MTP joint and for the subcutaneous layers  The tourniquet had been released and meticulous hemostasis had been obtained  Satisfactory capillary refill remained in all toes  The wounds were approximated using nylon suture in an interrupted fashion to a tension-less closure  We then applied the negative pressure wound therapy dressing to the incision and used this as a dressing  Satisfactory capillary refill remained in all toes  The patient tolerated procedure well was taken to the recovery room in stable condition  DISPOSITION:   1  Patient is stable to PACU    2  WBAT in postop shoe  3  Strict elevation  4  Follow-up in 3 weeks for suture removal if appropriate   5  See Patient Instructions for full disposition, including weightbearing status, DVT prophylaxis, postoperative care and follow-up       I was present for the entire procedure, A qualified resident physician was not available and A physician assistant was required during the procedure for retraction tissue handling,dissection and suturing    Patient Disposition:  PACU       SIGNATURE: Shanthi Espinal MD  DATE: July 14, 2022  TIME: 7:14 AM

## 2022-07-14 NOTE — ANESTHESIA POSTPROCEDURE EVALUATION
Post-Op Assessment Note    CV Status:  Stable  Pain Score: 0    Pain management: adequate     Mental Status:  Alert and awake   Hydration Status:  Euvolemic   PONV Controlled:  Controlled   Airway Patency:  Patent      Post Op Vitals Reviewed: Yes      Staff: CRNA         No complications documented      BP   138/75   Temp   98 7   Pulse  78   Resp   16   SpO2   95

## 2022-07-14 NOTE — ANESTHESIA PREPROCEDURE EVALUATION
Procedure:  DEBRIDEMENT FOOT/TOE [facility] OUT), negative pressure wound dressing application (Right First Toe)    Relevant Problems   CARDIO   (+) Mixed hyperlipidemia      GI/HEPATIC   (+) GERD (gastroesophageal reflux disease)      /RENAL   (+) Benign hypertension with CKD (chronic kidney disease), stage II   (+) Stage 3a chronic kidney disease (HCC)      MUSCULOSKELETAL   (+) Arthritis of both feet   (+) Primary osteoarthritis of left foot   (+) Primary osteoarthritis of right foot      NEURO/PSYCH   (+) History of colon polyps   (+) Major depressive disorder, single episode, mild (HCC)      PULMONARY   (+) Centrilobular emphysema (HCC)   (+) SOB (shortness of breath)   (+) Sleep apnea      Former 35 py smoker    Physical Exam    Airway    Mallampati score: II  TM Distance: >3 FB  Neck ROM: full     Dental       Cardiovascular      Pulmonary      Other Findings        Anesthesia Plan  ASA Score- 3     Anesthesia Type- general with ASA Monitors  Additional Monitors:   Airway Plan: LMA  Plan Factors-Exercise tolerance (METS): >4 METS  Chart reviewed  Existing labs reviewed  Patient summary reviewed  Induction- intravenous  Postoperative Plan-     Informed Consent- Anesthetic plan and risks discussed with patient  I personally reviewed this patient with the CRNA  Discussed and agreed on the Anesthesia Plan with the CRNA  Kev Cavazos

## 2022-07-15 ENCOUNTER — TELEPHONE (OUTPATIENT)
Dept: OBGYN CLINIC | Facility: HOSPITAL | Age: 57
End: 2022-07-15

## 2022-07-15 DIAGNOSIS — M20.21 HALLUX RIGIDUS, RIGHT FOOT: ICD-10-CM

## 2022-07-15 NOTE — TELEPHONE ENCOUNTER
Spoke to patient  Advised he is approved to take 2 oxycodone for 2 doses only  Then back to the regular 1 tablet per dose  Advised he can also add advil OR aleve  Not both  One or the other as recommended on the bottle only  Continue tylenol as recommended and ice 20 mins on 20 mins off  Understanding was verbalized  Patient will call back if pain still uncontrolled after trying above

## 2022-07-15 NOTE — TELEPHONE ENCOUNTER
Patients wife did call back into the office in regards to this  She states he is in a lot of pain currently and the medication prescribed is not helping  She is asking if there is anyway a different medication can be prescribed to help him with his pain  I did warm transfer to nurse for further assistance

## 2022-07-15 NOTE — TELEPHONE ENCOUNTER
Dr Lachman    Patient had sx yesterday, the medication he has is not helping his pain  Would like a cb         # [phone]

## 2022-07-15 NOTE — TELEPHONE ENCOUNTER
Patient's wife calling to let Dr Power Chamorro know that his pain is not controlled with the oxycodone 5mg  Wife states that patient did not have a pain block  Oxycodone does not work for pain for Crystaljewel Morales  He is asking for Morphine oral tablets  Advised that is is doubtful Dr would order this  Please advise if oxycodone 5mg 2 tabs every 4 hrs x 2 doses, the go back to 1 tab every 4 hrs? Naplate Side

## 2022-07-18 ENCOUNTER — TELEPHONE (OUTPATIENT)
Dept: OBGYN CLINIC | Facility: CLINIC | Age: 57
End: 2022-07-18

## 2022-07-18 NOTE — TELEPHONE ENCOUNTER
Dr Lachman  RE: [facility]#: [phone]     Patient called to see if disability was received by employer  Per chart nothing scanned in and nothing on the log       Will have it refaxed

## 2022-07-20 ENCOUNTER — OFFICE VISIT (OUTPATIENT)
Dept: OBGYN CLINIC | Facility: CLINIC | Age: 57
End: 2022-07-20

## 2022-07-20 VITALS
DIASTOLIC BLOOD PRESSURE: 94 MMHG | BODY MASS INDEX: 31.81 KG/M2 | WEIGHT: 240 LBS | SYSTOLIC BLOOD PRESSURE: 135 MMHG | HEART RATE: 80 BPM | HEIGHT: 73 IN

## 2022-07-20 DIAGNOSIS — M20.22 HALLUX RIGIDUS OF BOTH FEET: Primary | ICD-10-CM

## 2022-07-20 DIAGNOSIS — M20.21 HALLUX RIGIDUS OF BOTH FEET: Primary | ICD-10-CM

## 2022-07-20 PROCEDURE — 99024 POSTOP FOLLOW-UP VISIT: CPT | Performed by: ORTHOPAEDIC SURGERY

## 2022-07-20 NOTE — PROGRESS NOTES
JONY Shaw  Attending, Orthopaedic Surgery  Foot and Ankle  [facility]      ORTHOPAEDIC FOOT AND ANKLE POST-OP VISIT     Procedure:     Debridement of 1st MTP wound, application of incisional VAC       Date of surgery:   7/14/22      PLAN  1  Weightbearing Status- WBAT operative extremity  2  DVT prophylaxis- ASA 325mg BID  3  Continue to elevate 23hrs/day getting up 1x per hour to prevent a blood clot  4  Pain control- OTC pain medication  5  RTC in 1 weeks  6  Xrays needed next visit - no    History of Present Illness:   Chief Complaint:   Chief Complaint   Patient presents with   [address] [place] [address] is a 59 y o  male who is being seen for post-operative visit for the above procedure  Pain is well controlled and the patient has successfully transitioned to OTC pain medicines  he is taking ASA 325mg BID for DVT prophylaxis  Patient has been WBAT in a Post-op Shoe  Review of Systems:  General- denies fever/chills  Respiratory- denies cough or SOB  Cardio- denies chest pain or palpitations  GI- denies abdominal pain  Musculoskeletal- Negative except noted above  Skin- denies rashes or wounds    Physical Exam:   /94 (BP Location: Right arm, Patient Position: Sitting, Cuff Size: Adult)   Pulse 80   Ht 6' 1" (1 854 m)   Wt 109 kg (240 lb)   BMI 31 66 kg/m²   General/Constitutional: No apparent distress: well-nourished and well developed  Eyes: normal ocular motion  Lymphatic: No appreciable lymphadenopathy  Respiratory: Non-labored breathing  Vascular: No edema, swelling or tenderness, except as noted in detailed exam   Integumentary: No impressive skin lesions present, except as noted in detailed exam   Neuro: No ataxia or tremors noted  Psych: Normal mood and affect, oriented to person, place and time  Appropriate affect  Musculoskeletal: Normal, except as noted in detailed exam and in HPI      Examination    right        Incision Clean, dry, intact, incisional VAC removed  Sutures In    Ecchymosis none    Swelling Mild    Sensation Intact to light touch throughout sural, saphenous, superficial peroneal, deep peroneal and medial/lateral plantar nerve distributions  Atalissa-Pat 5 07 filament (10g) testing deferred  Cardiovascular Brisk capillary refill < 2 seconds,intact DP and PT pulses    Special Tests None      Imaging Studies:   None      James R Lachman, MD  Foot & Ankle Surgery   Department of [address] [place] [address]      I personally performed the service  [facility]   Lachman, MD

## 2022-07-20 NOTE — PATIENT INSTRUCTIONS
Continue aspirin for blood clot prevention  Maintain the dressing, keep it dry and clean      Follow up in 1 week for dressing change  Continue to use the post-op shoe

## 2022-07-27 ENCOUNTER — OFFICE VISIT (OUTPATIENT)
Dept: OBGYN CLINIC | Facility: CLINIC | Age: 57
End: 2022-07-27

## 2022-07-27 VITALS
DIASTOLIC BLOOD PRESSURE: 94 MMHG | SYSTOLIC BLOOD PRESSURE: 134 MMHG | WEIGHT: 240 LBS | BODY MASS INDEX: 31.81 KG/M2 | HEART RATE: 82 BPM | HEIGHT: 73 IN

## 2022-07-27 DIAGNOSIS — M20.21 HALLUX RIGIDUS, BILATERAL: Primary | ICD-10-CM

## 2022-07-27 DIAGNOSIS — M20.22 HALLUX RIGIDUS, BILATERAL: Primary | ICD-10-CM

## 2022-07-27 PROCEDURE — 99024 POSTOP FOLLOW-UP VISIT: CPT | Performed by: ORTHOPAEDIC SURGERY

## 2022-07-27 RX ORDER — CHLORHEXIDINE GLUCONATE 4 G/100ML
SOLUTION TOPICAL DAILY PRN
Status: CANCELLED | OUTPATIENT
Start: 2022-07-27

## 2022-07-27 RX ORDER — CHLORHEXIDINE GLUCONATE 0.12 MG/ML
15 RINSE ORAL ONCE
Status: CANCELLED | OUTPATIENT
Start: 2022-07-27 | End: 2022-07-27

## 2022-07-27 NOTE — PATIENT INSTRUCTIONS
JONY Combs  Attending, [address] [place] [address] Office Phone: [phone] ? Fax: [phone]  [facility] Office Phone: [phone] ? XPZ:[phone]    : Sahil Boss) [place], [place]     Surgery Coordinators Myrtle Whitman: Guillermo Lowry, [phone]  Ruddaquilino Medinacristinociara, [phone]  Surgery Coordinator Mekhi:  Christina Avendaño, [address] [facility], [phone]  www [email or web address]   PRE-OPERATIVE AND POST-OPERATIVE INSTRUCTIONS    General Information:  Your surgery is with Dr Fowler Billmark  Dates can change (although rare) depending on emergencies  Typical post operative visits are at the following intervals:  3 weeks post surgery(except 1 week for bunions and wound monitoring), 6 weeks post surgery, 3 months post surgery, 6 months post surgery, and then on a yearly basis  However, this may change based on Dr Fernanda Duncan recommendation  #1 post-operative rule for foot/ankle surgery:  ONCE YOU ARE OUT OF YOUR CAST AND/OR REMOVABLE BOOT, SWELLING MAY PERSIST FOR MANY MONTHS  YOU MIGHT ALSO EXPERIENCE A BLUISH DISCOLORATION OF YOUR LEG  THIS IS NORMAL AND PART OF THE USUAL POSTOPERATIVE EXPERIENCE  SMOKING:  Smoking results in incomplete healing of fractures (broken bones) and joints that my have been fused  Smoking and nicotine also prevents the growth of bone into ankle replacements and bone healing  It also slows the healing of muscles and skin (soft tissue)  Therefore, please do not have surgery if you continue to smoke  We reserve the right to cancel your surgery if we suspect that you are smoking  DO NOT use nicorette gum or other patches  Please find an alternative method to quit smoking before your surgery  Pre-Operative Information:  Surgery date and preoperative visits:   If you have medical problems, such as an abnormal EKG, history of BLOOD CLOT, ANEURYSM, and any other heart condition, please inform us so that we can get your medical clearance several weeks before the surgery  Please bring any important medical information, such as an EKG, chest x-ray, or echocardiogram, with you to ensure that your surgery will not be delayed  If needed, you will receive your preoperative appointments in the mail or by phone from our scheduling office  The location of the preoperative appointment will be given to you also  You may not eat after midnight the night before surgery  If you do, your surgery will be cancelled  You will receive a phone call from your surgery center the day before your surgery (if your surgery is on a Monday, you will get a call the Friday before)  If you do not hear from someone by 4pm the day before your surgery, please call the Surgical coordinator (number above) to notify us  Start taking Vitamin D3 4000 units per day and Calcium 1200mg per day immediately  You will continue this until your 3 month post-op visit  These are over the counter and available at all pharmacies and supermarkets  FOR THOSE HAVING SURGERY AT [address] [place] [address] Avenue WILL NEED CRUTCHES OR A ROLLING WALKER AFTER SURGERY, ASK FOR A PRESCRIPTION FOR THIS FROM OUR OFFICE TODAY  THIS CANNOT BE HANDLED THE DAY OF SURGERY AS [facility] DOES NOT STOCK THESE  Because bacterial can often enter any defect in the skin, it is important to avoid any cuts before surgery  Any breaks in the skin on the leg will often result in your surgery being postponed  Please avoid going on a very long walk the day prior to surgery, or doing other activities that could lead to irritation of the skin, including yard work, extra athletic activity, or shaving  This could result in surgery cancellation  You MUST be fasting the day of your surgery  Therefore, please do not consume any foot or beverage after midnight the night before surgery  The morning of surgery you may take your usual medications with a sip of water  It is important not to take anti-inflammatory medication like Ibuprofen, Motrin, Naproxen (Aleve), or Aspirin 7-10 days before surgery because they will make you bleed more than usual   Vitamin, E, Plavix and Coumadin also have the same effect  Stop Aspirin and Vitamin E two weeks before surgery  YOUR MEDICAL DOCTOR SHOULD TELL YOU WHEN TO STOP COUMADIN OR PLAVIX  If your surgery involves any bone healing, please do not take anti-inflammatories for at least 6 weeks after surgery  This can impede bone healing (ibuprofen, Aleve, Relafen, iodine)  Tylenol is fine to take  PREOPERATIVE BATHING INSTRUCTIONS:    Before your surgery, bathe with Hibiclens (4% Chlorhexidene) as instructed below  This skin cleanser will help reduce the bacteria on your skin before surgery  To avoid irritating your eyes, do not apply Hibiclens above the level of your neck  On the evening before AND the morning of surgery, bathe your entire body except the face and scalp, then rinse freely  DO NOT apply to your face or scalp, as Hibiclens can irritate your eyes  Purchasing information:   Hibiclens is available without a prescription at [facility]  ADDITIONAL INSTRUCTIONS:  PATIENTS HAVING FOOT/ANKLE SURGERY     In preparation for your upcoming surgery, we kindly request and advise the following:  Notify our office if you are taking any of the following:  Coumadin (warfarin):  Persantine (dipyridamole); Pletal (cilostazol); Plavix (clopidogrel); Ticlid (ticlopidine); Agrylin (anagrelide); Aggrenox (dipyridamole and aspirin) or other blood thinners,  In addition, stop taking Vitamin E and herbal supplements  Do not schedule any elective dental work for at least 6 months after surgery  If you had an ankle replacement, you will need to take antibiotics before any future dental procedures   Your dentist or our office can prescribe these for you  1000mg of Amoxicillin 1 hour prior to any dental procedure is the recommended dosing  THREE RULES:    After surgery you will most likely be given the instructions KEEP YOUR TOES ABOVE YOUR NOSE    This means that you MUST have your feet elevated higher than your heart  Keeping your toes above your nose helps to heal the muscles and skin (soft tissues) by reducing swelling in your leg  This position also helps to prevent infection, and is very important in avoiding deep venous thrombosis (blood clots)  In order to keep the blood circulating in your legs and in order to avoid deep vein   thrombosis (blood clots), we ask patients to GET UP ONCE AN HOUR during the day  This means you should at least cross the room and come back  It does not mean you have to be up for long periods of time  In most cases we will not have people immediately put any weight on their operated part  This is important to prevent loosening of metal or other devices holding the bones together  It also prevents irritation of the soft tissues which can lead to prolonged healing  When we say get up once an hour, please walk, hop or move with an assisted device  This is important! Do not do any excessive walking during the first few days after surgery  Recovering from surgery is a full-time task for the patient  Postoperative care is important to avoid irritating the skin incision, which can lead to infection  Please do not plan activities or go out of town for several weeks after surgery  If you are unsure about your future activities, please schedule surgery only when you know it is acceptable for you  Scheduling surgery and then canceling the date, prevents other people from having surgery on that date as it takes time to line everything up effectively  If you cancel your surgery the week of your planned surgery, we reserve the right to cancel all future surgical procedures      THE DAY OF SURGERY:    Arrival to the hospital or outpatient surgical center on time is imperative  If you arrive late, then your surgery will be cancelled  You MUST have a family member/friend bring you, stay with you throughout the DURATION of your surgery, and drive you home  You MUST be fasting the day of your surgery  Therefore, do not consume any food or beverage after midnight the night before surgery  At your pre-operative visit with the anesthesia staff, or during your phone screen, a nurse will instruct you what medications you will need to take the day of surgery  MAKE SURE THAT THE PHARMACY LISTED IN THE ELECTRONIC MEDICAL RECORD (EPIC) IS YOUR PREFERRED PHARMACY  For example, if you are staying with family or a friend, and will not be near your preferred pharmacy, YOU MUST, tell the nurses checking you in the day of surgery so that this can be changed in the system  If your prescriptions are sent to a pharmacy, this cannot be changed  AFTER YOUR SURGERY:  Bleeding through the bandage almost always occurs  Do not let this alarm you  Simply add more gauze or a towel, call us, and come in for a dressing change  If you think it is excessive, contact us immediately or go to the local emergency room  Do not get the bandage wet  Showering is possible with plastic protectors  Be very careful, as the bathroom can be wet and slippery  If you do get your dressing wet, it should be changed immediately  Please contact us  ONCE YOUR ARE OUT OF YOUR CAST AND/OR REMOVABLE BOOT, SWELLING MAY PERSIST FOR MANY MONTHS  YOU MIGHT ALSO EXPERIENCE A BLUISH DISCOLORATION OF YOUR LEG  THIS IS NORMAL AND PART OF THE USUAL POSTOPERATIVE EXPERIENCE  WEARING COMPRESSION HOSE (ELASTIC STOCKINGS) CAN HELP AVOID SOME OF THIS SWELLING  DRESSING:   The purpose of the surgical dressing is to keep your wound and the surgical site protected from the environment    Most dressings contain splints, which help to hold your foot and ankle in a corrected position, and also allow the surgical site to heal properly  Dressings will remain in place and undisturbed until the first postop visit  If you have a drain in place, this will need to be removed in 1-3 days after surgery  The time for the drain to be pulled will be written on your discharge instruction sheet  CAST  INSTRUCTIONS:  You may or may not get a cast following surgery  If you do, pay close attention to the following:    After application of a splint or cast, it is very important to elevate your leg for 24 to 72 hours  The injured area should be elevated well above the heart  Remember Toes above your Nose  Rest and elevation greatly reduce pain and speed the healing process by minimizing early swelling  CALL YOUR DOCTORS OFFICE OR VISIT LOCATION EMERGENCY ROOM IF YOU HAVE ANY OF THE FOLLOWING:    Significant increased pain, which may be caused by swelling, and the feeling that the splint or cast is too tight  Numbness and tingling in your hand or foot, which may be caused by too much pressure on the nerves  Burning and stinging, which may be caused by too much pressure on the skin  Excessive swelling below the cast, which may mean the cast is slowing your blood circulation  Loss of active movement of toes, which request an urgent evaluation  Loss of capillary refill  Pinch the tip of toes and tricia the skin  Release pressure and if the skin does not return pink then call the office immediately  DO NOT GET YOUR CAST WET  Bacteria thrive in moist dark areas  We do not want this  If your cast becomes wet, return to the office and we will apply another one  PAIN AFTER SURGERY:  Narcotic pain medication can and will depress your respiratory system if taken in excess  The goal of pain management with narcotics is to be comfortable not pain free  If you take enough narcotics to be pain free then you run the risk of stopping breathing    If this happens, call [phone] immediately! Pain in the heel is often  caused by pressure from the weight of your foot on the bed  Make sure your heel is suspended off the bed by keeping a pillow underneath your calf not your knee  Medications: You will be given narcotic pain medication  Do NOT drive while taking narcotic medications  Medications such as Darvocet, Percocet, Vicoden or Tylenol #3, also contain acetaminophen (Tylenol)  Do not take acetaminophen or Tylenol from home when taking theses medications  When you fill your prescription, you may ask the pharmacist if your pain medication has acetaminophen/Tylenol in it  It is okay to take Tylenol with Oxycontin/Oxycodone  Should you have pain after taking your prescription medication, ibuprophen (Motrin, Advil, and Alleve) is a common over the counter preparation and may often be taken with the prescription pain medication as long as you take them with food  These medications can irritate the stomach lining  Unless you are allergic to aspirin or currently taking a blood thinner, Dr Marc Soares patients are requested to take one 325 mg aspirin every 12 hours until you are back to walking normally after surgery (This can be up to 6 weeks)  Narcotic medications commonly cause nausea  Taking them with food will decrease this side effect  If you are having extreme nausea, please contact us for an alternative medication or for something that can be taken with this medication to decrease the nausea  Also, narcotic medications frequently cause constipation  An increase of fiber, fruits and vegetables in your diet may alleviate this problem, or if necessary, you may use an over-the-counter medication such as senekot, colace, or Fibercon for constipation problems  You should resume all medications you were taking prior to the surgery unless otherwise specified  Activity:   Because of your recent foot surgery, your activity level will decrease   You will need to elevate your foot ABOVE the level of your heart for a minimum of four days  The length of time necessary for the swelling to go down, and for your wounds to heal properly depends greatly on your efforts here  Elevation is extremely important to avoid compromising the blood supply to your foot  Remember when your foot is down it will swell, which will increase pain and slow healing  Wiggle your toes frequently if possible  If you go home with a regional block, (a type of anesthesia) the foot and leg will be numb  Think of ways to get into your house and around the house until the block wears off  Keep in mind that it may be a legal issue if you drive while in a cast or splint, especially when the splint is on the right foot  You may call the [employer] to schedule a road test if you have adaptive equipment applied to your car  The amount of weight you are allowed to bear on your foot will be written on your discharge sheet filled out at the time of surgery  The following is an explanation of the possibilities:       [address] has the following policies when it comes to ELECTIVE surgery  No elective surgery requiring anesthesia until 7 weeks after a patient tested positive for COVID-19   No elective surgery requiring anesthesia until 3 months after a patient was hospitalized for COVID-19       Heel-only weight bearing:   Usually, this order is given for use with a special shoe only, which will help you to put weight only on your heel  You may bear your body weight on your foot, as long as it is only borne on your heel

## 2022-07-27 NOTE — H&P (VIEW-ONLY)
JONY Wong  Attending, Orthopaedic Surgery  Foot and Ankle  [facility]      ORTHOPAEDIC FOOT AND ANKLE POST-OP VISIT     Procedure:     Debridement of 1st MTP wound, application of incisional VAC, Right     Date of surgery:   7/14/22  He has healed the wound from the surgery, we will set him up bilateral 1st MTP fusions  He will continue local wound care, may shower but do not soak in water, protect the scab  Informed consent was signed for bilateral 1st MTP arthrodesis  PLAN  1  Weightbearing Status- WBAT operative extremity  2  DVT prophylaxis- ASA 325mg BID  3  Continue to elevate 23hrs/day getting up 1x per hour to prevent a blood clot  4  Pain control- OTC pain medication  5  RTC in 3 weeks POSTOP    CONSENT FOR BONY PROCEDURES:   Patient understands that there is no guarantee that the surgery will relieve all of His pain and also understands that there may be a prolonged course of protected weight-bearing status required which will restrict them from driving and other activities as discussed at today's visit  Patient recognizes that there are risks with surgery including bleeding, numbness, nerve irritation, wound complications, infection, continued pain, joint stiffness, malunion, nonunion, anesthetic complications, death, failure of procedure and possible need for further surgery  The patient understands that there is no guarantee that this surgery will relieve all of His pain and symptoms  Patient understands that there is no guarantee that they will return to full function after the procedure  Patient has provided informed consent for the procedure  History of Present Illness:   Chief Complaint:   Chief Complaint   Patient presents with   [address] [place] [address] Street is a 59 y o  male who is being seen for post-operative visit for the above procedure  Pain is well controlled and the patient has successfully transitioned to OTC pain medicines  he is taking ASA 325mg BID for DVT prophylaxis  Patient has been WBAT in a Post-op Shoe  His wound has healed and he reports no drainage  He is interested in setting up for bilateral 1st MTP fusions  Review of Systems:  General- denies fever/chills  Respiratory- denies cough or SOB  Cardio- denies chest pain or palpitations  GI- denies abdominal pain  Musculoskeletal- Negative except noted above  Skin- denies rashes or wounds    Physical Exam:   /94 (BP Location: Right arm, Patient Position: Sitting, Cuff Size: Adult)   Pulse 82   Ht 6' 1" (1 854 m)   Wt 109 kg (240 lb)   BMI 31 66 kg/m²   General/Constitutional: No apparent distress: well-nourished and well developed  Eyes: normal ocular motion  Lymphatic: No appreciable lymphadenopathy  Respiratory: Non-labored breathing  Vascular: No edema, swelling or tenderness, except as noted in detailed exam   Integumentary: No impressive skin lesions present, except as noted in detailed exam   Neuro: No ataxia or tremors noted  Psych: Normal mood and affect, oriented to person, place and time  Appropriate affect  Musculoskeletal: Normal, except as noted in detailed exam and in HPI  Examination    right        Incision Clean, dry, intact  Sutures Removed this visit    Ecchymosis none    Swelling Mild    Sensation Intact to light touch throughout sural, saphenous, superficial peroneal, deep peroneal and medial/lateral plantar nerve distributions  [place]-Pat 5 07 filament (10g) testing deferred  Cardiovascular Brisk capillary refill < 2 seconds,intact DP and PT pulses    Special Tests None      Imaging Studies:   None        James R Lachman, MD  Foot & Ankle Surgery   Department of [address] [place] [address]      I personally performed the service  Marlena Slay Lachman, MD

## 2022-07-27 NOTE — PROGRESS NOTES
JONY Guillermo  Attending, Orthopaedic Surgery  Foot and Ankle  [facility]      ORTHOPAEDIC FOOT AND ANKLE POST-OP VISIT     Procedure:     Debridement of 1st MTP wound, application of incisional VAC, Right     Date of surgery:   7/14/22  He has healed the wound from the surgery, we will set him up bilateral 1st MTP fusions  He will continue local wound care, may shower but do not soak in water, protect the scab  Informed consent was signed for bilateral 1st MTP arthrodesis  PLAN  1  Weightbearing Status- WBAT operative extremity  2  DVT prophylaxis- ASA 325mg BID  3  Continue to elevate 23hrs/day getting up 1x per hour to prevent a blood clot  4  Pain control- OTC pain medication  5  RTC in 3 weeks POSTOP    CONSENT FOR BONY PROCEDURES:   Patient understands that there is no guarantee that the surgery will relieve all of His pain and also understands that there may be a prolonged course of protected weight-bearing status required which will restrict them from driving and other activities as discussed at today's visit  Patient recognizes that there are risks with surgery including bleeding, numbness, nerve irritation, wound complications, infection, continued pain, joint stiffness, malunion, nonunion, anesthetic complications, death, failure of procedure and possible need for further surgery  The patient understands that there is no guarantee that this surgery will relieve all of His pain and symptoms  Patient understands that there is no guarantee that they will return to full function after the procedure  Patient has provided informed consent for the procedure  History of Present Illness:   Chief Complaint:   Chief Complaint   Patient presents with   [address] [place] [address] Street is a 59 y o  male who is being seen for post-operative visit for the above procedure  Pain is well controlled and the patient has successfully transitioned to OTC pain medicines  he is taking ASA 325mg BID for DVT prophylaxis  Patient has been WBAT in a Post-op Shoe  His wound has healed and he reports no drainage  He is interested in setting up for bilateral 1st MTP fusions  Review of Systems:  General- denies fever/chills  Respiratory- denies cough or SOB  Cardio- denies chest pain or palpitations  GI- denies abdominal pain  Musculoskeletal- Negative except noted above  Skin- denies rashes or wounds    Physical Exam:   /94 (BP Location: Right arm, Patient Position: Sitting, Cuff Size: Adult)   Pulse 82   Ht 6' 1" (1 854 m)   Wt 109 kg (240 lb)   BMI 31 66 kg/m²   General/Constitutional: No apparent distress: well-nourished and well developed  Eyes: normal ocular motion  Lymphatic: No appreciable lymphadenopathy  Respiratory: Non-labored breathing  Vascular: No edema, swelling or tenderness, except as noted in detailed exam   Integumentary: No impressive skin lesions present, except as noted in detailed exam   Neuro: No ataxia or tremors noted  Psych: Normal mood and affect, oriented to person, place and time  Appropriate affect  Musculoskeletal: Normal, except as noted in detailed exam and in HPI  Examination    right        Incision Clean, dry, intact  Sutures Removed this visit    Ecchymosis none    Swelling Mild    Sensation Intact to light touch throughout sural, saphenous, superficial peroneal, deep peroneal and medial/lateral plantar nerve distributions  [place]-Pat 5 07 filament (10g) testing deferred  Cardiovascular Brisk capillary refill < 2 seconds,intact DP and PT pulses    Special Tests None      Imaging Studies:   None        James R Lachman, MD  Foot & Ankle Surgery   Department of [address] [place] [address]      I personally performed the service  Shelvy Auer Lachman, MD

## 2022-07-27 NOTE — TELEPHONE ENCOUNTER
Patient is calling about the [address] [facility] [address] carter, received on 7/21/2022, but it is in process  He is stating his deadline is 08/01/2022 for Flor  He is asking for a call back at [phone]  Did advise of the 10-14 day process

## 2022-08-15 DIAGNOSIS — K21.00 GASTROESOPHAGEAL REFLUX DISEASE WITH ESOPHAGITIS WITHOUT HEMORRHAGE: ICD-10-CM

## 2022-08-15 DIAGNOSIS — F32.0 MAJOR DEPRESSIVE DISORDER, SINGLE EPISODE, MILD (HCC): ICD-10-CM

## 2022-08-15 RX ORDER — FLUOXETINE 20 MG/1
20 TABLET, FILM COATED ORAL DAILY
Qty: 90 TABLET | Refills: 1 | Status: SHIPPED | OUTPATIENT
Start: 2022-08-15 | End: 2022-10-11 | Stop reason: SDUPTHER

## 2022-08-15 RX ORDER — PANTOPRAZOLE SODIUM 40 MG/1
40 TABLET, DELAYED RELEASE ORAL 2 TIMES DAILY
Qty: 60 TABLET | Refills: 5 | Status: SHIPPED | OUTPATIENT
Start: 2022-08-15 | End: 2022-10-11 | Stop reason: SDUPTHER

## 2022-08-17 ENCOUNTER — ANESTHESIA EVENT (OUTPATIENT)
Dept: PERIOP | Facility: AMBULARY SURGERY CENTER | Age: 57
End: 2022-08-17
Payer: COMMERCIAL

## 2022-08-23 NOTE — PRE-PROCEDURE INSTRUCTIONS
Pre-Surgery Instructions:   Medication Instructions    albuterol (PROVENTIL HFA,VENTOLIN HFA) 90 mcg/act inhaler Uses PRN- OK to take day of surgery    b complex vitamins capsule Stop taking 7 days prior to surgery   carvedilol (COREG) 3 125 mg tablet Take day of surgery   eszopiclone (LUNESTA) 3 MG tablet Take night before surgery    FLUoxetine (PROzac) 20 MG tablet Take day of surgery   fluticasone-umeclidinium-vilanterol (Trelegy Ellipta) 100-62 5-25 MCG/INH inhaler Take day of surgery   lisinopril (ZESTRIL) 30 mg tablet Take night before surgery    meloxicam (MOBIC) 15 mg tablet Stop taking 3 days prior to surgery   multivitamin (THERAGRAN) TABS Stop taking 7 days prior to surgery   pantoprazole (PROTONIX) 40 mg tablet Take day of surgery   pregabalin (LYRICA) 75 mg capsule Take day of surgery   rosuvastatin (CRESTOR) 10 MG tablet Take night before surgery    sulindac (CLINORIL) 200 MG tablet Stop taking 3 days prior to surgery   triamcinolone (KENALOG) 0 1 % ointment Hold day of surgery   VITAMIN D PO Stop taking 7 days prior to surgery   Zinc Sulfate (ZINC 15 PO) Stop taking 7 days prior to surgery  Pre op instructions per My Surgical Experience booklet,medications per anesthesia guidelines and showering instructions per [facility] protocol reviewed-Patient has CHG  Pt  Verbalized understanding of current visitor restrictions  Instructed to call surgeon with any illness or covid exposure now till DOS  All medications instructed to take DOS are with sips water only  Instructed to avoid all ASA/NSAIDs and OTC Vit/Supp from now until after surgery per anesthesia guidelines  Tylenol ok prn  Pt  Verbalized an understanding of all instructions reviewed and offers no concerns at this time

## 2022-08-25 ENCOUNTER — ANESTHESIA (OUTPATIENT)
Dept: PERIOP | Facility: AMBULARY SURGERY CENTER | Age: 57
End: 2022-08-25
Payer: COMMERCIAL

## 2022-08-25 ENCOUNTER — HOSPITAL ENCOUNTER (OUTPATIENT)
Facility: AMBULARY SURGERY CENTER | Age: 57
Setting detail: OUTPATIENT SURGERY
Discharge: HOME | End: 2022-08-25
Attending: ORTHOPAEDIC SURGERY | Admitting: ORTHOPAEDIC SURGERY
Payer: COMMERCIAL

## 2022-08-25 ENCOUNTER — APPOINTMENT (OUTPATIENT)
Dept: RADIOLOGY | Facility: AMBULARY SURGERY CENTER | Age: 57
End: 2022-08-25
Payer: COMMERCIAL

## 2022-08-25 VITALS
WEIGHT: 240 LBS | OXYGEN SATURATION: 90 % | HEART RATE: 93 BPM | TEMPERATURE: 97 F | SYSTOLIC BLOOD PRESSURE: 149 MMHG | BODY MASS INDEX: 31.81 KG/M2 | DIASTOLIC BLOOD PRESSURE: 85 MMHG | HEIGHT: 73 IN | RESPIRATION RATE: 16 BRPM

## 2022-08-25 DIAGNOSIS — M20.21 HALLUX RIGIDUS OF BOTH FEET: Primary | ICD-10-CM

## 2022-08-25 DIAGNOSIS — M20.22 HALLUX RIGIDUS OF BOTH FEET: Primary | ICD-10-CM

## 2022-08-25 DIAGNOSIS — M20.21 HALLUX RIGIDUS, RIGHT FOOT: ICD-10-CM

## 2022-08-25 PROCEDURE — 73620 X-RAY EXAM OF FOOT: CPT

## 2022-08-25 PROCEDURE — C1713 ANCHOR/SCREW BN/BN,TIS/BN: HCPCS | Performed by: ORTHOPAEDIC SURGERY

## 2022-08-25 PROCEDURE — C9290 INJ, BUPIVACAINE LIPOSOME: HCPCS | Performed by: ORTHOPAEDIC SURGERY

## 2022-08-25 PROCEDURE — 28750 FUSION OF BIG TOE JOINT: CPT | Performed by: ORTHOPAEDIC SURGERY

## 2022-08-25 PROCEDURE — 28750 FUSION OF BIG TOE JOINT: CPT | Performed by: PHYSICIAN ASSISTANT

## 2022-08-25 DEVICE — LOCKING SCREW
Type: IMPLANTABLE DEVICE | Site: FOOT | Status: FUNCTIONAL
Brand: VARIAX

## 2022-08-25 DEVICE — CP LAG SCREW (T10)
Type: IMPLANTABLE DEVICE | Site: FOOT | Status: FUNCTIONAL
Brand: ANCHORAGE

## 2022-08-25 DEVICE — BROAD STRAIGHT PLATE
Type: IMPLANTABLE DEVICE | Site: FOOT | Status: FUNCTIONAL
Brand: VARIAX

## 2022-08-25 RX ORDER — FENTANYL CITRATE 50 UG/ML
INJECTION, SOLUTION INTRAMUSCULAR; INTRAVENOUS AS NEEDED
Status: DISCONTINUED | OUTPATIENT
Start: 2022-08-25 | End: 2022-08-25

## 2022-08-25 RX ORDER — LIDOCAINE HYDROCHLORIDE 10 MG/ML
INJECTION, SOLUTION EPIDURAL; INFILTRATION; INTRACAUDAL; PERINEURAL AS NEEDED
Status: DISCONTINUED | OUTPATIENT
Start: 2022-08-25 | End: 2022-08-25

## 2022-08-25 RX ORDER — ASPIRIN 325 MG
325 TABLET, DELAYED RELEASE (ENTERIC COATED) ORAL 2 TIMES DAILY
Qty: 84 TABLET | Refills: 0 | Status: SHIPPED | OUTPATIENT
Start: 2022-08-25 | End: 2022-10-06

## 2022-08-25 RX ORDER — PROPOFOL 10 MG/ML
INJECTION, EMULSION INTRAVENOUS AS NEEDED
Status: DISCONTINUED | OUTPATIENT
Start: 2022-08-25 | End: 2022-08-25

## 2022-08-25 RX ORDER — LIDOCAINE HYDROCHLORIDE 10 MG/ML
0.5 INJECTION, SOLUTION EPIDURAL; INFILTRATION; INTRACAUDAL; PERINEURAL ONCE AS NEEDED
Status: DISCONTINUED | OUTPATIENT
Start: 2022-08-25 | End: 2022-08-25 | Stop reason: HOSPADM

## 2022-08-25 RX ORDER — BUPIVACAINE HYDROCHLORIDE 2.5 MG/ML
INJECTION, SOLUTION EPIDURAL; INFILTRATION; INTRACAUDAL AS NEEDED
Status: DISCONTINUED | OUTPATIENT
Start: 2022-08-25 | End: 2022-08-25 | Stop reason: HOSPADM

## 2022-08-25 RX ORDER — SODIUM CHLORIDE, SODIUM LACTATE, POTASSIUM CHLORIDE, CALCIUM CHLORIDE 600; 310; 30; 20 MG/100ML; MG/100ML; MG/100ML; MG/100ML
125 INJECTION, SOLUTION INTRAVENOUS CONTINUOUS
Status: DISCONTINUED | OUTPATIENT
Start: 2022-08-25 | End: 2022-08-25 | Stop reason: HOSPADM

## 2022-08-25 RX ORDER — CEFAZOLIN SODIUM 2 G/50ML
2000 SOLUTION INTRAVENOUS ONCE
Status: COMPLETED | OUTPATIENT
Start: 2022-08-25 | End: 2022-08-25

## 2022-08-25 RX ORDER — EPHEDRINE SULFATE 50 MG/ML
INJECTION INTRAVENOUS AS NEEDED
Status: DISCONTINUED | OUTPATIENT
Start: 2022-08-25 | End: 2022-08-25

## 2022-08-25 RX ORDER — ALBUTEROL SULFATE 2.5 MG/3ML
2.5 SOLUTION RESPIRATORY (INHALATION) ONCE AS NEEDED
Status: DISCONTINUED | OUTPATIENT
Start: 2022-08-25 | End: 2022-08-25 | Stop reason: HOSPADM

## 2022-08-25 RX ORDER — MORPHINE SULFATE 15 MG/1
15 TABLET, FILM COATED, EXTENDED RELEASE ORAL 2 TIMES DAILY
Qty: 20 TABLET | Refills: 0 | Status: SHIPPED | OUTPATIENT
Start: 2022-08-25 | End: 2022-09-04

## 2022-08-25 RX ORDER — LABETALOL HYDROCHLORIDE 5 MG/ML
5 INJECTION, SOLUTION INTRAVENOUS
Status: DISCONTINUED | OUTPATIENT
Start: 2022-08-25 | End: 2022-08-25 | Stop reason: HOSPADM

## 2022-08-25 RX ORDER — CHLORHEXIDINE GLUCONATE 0.12 MG/ML
15 RINSE ORAL ONCE
Status: DISCONTINUED | OUTPATIENT
Start: 2022-08-25 | End: 2022-08-25 | Stop reason: HOSPADM

## 2022-08-25 RX ORDER — OXYCODONE HYDROCHLORIDE 5 MG/1
5 TABLET ORAL ONCE AS NEEDED
Status: DISCONTINUED | OUTPATIENT
Start: 2022-08-25 | End: 2022-08-25 | Stop reason: HOSPADM

## 2022-08-25 RX ORDER — VANCOMYCIN HYDROCHLORIDE 1 G/20ML
INJECTION, POWDER, LYOPHILIZED, FOR SOLUTION INTRAVENOUS AS NEEDED
Status: DISCONTINUED | OUTPATIENT
Start: 2022-08-25 | End: 2022-08-25 | Stop reason: HOSPADM

## 2022-08-25 RX ORDER — FENTANYL CITRATE/PF 50 MCG/ML
25 SYRINGE (ML) INJECTION
Status: COMPLETED | OUTPATIENT
Start: 2022-08-25 | End: 2022-08-25

## 2022-08-25 RX ORDER — MIDAZOLAM HYDROCHLORIDE 2 MG/2ML
INJECTION, SOLUTION INTRAMUSCULAR; INTRAVENOUS AS NEEDED
Status: DISCONTINUED | OUTPATIENT
Start: 2022-08-25 | End: 2022-08-25

## 2022-08-25 RX ORDER — CHLORHEXIDINE GLUCONATE 4 G/100ML
SOLUTION TOPICAL DAILY PRN
Status: DISCONTINUED | OUTPATIENT
Start: 2022-08-25 | End: 2022-08-25 | Stop reason: HOSPADM

## 2022-08-25 RX ORDER — ONDANSETRON 4 MG/1
4 TABLET, FILM COATED ORAL EVERY 8 HOURS PRN
Qty: 30 TABLET | Refills: 0 | Status: SHIPPED | OUTPATIENT
Start: 2022-08-25

## 2022-08-25 RX ORDER — ONDANSETRON 2 MG/ML
4 INJECTION INTRAMUSCULAR; INTRAVENOUS ONCE AS NEEDED
Status: COMPLETED | OUTPATIENT
Start: 2022-08-25 | End: 2022-08-25

## 2022-08-25 RX ORDER — MEPERIDINE HYDROCHLORIDE 25 MG/ML
12.5 INJECTION INTRAMUSCULAR; INTRAVENOUS; SUBCUTANEOUS ONCE
Status: DISCONTINUED | OUTPATIENT
Start: 2022-08-25 | End: 2022-08-25 | Stop reason: HOSPADM

## 2022-08-25 RX ORDER — ONDANSETRON 2 MG/ML
INJECTION INTRAMUSCULAR; INTRAVENOUS AS NEEDED
Status: DISCONTINUED | OUTPATIENT
Start: 2022-08-25 | End: 2022-08-25

## 2022-08-25 RX ORDER — HYDROMORPHONE HCL/PF 1 MG/ML
0.5 SYRINGE (ML) INJECTION
Status: DISCONTINUED | OUTPATIENT
Start: 2022-08-25 | End: 2022-08-25 | Stop reason: HOSPADM

## 2022-08-25 RX ORDER — PROMETHAZINE HYDROCHLORIDE 25 MG/ML
12.5 INJECTION, SOLUTION INTRAMUSCULAR; INTRAVENOUS ONCE AS NEEDED
Status: DISCONTINUED | OUTPATIENT
Start: 2022-08-25 | End: 2022-08-25 | Stop reason: HOSPADM

## 2022-08-25 RX ORDER — DEXAMETHASONE SODIUM PHOSPHATE 10 MG/ML
INJECTION, SOLUTION INTRAMUSCULAR; INTRAVENOUS AS NEEDED
Status: DISCONTINUED | OUTPATIENT
Start: 2022-08-25 | End: 2022-08-25

## 2022-08-25 RX ADMIN — PROPOFOL 300 MG: 10 INJECTION, EMULSION INTRAVENOUS at 08:26

## 2022-08-25 RX ADMIN — EPHEDRINE SULFATE 10 MG: 50 INJECTION, SOLUTION INTRAVENOUS at 09:02

## 2022-08-25 RX ADMIN — HYDROMORPHONE HYDROCHLORIDE 0.5 MG: 1 INJECTION, SOLUTION INTRAMUSCULAR; INTRAVENOUS; SUBCUTANEOUS at 10:27

## 2022-08-25 RX ADMIN — FENTANYL CITRATE 25 MCG: 50 INJECTION, SOLUTION INTRAMUSCULAR; INTRAVENOUS at 08:48

## 2022-08-25 RX ADMIN — HYDROMORPHONE HYDROCHLORIDE 0.5 MG: 1 INJECTION, SOLUTION INTRAMUSCULAR; INTRAVENOUS; SUBCUTANEOUS at 10:22

## 2022-08-25 RX ADMIN — FENTANYL CITRATE 25 MCG: 50 INJECTION INTRAMUSCULAR; INTRAVENOUS at 10:15

## 2022-08-25 RX ADMIN — EPHEDRINE SULFATE 10 MG: 50 INJECTION, SOLUTION INTRAVENOUS at 09:07

## 2022-08-25 RX ADMIN — CEFAZOLIN SODIUM 2000 MG: 2 SOLUTION INTRAVENOUS at 08:24

## 2022-08-25 RX ADMIN — FENTANYL CITRATE 25 MCG: 50 INJECTION, SOLUTION INTRAMUSCULAR; INTRAVENOUS at 08:31

## 2022-08-25 RX ADMIN — LIDOCAINE HYDROCHLORIDE 50 MG: 10 INJECTION, SOLUTION EPIDURAL; INFILTRATION; INTRACAUDAL at 08:26

## 2022-08-25 RX ADMIN — SODIUM CHLORIDE, SODIUM LACTATE, POTASSIUM CHLORIDE, AND CALCIUM CHLORIDE: .6; .31; .03; .02 INJECTION, SOLUTION INTRAVENOUS at 07:55

## 2022-08-25 RX ADMIN — MIDAZOLAM HYDROCHLORIDE 2 MG: 1 INJECTION, SOLUTION INTRAMUSCULAR; INTRAVENOUS at 08:21

## 2022-08-25 RX ADMIN — ONDANSETRON 4 MG: 2 INJECTION INTRAMUSCULAR; INTRAVENOUS at 08:31

## 2022-08-25 RX ADMIN — FENTANYL CITRATE 25 MCG: 50 INJECTION INTRAMUSCULAR; INTRAVENOUS at 10:12

## 2022-08-25 RX ADMIN — FENTANYL CITRATE 25 MCG: 50 INJECTION INTRAMUSCULAR; INTRAVENOUS at 10:09

## 2022-08-25 RX ADMIN — EPHEDRINE SULFATE 10 MG: 50 INJECTION, SOLUTION INTRAVENOUS at 09:40

## 2022-08-25 RX ADMIN — FENTANYL CITRATE 25 MCG: 50 INJECTION, SOLUTION INTRAMUSCULAR; INTRAVENOUS at 09:26

## 2022-08-25 RX ADMIN — DEXAMETHASONE SODIUM PHOSPHATE 10 MG: 10 INJECTION, SOLUTION INTRAMUSCULAR; INTRAVENOUS at 08:26

## 2022-08-25 RX ADMIN — EPHEDRINE SULFATE 10 MG: 50 INJECTION, SOLUTION INTRAVENOUS at 08:43

## 2022-08-25 RX ADMIN — HYDROMORPHONE HYDROCHLORIDE 0.5 MG: 1 INJECTION, SOLUTION INTRAMUSCULAR; INTRAVENOUS; SUBCUTANEOUS at 10:33

## 2022-08-25 RX ADMIN — FENTANYL CITRATE 25 MCG: 50 INJECTION INTRAMUSCULAR; INTRAVENOUS at 10:18

## 2022-08-25 RX ADMIN — FENTANYL CITRATE 25 MCG: 50 INJECTION, SOLUTION INTRAMUSCULAR; INTRAVENOUS at 08:46

## 2022-08-25 RX ADMIN — ONDANSETRON 4 MG: 2 INJECTION INTRAMUSCULAR; INTRAVENOUS at 11:55

## 2022-08-25 NOTE — INTERVAL H&P NOTE
H&P reviewed  After examining the patient I find no changes in the patients condition since the H&P had been written      Vitals:    08/25/22 0731   BP: 152/97   Pulse: 73   Resp: 18   Temp: (!) 97 °F (36 1 °C)   SpO2: 98%     Plan for 1st MTP arthrodesis bilateral

## 2022-08-25 NOTE — OP NOTE
OPERATIVE REPORT  PATIENT NAME: Annette Caceres    :  1965  MRN: [id number]  Pt Location: [facility] OR ROOM 04    SURGERY DATE: 2022    Surgeon(s) and Role:     Steph Garcia MD - Primary  OLIVA Stephen- assisting    Preop Diagnosis:  Hallux rigidus, bilateral [M20 21, M20 22]    Post-Op Diagnosis Codes:     * Hallux rigidus, bilateral [M20 21, M20 22]    Procedure(s) (LRB):  Bilateral 1st MTP arthrodesis (Bilateral)    Specimen(s):  * No specimens in log *    Estimated Blood Loss:   Minimal    Drains:  * No LDAs found *    Anesthesia Type:   Choice    Operative Indications:  Hallux rigidus, bilateral [M20 21, M20 22]      Operative Findings:  Consistent with diagnosis    Complications:   None    Procedure and Technique:  1  First metatarsophalangeal joint arthrodesis Left  2  First metatarsophalangeal joint arthrodesis Right  2  Intraoperative fluoroscopic interpretation, greater than one hour, no radiologist       OPERATIVE REPORT:    After informed consent and preoperative medical clearance were obtained the patient was taken the preoperative holding area where He was properly assessed  Patient was given appropriate perioperative IV antibiotics  Please see the anesthesia report for details of the anesthesia administered  Patient was taken to the operating room and placed supine on the operating room table  The operative lower extremities prepped and draped in sterile fashion  The Left lower extremity was exsanguinated with an elastic bandage and used as a tourniquet  A time-out was performed with the attending surgeon in the room  A dorsal medial incision was made over the Left 1st MTP joint, through the prior incision  Careful soft tissue dissection was maintained  Superficial peroneal nerve branch to the hallux was protected as was the extensor hallucis longus tendon  Longitudinal capsulotomy was performed dorsally the capsule was reflected  Minimal periosteum stripping was performed   Joint was fully exposed  Consideration given to performing a joint implant; however, this was not appropriate given the extent of joint destruction  Reamer system was utilized to create a cup-and-cone preparation for the left 1st MTP joint  The joint was reduced to an anatomic position and then stabilized  One lag screw was used to generate compression and was countersunk with satisfactory purchase  Neutralizing dorsal mini fragment plate was placed with multiple screw fixation, with all screws having satisfactory purchase (see IMPLANTS below for details)  Intraoperative fluoroscopy was utilized and first confirmed satisfactory alignment and second satisfactory bony apposition and position of the hardware  Clinically and radiographically rotation was well controlled  Thorough irrigation was performed with copious amounts of sterile saline mixed with antibiotics for wound  Next, we turned our attention to the right foot  A dorsal medial incision was made over the Right 1st MTP joint, through the prior incision  Careful soft tissue dissection was maintained  Superficial peroneal nerve branch to the hallux was protected as was the extensor hallucis longus tendon  Longitudinal capsulotomy was performed dorsally the capsule was reflected  Minimal periosteum stripping was performed  Joint was fully exposed  Consideration given to performing a joint implant; however, this was not appropriate given the extent of joint destruction  Reamer system was utilized to create a cup-and-cone preparation for the left 1st MTP joint  The joint was reduced to an anatomic position and then stabilized  One lag screw was used to generate compression and was countersunk with satisfactory purchase  Neutralizing dorsal mini fragment plate was placed with multiple screw fixation, with all screws having satisfactory purchase (see IMPLANTS below for details)   Intraoperative fluoroscopy was utilized and first confirmed satisfactory alignment and second satisfactory bony apposition and position of the hardware  Clinically and radiographically rotation was well controlled  Thorough irrigation was performed with copious amounts of sterile saline mixed with antibiotics for wound  The wounds were then closed in layers with Vicryl suture for the deeper layers including the capsular tissue for the 1st MTP joint and Vicryl suture for the subcutaneous layers  The tourniquet had been released and meticulous hemostasis had been obtained  Satisfactory capillary refill remained in all toes  The provisional pin that had been placed in the 1st toe MTP joint was removed  The wounds were approximated using nylon suture in an interrupted fashion to a tension-less closure  Sterile dressings were applied  Over abundant padding with the ankle in neutral position and no pressure on the toes with a posterior/sugar tong splint was applied, with a protective forefoot spica splint  Satisfactory capillary refill remained in all toes  The patient tolerated procedure well was taken to the recovery room in stable condition  DISPOSITION:   1  Patient is stable to PACU  2  Darco Heel boot-Wbat in boot  3  Strict elevation  4  Follow-up in 3 weeks for suture removal if appropriate   5  At 8 weeks, transition to sneaker  X-rays at 6 weeks visit  6  See Patient Instructions for full disposition, including weightbearing status, DVT prophylaxis, postoperative care and follow-up       I was present for the entire procedure, A qualified resident physician was not available and A physician assistant was required during the procedure for retraction tissue handling,dissection and suturing    Patient Disposition:  PACU       SIGNATURE: Keyona Geller MD  DATE: August 25, 2022  TIME: 8:07 AM

## 2022-08-25 NOTE — ANESTHESIA POSTPROCEDURE EVALUATION
Post-Op Assessment Note    CV Status:  Stable  Pain Score: 0    Pain management: adequate     Mental Status:  Alert and awake   Hydration Status:  Euvolemic   PONV Controlled:  Controlled   Airway Patency:  Patent      Post Op Vitals Reviewed: Yes      Staff: Anesthesiologist, CRNA         No complications documented      BP   136/69   Temp  97 5   Pulse  73   Resp   16   SpO2   98

## 2022-08-25 NOTE — DISCHARGE INSTRUCTIONS
JONY Hawkins  Attending, [address] [place] [address] Office Phone: [phone] ? Fax: [phone]  [facility] Office Phone: [phone] ? XPE:[phone]    : Katharina Sauer) [place], [place]     Surgery Coordinators Vanda Rivas: Joan Pablo, [phone]  Temoyesika Hawkins  [phone]  Surgery Coordinator [facility]:  Kedar Renteriadebbie Knowles [address], [phone]  www [email or web address]   PRE-OPERATIVE AND POST-OPERATIVE INSTRUCTIONS    General Information:  Typical post operative visits are at the following intervals:  2-3 weeks post surgery, 6 weeks post surgery, 3 months post surgery, 6 months post surgery, and then on a yearly basis  However, this may change based on Dr Lagunas Solid recommendation  #1 post-operative rule for foot/ankle surgery:  ONCE YOU ARE OUT OF YOUR CAST AND/OR REMOVABLE BOOT, SWELLING MAY PERSIST FOR MANY MONTHS  YOU MIGHT ALSO EXPERIENCE A BLUISH DISCOLORATION OF YOUR LEG  THIS IS NORMAL AND PART OF THE USUAL POSTOPERATIVE EXPERIENCE  DO NOT WAIT UNTIL YOUR BLOCK WEARS OFF TO TAKE YOUR PAIN MEDICATION  IT TAKES A FEW DOSES OF THE PAIN MEDICATION TO REACH A THERAPEUTIC LEVEL  TAKE A TABLET PROACTIVELY BEFORE YOU HAVE ANY PAIN AND AGAIN 4 HOURS LATER SO WHEN THE BLOCK WEARS OFF, YOU ARE NOT CAUGHT OFF GUARD  SMOKING:  Smoking results in incomplete healing of fractures (broken bones) and joints that my have been fused  Smoking and nicotine also prevents the growth of bone into ankle replacements and bone healing  It also slows the healing of muscles and skin (soft tissue)  Therefore, please do not have surgery if you continue to smoke  We reserve the right to cancel your surgery if we suspect that you are smoking  DO NOT use nicorette gum or other patches    Please find an alternative method to quit smoking before your surgery and do not restart after surgery to allow for healing  THREE RULES:    After surgery you will most likely be given the instructions KEEP YOUR TOES ABOVE YOUR NOSE    This means that you MUST have your feet elevated higher than your heart  Keeping your toes above your nose helps to heal the muscles and skin (soft tissues) by reducing swelling in your leg  This position also helps to prevent infection, and is very important in avoiding deep venous thrombosis (blood clots)  In order to keep the blood circulating in your legs and in order to avoid deep vein   thrombosis (blood clots), we ask patients to GET UP ONCE AN HOUR during the day  This means you should at least cross the room and come back  It does not mean you have to be up for long periods of time  In most cases we will not have people immediately put any weight on their operated part  This is important to prevent loosening of metal or other devices holding the bones together  It also prevents irritation of the soft tissues which can lead to prolonged healing  When we say get up once an hour, please walk, hop or move with an assisted device  This is important! Do not do any excessive walking during the first few days after surgery  Recovering from surgery is a full-time task for the patient  Postoperative care is important to avoid irritating the skin incision, which can lead to infection  Please do not plan activities or go out of town for several weeks after surgery  AFTER YOUR SURGERY:  Bleeding through the bandage almost always occurs  Do not let this alarm you  Simply add more gauze or a towel, call us, and come in for a dressing change  If you think it is excessive, contact us immediately or go to the local emergency room  Do not get the bandage wet  Showering is possible with plastic protectors  Be very careful, as the bathroom can be wet and slippery    If you do get your dressing wet, it should be changed immediately  Please contact us  ONCE YOUR ARE OUT OF YOUR CAST AND/OR REMOVABLE BOOT, SWELLING MAY PERSIST FOR MANY MONTHS  THERE WILL ALSO BE A BLUISH DISCOLORATION OF YOUR LEG FOR MONTHS  THIS IS NORMAL AND PART OF THE USUAL POSTOPERATIVE EXPERIENCE  WEARING COMPRESSION HOSE (ELASTIC STOCKINGS) CAN HELP AVOID SOME OF THIS SWELLING  Ice the area 20 minutes every hour once the nerve block wears off  If you are in a cast or a splint, you may need to leave the ice on longer than 20 minutes in order to feel any benefits  DRESSING:   The purpose of the surgical dressing is to keep your wound and the surgical site protected from the environment  Most dressings contain splints, which help to hold your foot and ankle in a corrected position, and also allow the surgical site to heal properly  If you have a drain in place, this will need to be removed in 1 day after surgery  The time for the drain to be pulled will be written on your discharge instruction sheet  CAST  INSTRUCTIONS:  You may or may not get a cast following surgery  If you do, pay close attention to the following:    After application of a splint or cast, it is very important to elevate your leg for 24 to 72 hours  The injured area should be elevated well above the heart  Remember Toes above your Nose  Rest and elevation greatly reduce pain and speed the healing process by minimizing early swelling      CALL YOUR DOCTORS OFFICE OR VISIT LOCATION EMERGENCY ROOM IF YOU HAVE ANY OF THE FOLLOWING:    Significant increased pain, which may be caused by swelling (Strict elevation will alleviate this)  Numbness and tingling in your hand or foot, which may be caused by too much pressure on the nerves (There is always some numbness after surgery due to nerve blocks)  Burning and stinging, which may be caused by too much pressure on the skin  Excessive swelling below the cast, which may mean the cast is slowing your blood circulation  Loss of active movement of toes, which request an urgent evaluation  Loss of capillary refill  Pinch the tip of toes and tricia the skin  Release pressure and if the skin does not return pink then call the office immediately  DO NOT GET YOUR CAST WET  Bacteria thrive in moist dark areas  We do not want this  If your cast becomes wet, return to the office and we will apply another one  PAIN AFTER SURGERY:  Narcotic pain medication can and will depress your respiratory system if taken in excess  The goal of pain management with narcotics is to be comfortable not pain free  If you take enough narcotics to be pain free then you run the risk of stopping breathing  If this happens, call 911 immediately! Pain in the heel is often  caused by pressure from the weight of your foot on the bed  Make sure your heel is suspended off the bed by keeping a pillow underneath your calf not your knee  Medications: You will be given narcotic pain medication  Do NOT drive while taking narcotic medications  Medications such as Darvocet, Percocet, Vicoden or Tylenol #3, also contain acetaminophen (Tylenol)  Do not take acetaminophen or Tylenol from home when taking theses medications  When you fill your prescription, you may ask the pharmacist if your pain medication has acetaminophen/Tylenol in it  It is okay to take Tylenol with Oxycontin/Oxycodone  Unless you are allergic to aspirin or currently taking a blood thinner, Dr Castillo Pollard patients are requested to take one 325 mg aspirin every 12 hours until you are back to walking normally after surgery (This can be up to 6 weeks)  Ecotrin (Enteric-coated aspirin) is more sensitive to the stomach and we recommend purchasing this instead of regular aspirin to minimize the risk of stomach irritation  Narcotic medications commonly cause nausea  Taking them with food will decrease this side effect   If you are having extreme nausea, please contact us for an alternative medication or for something that can be taken with this medication to decrease the nausea  Also, narcotic medications frequently cause constipation  An increase of fiber, fruits and vegetables in your diet may alleviate this problem, or if necessary, you may use an over-the-counter medication such as senekot, colace, or Fibercon for constipation problems  You should resume all medications you were taking prior to the surgery unless otherwise specified  If you had fracture surgery, bony surgery like an osteotomy or fusion, or a surgery that requires bone healing, you are advised to take Vitamin D and Calcium to improve healing potential   Vitamin D3 4000 units/day and Calcium 1200mg/day  These are over the counter medications so please pick them up at the pharmacy when you are picking up your prescriptions  Activity:   Because of your recent foot surgery, your activity level will decrease  You will need to elevate your foot ABOVE the level of your heart for a minimum of four days  The length of time necessary for the swelling to go down, and for your wounds to heal properly depends greatly on your efforts here  Elevation is extremely important to avoid compromising the blood supply to your foot  Remember when your foot is down it will swell, which will increase pain and slow healing  Wiggle your toes frequently if possible  If you go home with a regional block, (a type of anesthesia) the foot and leg will be numb  Think of ways to get into your house and around the house until the block wears off  Keep in mind that it may be a legal issue if you drive while in a cast or splint, especially when the splint is on the right foot  You may call the Department of Motor Vehicles to schedule a road test if you have adaptive equipment applied to your car  The amount of weight you are allowed to bear on your foot will be written on your discharge sheet filled out at the time of surgery   The following is an explanation of the possibilities:          Heel-only weight bearing:   Usually, this order is given for use with a special shoe only, which will help you to put weight only on your heel  You may bear your body weight on your foot, as long as it is only borne on your heel

## 2022-09-07 ENCOUNTER — OFFICE VISIT (OUTPATIENT)
Dept: OBGYN CLINIC | Facility: CLINIC | Age: 57
End: 2022-09-07

## 2022-09-07 VITALS
BODY MASS INDEX: 31.81 KG/M2 | SYSTOLIC BLOOD PRESSURE: 130 MMHG | HEART RATE: 83 BPM | WEIGHT: 240 LBS | HEIGHT: 73 IN | DIASTOLIC BLOOD PRESSURE: 82 MMHG

## 2022-09-07 DIAGNOSIS — M20.22 HALLUX RIGIDUS, BILATERAL: Primary | ICD-10-CM

## 2022-09-07 DIAGNOSIS — M20.21 HALLUX RIGIDUS, BILATERAL: Primary | ICD-10-CM

## 2022-09-07 PROCEDURE — 99024 POSTOP FOLLOW-UP VISIT: CPT | Performed by: ORTHOPAEDIC SURGERY

## 2022-09-07 NOTE — PATIENT INSTRUCTIONS
Continue aspirin/lovenox for blood clot prevention  May shower, do not soak in a tub/pool/ocean/etc for another 4 weeks  Scar massage- pea sized amount of lotion, massage into scar for 5 minutes each day  Compression stocking (Knee high, 20-30mm Hg) to be worn at all times while awake  Recommend taking the following supplements: Vitamin D3- 4000 units per day and Calcium 1200 mg per day  This will help with bone healing  Wear the boot at all times except when showering, even to sleep at night

## 2022-09-07 NOTE — PROGRESS NOTES
JONY Cabral  Attending, Orthopaedic Surgery  Foot and Ankle  [facility]      ORTHOPAEDIC FOOT AND ANKLE POST-OP VISIT     Procedure:     Bilateral 1st MTP fusion       Date of surgery:   8/25/22      PLAN  1  Weightbearing Status- WBAT bilateral Darco boot  2  DVT prophylaxis- ASA 325mg BID  3  Continue to elevate 23hrs/day getting up 1x per hour to prevent a blood clot  4  Pain control- OTC pain medication  5  RTC in 4 weeks  6  Xrays needed next visit - Yes, WB BL feet    History of Present Illness:   Chief Complaint:   Chief Complaint   Patient presents with   Trevor Sweeney 44 is a 59 y o  male who is being seen for post-operative visit for the above procedure  Pain is well controlled and the patient has successfully transitioned to OTC pain medicines  he is taking ASA 325mg BID for DVT prophylaxis  Patient has been WBAT in a Darco Heel walker  Review of Systems:  General- denies fever/chills  Respiratory- denies cough or SOB  Cardio- denies chest pain or palpitations  GI- denies abdominal pain  Musculoskeletal- Negative except noted above  Skin- denies rashes or wounds    Physical Exam:   /82 (BP Location: Left arm, Patient Position: Sitting, Cuff Size: Adult)   Pulse 83   Ht 6' 1" (1 854 m)   Wt 109 kg (240 lb)   BMI 31 66 kg/m²   General/Constitutional: No apparent distress: well-nourished and well developed  Eyes: normal ocular motion  Lymphatic: No appreciable lymphadenopathy  Respiratory: Non-labored breathing  Vascular: No edema, swelling or tenderness, except as noted in detailed exam   Integumentary: No impressive skin lesions present, except as noted in detailed exam   Neuro: No ataxia or tremors noted  Psych: Normal mood and affect, oriented to person, place and time  Appropriate affect  Musculoskeletal: Normal, except as noted in detailed exam and in HPI      Examination    right        Incision Clean, dry, intact  Sutures Removed this visit    Ecchymosis mild    Swelling Mild    Sensation Intact to light touch throughout sural, saphenous, superficial peroneal, deep peroneal and medial/lateral plantar nerve distributions  Chidester-Pat 5 07 filament (10g) testing deferred  Cardiovascular Brisk capillary refill < 2 seconds,intact DP and PT pulses    Special Tests None         Left        Incision Clean, dry, intact  Sutures Removed this visit    Ecchymosis mild    Swelling Mild    Sensation Intact to light touch throughout sural, saphenous, superficial peroneal, deep peroneal and medial/lateral plantar nerve distributions  Chidester-Pat 5 07 filament (10g) testing deferred  Cardiovascular Brisk capillary refill < 2 seconds,intact DP and PT pulses    Special Tests None      Imaging Studies:   None    Scribe Attestation    I,:  Karolina Lorenzo PA-C am acting as a scribe while in the presence of the attending physician :       I,:  Mario Garza MD personally performed the services described in this documentation    as scribed in my presence :             Greggory Dutch Lachman, MD  Foot & Ankle Surgery   Department [address]      I personally performed the service  Greggory Dutch Lachman, MD

## 2022-09-29 ENCOUNTER — VBI (OUTPATIENT)
Dept: ADMINISTRATIVE | Facility: OTHER | Age: 57
End: 2022-09-29

## 2022-10-05 ENCOUNTER — OFFICE VISIT (OUTPATIENT)
Dept: OBGYN CLINIC | Facility: CLINIC | Age: 57
End: 2022-10-05

## 2022-10-05 ENCOUNTER — APPOINTMENT (OUTPATIENT)
Dept: RADIOLOGY | Facility: AMBULARY SURGERY CENTER | Age: 57
End: 2022-10-05
Attending: ORTHOPAEDIC SURGERY
Payer: COMMERCIAL

## 2022-10-05 VITALS — WEIGHT: 240 LBS | BODY MASS INDEX: 31.81 KG/M2 | HEIGHT: 73 IN

## 2022-10-05 DIAGNOSIS — M20.21 HALLUX RIGIDUS, BILATERAL: Primary | ICD-10-CM

## 2022-10-05 DIAGNOSIS — M20.22 HALLUX RIGIDUS, BILATERAL: ICD-10-CM

## 2022-10-05 DIAGNOSIS — M20.22 HALLUX RIGIDUS, BILATERAL: Primary | ICD-10-CM

## 2022-10-05 DIAGNOSIS — M20.21 HALLUX RIGIDUS, BILATERAL: ICD-10-CM

## 2022-10-05 PROCEDURE — 73630 X-RAY EXAM OF FOOT: CPT

## 2022-10-05 PROCEDURE — 99024 POSTOP FOLLOW-UP VISIT: CPT | Performed by: ORTHOPAEDIC SURGERY

## 2022-10-05 NOTE — LETTER
October 5, 2022     Patient: Woodrow Blum  YOB: 1965  Date of Visit: 10/5/2022      To Whom it May Concern:    Scott Todd is under my professional care  Shree Schuster was seen in my office on 10/5/2022  Shree Schuster may return to desk duty only for his job  If you have any questions or concerns, please don't hesitate to call           Sincerely,          Jonathon Lynn MD        CC: No Recipients

## 2022-10-05 NOTE — PATIENT INSTRUCTIONS
You may begin weaning your boot and transitioning to a sneaker (Today)  It is important to do this gradually to avoid aggravating the healing process  Today, you may come out of the boot into a sneaker for 1 hours  2  Tomorrow, you may come out of the boot into a sneaker for 2 hours,  3  The next day, you may come out of the boot into a sneaker for 3 hours  4  Continue this (adding 1 hours per day) as you tolerate  For example, if you do 6 hours out of the boot into a sneaker and your foot swells more than usual at night and it is difficult to control the discomfort, do not advance to 7 hours the next day, stay at 6 hours until you are able to tolerate it  It is essential to follow this protocol and not modify this  No matter when you begin weaning the boot, it will be difficult and there will be swelling and soreness  If you spend more time than is recommended in the boot, this process becomes longer and more painful  Elevation, Ice and tylenol and staying off of it at night will be important to aide in this transition out of the boot  Swelling and soreness are normal as you begin to do more with the injured leg  May DC aspirin/lovenox, no longer needed  May shower, do not soak in a tub/pool/ocean/etc for another 1 weeks  Scar massage- pea sized amount of lotion, massage into scar for 5 minutes each day  Compression stocking (Knee high, 20-30mm Hg) to be worn at all times while awake  Recommend taking the following supplements: Vitamin D 4000 units per day and Calcium 1200 mg per day  This will help with bone healing

## 2022-10-05 NOTE — PROGRESS NOTES
JONY Alexandre  Attending, Orthopaedic Surgery  Foot and Ankle  [facility]      ORTHOPAEDIC FOOT AND ANKLE POST-OP VISIT     Procedure:     Bilateral 1st MTP fusion       Date of surgery:   8/25/22      PLAN  1  Weightbearing Status- WBAT operative extremity  2  DVT prophylaxis-  BID  3  Continue to elevate 23hrs/day getting up 1x per hour to prevent a blood clot  4  Pain control- OTC pain medication  5  RTC in 6 week(s)  6  Xrays needed next visit - yes Weightbearing bilateral feet    History of Present Illness:   Chief Complaint:   Chief Complaint   Patient presents with   Trevor Sweeney 44 is a 59 y o  male who is being seen for post-operative visit for the above procedure  Pain is well controlled and the patient has successfully transitioned to OTC pain medicines  he completed his course of ASA 325mg BID for DVT prophylaxis  Patient has been WBAT in a [employer] Heel walker  Review of Systems:  General- denies fever/chills  Respiratory- denies cough or SOB  Cardio- denies chest pain or palpitations  GI- denies abdominal pain  Musculoskeletal- Negative except noted above  Skin- denies rashes or wounds    Physical Exam:   There were no vitals taken for this visit  General/Constitutional: No apparent distress: well-nourished and well developed  Eyes: normal ocular motion  Lymphatic: No appreciable lymphadenopathy  Respiratory: Non-labored breathing  Vascular: No edema, swelling or tenderness, except as noted in detailed exam   Integumentary: No impressive skin lesions present, except as noted in detailed exam   Neuro: No ataxia or tremors noted  Psych: Normal mood and affect, oriented to person, place and time  Appropriate affect  Musculoskeletal: Normal, except as noted in detailed exam and in HPI  Examination    Right        Incision Clean, dry, intact  Sutures Previously removed      Ecchymosis mild    Swelling mild Sensation Intact to light touch throughout sural, saphenous, superficial peroneal, deep peroneal and medial/lateral plantar nerve distributions  [place]-Pat 5 07 filament (10g) testing deferred  Cardiovascular Brisk capillary refill < 2 seconds,intact DP and PT pulses    Special Tests None       Leftt       Incision Clean, dry, intact  Sutures Previously removed  Ecchymosis mild   Swelling mild   Sensation Intact to light touch throughout sural, saphenous, superficial peroneal, deep peroneal and medial/lateral plantar nerve distributions  [place]-Pat 5 07 filament (10g) testing deferred  Cardiovascular Brisk capillary refill < 2 seconds,intact DP and PT pulses   Special Tests None     Imaging Studies:   6 views of the bilateral feet were taken, reviewed and interpreted independently that demonstrate intact hardware without migration or fracture  Reviewed by me personally  Christyne Kemps Lachman, MD  Foot & Ankle Surgery   Department [address]      I personally performed the service  Christyne Kemps Lachman, MD

## 2022-10-11 DIAGNOSIS — F51.01 PRIMARY INSOMNIA: ICD-10-CM

## 2022-10-11 DIAGNOSIS — E78.2 MIXED HYPERLIPIDEMIA: ICD-10-CM

## 2022-10-11 DIAGNOSIS — F32.0 MAJOR DEPRESSIVE DISORDER, SINGLE EPISODE, MILD (HCC): ICD-10-CM

## 2022-10-11 DIAGNOSIS — I10 ESSENTIAL HYPERTENSION: ICD-10-CM

## 2022-10-11 DIAGNOSIS — K21.00 GASTROESOPHAGEAL REFLUX DISEASE WITH ESOPHAGITIS WITHOUT HEMORRHAGE: ICD-10-CM

## 2022-10-11 NOTE — TELEPHONE ENCOUNTER
Phone call placed to pt as requested to confirm we received his voicemail regarding refills  No answer  LM stating we received his message and refills are pending for provider's review

## 2022-10-12 RX ORDER — PANTOPRAZOLE SODIUM 40 MG/1
40 TABLET, DELAYED RELEASE ORAL 2 TIMES DAILY
Qty: 180 TABLET | Refills: 1 | Status: SHIPPED | OUTPATIENT
Start: 2022-10-12

## 2022-10-12 RX ORDER — ESZOPICLONE 3 MG/1
3 TABLET, FILM COATED ORAL
Qty: 30 TABLET | Refills: 3 | Status: SHIPPED | OUTPATIENT
Start: 2022-10-12

## 2022-10-12 RX ORDER — FLUOXETINE 20 MG/1
20 TABLET, FILM COATED ORAL EVERY MORNING
Qty: 90 TABLET | Refills: 1 | Status: SHIPPED | OUTPATIENT
Start: 2022-10-12

## 2022-10-12 RX ORDER — LISINOPRIL 30 MG/1
30 TABLET ORAL EVERY EVENING
Qty: 90 TABLET | Refills: 1 | Status: SHIPPED | OUTPATIENT
Start: 2022-10-12

## 2022-10-12 RX ORDER — ROSUVASTATIN CALCIUM 10 MG/1
10 TABLET, COATED ORAL
Qty: 90 TABLET | Refills: 1 | Status: SHIPPED | OUTPATIENT
Start: 2022-10-12

## 2022-11-03 DIAGNOSIS — E78.2 MIXED HYPERLIPIDEMIA: ICD-10-CM

## 2022-11-03 DIAGNOSIS — J43.9 PULMONARY EMPHYSEMA, UNSPECIFIED EMPHYSEMA TYPE (HCC): ICD-10-CM

## 2022-11-05 RX ORDER — CARVEDILOL 3.12 MG/1
3.12 TABLET ORAL 2 TIMES DAILY WITH MEALS
Qty: 60 TABLET | Refills: 5 | Status: SHIPPED | OUTPATIENT
Start: 2022-11-05 | End: 2023-01-04

## 2022-11-14 DIAGNOSIS — J43.9 PULMONARY EMPHYSEMA, UNSPECIFIED EMPHYSEMA TYPE (HCC): ICD-10-CM

## 2022-11-14 DIAGNOSIS — K21.00 GASTROESOPHAGEAL REFLUX DISEASE WITH ESOPHAGITIS WITHOUT HEMORRHAGE: ICD-10-CM

## 2022-11-14 DIAGNOSIS — E78.2 MIXED HYPERLIPIDEMIA: ICD-10-CM

## 2022-11-14 DIAGNOSIS — I10 ESSENTIAL HYPERTENSION: ICD-10-CM

## 2022-11-14 DIAGNOSIS — F51.01 PRIMARY INSOMNIA: ICD-10-CM

## 2022-11-14 DIAGNOSIS — L40.9 PSORIASIS: ICD-10-CM

## 2022-11-14 DIAGNOSIS — F32.0 MAJOR DEPRESSIVE DISORDER, SINGLE EPISODE, MILD (HCC): ICD-10-CM

## 2022-11-15 ENCOUNTER — TELEPHONE (OUTPATIENT)
Dept: OBGYN CLINIC | Facility: CLINIC | Age: 57
End: 2022-11-15

## 2022-11-15 RX ORDER — PANTOPRAZOLE SODIUM 40 MG/1
40 TABLET, DELAYED RELEASE ORAL 2 TIMES DAILY
Qty: 180 TABLET | Refills: 1 | Status: SHIPPED | OUTPATIENT
Start: 2022-11-15

## 2022-11-15 RX ORDER — FLUOXETINE 20 MG/1
20 TABLET, FILM COATED ORAL EVERY MORNING
Qty: 90 TABLET | Refills: 1 | Status: SHIPPED | OUTPATIENT
Start: 2022-11-15

## 2022-11-15 RX ORDER — ESZOPICLONE 3 MG/1
3 TABLET, FILM COATED ORAL
Qty: 30 TABLET | Refills: 3 | Status: SHIPPED | OUTPATIENT
Start: 2022-11-15

## 2022-11-15 RX ORDER — ROSUVASTATIN CALCIUM 10 MG/1
10 TABLET, COATED ORAL
Qty: 90 TABLET | Refills: 1 | Status: SHIPPED | OUTPATIENT
Start: 2022-11-15

## 2022-11-15 RX ORDER — LISINOPRIL 30 MG/1
30 TABLET ORAL EVERY EVENING
Qty: 90 TABLET | Refills: 1 | Status: SHIPPED | OUTPATIENT
Start: 2022-11-15

## 2022-11-15 NOTE — TELEPHONE ENCOUNTER
Pt is out of his medications at this time and due to his usual pharmacy being closed, he cannot refill his meds  Would like them sent to [facility] on [facility]

## 2022-11-15 NOTE — TELEPHONE ENCOUNTER
Patient called, he has now been out of his meds for 3 days and is not feeling well at all and he is sure that it is his blood pressure  Would you please send these medications over to [facility] ASAP and let him know they are sent so he can go over and get them right away

## 2022-11-16 ENCOUNTER — OFFICE VISIT (OUTPATIENT)
Dept: OBGYN CLINIC | Facility: CLINIC | Age: 57
End: 2022-11-16

## 2022-11-16 ENCOUNTER — TELEPHONE (OUTPATIENT)
Dept: FAMILY MEDICINE CLINIC | Facility: CLINIC | Age: 57
End: 2022-11-16

## 2022-11-16 ENCOUNTER — APPOINTMENT (OUTPATIENT)
Dept: RADIOLOGY | Facility: AMBULARY SURGERY CENTER | Age: 57
End: 2022-11-16
Attending: ORTHOPAEDIC SURGERY

## 2022-11-16 VITALS
SYSTOLIC BLOOD PRESSURE: 160 MMHG | BODY MASS INDEX: 31.81 KG/M2 | HEART RATE: 81 BPM | HEIGHT: 73 IN | DIASTOLIC BLOOD PRESSURE: 100 MMHG | WEIGHT: 240 LBS

## 2022-11-16 DIAGNOSIS — M20.21 HALLUX RIGIDUS, BILATERAL: ICD-10-CM

## 2022-11-16 DIAGNOSIS — M20.22 HALLUX RIGIDUS, BILATERAL: ICD-10-CM

## 2022-11-16 DIAGNOSIS — L60.0 INGROWING TOENAIL WITH INFECTION: ICD-10-CM

## 2022-11-16 DIAGNOSIS — L03.032 PARONYCHIA, TOE, LEFT: Primary | ICD-10-CM

## 2022-11-16 RX ORDER — SULFAMETHOXAZOLE AND TRIMETHOPRIM 800; 160 MG/1; MG/1
1 TABLET ORAL EVERY 12 HOURS SCHEDULED
Qty: 10 TABLET | Refills: 0 | Status: SHIPPED | OUTPATIENT
Start: 2022-11-16 | End: 2022-11-21

## 2022-11-16 NOTE — TELEPHONE ENCOUNTER
Pt left a message on refill line regarding his Trelegy  Pt requested a refill of his Trelegy, but we do not fill this medication  Medication comes from pulm  Will call pt and inform

## 2022-11-16 NOTE — PROGRESS NOTES
JONY Prescott  Attending, Orthopaedic Surgery  Foot and Ankle  [facility]      ORTHOPAEDIC FOOT AND ANKLE POST-OP VISIT     Procedure:     Bilateral 1st MTP fusion       Date of surgery:     8/25/22      PLAN  1  Weightbearing Status- WBAT operative extremity  2  DVT prophylaxis- Completed  3  Continue to elevate 23hrs/day getting up 1x per hour to prevent a blood clot  4  Pain control- OTC pain medication  5  RTC in 3 month(s)  6  Xrays needed next visit - Yes weightbearing bilateral feet    History of Present Illness:   Chief Complaint: 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  Amanda Wing is a 59 y o  male who is being seen for post-operative visit for the above procedure  Pain is well controlled and the patient has successfully transitioned to OTC pain medicines  he has completed ASA 325mg BID for DVT prophylaxis  Patient has been WBAT in a Supportive sneaker      Review of Systems:  General- denies fever/chills  Respiratory- denies cough or SOB  Cardio- denies chest pain or palpitations  GI- denies abdominal pain  Musculoskeletal- Negative except noted above  Skin- denies rashes or wounds    Physical Exam:   There were no vitals taken for this visit  General/Constitutional: No apparent distress: well-nourished and well developed  Eyes: normal ocular motion  Lymphatic: No appreciable lymphadenopathy  Respiratory: Non-labored breathing  Vascular: No edema, swelling or tenderness, except as noted in detailed exam   Integumentary: No impressive skin lesions present, except as noted in detailed exam   Neuro: No ataxia or tremors noted  Psych: Normal mood and affect, oriented to person, place and time  Appropriate affect  Musculoskeletal: Normal, except as noted in detailed exam and in HPI  Examination    right        Incision Clean, dry, intact  Sutures Previously removed      Ecchymosis none    Swelling mild    Sensation Intact to light touch throughout sural, saphenous, superficial peroneal, deep peroneal and medial/lateral plantar nerve distributions  [place]-Pat 5 07 filament (10g) testing deferred  Cardiovascular Brisk capillary refill < 2 seconds,intact DP and PT pulses    Special Tests None      Nail removal    Date/Time: 11/16/2022 2:48 PM  Performed by: Gil Ruffin MD  Authorized by: Gil Ruffin MD     Patient location:  ClinicUniversal Protocol:  Consent: Verbal consent obtained  Risks and benefits: risks, benefits and alternatives were discussed  Consent given by: patient  Timeout called at: 11/16/2022 2:48 PM   Patient understanding: patient states understanding of the procedure being performed  Patient identity confirmed: verbally with patient      Location:     Foot:  L big toe  Pre-procedure details:     Skin preparation:  Alcohol  Anesthesia (see MAR for exact dosages): Anesthesia method:  Local infiltration    Local anesthetic:  Bupivacaine 0 25% w/o epi  Nail Removal:     Nail removed:  Complete    Nail bed sutured: no    Ingrown nail:     Wedge excision of skin: no      Nail matrix removed or ablated:  None  Post-procedure details:     Dressing:  4x4 sterile gauze (Adaptic & coban)    Patient tolerance of procedure: Tolerated well, no immediate complications      Imaging Studies:   6 views of bilateral feet were taken, reviewed and interpreted independently that demonstrate hardware in expected position without signs of failure or loosening  Reviewed by me personally  Dory Cross Lachman, MD  Foot & Ankle Surgery   Department of [address] [place] [address] personally performed the service  Dory Cross Lachman, MD

## 2022-11-17 ENCOUNTER — TELEPHONE (OUTPATIENT)
Dept: PULMONOLOGY | Facility: CLINIC | Age: 57
End: 2022-11-17

## 2022-11-17 NOTE — TELEPHONE ENCOUNTER
Pt left Vm requesting a prescription refill for his trelegy inhaler   I called th ept and left him a VM requesting a call back to please schedule f/u appt ans then we can process Trelegy order

## 2022-11-22 ENCOUNTER — OFFICE VISIT (OUTPATIENT)
Dept: OBGYN CLINIC | Facility: CLINIC | Age: 57
End: 2022-11-22

## 2022-11-22 VITALS — WEIGHT: 240 LBS | BODY MASS INDEX: 31.81 KG/M2 | HEIGHT: 73 IN

## 2022-11-22 DIAGNOSIS — M20.22 HALLUX RIGIDUS, BILATERAL: Primary | ICD-10-CM

## 2022-11-22 DIAGNOSIS — M20.21 HALLUX RIGIDUS, BILATERAL: Primary | ICD-10-CM

## 2022-11-22 NOTE — PROGRESS NOTES
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  Walker Villatoro is a 59 y o  male who is being seen for post-operative visit for the above procedure  Pain is well controlled and the patient has successfully transitioned to OTC pain medicines  he has completed ASA 325mg BID for DVT prophylaxis  Patient has been WBAT in a Supportive sneaker   Last week, he had left big toe nail removal for ingrown nail  He took the prescribed Bactrim  Review of Systems:  General- denies fever/chills  Respiratory- denies cough or SOB  Cardio- denies chest pain or palpitations  GI- denies abdominal pain  Musculoskeletal- Negative except noted above  Skin- denies rashes or wounds    Physical Exam:   Ht 6' 1" (1 854 m)   Wt 109 kg (240 lb)   BMI 31 66 kg/m²   General/Constitutional: No apparent distress: well-nourished and well developed  Eyes: normal ocular motion  Lymphatic: No appreciable lymphadenopathy  Respiratory: Non-labored breathing  Vascular: No edema, swelling or tenderness, except as noted in detailed exam   Integumentary: No impressive skin lesions present, except as noted in detailed exam   Neuro: No ataxia or tremors noted  Psych: Normal mood and affect, oriented to person, place and time  Appropriate affect  Musculoskeletal: Normal, except as noted in detailed exam and in HPI      Examination    right        Incision Clean, dry, intact  Sutures Previously removed  Ecchymosis none    Swelling mild    Sensation Intact to light touch throughout sural, saphenous, superficial peroneal, deep peroneal and medial/lateral plantar nerve distributions  [place]-Pat 5 07 filament (10g) testing deferred  Cardiovascular Brisk capillary refill < 2 seconds,intact DP and PT pulses    Special Tests None      Left big toe missing nail, no signs of drainage or erythema  Imaging Studies:   No new imaging today      Margene Maroon Lachman, MD  Foot & Ankle Surgery   Department of [address] [place] [address]      I personally performed the service  Margene Maroon Lachman, MD

## 2022-11-23 DIAGNOSIS — J43.9 PULMONARY EMPHYSEMA, UNSPECIFIED EMPHYSEMA TYPE (HCC): ICD-10-CM

## 2022-12-06 ENCOUNTER — TELEPHONE (OUTPATIENT)
Dept: FAMILY MEDICINE CLINIC | Facility: CLINIC | Age: 57
End: 2022-12-06

## 2022-12-06 DIAGNOSIS — E78.2 MIXED HYPERLIPIDEMIA: ICD-10-CM

## 2022-12-06 RX ORDER — CARVEDILOL 3.12 MG/1
3.12 TABLET ORAL 2 TIMES DAILY WITH MEALS
Qty: 60 TABLET | Refills: 5 | Status: SHIPPED | OUTPATIENT
Start: 2022-12-06 | End: 2022-12-09

## 2022-12-06 NOTE — TELEPHONE ENCOUNTER
Patient called back and needs medication sent to [facility], has recently switched from [facility] and cannot transfer

## 2022-12-06 NOTE — TELEPHONE ENCOUNTER
carvedilol (COREG) 3 125 mg tablet     Patient left message  for refill on above medication however recently refilled on 11/5/22 with refills  Message left for patient to inform him of above

## 2022-12-09 RX ORDER — CARVEDILOL 3.12 MG/1
3.12 TABLET ORAL 2 TIMES DAILY WITH MEALS
Qty: 60 TABLET | Refills: 5 | Status: SHIPPED | OUTPATIENT
Start: 2022-12-09 | End: 2023-02-07

## 2022-12-13 ENCOUNTER — OFFICE VISIT (OUTPATIENT)
Dept: PULMONOLOGY | Facility: MEDICAL CENTER | Age: 57
End: 2022-12-13

## 2022-12-13 VITALS
HEIGHT: 73 IN | BODY MASS INDEX: 31.66 KG/M2 | RESPIRATION RATE: 12 BRPM | TEMPERATURE: 98.2 F | HEART RATE: 84 BPM | SYSTOLIC BLOOD PRESSURE: 142 MMHG | OXYGEN SATURATION: 97 % | DIASTOLIC BLOOD PRESSURE: 98 MMHG

## 2022-12-13 DIAGNOSIS — E66.09 CLASS 1 OBESITY DUE TO EXCESS CALORIES WITH SERIOUS COMORBIDITY AND BODY MASS INDEX (BMI) OF 31.0 TO 31.9 IN ADULT: ICD-10-CM

## 2022-12-13 DIAGNOSIS — J43.9 PULMONARY EMPHYSEMA, UNSPECIFIED EMPHYSEMA TYPE (HCC): ICD-10-CM

## 2022-12-13 DIAGNOSIS — G47.33 OBSTRUCTIVE SLEEP APNEA SYNDROME: ICD-10-CM

## 2022-12-13 DIAGNOSIS — F17.211 CIGARETTE NICOTINE DEPENDENCE IN REMISSION: ICD-10-CM

## 2022-12-13 DIAGNOSIS — J43.2 CENTRILOBULAR EMPHYSEMA (HCC): Primary | ICD-10-CM

## 2022-12-13 NOTE — ASSESSMENT & PLAN NOTE
Krystal Burt will continue with Trelegy 1 puff once daily  He is aware of proper use and technique  Prescriptions were sent into his pharmacy  Given his increased symptoms while off the inhaler, I advised him that we could escalate him to a higher dose; however, given he has had vast improvement with only 2 weeks use of it, I advised him that we could also stay the current course with albuterol as needed  He would like to do the latter and will call if symptoms do not further continue to improve  He will continue to increase activity as tolerated

## 2022-12-13 NOTE — ASSESSMENT & PLAN NOTE
He had a positive sleep study in the past, but declined CPAP therapy at that time  At his last visit, he declined CPAP therapy and again declines it today  He did bring up the inspire device today  I advised him that if he is interested, we can refer him to one of our sleep specialist in the office to further discuss  Given that he has just gotten over multiple foot surgeries, he would like to table that discussion for now  He will follow-up in 1 year to further discuss or call sooner if he would like to

## 2022-12-13 NOTE — ASSESSMENT & PLAN NOTE
He quit in 2013  He will continue with complete cessation  I discussed with him that he is a candidate for lung cancer CT screening  The following Shared Decision-Making points were covered:  1  Benefits of screening were discussed, including the rates of reduction in death from lung cancer and other causes  Harms of screening were reviewed, including false positive tests, radiation exposure levels, risks of invasive procedures, risks of complications of screening, and risk of overdiagnosis  2  I counseled on the importance of adherence to annual lung cancer LDCT screening, impact of co-morbidities, and ability or willingness to undergo diagnosis and treatment  3  I counseled on the importance of maintaining abstinence as a former smoker or was counseled on the importance of smoking cessation if a current smoker    Review of Eligibility Criteria: He meets all of the criteria for Lung Cancer Screening  · He is 59 y o    · He has 20 pack year tobacco history and is a current smoker or has quit within the past 15 years  · He presents no signs or symptoms of lung cancer    After discussion, the patient decided to decline lung cancer screening for now  He would like to follow-up in 1 year to further discuss  He will call the office sooner should he want to address this sooner

## 2022-12-13 NOTE — PROGRESS NOTES
Pulmonary Follow-Up Note   Kyleigh Arnold 59 y o  male MRN: [id number]  12/13/2022      Assessment/Plan:    Problem List Items Addressed This Visit        Respiratory    Sleep apnea     He had a positive sleep study in the past, but declined CPAP therapy at that time  At his last visit, he declined CPAP therapy and again declines it today  He did bring up the inspire device today  I advised him that if he is interested, we can refer him to one of our sleep specialist in the office to further discuss  Given that he has just gotten over multiple foot surgeries, he would like to table that discussion for now  He will follow-up in 1 year to further discuss or call sooner if he would like to  Centrilobular emphysema ([facility] [address] ) - Primary     Prince will continue with Trelegy 1 puff once daily  He is aware of proper use and technique  Prescriptions were sent into his pharmacy  Given his increased symptoms while off the inhaler, I advised him that we could escalate him to a higher dose; however, given he has had vast improvement with only 2 weeks use of it, I advised him that we could also stay the current course with albuterol as needed  He would like to do the latter and will call if symptoms do not further continue to improve  He will continue to increase activity as tolerated  Relevant Medications    fluticasone-umeclidinium-vilanterol (Trelegy Ellipta) 100-62 5-25 mcg/actuation inhaler       Other    Class 1 obesity due to excess calories with serious comorbidity and body mass index (BMI) of 31 0 to 31 9 in adult     Lifestyle modifications and weight loss as able  Cigarette nicotine dependence in remission     He quit in 2013  He will continue with complete cessation  I discussed with him that he is a candidate for lung cancer CT screening  The following Shared Decision-Making points were covered:  1   Benefits of screening were discussed, including the rates of reduction in death from lung cancer and other causes  Harms of screening were reviewed, including false positive tests, radiation exposure levels, risks of invasive procedures, risks of complications of screening, and risk of overdiagnosis  2  I counseled on the importance of adherence to annual lung cancer LDCT screening, impact of co-morbidities, and ability or willingness to undergo diagnosis and treatment  3  I counseled on the importance of maintaining abstinence as a former smoker or was counseled on the importance of smoking cessation if a current smoker    Review of Eligibility Criteria: He meets all of the criteria for Lung Cancer Screening  · He is 59 y o    · He has 20 pack year tobacco history and is a current smoker or has quit within the past 15 years  · He presents no signs or symptoms of lung cancer    After discussion, the patient decided to decline lung cancer screening for now  He would like to follow-up in 1 year to further discuss  He will call the office sooner should he want to address this sooner  Other Visit Diagnoses     Pulmonary emphysema, unspecified emphysema type ([address] )        Relevant Medications    fluticasone-umeclidinium-vilanterol (Trelegy Ellipta) 100-62 5-25 mcg/actuation inhaler          Education provided at this visit:   Need for Vaccination: Up-to-date on flu vaccine  Declines COVID-vaccine  Pulmonary Rehab: Not applicable   Smoking Cessation: Quit 2013  Lung Cancer Screening: As above   Inhaler Use: Reiterated proper use and technique    Return in about 1 year (around 12/13/2023), or if symptoms worsen or fail to improve  All of Prince's questions were answered prior to leaving the office today  He will follow-up with our office in 12 months or sooner should the need arise  He is aware to call our office with any further questions or concerns      History of Present Illness   Reason for Visit: Follow-up  Chief Complaint: "I ran out of my Trelegy "  HPI: Amanda Wing is a 59 y o  male who presents to the office today for follow-up of emphysema  Hartsel reports that he has had a rough year due to multiple surgeries on both of his feet  He was virtually unable to walk for the better part of a year  He just got back on his feet in the last few weeks and subsequently ran out of his Trelegy for 2 weeks  Until he got it refilled, he had worsening shortness of breath  He reports that since he has been back on the Trelegy, his symptoms have remarkably improved  He went from using his albuterol MDI multiple times a day to using it less than a few times a month  He still has some dyspnea on exertion, but no wheezing or cough  He reports he is also gained at least 20 pounds in the last year  He denies any leg pain or swelling  He denies any chest pain  Aside from the above, no other complaints  Review of Systems   All other systems reviewed and are negative  A full 12-point review of systems was completed and is negative except for those outlined in the HPI      Historical Information   Past Medical History:   Diagnosis Date   • Colitis     x1 occurnce   • COVID-19     positive test on 12/16/21   • Depression    • Diverticulitis    • Diverticulosis    • GERD (gastroesophageal reflux disease)    • Hyperlipidemia    • Hypertension    • Pulmonary emphysema (HCC)    • Sleep apnea     no cpap   • Umbilical hernia      Past Surgical History:   Procedure Laterality Date   • CATARACT EXTRACTION Bilateral    • CHOLECYSTECTOMY     • COLONOSCOPY     • ESOPHAGOGASTRODUODENOSCOPY     • HERNIA REPAIR  09/19/2019    Open repair of incarcerated incisional hernia with mesh - Dr Barksdale Spotted   • KNEE ARTHROSCOPY Left    • AZ DEBRIDEMENT, SKIN, SUB-Q TISSUE,=<20 SQ CM Right 7/14/2022    Procedure: DEBRIDEMENT FOOT/TOE [facility] HUY), negative pressure wound dressing application;  Surgeon: Aleksandar Marinelli MD;  [facility] MA[facility];  Service: Orthopedics   • AZ FUSION BIG TOE,MT-P JT Bilateral 8/25/2022 Procedure: Bilateral 1st MTP arthrodesis;   Surgeon: Watson Yang MD;  [facility];  Service: Orthopedics   • HI HALLUX RIGIDUS W/CHEILECTOMY 1ST MP JT W/O IMPLT Bilateral 12/10/2021    Procedure: CHEILECTOMY;  Surgeon: Lisa Davis DPM;  Location: [facility];  Service: Podiatry   • HI TENOTOMY ELBOW LATERAL/MEDIAL DEBRIDE REPAIR Left 2020    Procedure: lateral epicondyle elbow debridement, release, and repair;  Surgeon: Yamilex Powell MD;  [facility];  Service: Orthopedics   • WISDOM TOOTH EXTRACTION       Family History   Problem Relation Age of Onset   • Anemia Mother    • Hypertension Mother    • Pancreatic cancer Father    • Diabetes Father    • Hypertension Father    • Abdominal aortic aneurysm Father    • Diabetes Maternal Grandmother    • Emphysema Maternal Grandmother    • Diabetes Maternal Grandfather    • Diabetes Paternal Grandmother    • Diabetes Paternal Grandfather    • Emphysema Brother      Social History   Social History     Substance and Sexual Activity   Alcohol Use Yes   • Alcohol/week: 4 0 standard drinks   • Types: 4 Standard drinks or equivalent per week    Comment: "few times a week"     Social History     Substance and Sexual Activity   Drug Use Never     Social History     Tobacco Use   Smoking Status Former   • Packs/day: 1 00   • Years: 35 00   • Pack years: 35 00   • Types: Cigarettes   • Start date: 1986   • Quit date: 2013   • Years since quittin 9   Smokeless Tobacco Never   Tobacco Comments    quit 6 5 years ago - As per [place]      E-Cigarette/Vaping   • E-Cigarette Use Never User      E-Cigarette/Vaping Substances   • Nicotine No    • THC No    • CBD No    • Flavoring No        Meds/Allergies     Current Outpatient Medications:   •  albuterol (PROVENTIL HFA,VENTOLIN HFA) 90 mcg/act inhaler, Inhale 2 puffs every 4 (four) hours as needed for wheezing or shortness of breath, Disp: 18 g, Rfl: 5  •  b complex vitamins capsule, Take 1 capsule by mouth daily, Disp: , Rfl:   •  carvedilol (COREG) 3 125 mg tablet, Take 1 tablet (3 125 mg total) by mouth 2 (two) times a day with meals, Disp: 60 tablet, Rfl: 5  •  eszopiclone (LUNESTA) 3 MG tablet, Take 1 tablet (3 mg total) by mouth daily at bedtime, Disp: 30 tablet, Rfl: 3  •  FLUoxetine (PROzac) 20 MG tablet, Take 1 tablet (20 mg total) by mouth every morning, Disp: 90 tablet, Rfl: 1  •  fluticasone-umeclidinium-vilanterol (Trelegy Ellipta) 100-62 5-25 mcg/actuation inhaler, Inhale 1 puff daily Rinse mouth after use , Disp: 60 blister, Rfl: 11  •  lisinopril (ZESTRIL) 30 mg tablet, Take 1 tablet (30 mg total) by mouth every evening, Disp: 90 tablet, Rfl: 1  •  multivitamin (THERAGRAN) TABS, Take 1 tablet by mouth daily, Disp: , Rfl:   •  ondansetron (ZOFRAN) 4 mg tablet, Take 1 tablet (4 mg total) by mouth every 8 (eight) hours as needed for nausea or vomiting, Disp: 30 tablet, Rfl: 0  •  pantoprazole (PROTONIX) 40 mg tablet, Take 1 tablet (40 mg total) by mouth 2 (two) times a day, Disp: 180 tablet, Rfl: 1  •  pregabalin (LYRICA) 75 mg capsule, Take 75 mg by mouth 2 (two) times a day, Disp: , Rfl:   •  rosuvastatin (CRESTOR) 10 MG tablet, Take 1 tablet (10 mg total) by mouth daily at bedtime, Disp: 90 tablet, Rfl: 1  •  triamcinolone (KENALOG) 0 1 % ointment, Apply topically 2 (two) times a day, Disp: 15 g, Rfl: 1  •  VITAMIN D PO, Take 1 tablet by mouth in the morning  , Disp: , Rfl:   •  Zinc Sulfate (ZINC 15 PO), Take 1 tablet by mouth in the morning  , Disp: , Rfl:   •  aspirin (ECOTRIN) 325 mg EC tablet, Take 1 tablet (325 mg total) by mouth 2 (two) times a day (Patient not taking: Reported on 11/22/2022), Disp: 84 tablet, Rfl: 0  •  sulindac (CLINORIL) 200 MG tablet, Take 1 tablet (200 mg total) by mouth in the morning and 1 tablet (200 mg total) in the evening  Do all this for 20 days  , Disp: 40 tablet, Rfl: 0  Allergies   Allergen Reactions   • Allopurinol Rash       Vitals: Blood pressure 142/98, pulse 84, temperature 98 2 °F (36 8 °C), temperature source Temporal, resp  rate 12, height 6' 1" (1 854 m), SpO2 97 %  Body mass index is 31 66 kg/m²  Oxygen Therapy  SpO2: 97 %    Physical Exam:  Physical Exam  Vitals reviewed  Constitutional:       General: He is not in acute distress  Appearance: He is well-developed  He is obese  He is not toxic-appearing or diaphoretic  HENT:      Head: Normocephalic and atraumatic  Eyes:      General: No scleral icterus  Neck:      Trachea: No tracheal deviation  Cardiovascular:      Rate and Rhythm: Normal rate and regular rhythm  Heart sounds: S1 normal and S2 normal  No murmur heard  No friction rub  No gallop  Pulmonary:      Effort: Pulmonary effort is normal  No tachypnea, accessory muscle usage or respiratory distress  Breath sounds: Normal breath sounds  No stridor  No decreased breath sounds, wheezing, rhonchi or rales  Chest:      Chest wall: No tenderness  Abdominal:      General: Bowel sounds are normal  There is no distension  Palpations: Abdomen is soft  Tenderness: There is no abdominal tenderness  Musculoskeletal:         General: No tenderness  Cervical back: Neck supple  Skin:     General: Skin is warm and dry  Findings: No rash  Neurological:      Mental Status: He is alert and oriented to person, place, and time  GCS: GCS eye subscore is 4  GCS verbal subscore is 5  GCS motor subscore is 6  Psychiatric:         Speech: Speech normal          Behavior: Behavior normal  Behavior is cooperative  No new labs or diagnostic testing      DODIE Morales  [facility]        Portions of the record may have been created with voice recognition software  Occasional wrong word or "sound a like" substitutions may have occurred due to the inherent limitations of voice recognition software    Read the chart carefully and recognize, using context, where substitutions have occurred or contact the dictating provider

## 2023-02-16 ENCOUNTER — TELEPHONE (OUTPATIENT)
Dept: FAMILY MEDICINE CLINIC | Facility: CLINIC | Age: 58
End: 2023-02-16

## 2023-02-16 ENCOUNTER — RA CDI HCC (OUTPATIENT)
Dept: OTHER | Facility: HOSPITAL | Age: 58
End: 2023-02-16

## 2023-02-16 DIAGNOSIS — R73.01 IFG (IMPAIRED FASTING GLUCOSE): ICD-10-CM

## 2023-02-16 DIAGNOSIS — E55.9 VITAMIN D DEFICIENCY: ICD-10-CM

## 2023-02-16 DIAGNOSIS — E78.2 MIXED HYPERLIPIDEMIA: ICD-10-CM

## 2023-02-16 DIAGNOSIS — Z12.5 SCREENING PSA (PROSTATE SPECIFIC ANTIGEN): Primary | ICD-10-CM

## 2023-02-16 DIAGNOSIS — N18.31 STAGE 3A CHRONIC KIDNEY DISEASE (HCC): ICD-10-CM

## 2023-02-16 NOTE — TELEPHONE ENCOUNTER
Patient called, he has an upcoming appointment and his lab work has , would you please add the orders in so he can get them done prior to his appointment

## 2023-02-16 NOTE — PROGRESS NOTES
Major depressive disorder, single episode, mild (HCC)Noted 5/7/2020  [F32 0]      NOT on the BPA- please assess using MEAT for 2023 billing    [facility] [address]  coding opportunities          Chart Reviewed number of suggestions sent to Provider: 1     Patients Insurance        Commercial Insurance: [address] [place] [address]

## 2023-02-19 ENCOUNTER — APPOINTMENT (OUTPATIENT)
Dept: LAB | Facility: CLINIC | Age: 58
End: 2023-02-19

## 2023-02-19 DIAGNOSIS — E55.9 VITAMIN D DEFICIENCY: ICD-10-CM

## 2023-02-19 DIAGNOSIS — R73.01 IFG (IMPAIRED FASTING GLUCOSE): ICD-10-CM

## 2023-02-19 DIAGNOSIS — Z12.5 SCREENING PSA (PROSTATE SPECIFIC ANTIGEN): ICD-10-CM

## 2023-02-19 DIAGNOSIS — N18.31 STAGE 3A CHRONIC KIDNEY DISEASE (HCC): ICD-10-CM

## 2023-02-19 DIAGNOSIS — E78.2 MIXED HYPERLIPIDEMIA: ICD-10-CM

## 2023-02-19 LAB
25(OH)D3 SERPL-MCNC: 25.9 NG/ML (ref 30–100)
ALBUMIN SERPL BCP-MCNC: 4.5 G/DL (ref 3.5–5)
ALP SERPL-CCNC: 86 U/L (ref 34–104)
ALT SERPL W P-5'-P-CCNC: 30 U/L (ref 7–52)
ANION GAP SERPL CALCULATED.3IONS-SCNC: 10 MMOL/L (ref 4–13)
AST SERPL W P-5'-P-CCNC: 22 U/L (ref 13–39)
BILIRUB SERPL-MCNC: 0.94 MG/DL (ref 0.2–1)
BUN SERPL-MCNC: 23 MG/DL (ref 5–25)
CALCIUM SERPL-MCNC: 9.5 MG/DL (ref 8.4–10.2)
CHLORIDE SERPL-SCNC: 106 MMOL/L (ref 96–108)
CHOLEST SERPL-MCNC: 152 MG/DL
CO2 SERPL-SCNC: 23 MMOL/L (ref 21–32)
CREAT SERPL-MCNC: 1.09 MG/DL (ref 0.6–1.3)
GFR SERPL CREATININE-BSD FRML MDRD: 73 ML/MIN/1.73SQ M
GLUCOSE P FAST SERPL-MCNC: 136 MG/DL (ref 65–99)
HDLC SERPL-MCNC: 59 MG/DL
LDLC SERPL CALC-MCNC: 74 MG/DL (ref 0–100)
NONHDLC SERPL-MCNC: 93 MG/DL
POTASSIUM SERPL-SCNC: 4 MMOL/L (ref 3.5–5.3)
PROT SERPL-MCNC: 7.8 G/DL (ref 6.4–8.4)
PSA SERPL-MCNC: 1.3 NG/ML (ref 0–4)
SODIUM SERPL-SCNC: 139 MMOL/L (ref 135–147)
TRIGL SERPL-MCNC: 96 MG/DL

## 2023-02-22 ENCOUNTER — OFFICE VISIT (OUTPATIENT)
Dept: OBGYN CLINIC | Facility: CLINIC | Age: 58
End: 2023-02-22

## 2023-02-22 ENCOUNTER — APPOINTMENT (OUTPATIENT)
Dept: RADIOLOGY | Facility: AMBULARY SURGERY CENTER | Age: 58
End: 2023-02-22
Attending: ORTHOPAEDIC SURGERY

## 2023-02-22 VITALS
DIASTOLIC BLOOD PRESSURE: 96 MMHG | SYSTOLIC BLOOD PRESSURE: 148 MMHG | WEIGHT: 240 LBS | HEART RATE: 94 BPM | BODY MASS INDEX: 31.81 KG/M2 | HEIGHT: 73 IN

## 2023-02-22 DIAGNOSIS — M20.22 HALLUX RIGIDUS, BILATERAL: ICD-10-CM

## 2023-02-22 DIAGNOSIS — M20.21 HALLUX RIGIDUS, BILATERAL: Primary | ICD-10-CM

## 2023-02-22 DIAGNOSIS — M20.21 HALLUX RIGIDUS, BILATERAL: ICD-10-CM

## 2023-02-22 DIAGNOSIS — M20.22 HALLUX RIGIDUS, BILATERAL: Primary | ICD-10-CM

## 2023-02-22 PROBLEM — N18.2 CKD (CHRONIC KIDNEY DISEASE) STAGE 2, GFR 60-89 ML/MIN: Status: ACTIVE | Noted: 2021-12-04

## 2023-02-22 PROBLEM — R73.01 IFG (IMPAIRED FASTING GLUCOSE): Status: ACTIVE | Noted: 2023-02-22

## 2023-02-22 LAB
EST. AVERAGE GLUCOSE BLD GHB EST-MCNC: 126 MG/DL
HBA1C MFR BLD: 6 %

## 2023-02-22 NOTE — PROGRESS NOTES
JONY Portillo  Attending, Orthopaedic Surgery  [facility] [address] [facility]      ORTHOPAEDIC FOOT AND ANKLE CLINIC VISIT     Assessment:     Encounter Diagnosis   Name Primary? • Hallux rigidus, bilateral Yes            Plan:   · The patient verbalized understanding of exam findings and treatment plan  We engaged in the shared decision-making process and treatment options were discussed at length with the patient  Surgical and conservative management discussed today along with risks and benefits  · He is now 6 months s/p bilateral 1st MTP arthrodesis  · He states he is having pain in both and is not satisfied with his progress,  · His left side looks perfect  Xrays demonstrate well healed, well aligned 1st MTP fusion  · The right side appears to be delayed with no progress in bony healing over the past 3 months  · We will obtain a bone stimulator for this side  · His vitamin D level taken recently was less than 30, It was also less than 30 the last time he had it taken in 2020  We encouraged him to take 4000u of D3 daily  He was only taking 1000  He may require the 50K weekly dosing  Return in about 3 months (around 5/22/2023)  History of Present Illness:   Chief Complaint:   Chief Complaint   Patient presents with   • Left Foot - Follow-up   • Right Foot - Follow-up     Catherine Montes is a 59 y o  male who is being seen in follow-up for b/l feet s/p 1st MTP fusion 6 months ago  When we last saw he we recommended wbat in a shoe  Pain has not improved  Residual pain is localized at MTP joints with minimal radiating and described as sharp and severe        Pain/symptom timing:  Worse during the day when active  Pain/symptom context:  Worse with activites and work  Pain/symptom modifying factors:  Rest makes better, activities make worse  Pain/symptom associated signs/symptoms: none    Prior treatment   · NSAIDsYes   · Injections No   · Bracing/Orthotics No · Physical Therapy No     Orthopedic Surgical History:   See below    Past Medical, Surgical and Social History:  Past Medical History:  has a past medical history of Colitis, COVID-19, Depression, Diverticulitis, Diverticulosis, GERD (gastroesophageal reflux disease), Hyperlipidemia, Hypertension, Pulmonary emphysema ([address] ), Sleep apnea, and Umbilical hernia  Problem List: does not have any pertinent problems on file  Past Surgical History:  has a past surgical history that includes Cholecystectomy; Cataract extraction (Bilateral); Knee arthroscopy (Left); Colonoscopy; Esophagogastroduodenoscopy; [place] tooth extraction; Hernia repair (09/19/2019); pr tnot elbow lateral/medial debride open tdn rpr (Left, 11/17/2020); pr hallux rigidus w/cheilectomy 1st mp jt w/o implt (Bilateral, 12/10/2021); pr debridement subcutaneous tissue 20 sq cm/< (Right, 7/14/2022); and pr arthrodesis great toe metatarsophalangeal joint (Bilateral, 8/25/2022)  Family History: family history includes Abdominal aortic aneurysm in his father; Anemia in his mother; Diabetes in his father, maternal grandfather, maternal grandmother, paternal grandfather, and paternal grandmother; Emphysema in his brother and maternal grandmother; Hypertension in his father and mother; Pancreatic cancer in his father  Social History:  reports that he quit smoking about 10 years ago  His smoking use included cigarettes  He started smoking about 36 years ago  He has a 35 00 pack-year smoking history  He has never used smokeless tobacco  He reports current alcohol use of about 4 0 standard drinks per week  He reports that he does not use drugs    Current Medications: has a current medication list which includes the following prescription(s): albuterol, b complex vitamins, carvedilol, eszopiclone, fluoxetine, fluticasone-umeclidinium-vilanterol, lisinopril, multivitamin, ondansetron, pantoprazole, pregabalin, rosuvastatin, sulindac, triamcinolone, vitamin d, zinc sulfate, and aspirin  Allergies: is allergic to allopurinol  Review of Systems:  General- denies fever/chills  HEENT- denies hearing loss or sore throat  Eyes- denies eye pain or visual disturbances, denies red eyes  Respiratory- denies cough or SOB  Cardio- denies chest pain or palpitations  GI- denies abdominal pain  Endocrine- denies urinary frequency  Urinary- denies pain with urination  Musculoskeletal- Negative except noted above  Skin- denies rashes or wounds  Neurological- denies dizziness or headache  Psychiatric- denies anxiety or difficulty concentrating    Physical Exam:   /96 (BP Location: Left arm, Patient Position: Sitting, Cuff Size: Adult)   Pulse 94   Ht 6' 1" (1 854 m)   Wt 109 kg (240 lb)   BMI 31 66 kg/m²   General/Constitutional: No apparent distress: well-nourished and well developed  Eyes: normal ocular motion  Lymphatic: No appreciable lymphadenopathy  Respiratory: Non-labored breathing  Vascular: No edema, swelling or tenderness, except as noted in detailed exam   Integumentary: No impressive skin lesions present, except as noted in detailed exam   Neuro: No ataxia or tremors noted  Psych: Normal mood and affect, oriented to person, place and time  Appropriate affect  Musculoskeletal: Normal, except as noted in detailed exam and in HPI  Examination    Bilateral    Gait Normal   Musculoskeletal Tender to palpation at right MTP joint    Skin Normal   Well-healed incisions  Nails Normal    Range of Motion  10 degrees dorsiflexion, 40 degrees plantarflexion  Subtalar motion: normal    Stability Stable    Muscle Strength 5/5 tibialis anterior  5/5 gastrocnemius-soleus  5/5 posterior tibialis  5/5 peroneal/eversion strength      Neurologic Normal    Sensation Intact to light touch throughout sural, saphenous, superficial peroneal, deep peroneal and medial/lateral plantar nerve distributions  Ford [place]-Pat 5 07 filament (10g) testing deferred      Cardiovascular Brisk capillary refill < 2 seconds,intact DP and PT pulses    Special Tests None      Imaging Studies:   3 views of the Right b/l feet were obtained, reviewed and interpreted independently which demonstrate L  MTP joint healed fusion and R 1st MTP not completely fused   Reviewed by me personally  Thersa Quay Lachman, MD  Foot & Ankle Surgery   Department of [address] [place] [address]      I personally performed the service  Thersa Quay Lachman, MD

## 2023-02-23 ENCOUNTER — OFFICE VISIT (OUTPATIENT)
Dept: FAMILY MEDICINE CLINIC | Facility: CLINIC | Age: 58
End: 2023-02-23

## 2023-02-23 VITALS
DIASTOLIC BLOOD PRESSURE: 86 MMHG | BODY MASS INDEX: 32.95 KG/M2 | HEIGHT: 73 IN | TEMPERATURE: 97 F | SYSTOLIC BLOOD PRESSURE: 128 MMHG | RESPIRATION RATE: 18 BRPM | WEIGHT: 248.6 LBS | OXYGEN SATURATION: 96 % | HEART RATE: 94 BPM

## 2023-02-23 DIAGNOSIS — F32.0 MAJOR DEPRESSIVE DISORDER, SINGLE EPISODE, MILD (HCC): ICD-10-CM

## 2023-02-23 DIAGNOSIS — F51.01 PRIMARY INSOMNIA: ICD-10-CM

## 2023-02-23 DIAGNOSIS — E78.2 MIXED HYPERLIPIDEMIA: ICD-10-CM

## 2023-02-23 DIAGNOSIS — E55.9 VITAMIN D DEFICIENCY: ICD-10-CM

## 2023-02-23 DIAGNOSIS — Z12.2 ENCOUNTER FOR SCREENING FOR MALIGNANT NEOPLASM OF LUNG IN FORMER SMOKER WHO QUIT IN PAST 15 YEARS WITH 30 PACK YEAR HISTORY OR GREATER: ICD-10-CM

## 2023-02-23 DIAGNOSIS — J43.2 CENTRILOBULAR EMPHYSEMA (HCC): ICD-10-CM

## 2023-02-23 DIAGNOSIS — R73.01 IFG (IMPAIRED FASTING GLUCOSE): ICD-10-CM

## 2023-02-23 DIAGNOSIS — Z23 ENCOUNTER FOR IMMUNIZATION: ICD-10-CM

## 2023-02-23 DIAGNOSIS — I12.9 BENIGN HYPERTENSION WITH CKD (CHRONIC KIDNEY DISEASE), STAGE II: ICD-10-CM

## 2023-02-23 DIAGNOSIS — Z87.891 ENCOUNTER FOR SCREENING FOR MALIGNANT NEOPLASM OF LUNG IN FORMER SMOKER WHO QUIT IN PAST 15 YEARS WITH 30 PACK YEAR HISTORY OR GREATER: ICD-10-CM

## 2023-02-23 DIAGNOSIS — Z00.00 WELL ADULT EXAM: Primary | ICD-10-CM

## 2023-02-23 DIAGNOSIS — N18.2 BENIGN HYPERTENSION WITH CKD (CHRONIC KIDNEY DISEASE), STAGE II: ICD-10-CM

## 2023-02-23 DIAGNOSIS — M25.521 RIGHT ELBOW PAIN: ICD-10-CM

## 2023-02-23 RX ORDER — FLUOXETINE 20 MG/1
40 TABLET, FILM COATED ORAL EVERY MORNING
Qty: 180 TABLET | Refills: 1 | Status: SHIPPED | OUTPATIENT
Start: 2023-02-23

## 2023-02-23 NOTE — PROGRESS NOTES
Assessment/Plan:     Kalin Coto was seen today for physical exam, psoriasis and hm  Diagnoses and all orders for this visit:    Well adult exam    Benign hypertension with CKD (chronic kidney disease), stage II  -     Comprehensive metabolic panel; Future  -     Lipid panel; Future  -     Microalbumin / creatinine urine ratio; Future  -     Hemoglobin A1C; Future    Mixed hyperlipidemia    Vitamin D deficiency    IFG (impaired fasting glucose)  -     Comprehensive metabolic panel; Future  -     Lipid panel; Future  -     Microalbumin / creatinine urine ratio; Future  -     Hemoglobin A1C; Future    Primary insomnia    Encounter for screening for malignant neoplasm of lung in former smoker who quit in past 15 years with 30 pack year history or greater    Encounter for immunization  -     Pneumococcal Conjugate Vaccine 20-valent (Pcv20)    Major depressive disorder, single episode, mild (HCC)  -     FLUoxetine (PROzac) 20 MG tablet; Take 2 tablets (40 mg total) by mouth every morning    Right elbow pain  -     Ambulatory Referral to Physical Therapy; Future  -     Ambulatory referral to Hand Surgery; Future    Centrilobular emphysema ([facility] [address] )     Requested copy of dermatology notes - not in Epic  Pt requested MRI of arm  Advised him we cannot order this without completing PT first  He instead said he will see Dr Robert Mosley because he is sure his muscle is again torn  Referral provided  Reviewed vitamin D - okay to start 5,000 daily  Calcium intake reviewed  Reviewed sugars  Work on lowering carbs  He declines dietician referral    Lipids with improved ASCVD 7 5 to 6 3%  Declines referral to therapist  Jurgen Villeda to increase in prozac  Send MyChart message in 4 weeks to let us know if this is helping  Well adult exam  ·         Continue healthy diet   ·         Encourage exercise 4 times a week or more for minimum 30 minutes  ·         Continue to see dentist, wear seatbelt    ·         Health maintenance reviewed - agreed to PCV20  Declines lung cancer screening this year - already d/w pulmonologist    Reviewed age appropriate health maintenance screenings and immunizations that are due, risks and benefits of these  Health Maintenance   Topic Date Due   • COVID-19 Vaccine (1) Never done   • Hepatitis A Vaccine (1 of 2 - Risk 2-dose series) Never done   • Lung Cancer Screening  07/15/2022   • Hepatitis B Vaccine (1 of 3 - Risk 3-dose series) 2023   • Depression Remission PHQ  2023   • BMI: Followup Plan  2024   • BMI: Adult  2024   • Annual Physical  2024   • Colorectal Cancer Screening  2025   • DTaP,Tdap,and Td Vaccines (2 - Td or Tdap) 2030   • HIV Screening  Completed   • Hepatitis C Screening  Completed   • Pneumococcal Vaccine: Pediatrics (0 to 5 Years) and At-Risk Patients (6 to 59 Years)  Completed   • Influenza Vaccine  Completed   • HIB Vaccine  Aged Out   • IPV Vaccine  Aged Out   • Meningococcal ACWY Vaccine  Aged Out   • HPV Vaccine  Aged Out     Return in about 6 months (around 2023)  Subjective:    MICHAELA Granger is a 59 y o  male who presents today for a physical      Chief Complaint   Patient presents with   • Physical Exam     Annual physical     • Psoriasis     Pt has a hx of psoriasis and states it is flaring at this time and it is "taking over my body " Has a dermatologist who pt states is not helping much  •      PCV20 today            Patient Care Team:  Meghann Riggins DO as PCP - General (Family Medicine)  Getachew Corbin as PCP - Wound (Wound Care)  Franck Flores DPM (Podiatry)    PHQ-2/9 Depression Screening    Little interest or pleasure in doing things: 0 - not at all  Feeling down, depressed, or hopeless: 0 - not at all  Trouble falling or staying asleep, or sleeping too much: 2 - more than half the days  Feeling tired or having little energy: 2 - more than half the days  Poor appetite or overeatin - not at all  Feeling bad about yourself - or that you are a failure or have let yourself or your family down: 0 - not at all  Trouble concentrating on things, such as reading the newspaper or watching television: 0 - not at all  Moving or speaking so slowly that other people could have noticed  Or the opposite - being so fidgety or restless that you have been moving around a lot more than usual: 1 - several days  Thoughts that you would be better off dead, or of hurting yourself in some way: 0 - not at all  PHQ-9 Score: 5   PHQ-9 Interpretation: Mild depression         ?    ---Above per clinical staff & reviewed  ---  Patient here today for a physical:    Diet: cutting back   Exercise:  Not able   Dental visits:  Yes   Seatbelt: yes     Concerns today:  2 surgeries on left, 4 on right - not fusing  Still with pain  Psoriasis and getting worse   Saw Dr Odonnell Guard and froze things and steroid cream   [facility]    Also having nose bleeds  Not using anything   At night or morning   Using humidifier     Cannot do things he wants to, gets frustrated   Fatigue  Vitamin D increased to 4000 - says ortho would be okay if I increased to 5000     Feels like his right toe is swollen and in toe   After the other surgery -    Wound care made it worse   Podiatry did not help   Worries about his job due to missing work   Fan Guadalupe 89 wife is a support for him     Did not get CT scan this year     Few years ago tore tendon in elbow - Dr Abbie Wood advised PT and made it worse  MRI showed severe tear  Now same thing on the right  Right side shaking ketchup bottle severe pain with straightening or lifting  Extender tendon torn  Rates pain from 0-10/10  This occurred 6 weeks ago  Has not tried anything for it    Feels he needs an MRI and does not want PT     Large esophagus ulcer - Fredo           The following portions of the patient's history were reviewed and updated as appropriate: allergies, current medications, past family history, past medical history, past social history, past surgical history and problem list      Current Medications:  Current Outpatient Medications   Medication Sig Dispense Refill   • albuterol (PROVENTIL HFA,VENTOLIN HFA) 90 mcg/act inhaler Inhale 2 puffs every 4 (four) hours as needed for wheezing or shortness of breath 18 g 5   • b complex vitamins capsule Take 1 capsule by mouth daily     • carvedilol (COREG) 3 125 mg tablet Take 1 tablet (3 125 mg total) by mouth 2 (two) times a day with meals 60 tablet 5   • eszopiclone (LUNESTA) 3 MG tablet Take 1 tablet (3 mg total) by mouth daily at bedtime 30 tablet 3   • FLUoxetine (PROzac) 20 MG tablet Take 2 tablets (40 mg total) by mouth every morning 180 tablet 1   • fluticasone-umeclidinium-vilanterol (Trelegy Ellipta) 100-62 5-25 mcg/actuation inhaler Inhale 1 puff daily Rinse mouth after use  60 blister 11   • lisinopril (ZESTRIL) 30 mg tablet Take 1 tablet (30 mg total) by mouth every evening 90 tablet 1   • multivitamin (THERAGRAN) TABS Take 1 tablet by mouth daily     • pantoprazole (PROTONIX) 40 mg tablet Take 1 tablet (40 mg total) by mouth 2 (two) times a day 180 tablet 1   • rosuvastatin (CRESTOR) 10 MG tablet Take 1 tablet (10 mg total) by mouth daily at bedtime 90 tablet 1   • VITAMIN D PO Take 1 tablet by mouth in the morning       • Zinc Sulfate (ZINC 15 PO) Take 1 tablet by mouth in the morning         No current facility-administered medications for this visit  Objective:      /86   Pulse 94   Temp (!) 97 °F (36 1 °C)   Resp 18   Ht 6' 1" (1 854 m)   Wt 113 kg (248 lb 9 6 oz)   SpO2 96%   BMI 32 80 kg/m²   BP Readings from Last 3 Encounters:   02/23/23 128/86   02/22/23 148/96   12/13/22 142/98     Wt Readings from Last 3 Encounters:   02/23/23 113 kg (248 lb 9 6 oz)   02/22/23 109 kg (240 lb)   11/22/22 109 kg (240 lb)       Review of Systems  ROS:  all others negative - no chest pain, SOB, normal urine and bowels  no GERD  sleeping well  mood good  Physical Exam   Constitutional: he appears well-developed and well-nourished  HENT: Head: Normocephalic  Right Ear: External ear normal  Tympanic membrane normal    Left Ear: External ear normal  Tympanic membrane normal    Nose: Nose normal  No mucosal edema, No rhinorrhea  Right sinus exhibits no maxillary sinus tenderness  Left sinus exhibits no maxillary sinus tenderness  Mouth/Throat: Oropharynx is clear and moist    Eyes: Normal conjunctiva  No erythema  No discharge  Neck: No pain on exam  Neck supple  Cardiovascular: Normal rate, regular rhythm and normal heart sounds  Pulmonary/Chest: Effort normal and breath sounds normal  No wheezes  No rales  No rhonchi  Abdominal: Soft  Bowel sounds are normal  There is no tenderness  Musculoskeletal: he exhibits no edema  Pain over right lateral epicondyle  No swelling  No reduction in range of motion  Lymphadenopathy: he has no cervical adenopathy  Neurological: he  is alert and oriented to person, place, and time  Skin: Skin is warm and dry  No rashes  No flaking, no erythema, no plaques on scalp, ears, face  Psychiatric: he  has a normal mood and affect angry, irritable when speaking of his medical care  his behavior is normal  Thought content normal    Vitals reviewed  BMI Counseling: Body mass index is 32 8 kg/m²  The BMI is above normal  Nutrition recommendations include decreasing portion sizes  Exercise recommendations include exercising 3-5 times per week  Rationale for BMI follow-up plan is due to patient being overweight or obese

## 2023-02-28 DIAGNOSIS — F51.01 PRIMARY INSOMNIA: ICD-10-CM

## 2023-03-02 RX ORDER — ESZOPICLONE 3 MG/1
3 TABLET, FILM COATED ORAL
Qty: 30 TABLET | Refills: 3 | Status: SHIPPED | OUTPATIENT
Start: 2023-03-02

## 2023-03-16 ENCOUNTER — TELEPHONE (OUTPATIENT)
Dept: OBGYN CLINIC | Facility: HOSPITAL | Age: 58
End: 2023-03-16

## 2023-03-16 NOTE — TELEPHONE ENCOUNTER
Patient is being referred to a orthopedics  Referral team has called x2  Please schedule accordingly      [address]   [phone]

## 2023-03-27 ENCOUNTER — TELEMEDICINE (OUTPATIENT)
Dept: FAMILY MEDICINE CLINIC | Facility: CLINIC | Age: 58
End: 2023-03-27

## 2023-03-27 VITALS
SYSTOLIC BLOOD PRESSURE: 172 MMHG | HEIGHT: 73 IN | OXYGEN SATURATION: 94 % | HEART RATE: 89 BPM | DIASTOLIC BLOOD PRESSURE: 98 MMHG | BODY MASS INDEX: 32.95 KG/M2 | WEIGHT: 248.6 LBS

## 2023-03-27 DIAGNOSIS — U07.1 COVID-19: Primary | ICD-10-CM

## 2023-03-27 DIAGNOSIS — R05.1 ACUTE COUGH: ICD-10-CM

## 2023-03-27 RX ORDER — BENZONATATE 200 MG/1
200 CAPSULE ORAL 3 TIMES DAILY PRN
Qty: 30 CAPSULE | Refills: 0 | Status: SHIPPED | OUTPATIENT
Start: 2023-03-27

## 2023-03-27 RX ORDER — NIRMATRELVIR AND RITONAVIR 300-100 MG
3 KIT ORAL 2 TIMES DAILY
Qty: 30 TABLET | Refills: 0 | Status: SHIPPED | OUTPATIENT
Start: 2023-03-27 | End: 2023-04-01

## 2023-03-27 NOTE — PROGRESS NOTES
COVID-19 Outpatient Progress Note    Assessment/Plan:    Problem List Items Addressed This Visit    None  Visit Diagnoses     COVID-19    -  Primary    Relevant Medications    nirmatrelvir & ritonavir (Paxlovid, 300/100,) tablet therapy pack    Acute cough        Relevant Medications    benzonatate (TESSALON) 200 MG capsule         Disposition:     Rest  Fluids  Tylenol or Ibuprofen as appropriate  COVID testing +   Paxlovid sent  isolate for 5 days since start of symptoms, then mask for the next 5 days  Do not return to school or work until you are fever free for 24 hours without fever-reducing medications  If not feeling well enough to return to work after 5 days we can extend work note  Call us if symptoms worsen or do no improve by 10 days  COVID GUIDELINES PER THE CDC AS OF December 2021:  -If You Test Positive for COVID-19 (Isolate)  Everyone, regardless of vaccination status  Stay home for 5 days   If you have no symptoms or your symptoms are resolving after 5 days, you can leave your house  Continue to wear a mask around others for 5 additional days  If you have a fever, continue to stay home until your fever resolves  -If You Were Exposed to Someone with COVID-19  If you:  Have been boosted OR Completed the primary series of Pfizer or Moderna vaccine within the last 6 months OR Completed the primary series of J&J vaccine within the last 2 months - You do not need to [employer] a mask around others for 10 days  Test on day 5, if possible  If you develop symptoms get a test and stay home for 5 days from the start of the symptoms  If you:  Completed the primary series of Pfizer or Moderna vaccine over 6 months ago and are not boosted OR Completed the primary series of J&J over 2 months ago and are not boosted OR Are unvaccinated - you should Quarantine  Stay home for 5 days  After that continue to wear a mask around others for 5 additional days    If you can't quarantine you must wear a mask for 10 days  Test on day 5 if possible  If you develop symptoms get a test and stay home for 5 days from the start of the symptoms  From the [facility] ELAINE website as of 12/27/2021 CDC Updates and Shortens Recommended Isolation and Quarantine Period for [address] [place] [address] Dr MAURICIO         Patient meets criteria for PAXLOVID and they have been counseled appropriately according to EUA documentation released by the FDA  After discussion, patient agrees to treatment  Maryjane Foote is an investigational medicine used to treat mild-to-moderate COVID-19 in adults and children (15years of age and older weighing at least 80 pounds (40 kg)) with positive results of direct SARS-CoV-2 viral testing, and who are at high risk for progression to severe COVID-19, including hospitalization or death  PAXLOVID is investigational because it is still being studied  There is limited information about the safety and effectiveness of using PAXLOVID to treat people with mild-to-moderate COVID-19  The FDA has authorized the emergency use of PAXLOVID for the treatment of mild-tomoderate COVID-19 in adults and children (15years of age and older weighing at least 80 pounds (40 kg)) with a positive test for the virus that causes COVID-19, and who are at high risk for progression to severe COVID-19, including hospitalization or death, under an EUA  What should I tell my healthcare provider before I take PAXLOVID? Tell your healthcare provider if you:  - Have any allergies  - Have liver or kidney disease  - Are pregnant or plan to become pregnant  - Are breastfeeding a child  - Have any serious illnesses    Tell your healthcare provider about all the medicines you take, including prescription and over-the-counter medicines, vitamins, and herbal supplements  Some medicines may interact with PAXLOVID and may cause serious side effects   Keep a list of your medicines to show your healthcare provider and pharmacist when you get a new medicine  You can ask your healthcare provider or pharmacist for a list of medicines that interact with PAXLOVID  Do not start taking a new medicine without telling your healthcare provider  Your healthcare provider can tell you if it is safe to take PAXLOVID with other medicines  Tell your healthcare provider if you are taking combined hormonal contraceptive  PAXLOVID may affect how your birth control pills work  Females who are able to become pregnant should use another effective alternative form of contraception or an additional barrier method of contraception  Talk to your healthcare provider if you have any questions about contraceptive methods that might be right for you  How do I take PAXLOVID? PAXLOVID consists of 2 medicines: nirmatrelvir and ritonavir  - Take 2 pink tablets of nirmatrelvir with 1 white tablet of ritonavir by mouth 2 times each day (in the morning and in the evening) for 5 days  For each dose, take all 3 tablets at the same time  - If you have kidney disease, talk to your healthcare provider  You may need a different dose  - Swallow the tablets whole  Do not chew, break, or crush the tablets  - Take PAXLOVID with or without food  - Do not stop taking PAXLOVID without talking to your healthcare provider, even if you feel better  - If you miss a dose of PAXLOVID within 8 hours of the time it is usually taken, take it as soon as you remember  If you miss a dose by more than 8 hours, skip the missed dose and take the next dose at your regular time  Do not take 2 doses of PAXLOVID at the same time  - If you take too much PAXLOVID, call your healthcare provider or go to the nearest hospital emergency room right away    - If you are taking a ritonavir- or cobicistat-containing medicine to treat hepatitis C or Human Immunodeficiency Virus (HIV), you should continue to take your medicine as prescribed by your healthcare provider   - Talk to your healthcare provider if you do not feel better or if you feel worse after 5 days  Who should generally not take PAXLOVID? Do not take PAXLOVID if:  You are allergic to nirmatrelvir, ritonavir, or any of the ingredients in PAXLOVID  You are taking any of the following medicines:  - Alfuzosin  - Pethidine, piroxicam, propoxyphene  - Ranolazine  - Amiodarone, dronedarone, flecainide, propafenone, quinidine  - Colchicine  - Lurasidone, pimozide, clozapine  - Dihydroergotamine, ergotamine, methylergonovine  - Lovastatin, simvastatin  - Sildenafil (Revatio®) for pulmonary arterial hypertension (PAH)  - Triazolam, oral midazolam  - Apalutamide  - Carbamazepine, phenobarbital, phenytoin  - Rifampin  - St  Avi’s Wort (hypericum perforatum)    What are the important possible side effects of PAXLOVID? Possible side effects of PAXLOVID are:  - Liver Problems  Tell your healthcare provider right away if you have any of these signs and symptoms of liver problems: loss of appetite, yellowing of your skin and the whites of eyes (jaundice), dark-colored urine, pale colored stools and itchy skin, stomach area (abdominal) pain  - Resistance to HIV Medicines  If you have untreated HIV infection, PAXLOVID may lead to some HIV medicines not working as well in the future  - Other possible side effects include: altered sense of taste, diarrhea, high blood pressure, or muscle aches    These are not all the possible side effects of PAXLOVID  Not many people have taken PAXLOVID  Serious and unexpected side effects may happen  Vishnu Johnson is still being studied, so it is possible that all of the risks are not known at this time  What other treatment choices are there? Like [place] may allow for the emergency use of other medicines to treat people with COVID-19   Go to https://Cost Effective Data/ for information on the emergency use of other medicines that are authorized by FDA to treat people with COVID-19  Your healthcare provider may talk with you about clinical trials for which you may be eligible  It is your choice to be treated or not to be treated with PAXLOVID  Should you decide not to receive it or for your child not to receive it, it will not change your standard medical care  What if I am pregnant or breastfeeding? There is no experience treating pregnant women or breastfeeding mothers with PAXLOVID  For a mother and unborn baby, the benefit of taking PAXLOVID may be greater than the risk from the treatment  If you are pregnant, discuss your options and specific situation with your healthcare provider  It is recommended that you use effective barrier contraception or do not have sexual activity while taking PAXLOVID  If you are breastfeeding, discuss your options and specific situation with your healthcare provider  How do I report side effects with PAXLOVID? Contact your healthcare provider if you have any side effects that bother you or do not go away  Report side effects to FDA MedWatch at www fda gov/medwatch or call 2-149-HWR8583 or you can report side effects to [employer]  at the contact information provided below  Website Fax number Telephone number   Tradier [phone]     How should I store [address]? Store PAXLOVID tablets at room temperature between 68°F to 77°F (20°C to 25°C)  Full fact sheet for patients, parents, and caregivers can be found at: [employer]    I have spent a total time of 15 minutes on the day of the encounter for this patient including discussing prognosis, risks and benefits of treatment options, instructions for management, importance of treatment compliance and risk factor reductions         Encounter provider: Merced Null DO     Provider located at: [place] [address] [facility]  [address] [place] [address]  [address]  [facility] [address]  [phone]     Recent Visits  No visits were found meeting these conditions  Showing recent visits within past 7 days and meeting all other requirements  Today's Visits  Date Type Provider Dept   03/27/23 Telemedicine Sara Garsia DO [facility]   Showing today's visits and meeting all other requirements  Future Appointments  No visits were found meeting these conditions  Showing future appointments within next 150 days and meeting all other requirements     This virtual check-in was done via telephone and he agrees to proceed  Patient agrees to participate in a virtual check in via telephone or video visit instead of presenting to the office to address urgent/immediate medical needs  Patient is aware this is a billable service  He acknowledged consent and understanding of privacy and security of the video platform  The patient has agreed to participate and understands they can discontinue the visit at any time  After connecting through Telephone, the patient was identified by name and date of birth  Timmy Estrada was informed that this was a telemedicine visit and that the exam was being conducted confidentially over secure lines  My office door was closed  No one else was in the room  Timmy Estrada acknowledged consent and understanding of privacy and security of the telemedicine visit  I informed the patient that I have reviewed his record in Epic and presented the opportunity for him to ask any questions regarding the visit today  The patient agreed to participate  It was my intent to perform this visit via video technology but the patient was not able to do a video connection so the visit was completed via audio telephone only          Verification of patient location:  Patient is located in the following state in which I hold an active license: [place]    Subjective:   Timmy Estrada is a 59 y o  male who has been screened "for COVID-19  Patient's symptoms include nasal congestion, sore throat and cough  - Date of symptom onset: 3/24/2023  - Date of positive COVID-19 test: 3/25/2023  Type of test: Home antigen  COVID-19 vaccination status: Not vaccinated    Started with cough, by the next day was worse  Lungs hurt  Congestion  Hoarse voice  Coughing and sneezing fits  Sore from coughing  Dry cough  [place]  Delsym  Worsening tinnitus  Lab Results   Component Value Date    SARSCOV2 Negative 03/21/2022    SARSCOV2 Not Detected 11/12/2020       Review of Systems   HENT: Positive for congestion and sore throat  Respiratory: Positive for cough  Current Outpatient Medications on File Prior to Visit   Medication Sig   • albuterol (PROVENTIL HFA,VENTOLIN HFA) 90 mcg/act inhaler Inhale 2 puffs every 4 (four) hours as needed for wheezing or shortness of breath   • b complex vitamins capsule Take 1 capsule by mouth daily   • carvedilol (COREG) 3 125 mg tablet Take 1 tablet (3 125 mg total) by mouth 2 (two) times a day with meals   • eszopiclone (LUNESTA) 3 MG tablet Take 1 tablet (3 mg total) by mouth daily at bedtime   • FLUoxetine (PROzac) 20 MG tablet Take 2 tablets (40 mg total) by mouth every morning   • fluticasone-umeclidinium-vilanterol (Trelegy Ellipta) 100-62 5-25 mcg/actuation inhaler Inhale 1 puff daily Rinse mouth after use     • lisinopril (ZESTRIL) 30 mg tablet Take 1 tablet (30 mg total) by mouth every evening   • multivitamin (THERAGRAN) TABS Take 1 tablet by mouth daily   • pantoprazole (PROTONIX) 40 mg tablet Take 1 tablet (40 mg total) by mouth 2 (two) times a day   • rosuvastatin (CRESTOR) 10 MG tablet Take 1 tablet (10 mg total) by mouth daily at bedtime   • VITAMIN D PO Take 1 tablet by mouth in the morning     • Zinc Sulfate (ZINC 15 PO) Take 1 tablet by mouth in the morning         Objective:    BP (!) 172/98   Pulse 89   Ht 6' 1\" (1 854 m)   Wt 113 kg (248 lb 9 6 oz)   SpO2 94%   BMI 32 80 kg/m²  " Physical Exam

## 2023-04-29 DIAGNOSIS — K21.00 GASTROESOPHAGEAL REFLUX DISEASE WITH ESOPHAGITIS WITHOUT HEMORRHAGE: ICD-10-CM

## 2023-04-29 RX ORDER — PANTOPRAZOLE SODIUM 40 MG/1
TABLET, DELAYED RELEASE ORAL
Qty: 60 TABLET | Refills: 5 | Status: SHIPPED | OUTPATIENT
Start: 2023-04-29

## 2023-05-02 DIAGNOSIS — I10 ESSENTIAL HYPERTENSION: ICD-10-CM

## 2023-05-02 DIAGNOSIS — E78.2 MIXED HYPERLIPIDEMIA: ICD-10-CM

## 2023-05-02 RX ORDER — ROSUVASTATIN CALCIUM 10 MG/1
TABLET, COATED ORAL
Qty: 30 TABLET | Refills: 5 | Status: SHIPPED | OUTPATIENT
Start: 2023-05-02

## 2023-05-02 RX ORDER — LISINOPRIL 30 MG/1
TABLET ORAL
Qty: 30 TABLET | Refills: 5 | Status: SHIPPED | OUTPATIENT
Start: 2023-05-02

## 2023-05-11 ENCOUNTER — PATIENT MESSAGE (OUTPATIENT)
Dept: OBGYN CLINIC | Facility: CLINIC | Age: 58
End: 2023-05-11

## 2023-05-15 ENCOUNTER — HOSPITAL ENCOUNTER (EMERGENCY)
Facility: HOSPITAL | Age: 58
Discharge: HOME | End: 2023-05-15
Attending: EMERGENCY MEDICINE

## 2023-05-15 ENCOUNTER — APPOINTMENT (EMERGENCY)
Dept: CT IMAGING | Facility: HOSPITAL | Age: 58
End: 2023-05-15

## 2023-05-15 VITALS
DIASTOLIC BLOOD PRESSURE: 98 MMHG | HEART RATE: 68 BPM | OXYGEN SATURATION: 99 % | RESPIRATION RATE: 16 BRPM | TEMPERATURE: 98.9 F | SYSTOLIC BLOOD PRESSURE: 158 MMHG

## 2023-05-15 DIAGNOSIS — N12 PYELONEPHRITIS: Primary | ICD-10-CM

## 2023-05-15 PROBLEM — M48.061 LUMBAR FORAMINAL STENOSIS: Status: ACTIVE | Noted: 2023-05-15

## 2023-05-15 PROBLEM — M43.16 ANTEROLISTHESIS OF LUMBAR SPINE: Status: ACTIVE | Noted: 2023-05-15

## 2023-05-15 LAB
ALBUMIN SERPL BCP-MCNC: 4.5 G/DL (ref 3.5–5)
ALP SERPL-CCNC: 65 U/L (ref 34–104)
ALT SERPL W P-5'-P-CCNC: 25 U/L (ref 7–52)
ANION GAP SERPL CALCULATED.3IONS-SCNC: 9 MMOL/L (ref 4–13)
AST SERPL W P-5'-P-CCNC: 19 U/L (ref 13–39)
ATRIAL RATE: 73 BPM
BACTERIA UR QL AUTO: ABNORMAL /HPF
BASOPHILS # BLD AUTO: 0.05 THOUSANDS/ÂΜL (ref 0–0.1)
BASOPHILS NFR BLD AUTO: 1 % (ref 0–1)
BILIRUB SERPL-MCNC: 0.61 MG/DL (ref 0.2–1)
BILIRUB UR QL STRIP: NEGATIVE
BUN SERPL-MCNC: 20 MG/DL (ref 5–25)
CALCIUM SERPL-MCNC: 9.7 MG/DL (ref 8.4–10.2)
CHLORIDE SERPL-SCNC: 106 MMOL/L (ref 96–108)
CK SERPL-CCNC: 53 U/L (ref 39–308)
CLARITY UR: CLEAR
CO2 SERPL-SCNC: 23 MMOL/L (ref 21–32)
COLOR UR: YELLOW
CREAT SERPL-MCNC: 1.04 MG/DL (ref 0.6–1.3)
EOSINOPHIL # BLD AUTO: 0.07 THOUSAND/ÂΜL (ref 0–0.61)
EOSINOPHIL NFR BLD AUTO: 1 % (ref 0–6)
ERYTHROCYTE [DISTWIDTH] IN BLOOD BY AUTOMATED COUNT: 13.7 % (ref 11.6–15.1)
GFR SERPL CREATININE-BSD FRML MDRD: 78 ML/MIN/1.73SQ M
GLUCOSE SERPL-MCNC: 155 MG/DL (ref 65–140)
GLUCOSE UR STRIP-MCNC: NEGATIVE MG/DL
HCT VFR BLD AUTO: 42.3 % (ref 36.5–49.3)
HGB BLD-MCNC: 14.1 G/DL (ref 12–17)
HGB UR QL STRIP.AUTO: NEGATIVE
IMM GRANULOCYTES # BLD AUTO: 0.03 THOUSAND/UL (ref 0–0.2)
IMM GRANULOCYTES NFR BLD AUTO: 0 % (ref 0–2)
KETONES UR STRIP-MCNC: NEGATIVE MG/DL
LEUKOCYTE ESTERASE UR QL STRIP: ABNORMAL
LIPASE SERPL-CCNC: 47 U/L (ref 11–82)
LYMPHOCYTES # BLD AUTO: 1.19 THOUSANDS/ÂΜL (ref 0.6–4.47)
LYMPHOCYTES NFR BLD AUTO: 17 % (ref 14–44)
MCH RBC QN AUTO: 30.4 PG (ref 26.8–34.3)
MCHC RBC AUTO-ENTMCNC: 33.3 G/DL (ref 31.4–37.4)
MCV RBC AUTO: 91 FL (ref 82–98)
MONOCYTES # BLD AUTO: 0.52 THOUSAND/ÂΜL (ref 0.17–1.22)
MONOCYTES NFR BLD AUTO: 7 % (ref 4–12)
MUCOUS THREADS UR QL AUTO: ABNORMAL
NEUTROPHILS # BLD AUTO: 5.19 THOUSANDS/ÂΜL (ref 1.85–7.62)
NEUTS SEG NFR BLD AUTO: 74 % (ref 43–75)
NITRITE UR QL STRIP: NEGATIVE
NON-SQ EPI CELLS URNS QL MICRO: ABNORMAL /HPF
NRBC BLD AUTO-RTO: 0 /100 WBCS
P AXIS: -84 DEGREES
PH UR STRIP.AUTO: 5.5 [PH]
PLATELET # BLD AUTO: 258 THOUSANDS/UL (ref 149–390)
PMV BLD AUTO: 9.6 FL (ref 8.9–12.7)
POTASSIUM SERPL-SCNC: 3.8 MMOL/L (ref 3.5–5.3)
PR INTERVAL: 162 MS
PROT SERPL-MCNC: 7.5 G/DL (ref 6.4–8.4)
PROT UR STRIP-MCNC: ABNORMAL MG/DL
QRS AXIS: 57 DEGREES
QRSD INTERVAL: 90 MS
QT INTERVAL: 404 MS
QTC INTERVAL: 445 MS
RBC # BLD AUTO: 4.64 MILLION/UL (ref 3.88–5.62)
RBC #/AREA URNS AUTO: ABNORMAL /HPF
SODIUM SERPL-SCNC: 138 MMOL/L (ref 135–147)
SP GR UR STRIP.AUTO: 1.02 (ref 1–1.03)
T WAVE AXIS: 52 DEGREES
UROBILINOGEN UR STRIP-ACNC: <2 MG/DL
VENTRICULAR RATE: 73 BPM
WBC # BLD AUTO: 7.05 THOUSAND/UL (ref 4.31–10.16)
WBC #/AREA URNS AUTO: ABNORMAL /HPF

## 2023-05-15 RX ORDER — CEPHALEXIN 500 MG/1
500 CAPSULE ORAL EVERY 12 HOURS SCHEDULED
Qty: 14 CAPSULE | Refills: 0 | Status: SHIPPED | OUTPATIENT
Start: 2023-05-15 | End: 2023-05-23

## 2023-05-15 RX ORDER — CEPHALEXIN 250 MG/1
500 CAPSULE ORAL ONCE
Status: COMPLETED | OUTPATIENT
Start: 2023-05-15 | End: 2023-05-15

## 2023-05-15 RX ORDER — IBUPROFEN 600 MG/1
600 TABLET ORAL EVERY 6 HOURS PRN
Qty: 15 TABLET | Refills: 0 | Status: SHIPPED | OUTPATIENT
Start: 2023-05-15

## 2023-05-15 RX ORDER — KETOROLAC TROMETHAMINE 30 MG/ML
15 INJECTION, SOLUTION INTRAMUSCULAR; INTRAVENOUS ONCE
Status: COMPLETED | OUTPATIENT
Start: 2023-05-15 | End: 2023-05-15

## 2023-05-15 RX ADMIN — SODIUM CHLORIDE 1000 ML: 0.9 INJECTION, SOLUTION INTRAVENOUS at 06:41

## 2023-05-15 RX ADMIN — CEPHALEXIN 500 MG: 250 CAPSULE ORAL at 10:50

## 2023-05-15 RX ADMIN — IOHEXOL 100 ML: 350 INJECTION, SOLUTION INTRAVENOUS at 09:36

## 2023-05-15 RX ADMIN — KETOROLAC TROMETHAMINE 15 MG: 30 INJECTION, SOLUTION INTRAMUSCULAR at 06:37

## 2023-05-15 NOTE — ED PROCEDURE NOTE
Procedure  POC Renal US    Date/Time: 5/15/2023 10:46 AM  Performed by: Madelyn Wren MD  Authorized by: Madelyn Wren MD     Patient location:  ED  Performed by:  Resident  Procedure details:     Exam Type:  Diagnostic    Indications: flank/back pain      Assessment for:  Bladder volume and suspected hydronephrosis    Views obtained: bladder (transverse and sagittal), left kidney and right kidney      Image quality: diagnostic      Image availability:  Images available in PACS  Findings:     LEFT hydronephrosis: none      RIGHT hydronephrosis: none      Bladder:  Visualized  Interpretation:     Renal ultrasound impressions: normal exam               Madelyn Wren MD  05/15/23 1053

## 2023-05-15 NOTE — ED PROVIDER NOTES
History  Chief Complaint   Patient presents with   • Flank Pain     Pt states yesterday AM began with bilat flank pain  Pt denies N/V/D or any other sx at this time  Patient is a 49-year-old male with PMH of HTN, HLD, GERD, emphysema, and diverticulitis presenting for evaluation of 1 day of bilateral flank pain  Patient notes he was mowing the grass yesterday when he started with bilateral flank pain  He notes the pain has been a constant ache with intermittent sharp pains, that is nonradiating  He has not tried any over-the-counter medications for this pain  He denies associated fevers, chills, lightheadedness/dizziness, sore throat, cough, CP/SOB, abdominal pain, N/V/D, dysuria, hematuria, urinary urgency/frequency, urinary retention, and skin changes  He notes having chronic back pain and this feels different  He has a abdominal surgical history of cholecystectomy  He denies history of kidney stones  History provided by:  Patient and significant other   used: No        Prior to Admission Medications   Prescriptions Last Dose Informant Patient Reported? Taking?    FLUoxetine (PROzac) 20 MG tablet   No No   Sig: Take 2 tablets (40 mg total) by mouth every morning   VITAMIN D PO   Yes No   Sig: Take 1 tablet by mouth in the morning     Zinc Sulfate (ZINC 15 PO)   Yes No   Sig: Take 1 tablet by mouth in the morning     albuterol (PROVENTIL HFA,VENTOLIN HFA) 90 mcg/act inhaler   No No   Sig: Inhale 2 puffs every 4 (four) hours as needed for wheezing or shortness of breath   b complex vitamins capsule   Yes No   Sig: Take 1 capsule by mouth daily   benzonatate (TESSALON) 200 MG capsule   No No   Sig: Take 1 capsule (200 mg total) by mouth 3 (three) times a day as needed for cough   carvedilol (COREG) 3 125 mg tablet   No No   Sig: Take 1 tablet (3 125 mg total) by mouth 2 (two) times a day with meals   eszopiclone (LUNESTA) 3 MG tablet   No No   Sig: Take 1 tablet (3 mg total) by mouth daily at bedtime   fluticasone-umeclidinium-vilanterol (Trelegy Ellipta) 100-62 5-25 mcg/actuation inhaler   No No   Sig: Inhale 1 puff daily Rinse mouth after use  lisinopril (ZESTRIL) 30 mg tablet   No No   Sig: TAKE 1 TABLET BY MOUTH EVERY EVENING    multivitamin (THERAGRAN) TABS   Yes No   Sig: Take 1 tablet by mouth daily   pantoprazole (PROTONIX) 40 mg tablet   No No   Sig: TAKE 1 TABLET BY MOUTH TWICE A DAY   rosuvastatin (CRESTOR) 10 MG tablet   No No   Sig: TAKE 1 TABLET BY MOUTH DAILY AT BEDTIME      Facility-Administered Medications: None       Past Medical History:   Diagnosis Date   • Arthritis    • Colitis     x1 occurnce   • COPD (chronic obstructive pulmonary disease) ([facility])    • COVID-19     positive test on 12/16/21   • Depression    • Diverticulitis    • Diverticulitis of colon    • Diverticulosis    • GERD (gastroesophageal reflux disease)    • Hyperlipidemia    • Hypertension    • Pulmonary emphysema (HCC)    • Sleep apnea     no cpap   • Umbilical hernia        Past Surgical History:   Procedure Laterality Date   • CATARACT EXTRACTION Bilateral    • CHOLECYSTECTOMY     • COLONOSCOPY     • ESOPHAGOGASTRODUODENOSCOPY     • EYE SURGERY     • HERNIA REPAIR  09/19/2019    Open repair of incarcerated incisional hernia with mesh - Dr Zac Mckeon   • KNEE ARTHROSCOPY Left    • MO ARTHRODESIS GREAT TOE METATARSOPHALANGEAL JOINT Bilateral 08/25/2022    Procedure: Bilateral 1st MTP arthrodesis;   Surgeon: Gisele Galindo MD;  Location[facility];  Service: Orthopedics   • MO DEBRIDEMENT SUBCUTANEOUS TISSUE 20 SQ CM/< Right 07/14/2022    Procedure: DEBRIDEMENT FOOT/TOE [facility] OUT), negative pressure wound dressing application;  Surgeon: Gisele Galindo MD;  Location: [facility] MA[facility] OR;  Service: Orthopedics   • MO HALLUX RIGIDUS W/CHEILECTOMY 1ST MP JT W/O IMPLT Bilateral 12/10/2021    Procedure: CHEILECTOMY;  Surgeon: Parth Rocha DPM;  Location: [facility];  Service: Podiatry   • MO TNOT ELBOW "LATERAL/MEDIAL DEBRIDE OPEN TDN RPR Left 11/17/2020    Procedure: lateral epicondyle elbow debridement, release, and repair;  Surgeon: Leah Ríos MD;  [facility]: [facility];  Service: Orthopedics   • WISDOM TOOTH EXTRACTION         Family History   Problem Relation Age of Onset   • Anemia Mother    • Hypertension Mother    • Pancreatic cancer Father    • Diabetes Father    • Hypertension Father    • Abdominal aortic aneurysm Father    • Cancer Father    • Diabetes Maternal Grandmother    • Emphysema Maternal Grandmother    • Diabetes Maternal Grandfather    • Diabetes Paternal Grandmother    • Diabetes Paternal Grandfather    • Emphysema Brother      I have reviewed and agree with the history as documented  E-Cigarette/Vaping   • E-Cigarette Use Never User      E-Cigarette/Vaping Substances   • Nicotine No    • THC No    • CBD No    • Flavoring No      Social History     Tobacco Use   • Smoking status: Former     Packs/day: 1 00     Years: 35 00     Pack years: 35 00     Types: Cigarettes     Start date: 12/2/1986     Quit date: 1/1/2013     Years since quitting: 10 3   • Smokeless tobacco: Never   • Tobacco comments:     quit 6 5 years ago - As per [employer] [facility]   • Vaping Use: Never used   Substance Use Topics   • Alcohol use: Yes     Comment: \"few times a week\"   • Drug use: Never       Review of Systems   Constitutional: Negative for appetite change, chills and fever  Eyes: Negative for visual disturbance  Respiratory: Negative for cough and shortness of breath  Cardiovascular: Negative for chest pain and leg swelling  Gastrointestinal: Negative for abdominal distention, abdominal pain, anal bleeding, blood in stool, diarrhea, nausea and vomiting  Genitourinary: Positive for flank pain (b/l)  Negative for decreased urine volume, difficulty urinating, dysuria, frequency, hematuria, penile pain, penile swelling, scrotal swelling, testicular pain and urgency     Musculoskeletal: " Positive for back pain (chronic low back pain)  Negative for gait problem  Skin: Negative for color change, pallor, rash and wound  Neurological: Negative for dizziness, syncope, weakness, light-headedness, numbness and headaches  All other systems reviewed and are negative  Physical Exam  Physical Exam  Vitals and nursing note reviewed  Constitutional:       General: He is in acute distress (Evidencing mild distress)  Appearance: Normal appearance  He is well-developed  He is obese  He is not ill-appearing, toxic-appearing or diaphoretic  HENT:      Head: Normocephalic and atraumatic  Jaw: There is normal jaw occlusion  Nose: Nose normal       Mouth/Throat:      Lips: Pink  Mouth: Mucous membranes are moist    Eyes:      General: Lids are normal  Vision grossly intact  Gaze aligned appropriately  Extraocular Movements: Extraocular movements intact  Conjunctiva/sclera: Conjunctivae normal    Neck:      Trachea: Phonation normal  No abnormal tracheal secretions  Cardiovascular:      Rate and Rhythm: Normal rate and regular rhythm  Pulses: Normal pulses  Radial pulses are 2+ on the right side and 2+ on the left side  Posterior tibial pulses are 2+ on the right side and 2+ on the left side  Heart sounds: Normal heart sounds, S1 normal and S2 normal  No murmur heard  Pulmonary:      Effort: Pulmonary effort is normal  No tachypnea or respiratory distress  Breath sounds: Normal breath sounds and air entry  No stridor, decreased air movement or transmitted upper airway sounds  No decreased breath sounds  Abdominal:      General: There is no distension or abdominal bruit  Palpations: Abdomen is soft  There is no pulsatile mass  Tenderness: There is no abdominal tenderness  There is no right CVA tenderness, left CVA tenderness, guarding or rebound  Negative signs include Tierney's sign     Musculoskeletal:         General: Normal range of motion  Cervical back: Full passive range of motion without pain, normal range of motion and neck supple  Right lower leg: No edema  Left lower leg: No edema  Comments: GARIBAY and spontaneously, 5 out of 5 strength throughout, sensation intact, no focal joint swelling, ambulatory with steady gait   Skin:     General: Skin is warm and dry  Capillary Refill: Capillary refill takes less than 2 seconds  Findings: No rash or wound  Neurological:      General: No focal deficit present  Mental Status: He is alert and oriented to person, place, and time  Mental status is at baseline  GCS: GCS eye subscore is 4  GCS verbal subscore is 5  GCS motor subscore is 6  Sensory: Sensation is intact  Motor: Motor function is intact  Gait: Gait is intact           Vital Signs  ED Triage Vitals [05/15/23 0613]   Temperature Pulse Respirations Blood Pressure SpO2   98 9 °F (37 2 °C) 79 18 161/96 96 %      Temp Source Heart Rate Source Patient Position - Orthostatic VS BP Location FiO2 (%)   Oral Monitor Lying Right arm --      Pain Score       --           Vitals:    05/15/23 0613   BP: 161/96   Pulse: 79   Patient Position - Orthostatic VS: Lying         Visual Acuity      ED Medications  Medications   sodium chloride 0 9 % bolus 1,000 mL (1,000 mL Intravenous New Bag 5/15/23 0641)   ketorolac (TORADOL) injection 15 mg (15 mg Intravenous Given 5/15/23 0637)       Diagnostic Studies  Results Reviewed     Procedure Component Value Units Date/Time    Comprehensive metabolic panel [[id number]]  (Abnormal) Collected: 05/15/23 0641    Lab Status: Final result Specimen: Blood from Arm, Left Updated: 05/15/23 0719     Sodium 138 mmol/L      Potassium 3 8 mmol/L      Chloride 106 mmol/L      CO2 23 mmol/L      ANION GAP 9 mmol/L      BUN 20 mg/dL      Creatinine 1 04 mg/dL      Glucose 155 mg/dL      Calcium 9 7 mg/dL      AST 19 U/L      ALT 25 U/L      Alkaline Phosphatase 65 U/L Total Protein 7 5 g/dL      Albumin 4 5 g/dL      Total Bilirubin 0 61 mg/dL      eGFR 78 ml/min/1 73sq m     Narrative:      National Kidney Disease Foundation guidelines for Chronic Kidney Disease (CKD):   •  Stage 1 with normal or high GFR (GFR > 90 mL/min/1 73 square meters)  •  Stage 2 Mild CKD (GFR = 60-89 mL/min/1 73 square meters)  •  Stage 3A Moderate CKD (GFR = 45-59 mL/min/1 73 square meters)  •  Stage 3B Moderate CKD (GFR = 30-44 mL/min/1 73 square meters)  •  Stage 4 Severe CKD (GFR = 15-29 mL/min/1 73 square meters)  •  Stage 5 End Stage CKD (GFR <15 mL/min/1 73 square meters)  Note: GFR calculation is accurate only with a steady state creatinine    Lipase [[id number]]  (Normal) Collected: 05/15/23 0641    Lab Status: Final result Specimen: Blood from Arm, Left Updated: 05/15/23 0719     Lipase 47 u/L     CK [[id number]]  (Normal) Collected: 05/15/23 0641    Lab Status: Final result Specimen: Blood from Arm, Left Updated: 05/15/23 0719     Total CK 53 U/L     CBC and differential [[id number]] Collected: 05/15/23 0641    Lab Status: Final result Specimen: Blood from Arm, Left Updated: 05/15/23 0659     WBC 7 05 Thousand/uL      RBC 4 64 Million/uL      Hemoglobin 14 1 g/dL      Hematocrit 42 3 %      MCV 91 fL      MCH 30 4 pg      MCHC 33 3 g/dL      RDW 13 7 %      MPV 9 6 fL      Platelets 675 Thousands/uL      nRBC 0 /100 WBCs      Neutrophils Relative 74 %      Immat GRANS % 0 %      Lymphocytes Relative 17 %      Monocytes Relative 7 %      Eosinophils Relative 1 %      Basophils Relative 1 %      Neutrophils Absolute 5 19 Thousands/µL      Immature Grans Absolute 0 03 Thousand/uL      Lymphocytes Absolute 1 19 Thousands/µL      Monocytes Absolute 0 52 Thousand/µL      Eosinophils Absolute 0 07 Thousand/µL      Basophils Absolute 0 05 Thousands/µL     UA w Reflex to Microscopic w Reflex to Culture [[id number]]     Lab Status: No result Specimen: Urine                  No orders to display Procedures  ECG 12 Lead Documentation Only    Date/Time: 5/15/2023 6:57 AM  Performed by: Sabiha Caal, [address]  Authorized by: DODIE Hester     Indications / Diagnosis:  B/l flank pain  ECG reviewed by me, the ED Provider: yes    Patient location:  ED  Previous ECG:     Previous ECG:  Unavailable    Comparison to cardiac monitor: Yes    Interpretation:     Interpretation: normal    Rate:     ECG rate:  73    ECG rate assessment: normal    Rhythm:     Rhythm: sinus rhythm    Ectopy:     Ectopy: none    QRS:     QRS axis:  Normal    QRS intervals:  Normal  Conduction:     Conduction: normal    ST segments:     ST segments:  Normal  T waves:     T waves: normal    Comments:      Sinus rhythm, normal axis, normal intervals, no acute ischemic changes read by me  ED Course  ED Course as of 05/15/23 0800   Mon May 15, 2023   0615 Triage VS stable   0704 CBC and differential  No leukocytosis or gross anemia   0721 Comprehensive metabolic panel(!)  No electrolyte derangement, no ELKE, mildly elevated random glucose, no transaminitis or elevated total bilirubin to suggest acute hepatobiliary pathology   0721 Lipase: 47  WDL, acute pancreatitis less likely   0722 Total CK: 53  WDL, rhabdomyolysis ruled out   0731 On reassessment, patient notes pain has improved to a 3/10  He notes he feels comfortable at this time and defers further pain medication management  I updated him and his wife on CMP, lipase, CBC, and CK results  He is aware of need for outstanding urine  Pending UA, will possibly obtain CT imaging to rule out stone pathology however this is very unlikely given exam and HPI     2726 Patient signed out to onckalie Xiao  Work-up pending UA  Suspicion highest for musculoskeletal cause of patient's pain  Pending UA, plan for discharge home with Tylenol, short burst of NSAIDs, and follow-up PCP                                               Medical Decision Making  DDx including but not limited to: Renal colic, pyelonephritis, UTI; considered but less likely appendicitis, diverticulitis, pancreatitis, rhabdomyolysis; considered but doubt AAA or cardiac pathology    Patient is a 64-year-old male presenting for evaluation of 1 day of bilateral flank pain  Patient's triage vital signs are stable  On initial examination he is evidencing mild discomfort and distress  His head to toe physical exam is benign  He has not yet tried anything for this pain, thus plan made for IV Toradol and IV fluids while awaiting results  Plan for CMP, lipase, CBC, CK, and UA to evaluate for electrolyte derangement, ELKE, metabolic abnormality, organ dysfunction, anemia, and infection  Plan for EKG to evaluate for cardiac arrhythmia and/or ischemia, although patient without chest pain or shortness of breath makes this less likely/lower on the differential   Will await blood and urine results to determine need for imaging  Patient and his wife in agreement with plan  See ED course for further MDM and disposition discussion    Amount and/or Complexity of Data Reviewed  Labs: ordered  Decision-making details documented in ED Course  Risk  OTC drugs  Prescription drug management  Disposition  Final diagnoses:   None     ED Disposition     None      Follow-up Information    None         Patient's Medications   Discharge Prescriptions    No medications on file       No discharge procedures on file      PDMP Review       Value Time User    PDMP Reviewed  Yes 5/15/2023  6:03 AM Boris Jay MD          ED Provider  Electronically Signed by           DODIE Mari  05/15/23 0800

## 2023-05-15 NOTE — ED CARE HANDOFF
Emergency Department Sign Out Note        Sign out and transfer of care from [facility]  See Separate Emergency Department note  The patient, Mar Mullen, was evaluated by the previous provider for Bilateral flank pain after mowing the lawn -Hx of [facility] but states that this pain is different  He c/o bilateral radicular symptoms  He denies any testicle pain  He denies any increase of pain with range of motion of his back  He has no nausea or vomiting  He denies any diarrhea, black tarry stools, bright red blood per rectum  He denies any urinary frequency, urgency, hematuria, dysuria  He denies any fever or chills  He denies any urethral discharge  He is circumcised  He denies any night sweats  He has lost about 12 pounds intentionally over the past 2 months  He is a former smoker  He has a past medical history of the chronic back pain with spondylolisthesis at the L5-S1 interval   He denies any testicle pain  No bowel or bladder complaints  No numbness, tingling or weakness  Workup Completed:CBC, CMP, EKG-EKG does not demonstrate any acute signs of ischemia  There is an ectopic atrial rhythm seen  ED Course / Workup Pending (followup): Medicated with IV Toradol  Pain decreased from 9/10 to 3/10  He was offered more meds but declined  UA pending     Past medical history is positive for emphysema, chronic back pain  Physical exam-this 29-year-old white male is alert 3  He is in no acute distress  His lungs are clear to auscultation  Heart is a regular rate and rhythm without murmur  His abdomen is soft nondistended nontender without mass or hepatosplenomegaly  Inspection of the lumbar spine-it is atraumatic upon inspection  There is no paravertebral spasm palpated  Patient moves his lumbar spine freely with reversal of the normal lordotic curve with forward flexion  He has full lateral flexion and extension without discomfort    Reflexes are +2 and symmetrical   There is no weakness of the extensor houses longus bilaterally  Plan-I offered the patient pain medications and he declined  He states he is okay with the Toradol that he previously received  We will plan to CT his abdomen and pelvis to rule out an abdominal cause of his flank pain  We will also get lumbar recons at that time  Impression-  Bilateral flank pain  Pyelonephritis    Plan-  Patient was given a prescription for Keflex 500 mg twice daily for 7 days  He will call make a follow-up appointment with his family physician for recheck exam   He was given reasons to return to the emergency room should his symptoms worsen  Ultimately, he may need a urology referral                                      Procedures  Medical Decision Making  Amount and/or Complexity of Data Reviewed  Labs: ordered  Radiology: ordered  Risk  Prescription drug management  Disposition  Final diagnoses:   Pyelonephritis     Time reflects when diagnosis was documented in both MDM as applicable and the Disposition within this note     Time User Action Codes Description Comment    5/15/2023 10:46 AM Kelley Briones Add [N12] Pyelonephritis       ED Disposition     ED Disposition   Discharge    Condition   Stable    Date/Time   Mon May 15, 2023 10:46 AM    Comment   Dustin Gonzalez discharge to home/self care  Follow-up Information     Follow up With Specialties Details Why Contact Info    Eder Nolen DO Family Medicine Schedule an appointment as soon as possible for a visit in 3 days  [address]     [address]  [place] [address]  [phone]          Discharge Medication List as of 5/15/2023 10:48 AM      START taking these medications    Details   cephalexin (KEFLEX) 500 mg capsule Take 1 capsule (500 mg total) by mouth every 12 (twelve) hours for 7 days, Starting Mon 5/15/2023, Until Mon 5/22/2023, Normal      ibuprofen (MOTRIN) 600 mg tablet Take 1 tablet (600 mg total) by mouth every 6 (six) hours as needed for mild pain for up to 15 doses, Starting Mon 5/15/2023, Normal         CONTINUE these medications which have NOT CHANGED    Details   albuterol (PROVENTIL HFA,VENTOLIN HFA) 90 mcg/act inhaler Inhale 2 puffs every 4 (four) hours as needed for wheezing or shortness of breath, Starting Thu 2/10/2022, Normal      b complex vitamins capsule Take 1 capsule by mouth daily, Historical Med      benzonatate (TESSALON) 200 MG capsule Take 1 capsule (200 mg total) by mouth 3 (three) times a day as needed for cough, Starting Mon 3/27/2023, Normal      carvedilol (COREG) 3 125 mg tablet Take 1 tablet (3 125 mg total) by mouth 2 (two) times a day with meals, Starting Fri 12/9/2022, Until Mon 3/27/2023, Normal      eszopiclone (LUNESTA) 3 MG tablet Take 1 tablet (3 mg total) by mouth daily at bedtime, Starting Thu 3/2/2023, Normal      FLUoxetine (PROzac) 20 MG tablet Take 2 tablets (40 mg total) by mouth every morning, Starting Thu 2/23/2023, Normal      fluticasone-umeclidinium-vilanterol (Trelegy Ellipta) 100-62 5-25 mcg/actuation inhaler Inhale 1 puff daily Rinse mouth after use , Starting Tue 12/13/2022, Until Mon 3/27/2023, Normal      lisinopril (ZESTRIL) 30 mg tablet TAKE 1 TABLET BY MOUTH EVERY EVENING , Normal      multivitamin (THERAGRAN) TABS Take 1 tablet by mouth daily, Historical Med      pantoprazole (PROTONIX) 40 mg tablet TAKE 1 TABLET BY MOUTH TWICE A DAY, Normal      rosuvastatin (CRESTOR) 10 MG tablet TAKE 1 TABLET BY MOUTH DAILY AT BEDTIME, Normal      VITAMIN D PO Take 1 tablet by mouth in the morning  , Historical Med      Zinc Sulfate (ZINC 15 PO) Take 1 tablet by mouth in the morning  , Historical Med           No discharge procedures on file         ED Provider  Electronically Signed by     Socorro Meraz PA-C  05/15/23 2402

## 2023-05-16 ENCOUNTER — TELEMEDICINE (OUTPATIENT)
Dept: FAMILY MEDICINE CLINIC | Facility: CLINIC | Age: 58
End: 2023-05-16

## 2023-05-16 DIAGNOSIS — M43.16 ANTEROLISTHESIS OF LUMBAR SPINE: Primary | ICD-10-CM

## 2023-05-16 DIAGNOSIS — M48.061 LUMBAR FORAMINAL STENOSIS: ICD-10-CM

## 2023-05-16 DIAGNOSIS — R10.9 FLANK PAIN: ICD-10-CM

## 2023-05-16 NOTE — PROGRESS NOTES
Assessment/Plan:   Torsten Melendez was seen today for other  Diagnoses and all orders for this visit:    Anterolisthesis of lumbar spine    Lumbar foraminal stenosis    Flank pain  -     Urinalysis with microscopic  -     Urine culture; Future      There are no Patient Instructions on file for this visit  No follow-ups on file  Subjective:    HPI  Torsten Melendez is a 59 y o  male who presents with:  Chief Complaint    OTHER       HPI     OTHER     Additional comments: ER follow up          Last edited by Oliver Saucedo on 5/16/2023  5:08 PM         ---Above per clinical staff & reviewed  ---      ER f/u from 5/15/23 for b/l back pain after mowing lawn  Has chronic back pain   96 Toradol given 15 mg Glu 155 CBC normal  Urine trace leuks  UA 4-10 WBC  1-2 RBC>     Bedside US - results not available   CT abdomen/pelvis:  Diverticulosis, no itis   OSSEOUS STRUCTURES: Chronic bilateral L5 pars defects with grade 1 L5 on S1 anterolisthesis        IMPRESSION:   1  No acute findings to account for flank pain  2  Chronic bilateral L5 pars defects with grade 1 L5 on S1 anterolisthesis        CT Lumbar spine:  FINDINGS:       ALIGNMENT: There is mild grade 1 anterolisthesis of L5-S1 secondary to bilateral pars interarticularis defects        VERTEBRAE: Vertebral body heights are maintained  No acute fractures are seen        DEGENERATIVE CHANGES:       L1-L2: No significant canal s   tenosis or foraminal narrowing        L2-L3: No significant canal stenosis or foraminal narrowing        L3-L4: No significant canal stenosis or foraminal narrowing        L4-L5: Bulging annulus  Facet arthrosis  No significant canal stenosis or foraminal narrowing        L5-S1: Vacuum disc phenomenon  Disc base narrowing  Marginal osteophytosis  Mild to moderate bilateral foraminal narrowing  No significant canal stenosis  There is incomplete fusion of the posterior arch of L5   There is also evidence for spinal   dysraphism with incomplete fusion of the posterior arch of the sacrum        PREVERTEBRAL AND PARASPINAL SOFT TISSUES: There are atherosclerotic changes noted  No significant prevertebral or paravertebral soft tissue swelling        IMPRESSION:       Mild grade 1 anterolisthesis L5-S1 secondary to bilateral pars interarticularis defects  Mild to moderate bilateral foraminal narrowing is present at this level, left greater than right        Correlate with separate CT of the abdomen and pelvi   s for additional findings                Workstation performed: [id number]         Imaging     CT recon only lumbar spine (Order: [id number]) - 5/15/2023     Result History     CT recon only lumbar spine (Order #[id number]) on 5/15/2023 - Order Result History Report     Order Report     Order Details   Order Questions     Question Answer   Exam reason bilateral L2-3 radiculopathy   Note: Enter reason for exam   What is the patient's sedation requirement? No Sedation   Release to patient through Mychart Immediate           Result Information     Status Priority Source   Final result (5/15/2023 1035) STAT     Other Results from 5/15/2023     UA w Reflex to Microscopic w Reflex to Culture  Final result 5/15/2023   Urine Microscopic  Final result 5/15/2023   Comprehensive metabolic panel  Final result 5/15/2023   Lipase  Final result 5/15/2023   CBC and differential  Final result 5/15/2023   CK  Final result 5/15/2023     Reason for Exam     bilateral L2-3 radiculopathy   FINDINGS:       ALIGNMENT: There is mild grade 1 anterolisthesis of L5-S1 secondary to bilateral pars interarticularis defects        VERTEBRAE: Vertebral body heights are maintained  No acute fractures are seen        DEGENERATIVE CHANGES:       L1-L2: No significant canal s   tenosis or foraminal narrowing        L2-L3: No significant canal stenosis or foraminal narrowing        L3-L4: No significant canal stenosis or foraminal narrowing        L4-L5: Bulging annulus  Facet arthrosis   No significant canal stenosis or foraminal narrowing        L5-S1: Vacuum disc phenomenon  Disc base narrowing  Marginal osteophytosis  Mild to moderate bilateral foraminal narrowing  No significant canal stenosis  There is incomplete fusion of the posterior arch of L5  There is also evidence for spinal   dysraphism with incomplete fusion of the posterior arch of the sacrum        PREVERTEBRAL AND PARASPINAL SOFT TISSUES: There are atherosclerotic changes noted  No significant prevertebral or paravertebral soft tissue swelling        IMPRESSION:       Mild grade 1 anterolisthesis L5-S1 secondary to bilateral pars interarticularis defects  Mild to moderate bilateral foraminal narrowing is present at this level, left greater than right        Correlate with separate CT of the abdomen and pelvi   s for additional findings              Was told by podiatry 10/2021 that he had DDD per xray and was advised to use Gabepentin and referred to chiropractor     Today:  ***    The following portions of the patient's history were reviewed and updated as appropriate: allergies, current medications, past family history, past medical history, past social history, past surgical history and problem list   Review of Systems  ROS:  all others negative - no chest pain, SOB, normal urine and bowels  no GERD  sleeping well  mood good  ***  Objective: There were no vitals taken for this visit    Wt Readings from Last 3 Encounters:   03/27/23 113 kg (248 lb 9 6 oz)   02/23/23 113 kg (248 lb 9 6 oz)   02/22/23 109 kg (240 lb)     BP Readings from Last 3 Encounters:   05/15/23 158/98   03/27/23 (!) 172/98   02/23/23 128/86       Current Medications:  Current Outpatient Medications   Medication Sig Dispense Refill   • albuterol (PROVENTIL HFA,VENTOLIN HFA) 90 mcg/act inhaler Inhale 2 puffs every 4 (four) hours as needed for wheezing or shortness of breath 18 g 5   • b complex vitamins capsule Take 1 capsule by mouth daily     • carvedilol (COREG) 3 125 mg tablet Take 1 tablet (3 125 mg total) by mouth 2 (two) times a day with meals 60 tablet 5   • cephalexin (KEFLEX) 500 mg capsule Take 1 capsule (500 mg total) by mouth every 12 (twelve) hours for 7 days 14 capsule 0   • eszopiclone (LUNESTA) 3 MG tablet Take 1 tablet (3 mg total) by mouth daily at bedtime 30 tablet 3   • FLUoxetine (PROzac) 20 MG tablet Take 2 tablets (40 mg total) by mouth every morning 180 tablet 1   • fluticasone-umeclidinium-vilanterol (Trelegy Ellipta) 100-62 5-25 mcg/actuation inhaler Inhale 1 puff daily Rinse mouth after use  60 blister 11   • ibuprofen (MOTRIN) 600 mg tablet Take 1 tablet (600 mg total) by mouth every 6 (six) hours as needed for mild pain for up to 15 doses 15 tablet 0   • lisinopril (ZESTRIL) 30 mg tablet TAKE 1 TABLET BY MOUTH EVERY EVENING  30 tablet 5   • multivitamin (THERAGRAN) TABS Take 1 tablet by mouth daily     • pantoprazole (PROTONIX) 40 mg tablet TAKE 1 TABLET BY MOUTH TWICE A DAY 60 tablet 5   • rosuvastatin (CRESTOR) 10 MG tablet TAKE 1 TABLET BY MOUTH DAILY AT BEDTIME 30 tablet 5   • VITAMIN D PO Take 1 tablet by mouth in the morning       • Zinc Sulfate (ZINC 15 PO) Take 1 tablet by mouth in the morning       • benzonatate (TESSALON) 200 MG capsule Take 1 capsule (200 mg total) by mouth 3 (three) times a day as needed for cough (Patient not taking: Reported on 5/16/2023) 30 capsule 0     No current facility-administered medications for this visit  Physical Exam   Constitutional: he appears well-developed and well-nourished  HENT: Head: Normocephalic  Right Ear: External ear normal  Tympanic membrane normal    Left Ear: External ear normal  Tympanic membrane normal    Nose: Nose normal  No mucosal edema, No rhinorrhea  Right sinus exhibits no maxillary sinus tenderness  Left sinus exhibits no maxillary sinus tenderness  Mouth/Throat: Oropharynx is clear and moist    Eyes: Normal conjunctiva  No erythema  No discharge    Neck: No pain on exam  Neck supple  Cardiovascular: Normal rate, regular rhythm and normal heart sounds  Pulmonary/Chest: Effort normal and breath sounds normal    Abdominal: Soft  Bowel sounds are normal  There is no tenderness  Lymphadenopathy: he has no cervical adenopathy  Neurological: he is alert and oriented to person, place, and time  Skin: Skin is warm and dry  Psychiatric: he has a normal mood and affect   his behavior is normal

## 2023-05-16 NOTE — PROGRESS NOTES
Virtual Regular Visit    Assessment/Plan:     Problem List Items Addressed This Visit        Unprioritized    Anterolisthesis of lumbar spine - Primary    Lumbar foraminal stenosis   Other Visit Diagnoses     Flank pain        Relevant Orders    Urinalysis with microscopic (Completed)    Urine culture (Completed)      reviewed ER records, results and differential dx  His urine showed no signs of infection  His blood work showed no sign of infection as well   He did not have pyelonephritis  The CT scan did not show any internal pathology   The US results were not available for me to view  The CT of the lumbar spine shows pathology  Pt is sure this is not the cause of his pain  Being this is a phone call, I am not able to examine him  Since he is feeling better he does not need a visit  Advised him that I do not know the cause, but suspect muscular  Again he is concerned and sure it is internal  I will order a urine culture, but advised it may not be helpful since he has been on antibiotics  If symptoms persist or worsen he will need an in person visit  5 minutes spent on chart prep, 15 minutes spent with patient counseling/educating on their diagnoses, tests completed and any new tests ordered, any referrals placed, treatment options, and documentation of above today  In prescribing new medications, or changing doses, we reviewed the risks and benefits and side effects of these medications along with other treatment options if appropriate  No follow-ups on file      Reason for visit is   Chief Complaint   Patient presents with   • OTHER     ER follow up     Encounter provider Brodie Sutton DO  Provider located at [address] [place] [id number]  [phone]    Recent Visits  Date Type Provider Dept   05/16/23 Telemedicine Digna Chester DO [address] [place] [address] recent visits within past 7 days and meeting all other requirements  Future Appointments  No visits were found meeting these conditions  Showing future appointments within next 150 days and meeting all other requirements       The patient was identified by name and date of birth  Devon Torres was informed that this is a telemedicine visit and that the visit is being conducted through the [employer]  He agrees to proceed     My office door was closed  No one else was in the room  He acknowledged consent and understanding of privacy and security of the video platform  The patient has agreed to participate and understands they can discontinue the visit at any time  Video was attempted by pt was not able to connect and visit was converted to a telephone visit  Patient is currently located in the state [facility]  Patient is currently located in a state in which I am licensed    Patient is aware this is a billable service  Subjective  Devon Torres is a 59 y o  male is being seen via Video Visit today due to the COVID-19 pandemic  Chief Complaint   Patient presents with   • OTHER     ER follow up   ER f/u from 5/15/23 for b/l back pain after mowing lawn  Has chronic back pain   96 Toradol given 15 mg Glu 155 CBC normal  Urine trace leuks  UA 4-10 WBC  1-2 RBC>      Bedside US - results not available   CT abdomen/pelvis:  Diverticulosis, no itis   OSSEOUS STRUCTURES: Chronic bilateral L5 pars defects with grade 1 L5 on S1 anterolisthesis        IMPRESSION:   1  No acute findings to account for flank pain  2  Chronic bilateral L5 pars defects with grade 1 L5 on S1 anterolisthesis        CT Lumbar spine:  FINDINGS:       ALIGNMENT: There is mild grade 1 anterolisthesis of L5-S1 secondary to bilateral pars interarticularis defects        VERTEBRAE: Vertebral body heights are maintained   No acute fractures are seen        DEGENERATIVE CHANGES:       L1-L2: No significant canal s   tenosis or foraminal narrowing        L2-L3: No significant canal stenosis or foraminal narrowing        L3-L4: No significant canal stenosis or foraminal narrowing        L4-L5: Bulging annulus  Facet arthrosis  No significant canal stenosis or foraminal narrowing        L5-S1: Vacuum disc phenomenon  Disc base narrowing  Marginal osteophytosis  Mild to moderate bilateral foraminal narrowing  No significant canal stenosis  There is incomplete fusion of the posterior arch of L5  There is also evidence for spinal   dysraphism with incomplete fusion of the posterior arch of the sacrum        PREVERTEBRAL AND PARASPINAL SOFT TISSUES: There are atherosclerotic changes noted  No significant prevertebral or paravertebral soft tissue swelling        IMPRESSION:       Mild grade 1 anterolisthesis L5-S1 secondary to bilateral pars interarticularis defects  Mild to moderate bilateral foraminal narrowing is present at this level, left greater than right        Correlate with separate CT of the abdomen and pelvi   s for additional findings                Workstation performed: [id number]         Imaging     CT recon only lumbar spine (Order: [id number]) - 5/15/2023     Result History     CT recon only lumbar spine (Order #[id number]) on 5/15/2023 - Order Result History Report     Order Report     Order Details   Order Questions     Question Answer   Exam reason bilateral L2-3 radiculopathy   Note: Enter reason for exam   What is the patient's sedation requirement?  No Sedation   Release to patient through [employer] Immediate           Result Information     Status Priority Source   Final result (5/15/2023 1035) STAT     Other Results from 5/15/2023     UA w Reflex to Microscopic w Reflex to Culture  Final result 5/15/2023   Urine Microscopic  Final result 5/15/2023   Comprehensive metabolic panel  Final result 5/15/2023   Lipase  Final result 5/15/2023   CBC and differential  Final result 5/15/2023   CK  Final result 5/15/2023     Reason for Exam     bilateral L2-3 radiculopathy   FINDINGS:     ALIGNMENT: There is mild grade 1 anterolisthesis of L5-S1 secondary to bilateral pars interarticularis defects        VERTEBRAE: Vertebral body heights are maintained  No acute fractures are seen        DEGENERATIVE CHANGES:       L1-L2: No significant canal s   tenosis or foraminal narrowing        L2-L3: No significant canal stenosis or foraminal narrowing        L3-L4: No significant canal stenosis or foraminal narrowing        L4-L5: Bulging annulus  Facet arthrosis  No significant canal stenosis or foraminal narrowing        L5-S1: Vacuum disc phenomenon  Disc base narrowing  Marginal osteophytosis  Mild to moderate bilateral foraminal narrowing  No significant canal stenosis  There is incomplete fusion of the posterior arch of L5  There is also evidence for spinal   dysraphism with incomplete fusion of the posterior arch of the sacrum        PREVERTEBRAL AND PARASPINAL SOFT TISSUES: There are atherosclerotic changes noted  No significant prevertebral or paravertebral soft tissue swelling        IMPRESSION:       Mild grade 1 anterolisthesis L5-S1 secondary to bilateral pars interarticularis defects  Mild to moderate bilateral foraminal narrowing is present at this level, left greater than right        Correlate with separate CT of the abdomen and pelvi   s for additional findings          Was told by podiatry 10/2021 that he had DDD per xray and was advised to use Gabepentin and referred to chiropractor      Today's concerns are:    Sunday started with kidney pain and felt like gas pockets and would not go away  Sharp sudden pain came and went fast  Feel internal ache  Could not sleep  ER visit  He was told there was elevated WBC count in his kidneys and given antibiotics  Few bouts of stabbing pain that almost knocked him off his feet  Not doing anything out of the ordinary  Equal on both sides  He did not feel it is was not his back  No fevers or chills  No abdominal pain  No frequency, urgency, dysuria  No changes in urine   pain decreased some since starting antibiotics  There were no vitals filed for this visit  Wt Readings from Last 3 Encounters:   03/27/23 113 kg (248 lb 9 6 oz)   02/23/23 113 kg (248 lb 9 6 oz)   02/22/23 109 kg (240 lb)     BP Readings from Last 3 Encounters:   05/15/23 158/98   03/27/23 (!) 172/98   02/23/23 128/86       PHQ-2/9 Depression Screening         ? Past Medical History:   Diagnosis Date   • Arthritis    • Colitis     x1 occurnce   • COPD (chronic obstructive pulmonary disease) ([facility] [address] )    • COVID-19     positive test on 12/16/21   • Depression    • Diverticulitis    • Diverticulitis of colon    • Diverticulosis    • GERD (gastroesophageal reflux disease)    • Hyperlipidemia    • Hypertension    • Pulmonary emphysema (HCC)    • Sleep apnea     no cpap   • Umbilical hernia      Past Surgical History:   Procedure Laterality Date   • CATARACT EXTRACTION Bilateral    • CHOLECYSTECTOMY     • COLONOSCOPY     • ESOPHAGOGASTRODUODENOSCOPY     • EYE SURGERY     • HERNIA REPAIR  09/19/2019    Open repair of incarcerated incisional hernia with mesh - Dr Zac Mckeon   • KNEE ARTHROSCOPY Left    • MA ARTHRODESIS GREAT TOE METATARSOPHALANGEAL JOINT Bilateral 08/25/2022    Procedure: Bilateral 1st MTP arthrodesis;   Surgeon: Gisele Galindo MD;  Location: [facility];  Service: Orthopedics   • MA DEBRIDEMENT SUBCUTANEOUS TISSUE 20 SQ CM/< Right 07/14/2022    Procedure: DEBRIDEMENT FOOT/TOE [facility] OUT), negative pressure wound dressing application;  Surgeon: Gisele Galindo MD;  Location: [facility];  Service: Orthopedics   • MA HALLUX RIGIDUS W/CHEILECTOMY 1ST MP JT W/O IMPLT Bilateral 12/10/2021    Procedure: CHEILECTOMY;  Surgeon: Parth Rocha DPM;  Location: [address] [place] [address];  Service: Podiatry   • MA TNOT ELBOW LATERAL/MEDIAL DEBRIDE OPEN TDN RPR Left 11/17/2020    Procedure: lateral epicondyle elbow debridement, release, and repair;  Surgeon: Manolo Loya MD;  Location: [facility]; Service: Orthopedics   • WISDOM TOOTH EXTRACTION         Current Medications:  Current Outpatient Medications   Medication Sig Dispense Refill   • albuterol (PROVENTIL HFA,VENTOLIN HFA) 90 mcg/act inhaler Inhale 2 puffs every 4 (four) hours as needed for wheezing or shortness of breath 18 g 5   • b complex vitamins capsule Take 1 capsule by mouth daily     • carvedilol (COREG) 3 125 mg tablet Take 1 tablet (3 125 mg total) by mouth 2 (two) times a day with meals 60 tablet 5   • cephalexin (KEFLEX) 500 mg capsule Take 1 capsule (500 mg total) by mouth every 12 (twelve) hours for 7 days 14 capsule 0   • eszopiclone (LUNESTA) 3 MG tablet Take 1 tablet (3 mg total) by mouth daily at bedtime 30 tablet 3   • FLUoxetine (PROzac) 20 MG tablet Take 2 tablets (40 mg total) by mouth every morning 180 tablet 1   • fluticasone-umeclidinium-vilanterol (Trelegy Ellipta) 100-62 5-25 mcg/actuation inhaler Inhale 1 puff daily Rinse mouth after use  60 blister 11   • ibuprofen (MOTRIN) 600 mg tablet Take 1 tablet (600 mg total) by mouth every 6 (six) hours as needed for mild pain for up to 15 doses 15 tablet 0   • lisinopril (ZESTRIL) 30 mg tablet TAKE 1 TABLET BY MOUTH EVERY EVENING  30 tablet 5   • multivitamin (THERAGRAN) TABS Take 1 tablet by mouth daily     • pantoprazole (PROTONIX) 40 mg tablet TAKE 1 TABLET BY MOUTH TWICE A DAY 60 tablet 5   • rosuvastatin (CRESTOR) 10 MG tablet TAKE 1 TABLET BY MOUTH DAILY AT BEDTIME 30 tablet 5   • VITAMIN D PO Take 1 tablet by mouth in the morning       • Zinc Sulfate (ZINC 15 PO) Take 1 tablet by mouth in the morning         No current facility-administered medications for this visit  Allergies: Allergies   Allergen Reactions   • Allopurinol Rash       Review of Systems  all others negative - no chest pain, SOB, normal urine and bowels  no GERD  sleeping well  mood good  Physical Exam   Video Exam Pt not examined - telephone only       As a result of this visit, I have not referred the patient for further respiratory evaluation  VIRTUAL VISIT DISCLAIMER    Amina Powell acknowledges that he has consented to an online visit or consultation  He understands that the online visit is based solely on information provided by him, and that, in the absence of a face-to-face physical evaluation by the physician, the diagnosis he receives is both limited and provisional in terms of accuracy and completeness  This is not intended to replace a full medical face-to-face evaluation by the physician  Amina Powell understands and accepts these terms

## 2023-05-18 ENCOUNTER — APPOINTMENT (OUTPATIENT)
Dept: LAB | Facility: CLINIC | Age: 58
End: 2023-05-18

## 2023-05-18 DIAGNOSIS — R10.9 FLANK PAIN: ICD-10-CM

## 2023-05-18 LAB
BACTERIA UR QL AUTO: ABNORMAL /HPF
BILIRUB UR QL STRIP: NEGATIVE
CLARITY UR: CLEAR
COLOR UR: YELLOW
GLUCOSE UR STRIP-MCNC: NEGATIVE MG/DL
HGB UR QL STRIP.AUTO: NEGATIVE
KETONES UR STRIP-MCNC: NEGATIVE MG/DL
LEUKOCYTE ESTERASE UR QL STRIP: NEGATIVE
NITRITE UR QL STRIP: NEGATIVE
NON-SQ EPI CELLS URNS QL MICRO: ABNORMAL /HPF
PH UR STRIP.AUTO: 5.5 [PH]
PROT UR STRIP-MCNC: ABNORMAL MG/DL
RBC #/AREA URNS AUTO: ABNORMAL /HPF
SP GR UR STRIP.AUTO: 1.02 (ref 1–1.03)
UROBILINOGEN UR STRIP-ACNC: <2 MG/DL
WBC #/AREA URNS AUTO: ABNORMAL /HPF

## 2023-05-19 LAB — BACTERIA UR CULT: NORMAL

## 2023-05-23 ENCOUNTER — OFFICE VISIT (OUTPATIENT)
Dept: OBGYN CLINIC | Facility: CLINIC | Age: 58
End: 2023-05-23

## 2023-05-23 ENCOUNTER — APPOINTMENT (OUTPATIENT)
Dept: RADIOLOGY | Facility: AMBULARY SURGERY CENTER | Age: 58
End: 2023-05-23
Attending: ORTHOPAEDIC SURGERY

## 2023-05-23 VITALS
HEIGHT: 73 IN | DIASTOLIC BLOOD PRESSURE: 100 MMHG | HEART RATE: 80 BPM | SYSTOLIC BLOOD PRESSURE: 150 MMHG | BODY MASS INDEX: 32.87 KG/M2 | WEIGHT: 248 LBS

## 2023-05-23 DIAGNOSIS — M20.21 HALLUX RIGIDUS, BILATERAL: ICD-10-CM

## 2023-05-23 DIAGNOSIS — M20.22 HALLUX RIGIDUS, BILATERAL: ICD-10-CM

## 2023-05-23 DIAGNOSIS — M96.89 NONUNION OF OSTEOTOMY SITE: Primary | ICD-10-CM

## 2023-05-23 NOTE — PROGRESS NOTES
JONY Null  Attending, Orthopaedic Surgery  Foot and [address] [facility] [address] [facility]      ORTHOPAEDIC FOOT AND ANKLE CLINIC VISIT     Assessment:     Encounter Diagnoses   Name Primary? • Hallux rigidus, bilateral    • Nonunion of osteotomy site Yes       Procedure: Bilateral 1st MTP arthrodesis  DOS: 8/25/22     Plan:   · The patient verbalized understanding of exam findings and treatment plan  We engaged in the shared decision-making process and treatment options were discussed at length with the patient  Surgical and conservative management discussed today along with risks and benefits  · Patient is nine months from his bilateral 1st MTP arthrodesis  · X rays demonstrate his left 1st MTP arthrodesis is healed appropriately  The right side has not demonstrated any progression in his fusion site and has an apparent nonunion  · He has pain on both sides  Pain in his right is from his nonunion  Pain in the left seems to be from his hardware irritating the EHL tendon  · Will obtain a CT scan of the right foot to assess degree of nonunion of the 1st MTP fusion   · Surgery would include 1st MTP revision arthrodesis on right with [facility] and bone graft  Removal of hardware on the left  Return for With CT results   History of Present Illness:   Chief Complaint:   Chief Complaint   Patient presents with   • Left Foot - Follow-up   • Right Foot - Follow-up     Gloria Leon is a 59 y o  male who is being seen in follow-up for bilateral 1st MTP fusions with persistent pain  He reports the pain is worse on the right than left  When we last saw he we recommended vitamin D  We obtained a bone stimulator and this has not helped his pain  Pain has not improved  Residual pain is localized at first MTPJ of right foot with minimal radiating and described as sharp and severe        Pain/symptom timing:  Worse during the day when active  Pain/symptom context:  Worse with activites and work  Pain/symptom modifying factors:  Rest makes better, activities make worse  Pain/symptom associated signs/symptoms: none    Prior treatment   · NSAIDsNo   · Injections No   · Bracing/Orthotics No    · Physical Therapy No     Orthopedic Surgical History:   Bilateral 1st MTP fusions 8/25/22    Past Medical, Surgical and Social History:  Past Medical History:  has a past medical history of Arthritis, Colitis, [facility]) [facility] COVID-19, Depression, Diverticulitis, Diverticulitis of colon, Diverticulosis, GERD (gastroesophageal reflux disease), Hyperlipidemia, Hypertension, Pulmonary emphysema ([facility] ), Sleep apnea, and Umbilical hernia  Problem List: does not have any pertinent problems on file  Past Surgical History:  has a past surgical history that includes Cholecystectomy; Cataract extraction (Bilateral); Knee arthroscopy (Left); Colonoscopy; Esophagogastroduodenoscopy; [place] tooth extraction; Hernia repair (09/19/2019); pr tnot elbow lateral/medial debride open tdn rpr (Left, 11/17/2020); pr hallux rigidus w/cheilectomy 1st mp jt w/o implt (Bilateral, 12/10/2021); pr debridement subcutaneous tissue 20 sq cm/< (Right, 07/14/2022); pr arthrodesis great toe metatarsophalangeal joint (Bilateral, 08/25/2022); and Eye surgery  Family History: family history includes Abdominal aortic aneurysm in his father; Anemia in his mother; Cancer in his father; Diabetes in his father, maternal grandfather, maternal grandmother, paternal grandfather, and paternal grandmother; Emphysema in his brother and maternal grandmother; Hypertension in his father and mother; Pancreatic cancer in his father  Social History:  reports that he quit smoking about 10 years ago  His smoking use included cigarettes  He started smoking about 36 years ago  He has a 35 00 pack-year smoking history  He has never used smokeless tobacco  He reports current alcohol use  He reports that he does not use drugs    Current "Medications: has a current medication list which includes the following prescription(s): albuterol, b complex vitamins, carvedilol, cephalexin, eszopiclone, fluoxetine, fluticasone-umeclidinium-vilanterol, ibuprofen, lisinopril, multivitamin, pantoprazole, rosuvastatin, vitamin d, and zinc sulfate  Allergies: is allergic to allopurinol  Review of Systems:  General- denies fever/chills  HEENT- denies hearing loss or sore throat  Eyes- denies eye pain or visual disturbances, denies red eyes  Respiratory- denies cough or SOB  Cardio- denies chest pain or palpitations  GI- denies abdominal pain  Endocrine- denies urinary frequency  Urinary- denies pain with urination  Musculoskeletal- Negative except noted above  Skin- denies rashes or wounds  Neurological- denies dizziness or headache  Psychiatric- denies anxiety or difficulty concentrating    Physical Exam:   /100 (BP Location: Right arm, Patient Position: Sitting, Cuff Size: Adult)   Pulse 80   Ht 6' 1\" (1 854 m)   Wt 112 kg (248 lb)   BMI 32 72 kg/m²   General/Constitutional: No apparent distress: well-nourished and well developed  Eyes: normal ocular motion  Lymphatic: No appreciable lymphadenopathy  Respiratory: Non-labored breathing  Vascular: No edema, swelling or tenderness, except as noted in detailed exam   Integumentary: No impressive skin lesions present, except as noted in detailed exam   Neuro: No ataxia or tremors noted  Psych: Normal mood and affect, oriented to person, place and time  Appropriate affect  Musculoskeletal: Normal, except as noted in detailed exam and in HPI  Examination    Right and left     Gait Normal   Musculoskeletal Tender to palpation at dorsal aspect of bilateral 1st MTP joints     Skin Normal   Well-healed incisions      Nails Normal    Range of Motion  20 degrees dorsiflexion, 30 degrees plantarflexion  Subtalar motion: normal     Stability Stable    Muscle Strength 5/5 tibialis anterior  5/5 " gastrocnemius-soleus  5/5 posterior tibialis  5/5 peroneal/eversion strength  5/5 EHL  5/5 FHL    Neurologic Normal    Sensation  Intact to light touch throughout sural, saphenous, superficial peroneal, deep peroneal and medial/lateral plantar nerve distributions  [place]-Pat 5 07 filament (10g) testing  deferred  Cardiovascular Brisk capillary refill < 2 seconds,intact DP and PT pulses    Special Tests None      Imaging Studies:   3 views of the Right foot were obtained, reviewed and interpreted independently which demonstrate loosening of the fusion hardware at the 1st MTP joint  There is nonunion at the 1st MTP joint  Reviewed by me personally  3 views of the Left foot were obtained, reviewed and interpreted independently which demonstrate appropriate bony fusion of the 1st MTP joint with intact orthopaedic arthrodesis hardware              Jeannine Mikes Lachman, MD  Foot & Ankle Surgery   Department [address] personally performed the service  Jeannine Mikes Lachman, MD

## 2023-06-11 ENCOUNTER — HOSPITAL ENCOUNTER (OUTPATIENT)
Dept: CT IMAGING | Facility: HOSPITAL | Age: 58
Discharge: HOME | End: 2023-06-11
Payer: COMMERCIAL

## 2023-06-11 DIAGNOSIS — M96.89 NONUNION OF OSTEOTOMY SITE: ICD-10-CM

## 2023-06-11 PROCEDURE — G1004 CDSM NDSC: HCPCS

## 2023-06-11 PROCEDURE — 73700 CT LOWER EXTREMITY W/O DYE: CPT

## 2023-06-27 ENCOUNTER — OFFICE VISIT (OUTPATIENT)
Dept: OBGYN CLINIC | Facility: CLINIC | Age: 58
End: 2023-06-27
Payer: COMMERCIAL

## 2023-06-27 VITALS — HEIGHT: 73 IN | WEIGHT: 245 LBS | BODY MASS INDEX: 32.47 KG/M2

## 2023-06-27 DIAGNOSIS — M20.22 HALLUX RIGIDUS, BILATERAL: Primary | ICD-10-CM

## 2023-06-27 DIAGNOSIS — M20.21 HALLUX RIGIDUS, BILATERAL: Primary | ICD-10-CM

## 2023-06-27 DIAGNOSIS — M96.89 NONUNION OF OSTEOTOMY SITE: ICD-10-CM

## 2023-06-27 PROCEDURE — 99213 OFFICE O/P EST LOW 20 MIN: CPT | Performed by: ORTHOPAEDIC SURGERY

## 2023-06-27 NOTE — PROGRESS NOTES
JONY Perez  Attending, Orthopaedic Surgery  Foot and [address] [facility] [address] [facility]      ORTHOPAEDIC FOOT AND ANKLE CLINIC VISIT     Assessment:     Encounter Diagnoses   Name Primary? • Hallux rigidus, bilateral Yes   • Nonunion of osteotomy site             Plan:   · The patient verbalized understanding of exam findings and treatment plan  We engaged in the shared decision-making process and treatment options were discussed at length with the patient  Surgical and conservative management discussed today along with risks and benefits  · Explained to the patient that his CT scan of the right foot reveals no evidence of osseous bridging of the 1st MTP arthrodesis  · He continues to have pain in both feet  · It was again explained to the patient we could perform a revision arthrodesis with BMAC of 1st MTP joint of his right foot  We can also perform a removal of hardware of his left foot  He would like to go home and review his options with his work and family  Return with decision about surgery  History of Present Illness:   Chief Complaint: Bilateral foot pain  Chief Complaint   Patient presents with   • Follow-up     Patient is here to go over right foot ct results     Sandra Garcia is a 59 y o  male who is being seen in follow-up for Bilateral 1st MTP fusions with persistent pain  When we last saw he we recommended right worse than left  Pain has not improved  Residual pain is localized at 1st MTP joint with minimal radiating and described as sharp and severe        Pain/symptom timing:  Worse during the day when active  Pain/symptom context:  Worse with activites and work  Pain/symptom modifying factors:  Rest makes better, activities make worse  Pain/symptom associated signs/symptoms: none    Prior treatment   · NSAIDsNo   · Injections No   · Bracing/Orthotics Yes    · Physical Therapy No     Orthopedic Surgical History:   See below    Past Medical, Surgical and Social History:  Past Medical History:  has a past medical history of Arthritis, Colitis, COPD (chronic obstructive pulmonary disease) ([facility] ), COVID-19, Depression, Diverticulitis, Diverticulitis of colon, Diverticulosis, GERD (gastroesophageal reflux disease), Hyperlipidemia, Hypertension, [facility] ), Sleep apnea, and Umbilical hernia  Problem List: does not have any pertinent problems on file  Past Surgical History:  has a past surgical history that includes Cholecystectomy; Cataract extraction (Bilateral); Knee arthroscopy (Left); Colonoscopy; Esophagogastroduodenoscopy; [place] tooth extraction; Hernia repair (09/19/2019); pr tnot elbow lateral/medial debride open tdn rpr (Left, 11/17/2020); pr hallux rigidus w/cheilectomy 1st mp jt w/o implt (Bilateral, 12/10/2021); pr debridement subcutaneous tissue 20 sq cm/< (Right, 07/14/2022); pr arthrodesis great toe metatarsophalangeal joint (Bilateral, 08/25/2022); and Eye surgery  Family History: family history includes Abdominal aortic aneurysm in his father; Anemia in his mother; Cancer in his father; Diabetes in his father, maternal grandfather, maternal grandmother, paternal grandfather, and paternal grandmother; Emphysema in his brother and maternal grandmother; Hypertension in his father and mother; Pancreatic cancer in his father  Social History:  reports that he quit smoking about 10 years ago  His smoking use included cigarettes  He started smoking about 36 years ago  He has a 35 00 pack-year smoking history  He has never used smokeless tobacco  He reports current alcohol use  He reports that he does not use drugs  Current Medications: has a current medication list which includes the following prescription(s): albuterol, b complex vitamins, eszopiclone, fluoxetine, ibuprofen, lisinopril, multivitamin, pantoprazole, rosuvastatin, vitamin d, zinc sulfate, carvedilol, and fluticasone-umeclidinium-vilanterol  Allergies: is allergic to allopurinol  "    Review of Systems:  General- denies fever/chills  HEENT- denies hearing loss or sore throat  Eyes- denies eye pain or visual disturbances, denies red eyes  Respiratory- denies cough or SOB  Cardio- denies chest pain or palpitations  GI- denies abdominal pain  Endocrine- denies urinary frequency  Urinary- denies pain with urination  Musculoskeletal- Negative except noted above  Skin- denies rashes or wounds  Neurological- denies dizziness or headache  Psychiatric- denies anxiety or difficulty concentrating    Physical Exam:   Ht 6' 1\" (1 854 m)   Wt 111 kg (245 lb)   BMI 32 32 kg/m²   General/Constitutional: No apparent distress: well-nourished and well developed  Eyes: normal ocular motion  Lymphatic: No appreciable lymphadenopathy  Respiratory: Non-labored breathing  Vascular: No edema, swelling or tenderness, except as noted in detailed exam   Integumentary: No impressive skin lesions present, except as noted in detailed exam   Neuro: No ataxia or tremors noted  Psych: Normal mood and affect, oriented to person, place and time  Appropriate affect  Musculoskeletal: Normal, except as noted in detailed exam and in HPI  Examination    Bilateral    Gait Normal   Musculoskeletal Tender to palpation at dorsal aspect of bilateral 1st MTP joints    Skin Normal   Well-healed incisions  Nails Normal    Range of Motion  20 degrees dorsiflexion, 30 degrees plantarflexion  Subtalar motion: normal    Stability Stable    Muscle Strength 5/5 tibialis anterior  5/5 gastrocnemius-soleus  5/5 posterior tibialis  5/5 peroneal/eversion strength  5/5 EHL  5/5 FHL    Neurologic Normal    Sensation Intact to light touch throughout sural, saphenous, superficial peroneal, deep peroneal and medial/lateral plantar nerve distributions  [place]-Pat 5 07 filament (10g) testing deferred      Cardiovascular Brisk capillary refill < 2 seconds,intact DP and PT pulses    Special Tests None      Imaging Studies:   CT available for " review of Right foot which shows First MTP arthrodesis with intact orthopedic hardware, evidence of osseous bridging  Reviewed by me personally  Real Reap Lachman, MD  Foot & Ankle Surgery   Department of [address] [place] [address]      I personally performed the service  Real Reap Lachman, MD    Scribe Attestation    I,:  Madison Gillette am acting as a scribe while in the presence of the attending physician :       I,:  Alicia Das MD personally performed the services described in this documentation    as scribed in my presence :

## 2023-07-02 DIAGNOSIS — E78.2 MIXED HYPERLIPIDEMIA: ICD-10-CM

## 2023-07-02 DIAGNOSIS — F51.01 PRIMARY INSOMNIA: ICD-10-CM

## 2023-07-02 RX ORDER — CARVEDILOL 3.12 MG/1
3.12 TABLET ORAL 2 TIMES DAILY WITH MEALS
Qty: 60 TABLET | Refills: 3 | Status: SHIPPED | OUTPATIENT
Start: 2023-07-02 | End: 2023-10-30

## 2023-07-02 NOTE — TELEPHONE ENCOUNTER
Medication Refill Request     Name carvedilol (COREG) 3.125 mg tablet  Dose/Frequency Take 1 tablet (3.125 mg total) by mouth 2 (two) times a day with meals  Quantity 60  Verified pharmacy   [x]  Verified ordering Provider   [x]  Does patient have enough for the next 3 days? Yes [x] No []    Medication Refill Request     Name eszopiclone (LUNESTA) 3 MG tablet  Dose/Frequency Take 1 tablet (3 mg total) by mouth daily at bedtime  Quantity 30  Verified pharmacy   [x]  Verified ordering Provider   [x]  Does patient have enough for the next 3 days?  Yes [x] No []

## 2023-07-03 RX ORDER — ESZOPICLONE 3 MG/1
3 TABLET, FILM COATED ORAL
Qty: 30 TABLET | Refills: 1 | Status: SHIPPED | OUTPATIENT
Start: 2023-07-03

## 2023-08-17 ENCOUNTER — APPOINTMENT (OUTPATIENT)
Dept: LAB | Facility: CLINIC | Age: 58
End: 2023-08-17
Payer: COMMERCIAL

## 2023-08-17 DIAGNOSIS — N18.2 BENIGN HYPERTENSION WITH CKD (CHRONIC KIDNEY DISEASE), STAGE II: ICD-10-CM

## 2023-08-17 DIAGNOSIS — R73.01 IFG (IMPAIRED FASTING GLUCOSE): ICD-10-CM

## 2023-08-17 DIAGNOSIS — I12.9 BENIGN HYPERTENSION WITH CKD (CHRONIC KIDNEY DISEASE), STAGE II: ICD-10-CM

## 2023-08-17 LAB
ALBUMIN SERPL BCP-MCNC: 4.6 G/DL (ref 3.5–5)
ALP SERPL-CCNC: 68 U/L (ref 34–104)
ALT SERPL W P-5'-P-CCNC: 27 U/L (ref 7–52)
ANION GAP SERPL CALCULATED.3IONS-SCNC: 7 MMOL/L
AST SERPL W P-5'-P-CCNC: 21 U/L (ref 13–39)
BILIRUB SERPL-MCNC: 0.63 MG/DL (ref 0.2–1)
BUN SERPL-MCNC: 20 MG/DL (ref 5–25)
CALCIUM SERPL-MCNC: 9.4 MG/DL (ref 8.4–10.2)
CHLORIDE SERPL-SCNC: 109 MMOL/L (ref 96–108)
CHOLEST SERPL-MCNC: 134 MG/DL
CO2 SERPL-SCNC: 25 MMOL/L (ref 21–32)
CREAT SERPL-MCNC: 1.1 MG/DL (ref 0.6–1.3)
CREAT UR-MCNC: 208 MG/DL
EST. AVERAGE GLUCOSE BLD GHB EST-MCNC: 126 MG/DL
GFR SERPL CREATININE-BSD FRML MDRD: 72 ML/MIN/1.73SQ M
GLUCOSE P FAST SERPL-MCNC: 91 MG/DL (ref 65–99)
HBA1C MFR BLD: 6 %
HDLC SERPL-MCNC: 53 MG/DL
LDLC SERPL CALC-MCNC: 53 MG/DL (ref 0–100)
MICROALBUMIN UR-MCNC: 19.5 MG/L (ref 0–20)
MICROALBUMIN/CREAT 24H UR: 9 MG/G CREATININE (ref 0–30)
NONHDLC SERPL-MCNC: 81 MG/DL
POTASSIUM SERPL-SCNC: 3.8 MMOL/L (ref 3.5–5.3)
PROT SERPL-MCNC: 7.6 G/DL (ref 6.4–8.4)
SODIUM SERPL-SCNC: 141 MMOL/L (ref 135–147)
TRIGL SERPL-MCNC: 140 MG/DL

## 2023-08-17 PROCEDURE — 36415 COLL VENOUS BLD VENIPUNCTURE: CPT

## 2023-08-17 PROCEDURE — 83036 HEMOGLOBIN GLYCOSYLATED A1C: CPT

## 2023-08-17 PROCEDURE — 80061 LIPID PANEL: CPT

## 2023-08-17 PROCEDURE — 80053 COMPREHEN METABOLIC PANEL: CPT

## 2023-08-17 PROCEDURE — 82570 ASSAY OF URINE CREATININE: CPT

## 2023-08-17 PROCEDURE — 82043 UR ALBUMIN QUANTITATIVE: CPT

## 2023-08-24 ENCOUNTER — OFFICE VISIT (OUTPATIENT)
Dept: FAMILY MEDICINE CLINIC | Facility: CLINIC | Age: 58
End: 2023-08-24
Payer: COMMERCIAL

## 2023-08-24 VITALS
TEMPERATURE: 97.6 F | WEIGHT: 223 LBS | BODY MASS INDEX: 29.55 KG/M2 | SYSTOLIC BLOOD PRESSURE: 126 MMHG | HEIGHT: 73 IN | RESPIRATION RATE: 18 BRPM | DIASTOLIC BLOOD PRESSURE: 82 MMHG | OXYGEN SATURATION: 97 % | HEART RATE: 79 BPM

## 2023-08-24 DIAGNOSIS — E78.2 MIXED HYPERLIPIDEMIA: ICD-10-CM

## 2023-08-24 DIAGNOSIS — Z12.2 ENCOUNTER FOR SCREENING FOR LUNG CANCER: ICD-10-CM

## 2023-08-24 DIAGNOSIS — R73.01 IFG (IMPAIRED FASTING GLUCOSE): ICD-10-CM

## 2023-08-24 DIAGNOSIS — F32.0 MAJOR DEPRESSIVE DISORDER, SINGLE EPISODE, MILD (HCC): ICD-10-CM

## 2023-08-24 DIAGNOSIS — F51.01 PRIMARY INSOMNIA: ICD-10-CM

## 2023-08-24 DIAGNOSIS — F17.211 CIGARETTE NICOTINE DEPENDENCE IN REMISSION: Primary | ICD-10-CM

## 2023-08-24 DIAGNOSIS — I10 ESSENTIAL HYPERTENSION: ICD-10-CM

## 2023-08-24 DIAGNOSIS — Z23 ENCOUNTER FOR IMMUNIZATION: ICD-10-CM

## 2023-08-24 DIAGNOSIS — F17.211 NICOTINE DEPENDENCE, CIGARETTES, IN REMISSION: ICD-10-CM

## 2023-08-24 DIAGNOSIS — K21.00 GASTROESOPHAGEAL REFLUX DISEASE WITH ESOPHAGITIS WITHOUT HEMORRHAGE: ICD-10-CM

## 2023-08-24 PROCEDURE — 99214 OFFICE O/P EST MOD 30 MIN: CPT | Performed by: FAMILY MEDICINE

## 2023-08-24 RX ORDER — CARVEDILOL 3.12 MG/1
3.12 TABLET ORAL 2 TIMES DAILY WITH MEALS
Qty: 180 TABLET | Refills: 1 | Status: SHIPPED | OUTPATIENT
Start: 2023-08-24 | End: 2023-12-22

## 2023-08-24 RX ORDER — ESZOPICLONE 3 MG/1
3 TABLET, FILM COATED ORAL
Qty: 90 TABLET | Refills: 1 | Status: SHIPPED | OUTPATIENT
Start: 2023-08-24

## 2023-08-24 RX ORDER — FLUOXETINE 20 MG/1
40 TABLET, FILM COATED ORAL EVERY MORNING
Qty: 180 TABLET | Refills: 1 | Status: CANCELLED | OUTPATIENT
Start: 2023-08-24

## 2023-08-24 RX ORDER — FLUOXETINE HYDROCHLORIDE 40 MG/1
40 CAPSULE ORAL DAILY
Qty: 90 CAPSULE | Refills: 1 | Status: SHIPPED | OUTPATIENT
Start: 2023-08-24

## 2023-08-24 RX ORDER — PANTOPRAZOLE SODIUM 40 MG/1
40 TABLET, DELAYED RELEASE ORAL DAILY
Qty: 90 TABLET | Refills: 1 | Status: SHIPPED | OUTPATIENT
Start: 2023-08-24

## 2023-08-24 RX ORDER — LISINOPRIL 30 MG/1
30 TABLET ORAL EVERY EVENING
Qty: 90 TABLET | Refills: 1 | Status: SHIPPED | OUTPATIENT
Start: 2023-08-24

## 2023-08-24 NOTE — PROGRESS NOTES
1. Cigarette nicotine dependence in remission  Assessment & Plan:  Declines lung cancer screening this year       2. Mixed hyperlipidemia  Assessment & Plan:  Nice improvement. He is working hard on diet changes. Has not been able to exercise due to foot pain and need for more surgery. 3. Primary insomnia  Assessment & Plan:  Using Lunesta for sleep. PDMP Review       Value Time User    PDMP Reviewed  Yes 8/24/2023 11:03 AM Mark Arevalo DO            Orders:  -     eszopiclone (LUNESTA) 3 MG tablet; Take 1 tablet (3 mg total) by mouth daily at bedtime    4. Major depressive disorder, single episode, mild (HCC)  Assessment & Plan:  Doing well on Prozac 40mg. Refill today. Orders:  -     FLUoxetine (PROzac) 40 MG capsule; Take 1 capsule (40 mg total) by mouth daily    5. Essential hypertension  -     carvedilol (COREG) 3.125 mg tablet; Take 1 tablet (3.125 mg total) by mouth 2 (two) times a day with meals  -     lisinopril (ZESTRIL) 30 mg tablet; Take 1 tablet (30 mg total) by mouth every evening    6. Gastroesophageal reflux disease with esophagitis without hemorrhage  Assessment & Plan:  Believes he was advised to be on Protonix 40 mg twice a day but insurance will only cover once a day. So far this is still helping. Advised he can use Pepcid for breakthrough, and watch diet. Orders:  -     pantoprazole (PROTONIX) 40 mg tablet; Take 1 tablet (40 mg total) by mouth daily    7. Encounter for screening for lung cancer    8. Nicotine dependence, cigarettes, in remission    9. Encounter for immunization    10. IFG (impaired fasting glucose)  Assessment & Plan:  Stable. Doing well with weight loss       11. BMI 29.0-29.9,adult  Assessment & Plan:  Nice improvement. He is working hard on diet changes. Has not been able to exercise due to foot pain and need for more surgery.             Return in about 6 months (around 2/24/2024) for Annual physical. .    Subjective:   Imtiaz Lawton is a 59 y.o. male here today for a follow-up on his current medical conditions:    Patient Active Problem List   Diagnosis   • GERD (gastroesophageal reflux disease)   • Diverticulosis   • Sleep apnea   • Major depressive disorder, single episode, mild (HCC)   • Mixed hyperlipidemia   • Benign hypertension with CKD (chronic kidney disease), stage II   • Primary insomnia   • BMI 29.0-29.9,adult   • Psoriasis   • Vitamin D deficiency   • Low libido   • Class 1 obesity due to excess calories with serious comorbidity and body mass index (BMI) of 31.0 to 31.9 in adult   • Arthritis of both feet   • Abnormal stress test   • Centrilobular emphysema (HCC)   • SOB (shortness of breath)   • CKD (chronic kidney disease) stage 2, GFR 60-89 ml/min   • Hallux rigidus, right foot   • Hallux rigidus of both feet   • Primary osteoarthritis of right foot   • Primary osteoarthritis of left foot   • C. difficile colitis and Sigmoid Diverticulitis   • History of colon polyps   • Open wound of right great toe   • Cigarette nicotine dependence in remission   • IFG (impaired fasting glucose)   • Anterolisthesis of lumbar spine   • Lumbar foraminal stenosis       Patient Care Team:  Rodrigue West DO as PCP - General (Family Medicine)  Cookie Harris as PCP - Wound (Wound Care)  Bob Bettencourt DPM (Podiatry)    Current Medications:  Current Outpatient Medications   Medication Sig Dispense Refill   • albuterol (PROVENTIL HFA,VENTOLIN HFA) 90 mcg/act inhaler Inhale 2 puffs every 4 (four) hours as needed for wheezing or shortness of breath 18 g 5   • b complex vitamins capsule Take 1 capsule by mouth daily     • carvedilol (COREG) 3.125 mg tablet Take 1 tablet (3.125 mg total) by mouth 2 (two) times a day with meals 180 tablet 1   • eszopiclone (LUNESTA) 3 MG tablet Take 1 tablet (3 mg total) by mouth daily at bedtime 90 tablet 1   • FLUoxetine (PROzac) 40 MG capsule Take 1 capsule (40 mg total) by mouth daily 90 capsule 1   • fluticasone-umeclidinium-vilanterol (Trelegy Ellipta) 100-62.5-25 mcg/actuation inhaler Inhale 1 puff daily Rinse mouth after use. 60 blister 11   • ibuprofen (MOTRIN) 600 mg tablet Take 1 tablet (600 mg total) by mouth every 6 (six) hours as needed for mild pain for up to 15 doses 15 tablet 0   • lisinopril (ZESTRIL) 30 mg tablet Take 1 tablet (30 mg total) by mouth every evening 90 tablet 1   • multivitamin (THERAGRAN) TABS Take 1 tablet by mouth daily     • pantoprazole (PROTONIX) 40 mg tablet Take 1 tablet (40 mg total) by mouth daily 90 tablet 1   • rosuvastatin (CRESTOR) 10 MG tablet TAKE 1 TABLET BY MOUTH DAILY AT BEDTIME 30 tablet 5   • VITAMIN D PO Take 1 tablet by mouth in the morning       • Zinc Sulfate (ZINC 15 PO) Take 1 tablet by mouth in the morning         No current facility-administered medications for this visit. HPI:  Chief Complaint   Patient presents with   • Follow-up     6 month CC follow up- Declined Lung Screening   Patient is down 8% in is Weight      -- Above per clinical staff and reviewed. --    PHQ-2/9 Depression Screening    Little interest or pleasure in doing things: 0 - not at all  Feeling down, depressed, or hopeless: 0 - not at all  Trouble falling or staying asleep, or sleeping too much: 1 - several days  Feeling tired or having little energy: 1 - several days  Poor appetite or overeatin - not at all  Feeling bad about yourself - or that you are a failure or have let yourself or your family down: 0 - not at all  Trouble concentrating on things, such as reading the newspaper or watching television: 0 - not at all  Moving or speaking so slowly that other people could have noticed.  Or the opposite - being so fidgety or restless that you have been moving around a lot more than usual: 0 - not at all  Thoughts that you would be better off dead, or of hurting yourself in some way: 0 - not at all  PHQ-9 Score: 2   PHQ-9 Interpretation: No or Minimal depression        ?     let him know he needs Q 6 month appts now   AUg 2023 labs done hemoglobin A1c stable at 6.0, GFR 72, urine microalbumin to creatinine ratio normal, cholesterol from 152 down to 134, triglycerides 140, HDL from 59 down to 53, LDL from 74 down to 53. PSA   due 2/19/2024. Physical due 2/23/2024. Immunizations up-to-date. Lung cancer screening due. abnormal CT scan of back May 2023   aug labs  A1C CMP  vit D  (PSA due 2/19/24)    december increased crestor 5 to 10 mg   chol some improvment. watch bP.      follows with nephrology proteinuria. T3 MOTION last Feb 2019 - d/c HCTZ due to gout, recommend d/c omerazole and NSAIDS. started Norvasc. low salt diet   cardio stopped norvasc started coreg low EF & crestor     Today:  Still problems with his feet   Needs bone graft now   Will need more surgery now   Pain depends on amt of activity   Lost 30 pound cutting out carbs and using small portions   Not eliseo to do much for exercise   Not accoess to pool   Work stressful   decines this year lung cancer screenujng   heartrnign   Only on medication once a day  Due for EGD   Avoids advil  Since diet better doing better  Ate Dandy and some heartburn    Had hep B vaccines x 3     The following portions of the patient's history were reviewed and updated as appropriate: allergies, current medications, past family history, past medical history, past social history, past surgical history and problem list.    Objective:  Vitals:  /82   Pulse 79   Temp 97.6 °F (36.4 °C)   Resp 18   Ht 6' 1" (1.854 m)   Wt 101 kg (223 lb)   SpO2 97%   BMI 29.42 kg/m²    Wt Readings from Last 3 Encounters:   08/24/23 101 kg (223 lb)   06/27/23 111 kg (245 lb)   05/23/23 112 kg (248 lb)      BP Readings from Last 3 Encounters:   08/24/23 126/82   05/23/23 150/100   05/15/23 158/98        Review of Systems   He has no other concerns. No unexpected weight changes. No chest pain, SOB, or palpitations. No GERD. No changes in bowels or bladder. Sleeping well. No mood changes. Physical Exam   Constitutional:  he appears well-developed and well-nourished. HENT: Head: Normocephalic. Neck: Neck supple. Cardiovascular: Normal rate, regular rhythm and normal heart sounds. Pulmonary/Chest: Effort normal and breath sounds normal. No wheezes, rales, or rhonchi. Abdominal: Soft. Bowel sounds are normal. There is no tenderness. No hepatosplenomegaly. Musculoskeletal: he exhibits no edema. Lymphadenopathy: he has no cervical adenopathy. Neurological: he is alert and oriented to person, place, and time. Skin: Skin is warm and dry. Psychiatric: he has a normal mood and affect. his behavior is normal. Thought content normal.     BMI Counseling: Body mass index is 29.42 kg/m². The BMI is above normal. Nutrition recommendations include decreasing portion sizes. Exercise recommendations include exercising 3-5 times per week. Rationale for BMI follow-up plan is due to patient being overweight or obese.

## 2023-08-25 NOTE — ASSESSMENT & PLAN NOTE
Believes he was advised to be on Protonix 40 mg twice a day but insurance will only cover once a day. So far this is still helping. Advised he can use Pepcid for breakthrough, and watch diet.

## 2023-08-25 NOTE — ASSESSMENT & PLAN NOTE
Nice improvement. He is working hard on diet changes. Has not been able to exercise due to foot pain and need for more surgery.

## 2023-09-05 ENCOUNTER — OFFICE VISIT (OUTPATIENT)
Dept: OBGYN CLINIC | Facility: CLINIC | Age: 58
End: 2023-09-05
Payer: COMMERCIAL

## 2023-09-05 VITALS
DIASTOLIC BLOOD PRESSURE: 84 MMHG | HEIGHT: 73 IN | SYSTOLIC BLOOD PRESSURE: 133 MMHG | WEIGHT: 218 LBS | BODY MASS INDEX: 28.89 KG/M2 | HEART RATE: 86 BPM

## 2023-09-05 DIAGNOSIS — M96.89 NONUNION OF OSTEOTOMY SITE: ICD-10-CM

## 2023-09-05 DIAGNOSIS — T84.84XA PAINFUL ORTHOPAEDIC HARDWARE (HCC): ICD-10-CM

## 2023-09-05 DIAGNOSIS — M20.21 HALLUX RIGIDUS, RIGHT FOOT: Primary | ICD-10-CM

## 2023-09-05 PROCEDURE — 99214 OFFICE O/P EST MOD 30 MIN: CPT | Performed by: ORTHOPAEDIC SURGERY

## 2023-09-05 RX ORDER — CHLORHEXIDINE GLUCONATE 0.12 MG/ML
15 RINSE ORAL ONCE
OUTPATIENT
Start: 2023-09-05 | End: 2023-09-05

## 2023-09-05 RX ORDER — CHLORHEXIDINE GLUCONATE 4 G/100ML
SOLUTION TOPICAL DAILY PRN
OUTPATIENT
Start: 2023-09-05

## 2023-09-05 NOTE — PROGRESS NOTES
Karen Bradley M.D. Attending, Orthopaedic Surgery  Foot and [facility]      ORTHOPAEDIC FOOT AND ANKLE CLINIC VISIT     Assessment:     Encounter Diagnoses   Name Primary? • Hallux rigidus, right foot Yes   • Nonunion of osteotomy site    • Painful orthopaedic hardware [facility])             Plan:   · The patient verbalized understanding of exam findings and treatment plan. We engaged in the shared decision-making process and treatment options were discussed at length with the patient. Surgical and conservative management discussed today along with risks and benefits. · Patient has a nonunion of his 1st MTP arthrodesis of the right foot and painful orthopedic hardware from previous 1st MTP arthrodesis of his left foot. · He continues to have pain about both feet and would like to move forward with scheduling a surgical procedure  · Informed consent was obtained today for a RIGHT revision of 1st MTP arthrodesis with BMAC and bone grafting from the calcaneus as well as a LEFT removal of hardware. Return in about 3 weeks (around 9/26/2023) for postop. CONSENT FOR BONY PROCEDURES:   Patient understands that there is no guarantee that the surgery will relieve all of His pain and also understands that there may be a prolonged course of protected weight-bearing status required which will restrict them from driving and other activities as discussed at today's visit. Patient recognizes that there are risks with surgery including bleeding, numbness, nerve irritation, wound complications, infection, continued pain, joint stiffness, malunion, nonunion, anesthetic complications, death, failure of procedure and possible need for further surgery. The patient understands that there is no guarantee that this surgery will relieve all of His pain and symptoms. Patient understands that there is no guarantee that they will return to full function after the procedure.  Patient has provided informed consent for the procedure. History of Present Illness:   Chief Complaint:   Chief Complaint   Patient presents with   • Follow-up     Patient says he is not doing well and is here to sign up for another surgery      Madyson Potter is a 59 y.o. male who is being seen in follow-up for Bilateral foot pain. When we last saw he we recommended performing a revision surgical procedure for his right and to remove the hardware of his left foot. Pain has not improved. Residual pain is localized at 1st MTP joints bilaterally with minimal radiating and described as sharp and severe. Pain/symptom timing:  Worse during the day when active  Pain/symptom context:  Worse with activites and work  Pain/symptom modifying factors:  Rest makes better, activities make worse  Pain/symptom associated signs/symptoms: none    Prior treatment   · NSAIDsYes   · Injections Yes   · Bracing/Orthotics Yes    · Physical Therapy Yes     Orthopedic Surgical History:   See below    Past Medical, Surgical and Social History:  Past Medical History:  has a past medical history of Arthritis, Colitis, COPD (chronic obstructive pulmonary disease) ([address]), COVID-19, Depression, Diverticulitis, Diverticulitis of colon, Diverticulosis, GERD (gastroesophageal reflux disease), Hyperlipidemia, Hypertension, Pulmonary emphysema ([address]), Sleep apnea, and Umbilical hernia. Problem List: does not have any pertinent problems on file. Past Surgical History:  has a past surgical history that includes Cholecystectomy; Cataract extraction (Bilateral); Knee arthroscopy (Left); Colonoscopy; Esophagogastroduodenoscopy; Starke tooth extraction;  Hernia repair (09/19/2019); pr tnot elbow lateral/medial debride open tdn rpr (Left, 11/17/2020); pr hallux rigidus w/cheilectomy 1st mp jt w/o implt (Bilateral, 12/10/2021); pr debridement subcutaneous tissue 20 sq cm/< (Right, 07/14/2022); pr arthrodesis great toe metatarsophalangeal joint (Bilateral, 08/25/2022); and Eye surgery. Family History: family history includes Abdominal aortic aneurysm in his father; Anemia in his mother; Cancer in his father; Diabetes in his father, maternal grandfather, maternal grandmother, paternal grandfather, and paternal grandmother; Emphysema in his brother and maternal grandmother; Hypertension in his father and mother; Pancreatic cancer in his father. Social History:  reports that he quit smoking about 10 years ago. His smoking use included cigarettes. He started smoking about 36 years ago. He has a 35.00 pack-year smoking history. He has never used smokeless tobacco. He reports current alcohol use. He reports that he does not use drugs. Current Medications: has a current medication list which includes the following prescription(s): albuterol, b complex vitamins, carvedilol, eszopiclone, fluoxetine, ibuprofen, lisinopril, multivitamin, pantoprazole, rosuvastatin, vitamin d, zinc sulfate, and fluticasone-umeclidinium-vilanterol. Allergies: is allergic to allopurinol. Review of Systems:  General- denies fever/chills  HEENT- denies hearing loss or sore throat  Eyes- denies eye pain or visual disturbances, denies red eyes  Respiratory- denies cough or SOB  Cardio- denies chest pain or palpitations  GI- denies abdominal pain  Endocrine- denies urinary frequency  Urinary- denies pain with urination  Musculoskeletal- Negative except noted above  Skin- denies rashes or wounds  Neurological- denies dizziness or headache  Psychiatric- denies anxiety or difficulty concentrating    Physical Exam:   /84   Pulse 86   Ht 6' 1" (1.854 m)   Wt 98.9 kg (218 lb)   BMI 28.76 kg/m²   General/Constitutional: No apparent distress: well-nourished and well developed.   Eyes: normal ocular motion  Lymphatic: No appreciable lymphadenopathy  Respiratory: Non-labored breathing  Vascular: No edema, swelling or tenderness, except as noted in detailed exam.  Integumentary: No impressive skin lesions present, except as noted in detailed exam.  Neuro: No ataxia or tremors noted  Psych: Normal mood and affect, oriented to person, place and time. Appropriate affect. Musculoskeletal: Normal, except as noted in detailed exam and in HPI. Examination    Bilateral    Gait Antalgic   Musculoskeletal Tender to palpation at 1st MTP    Skin Normal.  Well-healed incisions. Nails Normal    Range of Motion  20 degrees dorsiflexion, 30 degrees plantarflexion  Subtalar motion: normal    Stability Stable    Muscle Strength 5/5 tibialis anterior  5/5 gastrocnemius-soleus  5/5 posterior tibialis  5/5 peroneal/eversion strength  5/5 EHL  5/5 FHL    Neurologic Normal    Sensation Intact to light touch throughout sural, saphenous, superficial peroneal, deep peroneal and medial/lateral plantar nerve distributions. Wilton-Pat 5.07 filament (10g) testing deferred. Cardiovascular Brisk capillary refill < 2 seconds,intact DP and PT pulses    Special Tests None      Imaging Studies:   No new imaging      James R. Lachman, MD  Foot & Ankle Surgery   Department of [address] [place] [address]      I personally performed the service. Lynae Pho. Lachman, MD    Scribe Attestation    I,:  Prince Anthony am acting as a scribe while in the presence of the attending physician.:       I,:  Virgie Ahumada, MD personally performed the services described in this documentation    as scribed in my presence.:

## 2023-09-05 NOTE — PATIENT INSTRUCTIONS
Lizbeth Trejo M.D. Attending, [address] [place] [address] Office Phone: [phone] ? Fax: [phone]  [facility] Office Phone: [phone] ? QXJ:[phone]    : Austin Chandra, [address] [facility]     Surgery Coordinators Dominique Mike: Cornelia Aaron, [address], [phone]  Surgery Coordinator Mekhi:  Evie Cabot, [phone]                                                               [email or web address]   PRE-OPERATIVE AND POST-OPERATIVE INSTRUCTIONS    General Information:  Your surgery is with Dr. Rochelle Martinez. Dates can change (although rare) depending on emergencies. Typical post operative visits are at the following intervals:  3 weeks post surgery(except 1 week for bunions and wound monitoring), 6 weeks post surgery, 3 months post surgery, 6 months post surgery, and then on a yearly basis. However, this may change based on Dr. Vega  recommendation. #1 post-operative rule for foot/ankle surgery:  ONCE YOU ARE OUT OF YOUR CAST AND/OR REMOVABLE BOOT, SWELLING MAY PERSIST FOR MANY MONTHS. YOU MIGHT ALSO EXPERIENCE A BLUISH DISCOLORATION OF YOUR LEG. THIS IS NORMAL AND PART OF THE USUAL POSTOPERATIVE EXPERIENCE. SMOKING:  Smoking results in incomplete healing of fractures (broken bones) and joints that my have been fused. Smoking and nicotine also prevents the growth of bone into ankle replacements and bone healing. It also slows the healing of muscles and skin (soft tissue). Therefore, please do not have surgery if you continue to smoke. We reserve the right to cancel your surgery if we suspect that you are smoking. DO NOT use nicorette gum or other patches. Please find an alternative method to quit smoking before your surgery. Pre-Operative Information:  Surgery date and preoperative visits:   If you have medical problems, such as an abnormal EKG, history of BLOOD CLOT, ANEURYSM, and any other heart condition, please inform us so that we can get your medical clearance several weeks before the surgery. Please bring any important medical information, such as an EKG, chest x-ray, or echocardiogram, with you to ensure that your surgery will not be delayed. If needed, you will receive your preoperative appointments in the mail or by phone from our scheduling office. The location of the preoperative appointment will be given to you also. You may not eat after midnight the night before surgery. If you do, your surgery will be cancelled. You will receive a phone call from your surgery center the day before your surgery (if your surgery is on a Monday, you will get a call the Friday before). If you do not hear from someone by 4pm the day before your surgery, please call the Surgical coordinator (number above) to notify us. Start taking Vitamin D3 4000 units per day and Calcium 1200mg per day immediately. You will continue this until your 3 month post-op visit. These are over the counter and available at all pharmacies and supermarkets. FOR THOSE HAVING SURGERY AT [facility] e WILL NEED CRUTCHES OR A ROLLING WALKER AFTER SURGERY, ASK FOR A PRESCRIPTION FOR THIS FROM OUR OFFICE TODAY. THIS CANNOT BE HANDLED THE DAY OF SURGERY AS [facility] DOES NOT STOCK THESE. Because bacterial can often enter any defect in the skin, it is important to avoid any cuts before surgery. Any breaks in the skin on the leg will often result in your surgery being postponed. Please avoid going on a very long walk the day prior to surgery, or doing other activities that could lead to irritation of the skin, including yard work, extra athletic activity, or shaving. This could result in surgery cancellation. You MUST be fasting the day of your surgery. Therefore, please do not consume any foot or beverage after midnight the night before surgery.   The morning of surgery you may take your usual medications with a sip of water. It is important not to take anti-inflammatory medication like Ibuprofen, Motrin, Naproxen (Aleve), or Aspirin 7-10 days before surgery because they will make you bleed more than usual.  Vitamin, E, Plavix and Coumadin also have the same effect. Stop Aspirin and Vitamin E two weeks before surgery. YOUR MEDICAL DOCTOR SHOULD TELL YOU WHEN TO STOP COUMADIN OR PLAVIX. If your surgery involves any bone healing, please do not take anti-inflammatories for at least 6 weeks after surgery. This can impede bone healing (ibuprofen, Aleve, Relafen, iodine). Tylenol is fine to take. PREOPERATIVE BATHING INSTRUCTIONS:    Before your surgery, bathe with Hibiclens (4% Chlorhexidene) as instructed below. This skin cleanser will help reduce the bacteria on your skin before surgery. To avoid irritating your eyes, do not apply Hibiclens above the level of your neck. On the evening before AND the morning of surgery, bathe your entire body except the face and scalp, then rinse freely. DO NOT apply to your face or scalp, as Hibiclens can irritate your eyes. Purchasing information:   Hibiclens is available without a prescription at [facility]. ADDITIONAL INSTRUCTIONS:  PATIENTS HAVING FOOT/ANKLE SURGERY     In preparation for your upcoming surgery, we kindly request and advise the following:  Notify our office if you are taking any of the following:  Coumadin (warfarin):  Persantine (dipyridamole); Pletal (cilostazol); Plavix (clopidogrel); Ticlid (ticlopidine); Agrylin (anagrelide); Aggrenox (dipyridamole and aspirin) or other blood thinners,. In addition, stop taking Vitamin E and herbal supplements. Do not schedule any elective dental work for at least 6 months after surgery. If you had an ankle replacement, you will need to take antibiotics before any future dental procedures. Your dentist or our office can prescribe these for you.   1000mg of Amoxicillin 1 hour prior to any dental procedure is the recommended dosing. THREE RULES:    After surgery you will most likely be given the instructions “KEEP YOUR TOES ABOVE YOUR NOSE.”  This means that you MUST have your feet elevated higher than your heart. Keeping your toes above your nose helps to heal the muscles and skin (soft tissues) by reducing swelling in your leg. This position also helps to prevent infection, and is very important in avoiding deep venous thrombosis (blood clots). In order to keep the blood circulating in your legs and in order to avoid deep vein   thrombosis (blood clots), we ask patients to GET UP ONCE AN HOUR during the day. This means you should at least cross the room and come back. It does not mean you have to be up for long periods of time. In most cases we will not have people immediately put any weight on their operated part. This is important to prevent loosening of metal or other devices holding the bones together. It also prevents irritation of the soft tissues which can lead to prolonged healing. When we say get up once an hour, please walk, hop or move with an assisted device. This is important! Do not do any excessive walking during the first few days after surgery. Recovering from surgery is a full-time task for the patient. Postoperative care is important to avoid irritating the skin incision, which can lead to infection. Please do not plan activities or go out of town for several weeks after surgery. If you are unsure about your future activities, please schedule surgery only when you know it is acceptable for you. Scheduling surgery and then canceling the date, prevents other people from having surgery on that date as it takes time to line everything up effectively. If you cancel your surgery the week of your planned surgery, we reserve the right to cancel all future surgical procedures.     THE DAY OF SURGERY:    Arrival to the hospital or outpatient surgical center on time is imperative. If you arrive late, then your surgery will be cancelled. You MUST have a family member/friend bring you, stay with you throughout the DURATION of your surgery, and drive you home. You MUST be fasting the day of your surgery. Therefore, do not consume any food or beverage after midnight the night before surgery. At your pre-operative visit with the anesthesia staff, or during your phone screen, a nurse will instruct you what medications you will need to take the day of surgery. MAKE SURE THAT THE PHARMACY LISTED IN THE ELECTRONIC MEDICAL RECORD (EPIC) IS YOUR PREFERRED PHARMACY. For example, if you are staying with family or a friend, and will not be near your preferred pharmacy, YOU MUST, tell the nurses checking you in the day of surgery so that this can be changed in the system. If your prescriptions are sent to a pharmacy, this cannot be changed. AFTER YOUR SURGERY:  Bleeding through the bandage almost always occurs. Do not let this alarm you. Simply add more gauze or a towel, call us, and come in for a dressing change. If you think it is excessive, contact us immediately or go to the local emergency room. Do not get the bandage wet. Showering is possible with plastic protectors. Be very careful, as the bathroom can be wet and slippery. If you do get your dressing wet, it should be changed immediately. Please contact us. ONCE YOUR ARE OUT OF YOUR CAST AND/OR REMOVABLE BOOT, SWELLING MAY PERSIST FOR MANY MONTHS. YOU MIGHT ALSO EXPERIENCE A BLUISH DISCOLORATION OF YOUR LEG. THIS IS NORMAL AND PART OF THE USUAL POSTOPERATIVE EXPERIENCE. WEARING COMPRESSION HOSE (ELASTIC STOCKINGS) CAN HELP AVOID SOME OF THIS SWELLING. DRESSING:   The purpose of the surgical dressing is to keep your wound and the surgical site protected from the environment.   Most dressings contain splints, which help to hold your foot and ankle in a corrected position, and also allow the surgical site to heal properly. Dressings will remain in place and undisturbed until the first postop visit. If you have a drain in place, this will need to be removed in 1-3 days after surgery. The time for the drain to be pulled will be written on your discharge instruction sheet. CAST  INSTRUCTIONS:  You may or may not get a cast following surgery. If you do, pay close attention to the following:    After application of a splint or cast, it is very important to elevate your leg for 24 to 72 hours. The injured area should be elevated well above the heart. Remember “Toes above your Nose”. Rest and elevation greatly reduce pain and speed the healing process by minimizing early swelling. CALL YOUR DOCTORS OFFICE OR VISIT LOCATION EMERGENCY ROOM IF YOU HAVE ANY OF THE FOLLOWING:    Significant increased pain, which may be caused by swelling, and the feeling that the splint or cast is too tight  Numbness and tingling in your hand or foot, which may be caused by too much pressure on the nerves  Burning and stinging, which may be caused by too much pressure on the skin  Excessive swelling below the cast, which may mean the cast is slowing your blood circulation  Loss of active movement of toes, which request an urgent evaluation  Loss of “capillary refill”. Pinch the tip of toes and tricia the skin. Release pressure and if the skin does not return pink then call the office immediately. DO NOT GET YOUR CAST WET. Bacteria thrive in moist dark areas. We do not want this. If your cast becomes wet, return to the office and we will apply another one. PAIN AFTER SURGERY:  Narcotic pain medication can and will depress your respiratory system if taken in excess. The goal of pain management with narcotics is to be comfortable not pain free. If you take enough narcotics to be pain free then you run the risk of stopping breathing. If this happens, call 911 immediately!   Pain in the heel is often caused by pressure from the weight of your foot on the bed. Make sure your heel is suspended off the bed by keeping a pillow underneath your calf not your knee. Medications: You will be given narcotic pain medication. Do NOT drive while taking narcotic medications. Medications such as Darvocet, Percocet, Vicoden or Tylenol #3, also contain acetaminophen (Tylenol). Do not take acetaminophen or Tylenol from home when taking theses medications. When you fill your prescription, you may ask the pharmacist if your pain medication has acetaminophen/Tylenol in it. It is okay to take Tylenol with Oxycontin/Oxycodone. Should you have pain after taking your prescription medication, ibuprophen (Motrin, Advil, and Alleve) is a common over the counter preparation and may often be taken with the prescription pain medication as long as you take them with food. These medications can irritate the stomach lining. Unless you are allergic to aspirin or currently taking a blood thinner, Dr. Brandt Martinez patients are requested to take one 325 mg aspirin every 12 hours until you are back to walking normally after surgery (This can be up to 6 weeks). Narcotic medications commonly cause nausea. Taking them with food will decrease this side effect. If you are having extreme nausea, please contact us for an alternative medication or for something that can be taken with this medication to decrease the nausea. Also, narcotic medications frequently cause constipation. An increase of fiber, fruits and vegetables in your diet may alleviate this problem, or if necessary, you may use an over-the-counter medication such as senekot, colace, or Fibercon for constipation problems. You should resume all medications you were taking prior to the surgery unless otherwise specified. Activity:   Because of your recent foot surgery, your activity level will decrease.  You will need to elevate your foot ABOVE the level of your heart for a minimum of four days. The length of time necessary for the swelling to go down, and for your wounds to heal properly depends greatly on your efforts here. Elevation is extremely important to avoid compromising the blood supply to your foot. Remember when your foot is down it will swell, which will increase pain and slow healing. Wiggle your toes frequently if possible. If you go home with a regional block, (a type of anesthesia) the foot and leg will be numb. Think of ways to get into your house and around the house until the block wears off. Keep in mind that it may be a legal issue if you drive while in a cast or splint, especially when the splint is on the right foot. You may call the Department of Motor Vehicles to schedule a road test if you have adaptive equipment applied to your car. The amount of weight you are allowed to bear on your foot will be written on your discharge sheet filled out at the time of surgery. The following is an explanation of the possibilities:       [id number] has the following policies when it comes to ELECTIVE surgery  No elective surgery requiring anesthesia until 7 weeks after a patient tested positive for COVID-19   No elective surgery requiring anesthesia until 3 months after a patient was hospitalized for COVID-19           Heel-only weight bearing:   Usually, this order is given for use with a special shoe only, which will help you to put weight only on your heel. You may bear your body weight on your foot, as long as it is only borne on your heel.

## 2023-09-20 ENCOUNTER — TELEPHONE (OUTPATIENT)
Age: 58
End: 2023-09-20

## 2023-09-20 NOTE — TELEPHONE ENCOUNTER
Caller: Patient    Doctor: Dr. Lea Caceres    Reason for call: Patient calling stating that his [facility] paperwork was faxed but they only received the first page and then they got a failure notice. Patient asking if paperwork can be refaxed to [facility].       Call back#: [phone]

## 2023-09-21 ENCOUNTER — ANESTHESIA EVENT (OUTPATIENT)
Dept: PERIOP | Facility: AMBULARY SURGERY CENTER | Age: 58
End: 2023-09-21
Payer: COMMERCIAL

## 2023-09-26 NOTE — PRE-PROCEDURE INSTRUCTIONS
Pre-Surgery Instructions:   Medication Instructions   • albuterol (PROVENTIL HFA,VENTOLIN HFA) 90 mcg/act inhaler Uses PRN- OK to take day of surgery   • b complex vitamins capsule Stop taking 7 days prior to surgery. • carvedilol (COREG) 3.125 mg tablet Take day of surgery. • eszopiclone (LUNESTA) 3 MG tablet Take night before surgery   • FLUoxetine (PROzac) 40 MG capsule Take day of surgery. • fluticasone-umeclidinium-vilanterol (Trelegy Ellipta) 100-62.5-25 mcg/actuation inhaler Take day of surgery. • ibuprofen (MOTRIN) 600 mg tablet Stop taking 7 days prior to surgery. • lisinopril (ZESTRIL) 30 mg tablet Hold day of surgery. • multivitamin (THERAGRAN) TABS Stop taking 7 days prior to surgery. • pantoprazole (PROTONIX) 40 mg tablet Take day of surgery. • rosuvastatin (CRESTOR) 10 MG tablet Take night before surgery   • VITAMIN D PO Stop taking 7 days prior to surgery. • Zinc Sulfate (ZINC 15 PO) Stop taking 7 days prior to surgery. Medication instructions for day surgery reviewed. Please use only a sip of water to take your instructed medications. Avoid all over the counter vitamins, supplements and NSAIDS for one week prior to surgery per anesthesia guidelines. Tylenol is ok to take as needed. You will receive a call one business day prior to surgery with an arrival time and hospital directions. If your surgery is scheduled on a Monday, the hospital will be calling you on the Friday prior to your surgery. If you have not heard from anyone by 8pm, please call the hospital supervisor through the hospital  at [phone]. Ghazal Rodriguez [phone]). Do not eat or drink anything after midnight the night before your surgery, including candy, mints, lifesavers, or chewing gum. Do not drink alcohol 24hrs before your surgery. Try not to smoke at least 24hrs before your surgery.        Follow the pre surgery showering instructions as listed in the [facility] Surgical Experience Booklet” or otherwise provided by your surgeon's office. Do not shave the surgical area 24 hours before surgery. Do not apply any lotions, creams, including makeup, cologne, deodorant, or perfumes after showering on the day of your surgery. No contact lenses, eye make-up, or artificial eyelashes. Remove nail polish, including gel polish, and any artificial, gel, or acrylic nails if possible. Remove all jewelry including rings and body piercing jewelry. Wear causal clothing that is easy to take on and off. Consider your type of surgery. Keep any valuables, jewelry, piercings at home. Please bring any specially ordered equipment (sling, braces) if indicated. Arrange for a responsible person to drive you to and from the hospital on the day of your surgery. Visitor Guidelines discussed. Call the surgeon's office with any new illnesses, exposures, or additional questions prior to surgery. Please reference your [facility] Surgical Experience Booklet” for additional information to prepare for your upcoming surgery. Pt instructed to stop nsaids and supplements one week prior to surgery. Pt verbalized understanding of shower and med instructions.

## 2023-09-29 ENCOUNTER — TELEPHONE (OUTPATIENT)
Dept: OBGYN CLINIC | Facility: HOSPITAL | Age: 58
End: 2023-09-29

## 2023-09-29 NOTE — TELEPHONE ENCOUNTER
Caller: Patricia Ramsey @ [facility]     Doctor: Lachman    Reason for call: Looking for procedure codes and pre-auth for surgery. Please advise. Can use the single source code.     Sx 10/5/23    Call back#: [phone] Ask for Enrique Wu - [phone]

## 2023-10-02 NOTE — TELEPHONE ENCOUNTER
Caller: [facility]   and patient     Doctor: Lachman     Reason for call: Looking for procedure codes and pre-auth for surgery. Please advise. Can use the single source code.   Patient is calling as well and quite upset.     Sx 10/5/23     Call back#: [phone] Ask for [facility] [phone]

## 2023-10-04 ENCOUNTER — TELEPHONE (OUTPATIENT)
Age: 58
End: 2023-10-04

## 2023-10-04 PROBLEM — T84.84XA PAINFUL ORTHOPAEDIC HARDWARE (HCC): Status: ACTIVE | Noted: 2023-10-04

## 2023-10-04 PROBLEM — M96.89 NONUNION OF OSTEOTOMY SITE: Status: ACTIVE | Noted: 2023-10-04

## 2023-10-04 RX ORDER — ASPIRIN 325 MG
325 TABLET, DELAYED RELEASE (ENTERIC COATED) ORAL 2 TIMES DAILY
Qty: 84 TABLET | Refills: 0 | Status: SHIPPED | OUTPATIENT
Start: 2023-10-04

## 2023-10-04 RX ORDER — OXYCODONE HYDROCHLORIDE 5 MG/1
5 TABLET ORAL EVERY 4 HOURS PRN
Qty: 30 TABLET | Refills: 0 | Status: SHIPPED | OUTPATIENT
Start: 2023-10-04 | End: 2023-10-04 | Stop reason: SINTOL

## 2023-10-04 RX ORDER — ONDANSETRON 4 MG/1
4 TABLET, FILM COATED ORAL EVERY 8 HOURS PRN
Qty: 10 TABLET | Refills: 0 | Status: SHIPPED | OUTPATIENT
Start: 2023-10-04

## 2023-10-04 NOTE — TELEPHONE ENCOUNTER
Caller: Patient    Doctor: Lachman    Reason for call: Pt is scheduled for surgery tomorrow and got a notification from the pharmacy that oxycodone was called in for him. Pt states last time he had surgery with Dr Cierra Buckner, they discussed that the oxy did not help with pain, it just made him feel "stupid"  Pt is requesting this script be changed to morphine, which was prescribed last time and did help with pain. Confirmed Pharmacy on file is correct.     Call back#: [phone]

## 2023-10-04 NOTE — ANESTHESIA PREPROCEDURE EVALUATION
Procedure:  Revision Right first MTP arthrodesis with BMAC harvest and bone graft harvest from the calcaneus. Left 1st MTP removal of hardware (Bilateral: Foot)    Relevant Problems   CARDIO   (+) Mixed hyperlipidemia      GI/HEPATIC   (+) GERD (gastroesophageal reflux disease)      /RENAL   (+) Benign hypertension with CKD (chronic kidney disease), stage II   (+) CKD (chronic kidney disease) stage 2, GFR 60-89 ml/min      MUSCULOSKELETAL   (+) Arthritis of both feet   (+) Primary osteoarthritis of left foot   (+) Primary osteoarthritis of right foot      NEURO/PSYCH   (+) Major depressive disorder, single episode, mild (HCC)      PULMONARY   (+) Centrilobular emphysema (HCC)   (+) SOB (shortness of breath)   (+) Sleep apnea      Past Medical History:   Diagnosis Date   • Arthritis    • Colitis     x1 occurnce   • COPD (chronic obstructive pulmonary disease) ([address])    • COVID-19     positive test on 21   • Depression    • Diverticulitis    • Diverticulitis of colon    • Diverticulosis    • GERD (gastroesophageal reflux disease)    • Hyperlipidemia    • Hypertension    • Pulmonary emphysema (HCC)    • Sleep apnea     no cpap   • Umbilical hernia      Smoking Status: Former - 35 pack years   Quit Smokin13   Smokeless Tobacco Status: Never   Comments: quit 6.5 years ago - As per [employer]    Alcohol use: Yes, unspecified volume   Comments: "few times a week"   Drug use: Never       Physical Exam    Airway    Mallampati score: III  TM Distance: >3 FB  Neck ROM: full     Dental   No notable dental hx     Cardiovascular  Rhythm: regular, Rate: normal,     Pulmonary  Breath sounds clear to auscultation,     Other Findings  Intercisor Distance > 3cm          Anesthesia Plan  ASA Score- 3     Anesthesia Type- general with ASA Monitors. Additional Monitors:   Airway Plan: LMA.     Comment: Discussed benefits/risks of general anesthesia including possibility of mouth/throat pain, injury to lips/teeth, nausea/vomiting, and surgical pain along with more rare complications such as stroke, MI, pneumonia, aspiration, and injury to blood vessels. All questions answered. .       Plan Factors-Exercise tolerance (METS): >4 METS. Chart reviewed. EKG reviewed. Existing labs reviewed. Induction- intravenous. Postoperative Plan- Plan for postoperative opioid use. Planned trial extubation    Informed Consent- Anesthetic plan and risks discussed with patient. I personally reviewed this patient with the CRNA. Discussed and agreed on the Anesthesia Plan with the CRNA. Adam Palmer

## 2023-10-05 ENCOUNTER — ANESTHESIA (OUTPATIENT)
Dept: PERIOP | Facility: AMBULARY SURGERY CENTER | Age: 58
End: 2023-10-05
Payer: COMMERCIAL

## 2023-10-05 ENCOUNTER — HOSPITAL ENCOUNTER (OUTPATIENT)
Facility: AMBULARY SURGERY CENTER | Age: 58
Setting detail: OUTPATIENT SURGERY
Discharge: HOME | End: 2023-10-05
Attending: ORTHOPAEDIC SURGERY | Admitting: ORTHOPAEDIC SURGERY
Payer: COMMERCIAL

## 2023-10-05 ENCOUNTER — APPOINTMENT (OUTPATIENT)
Dept: RADIOLOGY | Facility: AMBULARY SURGERY CENTER | Age: 58
End: 2023-10-05
Payer: COMMERCIAL

## 2023-10-05 VITALS
BODY MASS INDEX: 28 KG/M2 | HEART RATE: 67 BPM | SYSTOLIC BLOOD PRESSURE: 125 MMHG | RESPIRATION RATE: 16 BRPM | OXYGEN SATURATION: 96 % | TEMPERATURE: 97.5 F | WEIGHT: 212.2 LBS | DIASTOLIC BLOOD PRESSURE: 69 MMHG

## 2023-10-05 DIAGNOSIS — M96.89 NONUNION OF OSTEOTOMY SITE: Primary | ICD-10-CM

## 2023-10-05 DIAGNOSIS — T84.84XA PAINFUL ORTHOPAEDIC HARDWARE (HCC): ICD-10-CM

## 2023-10-05 DIAGNOSIS — M20.21 HALLUX RIGIDUS, RIGHT FOOT: ICD-10-CM

## 2023-10-05 PROCEDURE — C1734 ORTH/DEVIC/DRUG BN/BN,TIS/BN: HCPCS | Performed by: ORTHOPAEDIC SURGERY

## 2023-10-05 PROCEDURE — 20900 REMOVAL OF BONE FOR GRAFT: CPT | Performed by: ORTHOPAEDIC SURGERY

## 2023-10-05 PROCEDURE — C1713 ANCHOR/SCREW BN/BN,TIS/BN: HCPCS | Performed by: ORTHOPAEDIC SURGERY

## 2023-10-05 PROCEDURE — 73620 X-RAY EXAM OF FOOT: CPT

## 2023-10-05 PROCEDURE — C1781 MESH (IMPLANTABLE): HCPCS | Performed by: ORTHOPAEDIC SURGERY

## 2023-10-05 PROCEDURE — 99024 POSTOP FOLLOW-UP VISIT: CPT | Performed by: ORTHOPAEDIC SURGERY

## 2023-10-05 PROCEDURE — 11426 EXC H-F-NK-SP B9+MARG >4 CM: CPT | Performed by: ORTHOPAEDIC SURGERY

## 2023-10-05 PROCEDURE — C9290 INJ, BUPIVACAINE LIPOSOME: HCPCS | Performed by: STUDENT IN AN ORGANIZED HEALTH CARE EDUCATION/TRAINING PROGRAM

## 2023-10-05 PROCEDURE — 20999 UNLISTED PX MUSCSKEL GENERAL: CPT | Performed by: ORTHOPAEDIC SURGERY

## 2023-10-05 PROCEDURE — 28750 FUSION OF BIG TOE JOINT: CPT | Performed by: ORTHOPAEDIC SURGERY

## 2023-10-05 PROCEDURE — 20680 REMOVAL OF IMPLANT DEEP: CPT | Performed by: ORTHOPAEDIC SURGERY

## 2023-10-05 DEVICE — LOCKING SCREW, FULLY THREADED,T8
Type: IMPLANTABLE DEVICE | Site: FOOT | Status: FUNCTIONAL
Brand: VARIAX

## 2023-10-05 DEVICE — POLYAXIAL LOCKING PLATE -  MTP CROSS-PLATE, RIGHT (T8)
Type: IMPLANTABLE DEVICE | Site: FOOT | Status: FUNCTIONAL
Brand: ANCHORAGE

## 2023-10-05 DEVICE — CP LAG SCREW (T8)
Type: IMPLANTABLE DEVICE | Site: FOOT | Status: FUNCTIONAL
Brand: ANCHORAGE

## 2023-10-05 DEVICE — GRAFT BIO4 ORTHO BONE MATRIX 2.5ML-: Type: IMPLANTABLE DEVICE | Site: FOOT | Status: FUNCTIONAL

## 2023-10-05 RX ORDER — FENTANYL CITRATE/PF 50 MCG/ML
50 SYRINGE (ML) INJECTION
Status: DISCONTINUED | OUTPATIENT
Start: 2023-10-05 | End: 2023-10-05 | Stop reason: HOSPADM

## 2023-10-05 RX ORDER — PHENYLEPHRINE HCL IN 0.9% NACL 1 MG/10 ML
SYRINGE (ML) INTRAVENOUS AS NEEDED
Status: DISCONTINUED | OUTPATIENT
Start: 2023-10-05 | End: 2023-10-05

## 2023-10-05 RX ORDER — DEXAMETHASONE SODIUM PHOSPHATE 10 MG/ML
INJECTION, SOLUTION INTRAMUSCULAR; INTRAVENOUS AS NEEDED
Status: DISCONTINUED | OUTPATIENT
Start: 2023-10-05 | End: 2023-10-05

## 2023-10-05 RX ORDER — BUPIVACAINE HYDROCHLORIDE 2.5 MG/ML
INJECTION, SOLUTION EPIDURAL; INFILTRATION; INTRACAUDAL AS NEEDED
Status: DISCONTINUED | OUTPATIENT
Start: 2023-10-05 | End: 2023-10-05 | Stop reason: HOSPADM

## 2023-10-05 RX ORDER — BUPIVACAINE HYDROCHLORIDE 5 MG/ML
INJECTION, SOLUTION EPIDURAL; INTRACAUDAL
Status: DISCONTINUED | OUTPATIENT
Start: 2023-10-05 | End: 2023-10-05

## 2023-10-05 RX ORDER — CHLORHEXIDINE GLUCONATE 4 G/100ML
SOLUTION TOPICAL DAILY PRN
Status: DISCONTINUED | OUTPATIENT
Start: 2023-10-05 | End: 2023-10-05 | Stop reason: HOSPADM

## 2023-10-05 RX ORDER — ONDANSETRON 2 MG/ML
INJECTION INTRAMUSCULAR; INTRAVENOUS AS NEEDED
Status: DISCONTINUED | OUTPATIENT
Start: 2023-10-05 | End: 2023-10-05

## 2023-10-05 RX ORDER — ONDANSETRON 2 MG/ML
4 INJECTION INTRAMUSCULAR; INTRAVENOUS ONCE AS NEEDED
Status: DISCONTINUED | OUTPATIENT
Start: 2023-10-05 | End: 2023-10-05 | Stop reason: HOSPADM

## 2023-10-05 RX ORDER — LIDOCAINE HYDROCHLORIDE 10 MG/ML
INJECTION, SOLUTION EPIDURAL; INFILTRATION; INTRACAUDAL; PERINEURAL AS NEEDED
Status: DISCONTINUED | OUTPATIENT
Start: 2023-10-05 | End: 2023-10-05

## 2023-10-05 RX ORDER — VANCOMYCIN HYDROCHLORIDE 1 G/20ML
INJECTION, POWDER, LYOPHILIZED, FOR SOLUTION INTRAVENOUS AS NEEDED
Status: DISCONTINUED | OUTPATIENT
Start: 2023-10-05 | End: 2023-10-05 | Stop reason: HOSPADM

## 2023-10-05 RX ORDER — SODIUM CHLORIDE, SODIUM LACTATE, POTASSIUM CHLORIDE, CALCIUM CHLORIDE 600; 310; 30; 20 MG/100ML; MG/100ML; MG/100ML; MG/100ML
INJECTION, SOLUTION INTRAVENOUS CONTINUOUS PRN
Status: DISCONTINUED | OUTPATIENT
Start: 2023-10-05 | End: 2023-10-05

## 2023-10-05 RX ORDER — MAGNESIUM HYDROXIDE 1200 MG/15ML
LIQUID ORAL AS NEEDED
Status: DISCONTINUED | OUTPATIENT
Start: 2023-10-05 | End: 2023-10-05 | Stop reason: HOSPADM

## 2023-10-05 RX ORDER — SODIUM CHLORIDE, SODIUM LACTATE, POTASSIUM CHLORIDE, CALCIUM CHLORIDE 600; 310; 30; 20 MG/100ML; MG/100ML; MG/100ML; MG/100ML
50 INJECTION, SOLUTION INTRAVENOUS CONTINUOUS
Status: DISCONTINUED | OUTPATIENT
Start: 2023-10-05 | End: 2023-10-05 | Stop reason: HOSPADM

## 2023-10-05 RX ORDER — HYDROMORPHONE HCL/PF 1 MG/ML
SYRINGE (ML) INJECTION AS NEEDED
Status: DISCONTINUED | OUTPATIENT
Start: 2023-10-05 | End: 2023-10-05

## 2023-10-05 RX ORDER — EPHEDRINE SULFATE 50 MG/ML
INJECTION INTRAVENOUS AS NEEDED
Status: DISCONTINUED | OUTPATIENT
Start: 2023-10-05 | End: 2023-10-05

## 2023-10-05 RX ORDER — CEFAZOLIN SODIUM 2 G/50ML
2000 SOLUTION INTRAVENOUS ONCE
Status: COMPLETED | OUTPATIENT
Start: 2023-10-05 | End: 2023-10-05

## 2023-10-05 RX ORDER — PROPOFOL 10 MG/ML
INJECTION, EMULSION INTRAVENOUS AS NEEDED
Status: DISCONTINUED | OUTPATIENT
Start: 2023-10-05 | End: 2023-10-05

## 2023-10-05 RX ORDER — HYDROMORPHONE HCL/PF 1 MG/ML
0.5 SYRINGE (ML) INJECTION
Status: DISCONTINUED | OUTPATIENT
Start: 2023-10-05 | End: 2023-10-05 | Stop reason: HOSPADM

## 2023-10-05 RX ORDER — MIDAZOLAM HYDROCHLORIDE 2 MG/2ML
INJECTION, SOLUTION INTRAMUSCULAR; INTRAVENOUS AS NEEDED
Status: DISCONTINUED | OUTPATIENT
Start: 2023-10-05 | End: 2023-10-05

## 2023-10-05 RX ORDER — CHLORHEXIDINE GLUCONATE ORAL RINSE 1.2 MG/ML
15 SOLUTION DENTAL ONCE
Status: DISCONTINUED | OUTPATIENT
Start: 2023-10-05 | End: 2023-10-05 | Stop reason: HOSPADM

## 2023-10-05 RX ORDER — MORPHINE SULFATE 15 MG/1
15 TABLET, FILM COATED, EXTENDED RELEASE ORAL 2 TIMES DAILY
Qty: 20 TABLET | Refills: 0 | Status: SHIPPED | OUTPATIENT
Start: 2023-10-05 | End: 2023-10-15

## 2023-10-05 RX ORDER — FENTANYL CITRATE 50 UG/ML
INJECTION, SOLUTION INTRAMUSCULAR; INTRAVENOUS AS NEEDED
Status: DISCONTINUED | OUTPATIENT
Start: 2023-10-05 | End: 2023-10-05

## 2023-10-05 RX ADMIN — SODIUM CHLORIDE, SODIUM LACTATE, POTASSIUM CHLORIDE, AND CALCIUM CHLORIDE: .6; .31; .03; .02 INJECTION, SOLUTION INTRAVENOUS at 08:31

## 2023-10-05 RX ADMIN — MIDAZOLAM 2 MG: 1 INJECTION INTRAMUSCULAR; INTRAVENOUS at 08:54

## 2023-10-05 RX ADMIN — HYDROMORPHONE HYDROCHLORIDE 0.5 MG: 1 INJECTION, SOLUTION INTRAMUSCULAR; INTRAVENOUS; SUBCUTANEOUS at 09:25

## 2023-10-05 RX ADMIN — Medication 100 MCG: at 09:52

## 2023-10-05 RX ADMIN — PROPOFOL 200 MG: 10 INJECTION, EMULSION INTRAVENOUS at 08:59

## 2023-10-05 RX ADMIN — EPHEDRINE SULFATE 5 MG: 50 INJECTION INTRAVENOUS at 09:12

## 2023-10-05 RX ADMIN — HYDROMORPHONE HYDROCHLORIDE 0.5 MG: 1 INJECTION, SOLUTION INTRAMUSCULAR; INTRAVENOUS; SUBCUTANEOUS at 10:50

## 2023-10-05 RX ADMIN — BUPIVACAINE HYDROCHLORIDE 15 ML: 5 INJECTION, SOLUTION EPIDURAL; INTRACAUDAL; PERINEURAL at 11:15

## 2023-10-05 RX ADMIN — FENTANYL CITRATE 50 MCG: 50 INJECTION INTRAMUSCULAR; INTRAVENOUS at 10:45

## 2023-10-05 RX ADMIN — SODIUM CHLORIDE, SODIUM LACTATE, POTASSIUM CHLORIDE, AND CALCIUM CHLORIDE: .6; .31; .03; .02 INJECTION, SOLUTION INTRAVENOUS at 09:43

## 2023-10-05 RX ADMIN — FENTANYL CITRATE 50 MCG: 50 INJECTION INTRAMUSCULAR; INTRAVENOUS at 09:04

## 2023-10-05 RX ADMIN — PROPOFOL 40 MG: 10 INJECTION, EMULSION INTRAVENOUS at 09:26

## 2023-10-05 RX ADMIN — EPHEDRINE SULFATE 10 MG: 50 INJECTION INTRAVENOUS at 09:07

## 2023-10-05 RX ADMIN — HYDROMORPHONE HYDROCHLORIDE 0.5 MG: 1 INJECTION, SOLUTION INTRAMUSCULAR; INTRAVENOUS; SUBCUTANEOUS at 10:55

## 2023-10-05 RX ADMIN — LIDOCAINE HYDROCHLORIDE 50 MG: 10 INJECTION, SOLUTION EPIDURAL; INFILTRATION; INTRACAUDAL; PERINEURAL at 08:58

## 2023-10-05 RX ADMIN — Medication 100 MCG: at 09:20

## 2023-10-05 RX ADMIN — DEXAMETHASONE SODIUM PHOSPHATE 10 MG: 10 INJECTION, SOLUTION INTRAMUSCULAR; INTRAVENOUS at 09:04

## 2023-10-05 RX ADMIN — EPHEDRINE SULFATE 10 MG: 50 INJECTION INTRAVENOUS at 09:19

## 2023-10-05 RX ADMIN — ONDANSETRON 4 MG: 2 INJECTION INTRAMUSCULAR; INTRAVENOUS at 09:04

## 2023-10-05 RX ADMIN — CEFAZOLIN SODIUM 2000 MG: 2 SOLUTION INTRAVENOUS at 08:59

## 2023-10-05 RX ADMIN — FENTANYL CITRATE 50 MCG: 50 INJECTION INTRAMUSCULAR; INTRAVENOUS at 08:54

## 2023-10-05 RX ADMIN — FENTANYL CITRATE 50 MCG: 50 INJECTION INTRAMUSCULAR; INTRAVENOUS at 10:38

## 2023-10-05 NOTE — H&P
Omaira Robles M.D. Attending, Orthopaedic Surgery  Foot and [address] [facility]        ORTHOPAEDIC FOOT AND ANKLE H+P     Assessment:   Bilateral 1st MTP joint pain           Plan:   · The patient verbalized understanding of exam findings and treatment plan. We engaged in the shared decision-making process and treatment options were discussed at length with the patient. Surgical and conservative management discussed today along with risks and benefits. · Will plan for hardware removal left 1st MTP and revision right 1st MTP arthrodesis with bone grafting  · Consent in chart  CONSENT FOR BONY PROCEDURES:   Patient understands that there is no guarantee that the surgery will relieve all of His pain and also understands that there may be a prolonged course of protected weight-bearing status required which will restrict them from driving and other activities as discussed at today's visit. Patient recognizes that there are risks with surgery including bleeding, numbness, nerve irritation, wound complications, infection, continued pain, joint stiffness, malunion, nonunion, anesthetic complications, death, failure of procedure and possible need for further surgery. The patient understands that there is no guarantee that this surgery will relieve all of His pain and symptoms. Patient understands that there is no guarantee that they will return to full function after the procedure. Patient has provided informed consent for the procedure. History of Present Illness:   Chief Complaint: Bilateral great toe pain  Madie Benson is a 59 y.o. male who is being seen for right and left great toe pain after 1st MTP arthrodesis surgery. Right side didn't;t heal, left side healed but hardware hurts. Patient has failed all conservative treatment measures. Pain is localized at 1st MTP joints bilaterally with minimal radiating and described as sharp and severe.  Patient denies numbness, tingling or radicular pain. Denies history of neuropathy. Patient does not smoke, does not have diabetes and does not take blood thinners. Patient denies family history of anesthesia complications and has not had any complications with anesthesia. Pain/symptom timing:  Worse during the day when active  Pain/symptom context:  Worse with activites and work  Pain/symptom modifying factors:  Rest makes better, activities make worse  Pain/symptom associated signs/symptoms: none    Prior treatment   · NSAIDsYes    · Injections No   · Bracing/Orthotics Yes   · Physical Therapy No     Orthopedic Surgical History:   See below    Past Medical, Surgical and Social History:  Past Medical History:  has a past medical history of Arthritis, Colitis, COPD (chronic obstructive pulmonary disease) ([address]), COVID-19, Depression, Diverticulitis, Diverticulitis of colon, Diverticulosis, GERD (gastroesophageal reflux disease), Hyperlipidemia, Hypertension, Pulmonary emphysema ([address]), Sleep apnea, and Umbilical hernia. Problem List: does not have any pertinent problems on file. Past Surgical History:  has a past surgical history that includes Cholecystectomy; Cataract extraction (Bilateral); Knee arthroscopy (Left); Colonoscopy; Esophagogastroduodenoscopy; [place] tooth extraction; Hernia repair (09/19/2019); pr tnot elbow lateral/medial debride open tdn rpr (Left, 11/17/2020); pr hallux rigidus w/cheilectomy 1st mp jt w/o implt (Bilateral, 12/10/2021); pr debridement subcutaneous tissue 20 sq cm/< (Right, 07/14/2022); pr arthrodesis great toe metatarsophalangeal joint (Bilateral, 08/25/2022); and Eye surgery. Family History: family history includes Abdominal aortic aneurysm in his father;  Anemia in his mother; Cancer in his father; Diabetes in his father, maternal grandfather, maternal grandmother, paternal grandfather, and paternal grandmother; Emphysema in his brother and maternal grandmother; Hypertension in his father and mother; Pancreatic cancer in his father. Social History:  reports that he quit smoking about 10 years ago. His smoking use included cigarettes. He started smoking about 36 years ago. He has a 35.00 pack-year smoking history. He has never used smokeless tobacco. He reports current alcohol use. He reports that he does not use drugs. Current Medications: Scheduled Meds:  Current Facility-Administered Medications   Medication Dose Route Frequency Provider Last Rate   • bupivacaine liposomal  20 mL Infiltration Once Bright Raquel Mendosa MD     • cefazolin  2,000 mg Intravenous Once Jorge Aquino MD     • chlorhexidine   Topical Daily PRN Jorge Aquino MD     • chlorhexidine  15 mL Swish & Spit Once Jorge Aquino MD       Continuous Infusions:   PRN Meds:. •  chlorhexidine    Allergies: is allergic to allopurinol. Review of Systems:  General- denies fever/chills  HEENT- denies hearing loss or sore throat  Eyes- denies eye pain or visual disturbances, denies red eyes  Respiratory- denies cough or SOB  Cardio- denies chest pain or palpitations  GI- denies abdominal pain  Endocrine- denies urinary frequency  Urinary- denies pain with urination  Musculoskeletal- Negative except noted above  Skin- denies rashes or wounds  Neurological- denies dizziness or headache  Psychiatric- denies anxiety or difficulty concentrating    Physical Exam:   There were no vitals taken for this visit. General/Constitutional: No apparent distress: well-nourished and well developed. Eyes: normal ocular motion  Cardio: RRR, Normal S1S2, No m/r/g  Lymphatic: No appreciable lymphadenopathy  Respiratory: Non-labored breathing, CTA b/l no w/c/r  Abdominal: Soft and nontender  Vascular: No edema, swelling or tenderness, except as noted in detailed exam.  Integumentary: No impressive skin lesions present, except as noted in detailed exam.  Neuro: No ataxia or tremors noted  Psych: Normal mood and affect, oriented to person, place and time.  Appropriate affect. Musculoskeletal: Normal, except as noted in detailed exam and in HPI. Examination    Right    Gait Antalgic   Musculoskeletal Tender to palpation at 1st MTP    Skin Normal.      Nails Normal    Range of Motion  1st MTP- 0 degrees dorsiflexion, 0 degrees plantarflexion  Subtalar motion: normal    Stability Stable    Muscle Strength 5/5 tibialis anterior  5/5 gastrocnemius-soleus  5/5 posterior tibialis  5/5 peroneal/eversion strength  5/5 EHL  5/5 FHL    Neurologic Normal    Sensation Intact to light touch throughout sural, saphenous, superficial peroneal, deep peroneal and medial/lateral plantar nerve distributions. [place]-Pat 5.07 filament (10g) testing deferred. Cardiovascular Brisk capillary refill < 2 seconds,intact DP and PT pulses    Special Tests None      Imaging Studies:   None new        James R. Lachman, MD  Foot & Ankle Surgery   Department of [address] [place] [address] personally performed the service. Joleen Other. Lachman, MD

## 2023-10-05 NOTE — ANESTHESIA POSTPROCEDURE EVALUATION
Post-Op Assessment Note    CV Status:  Stable  Pain Score: 0    Pain management: adequate     Mental Status:  Alert and awake   Hydration Status:  Euvolemic   PONV Controlled:  Controlled   Airway Patency:  Patent      Post Op Vitals Reviewed: Yes      Staff: CRNA, Anesthesiologist         No notable events documented.     BP   124/67   Temp  97.8   Pulse  67   Resp   12   SpO2   98

## 2023-10-05 NOTE — DISCHARGE INSTR - AVS FIRST PAGE
Jasmin León M.D. Attending, [address] [place] [address] Office Phone: [phone] ? Fax: [phone]  [place] Office Phone: [phone] ? [phone]    : Shayy Bear, [address] [facility]     Surgery Coordinators J Carlos Kervin: Butch Carrera, [phone]  Justino Hernandez, [phone]  Surgery Coordinator Mekhi:  Sammie Nance, [phone]                                                              [email or web address]   POST-OPERATIVE INSTRUCTIONS    General Information:  Typical post operative visits are at the following intervals:  2-3 weeks post surgery, 6 weeks post surgery, 3 months post surgery, 6 months post surgery, and then on a yearly basis. However, this may change based on Dr. Villaseñor Masters recommendation. #1 post-operative rule for foot/ankle surgery:  ONCE YOU ARE OUT OF YOUR CAST AND/OR REMOVABLE BOOT, SWELLING MAY PERSIST FOR MANY MONTHS. YOU MIGHT ALSO EXPERIENCE A BLUISH DISCOLORATION OF YOUR LEG. THIS IS NORMAL AND PART OF THE USUAL POSTOPERATIVE EXPERIENCE. DO NOT WAIT UNTIL YOUR BLOCK WEARS OFF TO TAKE YOUR PAIN MEDICATION. IT TAKES A FEW DOSES OF THE PAIN MEDICATION TO REACH A THERAPEUTIC LEVEL. TAKE A TABLET PROACTIVELY BEFORE YOU HAVE ANY PAIN AND AGAIN 4 HOURS LATER SO WHEN THE BLOCK WEARS OFF, YOU ARE NOT CAUGHT OFF GUARD. SMOKING:  Smoking results in incomplete healing of fractures (broken bones) and joints that my have been fused. Smoking and nicotine also prevents the growth of bone into ankle replacements and bone healing. It also slows the healing of muscles and skin (soft tissue). Therefore, please do not have surgery if you continue to smoke. We reserve the right to cancel your surgery if we suspect that you are smoking. DO NOT use nicorette gum or other patches.   Please find an alternative method to quit smoking before your surgery and do not restart after surgery to allow for healing. THREE RULES:    After surgery you will most likely be given the instructions “KEEP YOUR TOES ABOVE YOUR NOSE.”  This means that you MUST have your feet elevated higher than your heart. Keeping your toes above your nose helps to heal the muscles and skin (soft tissues) by reducing swelling in your leg. This position also helps to prevent infection, and is very important in avoiding deep venous thrombosis (blood clots). In order to keep the blood circulating in your legs and in order to avoid deep vein   thrombosis (blood clots), we ask patients to GET UP ONCE AN HOUR during the day. This means you should at least cross the room and come back. It does not mean you have to be up for long periods of time. In most cases we will not have people immediately put any weight on their operated part. This is important to prevent loosening of metal or other devices holding the bones together. It also prevents irritation of the soft tissues which can lead to prolonged healing. When we say get up once an hour, please walk, hop or move with an assisted device. This is important! Do not do any excessive walking during the first few days after surgery. Recovering from surgery is a full-time task for the patient. Postoperative care is important to avoid irritating the skin incision, which can lead to infection. Please do not plan activities or go out of town for several weeks after surgery. AFTER YOUR SURGERY:  Bleeding through the bandage almost always occurs. Do not let this alarm you. Simply overwrap with an ABD pad and Ace bandage (The nurses discharging your from the day of surgery will provide this.)   If you think it is excessive, you can come in early for a dressing change (at around 1 week instead of 3 weeks postop.)    Do not get the bandage wet. Showering is possible with plastic protectors. Be very careful, as the bathroom can be wet and slippery.   If you do get your dressing wet, it should be changed immediately. Please contact us. ONCE YOUR ARE OUT OF YOUR CAST AND/OR REMOVABLE BOOT, SWELLING MAY PERSIST FOR MANY MONTHS. THERE WILL ALSO BE A BLUISH DISCOLORATION OF YOUR LEG FOR MONTHS. THIS IS NORMAL AND PART OF THE USUAL POSTOPERATIVE EXPERIENCE. WEARING COMPRESSION HOSE (ELASTIC STOCKINGS) CAN HELP AVOID SOME OF THIS SWELLING. Ice the area 20 minutes every hour once the nerve block wears off. If you are in a cast or a splint, you may need to leave the ice on longer than 20 minutes in order to feel any benefits. DRESSING:   The purpose of the surgical dressing is to keep your wound and the surgical site protected from the environment. Most dressings contain splints, which help to hold your foot and ankle in a corrected position, and also allow the surgical site to heal properly. If you have a drain in place, this will need to be removed in 1 day after surgery. The time for the drain to be pulled will be written on your discharge instruction sheet. CAST  INSTRUCTIONS:  You may or may not get a cast following surgery. If you do, pay close attention to the following:    After application of a splint or cast, it is very important to elevate your leg for 24 to 72 hours. The injured area should be elevated well above the heart. Remember “Toes above your Nose”. Rest and elevation greatly reduce pain and speed the healing process by minimizing early swelling.     CALL YOUR DOCTORS OFFICE OR VISIT LOCATION EMERGENCY ROOM IF YOU HAVE ANY OF THE FOLLOWING:    Significant increased pain, which may be caused by swelling (Strict elevation will alleviate this)  Numbness and tingling in your hand or foot, which may be caused by too much pressure on the nerves (There is always some numbness after surgery due to nerve blocks)  Burning and stinging, which may be caused by too much pressure on the skin  Excessive swelling below the cast, which may mean the cast is slowing your blood circulation  Loss of active movement of toes, which request an urgent evaluation  Loss of “capillary refill”. Pinch the tip of toes and tricia the skin. Release pressure and if the skin does not return pink then call the office immediately. DO NOT GET YOUR CAST WET. Bacteria thrive in moist dark areas. We do not want this. If your cast becomes wet, return to the office and we will apply another one. PAIN AFTER SURGERY:  Narcotic pain medication can and will depress your respiratory system if taken in excess. The goal of pain management with narcotics is to be comfortable not pain free. If you take enough narcotics to be pain free then you run the risk of stopping breathing. If this happens, call 911 immediately! Pain in the heel is often  caused by pressure from the weight of your foot on the bed. Make sure your heel is suspended off the bed by keeping a pillow underneath your calf not your knee. Medications: You will be given narcotic pain medication. Do NOT drive while taking narcotic medications. Medications such as Darvocet, Percocet, Vicoden or Tylenol #3, also contain acetaminophen (Tylenol). Do not take acetaminophen or Tylenol from home when taking theses medications. When you fill your prescription, you may ask the pharmacist if your pain medication has acetaminophen/Tylenol in it. It is okay to take Tylenol with Oxycontin/Oxycodone. Unless you are allergic to aspirin or currently taking a blood thinner, Dr. Ha Burnett patients are requested to take one 325 mg aspirin every 12 hours until you are back to walking normally after surgery (This can be up to 6 weeks). Ecotrin (Enteric-coated aspirin) is more sensitive to the stomach and we recommend purchasing this instead of regular aspirin to minimize the risk of stomach irritation. Narcotic medications commonly cause nausea. Taking them with food will decrease this side effect.  If you are having extreme nausea, please contact us for an alternative medication or for something that can be taken with this medication to decrease the nausea. Also, narcotic medications frequently cause constipation. An increase of fiber, fruits and vegetables in your diet may alleviate this problem, or if necessary, you may use an over-the-counter medication such as senekot, colace, or Fibercon for constipation problems. You should resume all medications you were taking prior to the surgery unless otherwise specified. If you had fracture surgery, bony surgery like an osteotomy or fusion, or a surgery that requires bone healing, you are advised to take Vitamin D and Calcium to improve healing potential.  Vitamin D3 4000 units/day and Calcium 1200mg/day. These are over the counter medications so please pick them up at the pharmacy when you are picking up your prescriptions. Activity:   Because of your recent foot surgery, your activity level will decrease. You will need to elevate your foot ABOVE the level of your heart for a minimum of four days. The length of time necessary for the swelling to go down, and for your wounds to heal properly depends greatly on your efforts here. Elevation is extremely important to avoid compromising the blood supply to your foot. Remember when your foot is down it will swell, which will increase pain and slow healing. Wiggle your toes frequently if possible. If you go home with a regional block, (a type of anesthesia) the foot and leg will be numb. Think of ways to get into your house and around the house until the block wears off. Keep in mind that it may be a legal issue if you drive while in a cast or splint, especially when the splint is on the right foot. You may call the Department of Motor Vehicles to schedule a road test if you have adaptive equipment applied to your car.    The amount of weight you are allowed to bear on your foot will be written on your discharge sheet filled out at the time of surgery. The following is an explanation of the possibilities:          Heel-only weight bearing: RIGHT  Usually, this order is given for use with a special shoe only, which will help you to put weight only on your heel. You may bear your body weight on your foot, as long as it is only borne on your heel. Weight bearing as tolerated (WBAT) LEFT  You may put your body weight on your foot as long as you tolerate the pain.

## 2023-10-05 NOTE — ANESTHESIA PROCEDURE NOTES
Peripheral Block    Patient location during procedure: holding area  Start time: 10/5/2023 11:15 AM  Reason for block: at surgeon's request and post-op pain management  Staffing  Performed by: Fabian Aguilar MD  Authorized by: Fabian Aguilar MD    Preanesthetic Checklist  Completed: patient identified, IV checked, site marked, risks and benefits discussed, surgical consent, monitors and equipment checked, pre-op evaluation and timeout performed  Peripheral Block  Patient position: supine  Prep: ChloraPrep  Patient monitoring: continuous pulse ox, frequent blood pressure checks and heart rate  Block type: ankle  Laterality: right  Injection technique: single-shot  Procedures: ultrasound guided, Ultrasound guidance required for the procedure to increase accuracy and safety of medication placement and decrease risk of complications.   Ultrasound permanent image savedbupivacaine (PF) (MARCAINE) 0.5 % injection 20 mL - Perineural   15 mL - 10/5/2023 11:15:00 AM  Needle  Needle type: Stimuplex   Needle gauge: 20 G  Needle length: 4 in  Needle localization: ultrasound guidance  Assessment  Injection assessment: incremental injection, local visualized surrounding nerve on ultrasound, negative aspiration for heme and no paresthesia on injection  Paresthesia pain: none  patient tolerated the procedure well with no immediate complications  Additional Notes  Unremarkable ankle block under US  Identified posterior tibial and superficial peroneal nerves

## 2023-10-05 NOTE — OP NOTE
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 Removal of painful orthopaedic hardware Left 1st MTP joint  2. Removal painful orthopaedic hardware Right 1st MTP joint  3. BMAC harvest from calcaneus and injection in to 1st MTP joint  4. Bone graft harvest from calcaneus, separate incision   5. Revision arthrodesis 1st MTP joint right  6. Scar revision left 1st MTP joint scar  7. Scar revision right 1st MTP joint scar  8. Fluoroscopy without benefit of radiologist, <1 hour        OPERATIVE REPORT:    After informed consent and preoperative medical clearance were obtained, the patient was taken to the preoperative holding area. Allergies were properly assessed and patient was given appropriate perioperative IV antibiotics without complication. Please see the anesthesia report for details of the anesthesia administered.       Patient was taken the operating room placed supine on the operating room table. All bony prominences and skin were well padded, airway maintained, genitalia protected and brachial plexi/ulnar nerves at the elbows protected. Patient's operative lower extremity was prepped and draped in sterile fashion. A time-out was performed with the attending surgeon in the room. First we made an incision laterally over the hindfoot and accessed the lateral wall of the calcaneus. We harvested 60cc of bone marrow aspirate and passed it off the field to be concentrated. Next, the left lower extremity was exsanguinated with esmarch elastic bandage which was used as a tourniquet. A longitudinal ellipse incision excising the previous scar was made dorsally over the 1st MTP joint taking care to protect the superficial peroneal nerve which traversed the field proximally. This scar was 4.5cm and 7mm wide. Careful soft tissue dissection was performed. With minimal periosteal stripping, the plate was exposed. We removed the 4 screws through the plate as well as the lag screw from medial to lateral using the appropriate screw drivers. The wound was irrigated thoroughly, Vancomycin powder was used in the wound bed. The wound was closed in layers with Vicryl suture for the deeper layers and nylon suture for the skin for a tension-less closure. There was no blanching at the skin margins after closure. Sterile dressings were applied with the ankle in neutral position and over-abundant padding with a curlex soft splint was applied. Satisfactory capillary refill remained in all toes. Patient tolerated the procedure well. There were no complications. Next attention was turned to the right side. Through the previously made incision over the lateral calcaneus, we harvest 3 dowels worth of calcaneal autograft to use in the fusion site.      A dorsal medial ellipse incision was made over the 1st MTP joint, excising the prior scar in a full thickness fasion. This scar was 4.5cm and 7mm wide. Careful soft tissue dissection was maintained. Superficial peroneal nerve branch to the hallux was protected as was the extensor hallucis longus tendon. We then identified the plate and removed the 4 screws through the plate. Then we removed the lag screw from medial to lateral using the appropriate screw . There was minimal joint motion but was clearly fibrous in nature. We used a knife to free any adhesions and expose the nonunion. We used a currette to remove any fibrinous tissue from the fusion site. Reamer system was utilized to create a cup-and-cone preparation for the left 1st MTP joint. The joint was reduced to an anatomic position and then stabilized. We then used a combination of the autograft soaked in [facility], mixed with [employer] Bio4 into the fusion site to aide in fusion. We then reduced and pinned the joint in the desired position. We then used the [employer] plate which has One lag screw dorsal to plantar to generate compression and was countersunk with satisfactory purchase. We first placed the metatarsal screws, then the lag screw, and then the two proximal phalanx screws with all screws having satisfactory purchase (see IMPLANTS below for details). Intraoperative fluoroscopy was utilized and first confirmed satisfactory alignment and second satisfactory bony apposition and position of the hardware. Clinically and radiographically rotation was well controlled. Thorough irrigation was performed with copious amounts of sterile saline mixed with antibiotics for wound. The wound was then closed in layers with Vicryl suture for the deeper layers including the capsular tissue for the 1st MTP joint and Vicryl suture for the subcutaneous layers. The tourniquet had been released and meticulous hemostasis had been obtained. Satisfactory capillary refill remained in all toes.  The provisional pin that had been placed in the 1st toe MTP joint was removed. The wounds were approximated using nylon suture in an interrupted fashion to a tension-less closure. Sterile dressings were applied. Over abundant padding with the ankle in neutral position and no pressure on the toes with a posterior/sugar tong splint was applied, with a protective forefoot spica splint. Satisfactory capillary refill remained in all toes. The patient tolerated procedure well was taken to the recovery room in stable condition. DISPOSITION:   1. Patient is stable to PACU. 2. Darco Heel boot-Wbat in boot right side. Postop shoe left side  3. Strict elevation  4. Follow-up in 3 weeks for suture removal if appropriate   5. At 8 weeks, transition to sneaker. X-rays at 6 weeks visit. 6. See Patient Instructions for full disposition, including weightbearing status, DVT prophylaxis, postoperative care and follow-up. I was present for the entire procedure.     Patient Disposition:  PACU         SIGNATURE: Ryan Villegas MD  DATE: October 5, 2023  TIME: 10:39 AM

## 2023-10-16 ENCOUNTER — TELEPHONE (OUTPATIENT)
Age: 58
End: 2023-10-16

## 2023-10-16 ENCOUNTER — TELEPHONE (OUTPATIENT)
Dept: OBGYN CLINIC | Facility: HOSPITAL | Age: 58
End: 2023-10-16

## 2023-10-16 NOTE — TELEPHONE ENCOUNTER
Caller: Patient    Doctor: Lachman    Reason for call: Patient stated paper work faxed on 10-13-23 to [facility] has the wrong return to work date. Return date and surgery date is the same date. Please resend paper work to [facility] with the right date. Patient would like a call back when completed. ty    Call back#: [phone]

## 2023-10-16 NOTE — TELEPHONE ENCOUNTER
Bailee,  Not sure what the username is for our medical leave office but you haven't included them in this message. They are the ones who can correct this for the patient. Can you please send this message to our medical leave office?     Thanks

## 2023-10-16 NOTE — TELEPHONE ENCOUNTER
Caller: Omkar Blackmon    Doctor/Office: Lachman/Anderson    Reason: calling to confirm return to work date is 10/5/23.  Advised yes per note desk duty only    Callback: n/a

## 2023-10-17 NOTE — TELEPHONE ENCOUNTER
Caller Prince     Doctor: Domi Rosales / [facility]     Reason for call: Please see previous notes     Patient called for status of his paperwork . Patient expected to have his call returned regarding the SL error on disability form . This has caused his claim to be denied . Patient wants confirmation and to speak with someone regarding the forms being sent out. I reached out to [facility] to see if she could speak with patient. She stated the forms were updated and will be sent out tomorrow when she returns to the office. Patient was very upset to hear they had not been sent out as of yet . Patient stated he will discuss this with Dr. Domi Rosales tomorrow in the office.          Call back#: [phone]

## 2023-10-18 ENCOUNTER — OFFICE VISIT (OUTPATIENT)
Dept: OBGYN CLINIC | Facility: CLINIC | Age: 58
End: 2023-10-18

## 2023-10-18 VITALS — HEIGHT: 73 IN | BODY MASS INDEX: 28.12 KG/M2 | WEIGHT: 212.2 LBS

## 2023-10-18 DIAGNOSIS — M20.21 HALLUX RIGIDUS, RIGHT FOOT: Primary | ICD-10-CM

## 2023-10-18 PROCEDURE — 99024 POSTOP FOLLOW-UP VISIT: CPT | Performed by: ORTHOPAEDIC SURGERY

## 2023-10-18 NOTE — PATIENT INSTRUCTIONS
Continue aspirin/lovenox for blood clot prevention  May shower, do not soak in a tub/pool/ocean/etc for another 4 weeks. Scar massage- pea sized amount of lotion, massage into scar for 5 minutes each day. Compression stocking (Knee high, 20-30mm Hg) to be worn at all times while awake. Recommend taking the following supplements: Vitamin D 4000 units per day and Calcium 1200 mg per day. This will help with bone healing. Wear the boot at all times except when showering, even to sleep at night.

## 2023-10-18 NOTE — PROGRESS NOTES
Sonia Wing M.D. Attending, Orthopaedic Surgery  Foot and Ankle  [facility]      ORTHOPAEDIC FOOT AND ANKLE POST-OP VISIT     Procedure:     Left foot removal of hardware  Right Revision arthrodesis 1st MTP joint       Date of surgery:   10/5/23      PLAN  1. Weightbearing Status - WBAT operative extremity, heel weightbearing boot right, sneaker left  2. DVT prophylaxis - ASA 325mg BID  3. Continue to elevate 23 hrs/day getting up 1x per hour to prevent a blood clot  4. Pain control - OTC pain medication  5. RTC in 4 week(s)  6. Xrays needed next visit - Yes weightbearing RIGHT foot, no left foot xray    History of Present Illness:   Chief Complaint:   Chief Complaint   Patient presents with    Left Foot - Post-op    Right Foot - Dov Quiros is a 59 y.o. male who is being seen for 2 week post-operative visit for the above procedure. Pain is well controlled and the patient has successfully transitioned to OTC pain medicines. he is taking ASA 325mg BID for DVT prophylaxis. Patient has been WBAT in a Darco heel boot      Review of Systems:  General- denies fever/chills  Respiratory- denies cough or SOB  Cardio- denies chest pain or palpitations  GI- denies abdominal pain  Musculoskeletal- Negative except noted above  Skin- denies rashes or wounds    Physical Exam:   Ht 6' 1" (1.854 m)   Wt 96.3 kg (212 lb 3.2 oz)   BMI 28.00 kg/m²   General/Constitutional: No apparent distress: well-nourished and well developed. Eyes: normal ocular motion  Lymphatic: No appreciable lymphadenopathy  Respiratory: Non-labored breathing  Vascular: No edema, swelling or tenderness, except as noted in detailed exam.  Integumentary: No impressive skin lesions present, except as noted in detailed exam.  Neuro: No ataxia or tremors noted  Psych: Normal mood and affect, oriented to person, place and time. Appropriate affect.   Musculoskeletal: Normal, except as noted in detailed exam and in HPI.    Examination    Bilateral        Incision Clean, dry, intact  Sutures Removed this visit    Ecchymosis none    Swelling mild    Sensation Intact to light touch throughout sural, saphenous, superficial peroneal, deep peroneal and medial/lateral plantar nerve distributions. Lynch-Pta 5.07 filament (10g) testing deferred. Cardiovascular Brisk capillary refill < 2 seconds,intact DP and PT pulses    Special Tests None        Imaging Studies:   No new images      Scribe Attestation      I,:  Saúl Burden PA-C am acting as a scribe while in the presence of the attending physician.:       I,:  Jasmin León MD personally performed the services described in this documentation    as scribed in my presence.:                Victorina Bolton. Lachman, MD  Foot & Ankle Surgery   Department of [address] [place] [address]      I personally performed the service. Victorina Bolton.  Lachman, MD

## 2023-10-18 NOTE — TELEPHONE ENCOUNTER
Caller: Patient    Doctor: Lachman    Reason for call: Wanted to verify the forms were faxed to [facility]. Advised faxed 10/18.  Confirmed w/Angela ireland return to work date is 2/5/24    Call back#: [phone]

## 2023-10-20 NOTE — TELEPHONE ENCOUNTER
Caller:  patient     Doctor: Lachman     Reason for call: work note needs to be changed to state patient may return to work Feb 5th. Needs desk duty removed from the note. Said his duties/restrictions are yet to be determined. This note is affecting patient being able to be paid.      Patient didn't have fax # at time of call, please send to [facility]     Call back [phone]

## 2023-10-20 NOTE — TELEPHONE ENCOUNTER
Caller: Donavon    Doctor: Lachman    Reason for call: Hasn't received note stating that patient is unable to work until 2/5/24.  Please fax to [phone]    Call back#: [phone]

## 2023-10-20 NOTE — TELEPHONE ENCOUNTER
Caller: patient    Doctor: Kavin Kemp    Reason for call: Patient would like an update on his paperwork please call patient as soon as paperwork is updated     Call back#: [phone]

## 2023-10-23 ENCOUNTER — TELEPHONE (OUTPATIENT)
Dept: OBGYN CLINIC | Facility: CLINIC | Age: 58
End: 2023-10-23

## 2023-10-23 NOTE — TELEPHONE ENCOUNTER
Called an spoke with patient the disability paper work is fine their is no error. The issue is the letter that was written from 2022 and was sent to his employer which is why he is being denied his disability. Patient was very disrespectful while I was trying to help him figure this out. New paper work was sent to his employer not including his note.       Thank you

## 2023-11-09 DIAGNOSIS — E78.2 MIXED HYPERLIPIDEMIA: ICD-10-CM

## 2023-11-09 RX ORDER — ROSUVASTATIN CALCIUM 10 MG/1
10 TABLET, COATED ORAL
Qty: 90 TABLET | Refills: 1 | Status: SHIPPED | OUTPATIENT
Start: 2023-11-09

## 2023-11-11 NOTE — ASSESSMENT & PLAN NOTE
Using Lunesta for sleep.    PDMP Review       Value Time User    PDMP Reviewed  Yes 8/24/2023 11:03 AM Torey Dominguez,  Yes

## 2023-11-15 ENCOUNTER — APPOINTMENT (OUTPATIENT)
Dept: RADIOLOGY | Facility: AMBULARY SURGERY CENTER | Age: 58
End: 2023-11-15
Attending: ORTHOPAEDIC SURGERY
Payer: COMMERCIAL

## 2023-11-15 ENCOUNTER — OFFICE VISIT (OUTPATIENT)
Dept: OBGYN CLINIC | Facility: CLINIC | Age: 58
End: 2023-11-15

## 2023-11-15 VITALS — HEIGHT: 73 IN | WEIGHT: 215 LBS | BODY MASS INDEX: 28.49 KG/M2

## 2023-11-15 DIAGNOSIS — T84.84XA PAINFUL ORTHOPAEDIC HARDWARE (HCC): ICD-10-CM

## 2023-11-15 DIAGNOSIS — M20.21 HALLUX RIGIDUS, RIGHT FOOT: ICD-10-CM

## 2023-11-15 DIAGNOSIS — M20.21 HALLUX RIGIDUS, RIGHT FOOT: Primary | ICD-10-CM

## 2023-11-15 PROCEDURE — 99024 POSTOP FOLLOW-UP VISIT: CPT | Performed by: ORTHOPAEDIC SURGERY

## 2023-11-15 PROCEDURE — 73630 X-RAY EXAM OF FOOT: CPT

## 2023-11-15 NOTE — PATIENT INSTRUCTIONS
You may begin weaning your boot and transitioning to a sneaker (Today). It is important to do this gradually to avoid aggravating the healing process. Today, you may come out of the boot into a sneaker for 1 hours. 2. Tomorrow, you may come out of the boot into a sneaker for 2 hours,  3. The next day, you may come out of the boot into a sneaker for 3 hours. 4. Continue this (adding 1 hours per day) as you tolerate. For example, if you do 6 hours out of the boot into a sneaker and your foot swells more than usual at night and it is difficult to control the discomfort, do not advance to 7 hours the next day, stay at 6 hours until you are able to tolerate it. It is essential to follow this protocol and not modify this. No matter when you begin weaning the boot, it will be difficult and there will be swelling and soreness. If you spend more time than is recommended in the boot, this process becomes longer and more painful. Elevation, Ice and tylenol and staying off of it at night will be important to aide in this transition out of the boot. Swelling and soreness are normal as you begin to do more with the injured leg. May DC aspirin/lovenox, no longer needed. May shower  Scar massage- pea sized amount of lotion, massage into scar for 5 minutes each day. Compression stocking (Knee high, 20-30mm Hg) to be worn at all times while awake. Recommend taking the following supplements: Vitamin D3- 4000 units per day and Calcium 1200 mg per day. This will help with bone healing.

## 2023-11-15 NOTE — PROGRESS NOTES
Jasmin León M.D. Attending, Orthopaedic Surgery  Foot and Ankle  [facility]      ORTHOPAEDIC FOOT AND ANKLE POST-OP VISIT     Procedure:      Left foot removal of hardware  Right revision arthrodesis 1st MTP joint       Date of surgery:   10/5/23      PLAN  1. Weightbearing Status - WBAT operative extremity in sneaker with instructions on progressive weightbearing  2. DVT prophylaxis - Completed  3. Continue compression stocking for swelling control  4. Pain control - OTC pain medication  5. RTC in 6 week(s)  6. Xrays needed next visit - Yes Weightbearing xrays right foot, no left foot xray    History of Present Illness:   Chief Complaint: s/p Left foot removal of hardware and right revision arthrodesis 1st MTP joint    Anika Woodard is a 59 y.o. male who is being seen for 6 week post-operative visit for the above procedure. Pain is well controlled and the patient has successfully transitioned to OTC pain medicines. he has completed ASA 325mg BID for DVT prophylaxis. Patient has been WBAT in a Darco heel boot      Review of Systems:  General- denies fever/chills  Respiratory- denies cough or SOB  Cardio- denies chest pain or palpitations  GI- denies abdominal pain  Musculoskeletal- Negative except noted above  Skin- denies rashes or wounds    Physical Exam:   There were no vitals taken for this visit. General/Constitutional: No apparent distress: well-nourished and well developed. Eyes: normal ocular motion  Lymphatic: No appreciable lymphadenopathy  Respiratory: Non-labored breathing  Vascular: No edema, swelling or tenderness, except as noted in detailed exam.  Integumentary: No impressive skin lesions present, except as noted in detailed exam.  Neuro: No ataxia or tremors noted  Psych: Normal mood and affect, oriented to person, place and time. Appropriate affect. Musculoskeletal: Normal, except as noted in detailed exam and in HPI.     Examination    bilateral Incision Clean, dry, intact  Sutures Previously removed. Ecchymosis none    Swelling mild    Sensation Intact to light touch throughout sural, saphenous, superficial peroneal, deep peroneal and medial/lateral plantar nerve distributions. Ruby-Pat 5.07 filament (10g) testing deferred. Cardiovascular Brisk capillary refill < 2 seconds,intact DP and PT pulses    Special Tests None      Imaging Studies:     3 views of the right foot were taken, reviewed and interpreted independently that demonstrate hardware in expected position without signs of failure or loosening. Reviewed by me personally. Scribe Attestation      I,:   am acting as a scribe while in the presence of the attending physician.:       I,:   personally performed the services described in this documentation    as scribed in my presence.:                Freddie Coupe. Lachman, MD  Foot & Ankle Surgery   Department of [address] [place] [address]      I personally performed the service. Kash Rodriguez.  Lachman, MD

## 2023-11-27 ENCOUNTER — TELEPHONE (OUTPATIENT)
Dept: GASTROENTEROLOGY | Facility: AMBULARY SURGERY CENTER | Age: 58
End: 2023-11-27

## 2023-11-27 ENCOUNTER — TELEPHONE (OUTPATIENT)
Dept: OBGYN CLINIC | Facility: HOSPITAL | Age: 58
End: 2023-11-27

## 2023-11-27 ENCOUNTER — OFFICE VISIT (OUTPATIENT)
Dept: GASTROENTEROLOGY | Facility: AMBULARY SURGERY CENTER | Age: 58
End: 2023-11-27
Payer: COMMERCIAL

## 2023-11-27 VITALS
HEART RATE: 97 BPM | OXYGEN SATURATION: 98 % | WEIGHT: 221 LBS | HEIGHT: 73 IN | DIASTOLIC BLOOD PRESSURE: 82 MMHG | BODY MASS INDEX: 29.29 KG/M2 | SYSTOLIC BLOOD PRESSURE: 140 MMHG

## 2023-11-27 DIAGNOSIS — K21.00 GASTROESOPHAGEAL REFLUX DISEASE WITH ESOPHAGITIS WITHOUT HEMORRHAGE: Primary | ICD-10-CM

## 2023-11-27 DIAGNOSIS — Z86.010 HISTORY OF COLON POLYPS: ICD-10-CM

## 2023-11-27 DIAGNOSIS — K22.10 ULCER OF ESOPHAGUS WITHOUT BLEEDING: ICD-10-CM

## 2023-11-27 PROCEDURE — 99214 OFFICE O/P EST MOD 30 MIN: CPT | Performed by: PHYSICIAN ASSISTANT

## 2023-11-27 NOTE — TELEPHONE ENCOUNTER
Procedure: EGD  Scheduled date of procedure (as of today):12/27/23  Physician performing procedure:Dr. Sara Nicole  [facility]  Instructions reviewed with patient by: given by NW at 11/27/23 ov  Clearances: n/a

## 2023-11-27 NOTE — TELEPHONE ENCOUNTER
Caller: Patient    Doctor: Lachman    Reason for call: Patient received a letter from [facility] stating that based on the Radiologist reading of my 11/15/23 x-rays that I should contact you, there was a finding that he needed you to call right away. He would like a call from Dr. Sheba Mims.      Call back#: [phone]

## 2023-11-27 NOTE — TELEPHONE ENCOUNTER
Called and spoke w/pt and he states that he just got off phone with another nurse who had relayed the msg. He had no further questions or concerns. And Thanked us.

## 2023-11-27 NOTE — LETTER
November 27, 2023     Timoteo Hung DO  [address]  [address] [facility]    Patient: Gulshan Courtney   YOB: 1965   Date of Visit: 11/27/2023       Dear Dr. Janiya Centeno: Thank you for referring Karine Rueda to me for evaluation. Below are my notes for this consultation. If you have questions, please do not hesitate to call me. I look forward to following your patient along with you. Sincerely,        Kris Yao PA-C        CC: No Recipients    Kris Yao PA-C  11/27/2023  8:52 AM  Sign when Signing Visit  Assessment and Plan    #1. GERD with esophagitis and esophageal ulcer: EGD in March 2022 was notable for grade B esophagitis with ulceration, patient was was to have a repeat EGD in 3 months however he has had multiple surgeries on his feet and unfortunately did not follow-up with this. Continues to have some reflux symptoms and a raspy voice despite taking pantoprazole once daily.  -Continue PPI once daily, can add Pepcid as needed in the evening  -Discussed antireflux diet and measures. Discussed avoiding eating close to bedtime.  -Patient does admit to eating foods that may trigger his GERD due to his wife being Dandy and also liking to eat late.  -At this time will recommend repeat EGD to reassess, rule out Whipple's esophagus. Discussed the procedure in detail with the patient including risks such as infection, bleeding, perforation. #2. History of colon polyps: Last colonoscopy in 2022, prep was suboptimal.  -Recommended for repeat colonoscopy with 2-day prep in 2025  --------------------------------------------------------------------------------------------------------------------    Chief Complaint: f/u GERD    HPI: Gulshan Courtney is a 59 y.o. male with a history of GERD, esophagitis, emphysema, CKD, hypertension, and multiple foot surgeries who presents today for follow up. Patient was last seen in March 2022 for an EGD and colonoscopy.   At that time his EGD was notable for grade B esophagitis with esophageal ulcer. Is recommended for repeat EGD in 3 months but unfortunately due to his multiple surgeries on his foot, this has not been done. His colonoscopy was a suboptimal prep but he did have 1 polyp removed and was recommended to have another colonoscopy in 3 years with a 2-day prep. Patient reports that he takes pantoprazole once daily. There was a period of time where his insurance did cover it twice daily but he has been on this once daily for quite some time. He still reports some intermittent reflux symptoms and a raspy voice. He denies any dysphagia, nausea, vomiting. He did lose some weight but this was on purpose. He denies any significant NSAID use due to his CKD. He does report that his wife is Dandy so he tends to eat some foods that do cause reflux. He tries his best to eat early and not right before bedtime. He quit smoking. He does report using alcohol. Denies any abdominal pain. Reports that his bowel movements are regular and denies any blood in the stool or black tarry stools.       Review of Systems:   General: negative for fatigue, fever, night sweats; purposeful weight loss  Psychological: negative for anxiety or depression  Ophthalmic: negative for blurry vision or scleral icterus  ENT: negative for headaches, oral lesions, sore throat, or dysphagia; +raspy voice  Hematological and Lymphatic: negative for pallor or swollen lymph nodes  Respiratory: negative for cough, shortness of breath or wheezing  Cardiovascular: negative for chest pain, edema or murmur  Gastrointestinal: as mentioned in HPI  Genito-Urinary: negative for dysuria or incontinence  Musculoskeletal: negative for joint pain, joint stiffness or joint swelling; foot pain   Dermatological: negative for pruritus, rash, or jaundice    Current Medications  Current Outpatient Medications   Medication Sig Dispense Refill   • albuterol (PROVENTIL HFA,VENTOLIN HFA) 90 mcg/act inhaler Inhale 2 puffs every 4 (four) hours as needed for wheezing or shortness of breath 18 g 5   • b complex vitamins capsule Take 1 capsule by mouth daily     • carvedilol (COREG) 3.125 mg tablet Take 1 tablet (3.125 mg total) by mouth 2 (two) times a day with meals 180 tablet 1   • eszopiclone (LUNESTA) 3 MG tablet Take 1 tablet (3 mg total) by mouth daily at bedtime 90 tablet 1   • FLUoxetine (PROzac) 40 MG capsule Take 1 capsule (40 mg total) by mouth daily 90 capsule 1   • lisinopril (ZESTRIL) 30 mg tablet Take 1 tablet (30 mg total) by mouth every evening 90 tablet 1   • multivitamin (THERAGRAN) TABS Take 1 tablet by mouth daily     • pantoprazole (PROTONIX) 40 mg tablet Take 1 tablet (40 mg total) by mouth daily 90 tablet 1   • rosuvastatin (CRESTOR) 10 MG tablet TAKE 1 TABLET BY MOUTH EVERYDAY AT BEDTIME 90 tablet 1   • VITAMIN D PO Take 1 tablet by mouth in the morning       • Zinc Sulfate (ZINC 15 PO) Take 1 tablet by mouth in the morning       • fluticasone-umeclidinium-vilanterol (Trelegy Ellipta) 100-62.5-25 mcg/actuation inhaler Inhale 1 puff daily Rinse mouth after use. 60 blister 11     No current facility-administered medications for this visit.        Past Medical History  Past Medical History:   Diagnosis Date   • Arthritis    • Colitis     x1 occurnce   • Colon polyp    • COPD (chronic obstructive pulmonary disease) ([address])    • COVID-19     positive test on 12/16/21   • Depression    • Diverticulitis    • Diverticulitis of colon    • Diverticulosis    • GERD (gastroesophageal reflux disease)    • Hyperlipidemia    • Hypertension    • Pulmonary emphysema (HCC)    • Sleep apnea     no cpap   • Umbilical hernia        Past Surgical History  Past Surgical History:   Procedure Laterality Date   • CATARACT EXTRACTION Bilateral    • CHOLECYSTECTOMY     • COLONOSCOPY     • COLONOSCOPY     • ESOPHAGOGASTRODUODENOSCOPY     • EYE SURGERY     • HERNIA REPAIR  09/19/2019    Open repair of incarcerated incisional hernia with mesh - Dr. Hanane Recio   • KNEE ARTHROSCOPY Left    • CT ARTHRODESIS GREAT TOE METATARSOPHALANGEAL JOINT Bilateral 08/25/2022    Procedure: Bilateral 1st MTP arthrodesis; Surgeon: Piero López MD;  Location[facility] MA[facility];  Service: Orthopedics   • CT ARTHRODESIS GREAT TOE METATARSOPHALANGEAL JOINT Bilateral 10/05/2023    Procedure: Revision Right first MTP arthrodesis with BMAC harvest and bone graft harvest from the calcaneus. Left 1st MTP removal of hardware;  Surgeon: Piero López MD;  Location[facility] MA[facility] OR;  Service: Orthopedics   • CT DEBRIDEMENT SUBCUTANEOUS TISSUE 20 SQ CM/< Right 07/14/2022    Procedure: DEBRIDEMENT FOOT/TOE [facility] OUT), negative pressure wound dressing application;  Surgeon: Piero López MD;  Location: [facility] MAIN OR;  Service: Orthopedics   • CT HALLUX RIGIDUS W/CHEILECTOMY 1ST MP JT W/O IMPLT Bilateral 12/10/2021    Procedure: CHEILECTOMY;  Surgeon: Mely Ojeda DPM;  Location: [facility];  Service: Podiatry   • CT TNOT ELBOW LATERAL/MEDIAL DEBRIDE OPEN TDN RPR Left 11/17/2020    Procedure: lateral epicondyle elbow debridement, release, and repair;  Surgeon: Angela Auguste MD;  [facility];  Service: Orthopedics   • UPPER GASTROINTESTINAL ENDOSCOPY     • WISDOM TOOTH EXTRACTION         Past Social History   Social History     Socioeconomic History   • Marital status: /Civil Union     Spouse name: Maryam Vargas   • Number of children: 0   • Years of education: 16   • Highest education level:  Bachelor's degree (e.g., BA, AB, BS)   Occupational History   • None   Tobacco Use   • Smoking status: Former     Packs/day: 1.00     Years: 35.00     Total pack years: 35.00     Types: Cigarettes     Start date: 12/2/1986     Quit date: 1/1/2013     Years since quitting: 10.9   • Smokeless tobacco: Never   • Tobacco comments:     quit 6.5 years ago - As per [employer] Use   • Vaping Use: Never used   Substance and Sexual Activity   • Alcohol use: Yes     Comment: "few times a week"   • Drug use: Never   • Sexual activity: Yes     Partners: Female   Other Topics Concern   • None   Social History Narrative    ·     Social Determinants of Health     Financial Resource Strain: Low Risk  (6/3/2021)    Overall Financial Resource Strain (CARDIA)    • Difficulty of Paying Living Expenses: Not hard at all   Food Insecurity: No Food Insecurity (6/3/2021)    Hunger Vital Sign    • Worried About Running Out of Food in the Last Year: Never true    • Ran Out of Food in the Last Year: Never true   Transportation Needs: No Transportation Needs (6/3/2021)    PRAPARE - Transportation    • Lack of Transportation (Medical): No    • Lack of Transportation (Non-Medical): No   Physical Activity: Sufficiently Active (9/24/2020)    Exercise Vital Sign    • Days of Exercise per Week: 5 days    • Minutes of Exercise per Session: 30 min   Stress: Stress Concern Present (9/24/2020)    [facility]    • Feeling of Stress : To some extent   Social Connections:  Moderately Isolated (9/24/2020)    Social Connection and Isolation Panel [NHANES]    • Frequency of Communication with Friends and Family: More than three times a week    • Frequency of Social Gatherings with Friends and Family: Once a week    • Attends Yazidi Services: Never    • Active Member of Clubs or Organizations: No    • Attends Club or Organization Meetings: Never    • Marital Status:    Intimate Partner Violence: Not At Risk (9/24/2020)    Humiliation, Afraid, Rape, and Kick questionnaire    • Fear of Current or Ex-Partner: No    • Emotionally Abused: No    • Physically Abused: No    • Sexually Abused: No   Housing Stability: Not on file       The following portions of the patient's history were reviewed and updated as appropriate: allergies, current medications, past family history, past medical history, past social history, past surgical history, and problem list.    Vital Signs  Vitals:    11/27/23 0822   BP: 140/82   BP Location: Left arm   Patient Position: Sitting   Cuff Size: Standard   Pulse: 97   SpO2: 98%   Weight: 100 kg (221 lb)   Height: 6' 1" (1.854 m)       Physical Exam:  General appearance: alert, cooperative, no distress  HEENT: normocephalic, anicteric, no eye erythema or discharge, no oropharyngeal thrush  Neck: supple, trachea midline, no adenopathy  Lungs: CTA b/l, no rales, rhonchi, or wheezing, unlabored respirations  Heart: RRR, no murmur, rubs, or gallops  Abdomen: soft, non-tender, non-distended, normal bowel sounds, no masses or organomegaly  Rectal: deferred  Extremities: no cyanosis, clubbing, or edema  Musculoskeletal: uses cane  Skin: color and texture normal, no jaundice, no rashes or lesions  Psychiatric: alert and oriented, normal affect and behavior

## 2023-11-27 NOTE — PROGRESS NOTES
Assessment and Plan    #1. GERD with esophagitis and esophageal ulcer: EGD in March 2022 was notable for grade B esophagitis with ulceration, patient was was to have a repeat EGD in 3 months however he has had multiple surgeries on his feet and unfortunately did not follow-up with this. Continues to have some reflux symptoms and a raspy voice despite taking pantoprazole once daily.  -Continue PPI once daily, can add Pepcid as needed in the evening  -Discussed antireflux diet and measures. Discussed avoiding eating close to bedtime.  -Patient does admit to eating foods that may trigger his GERD due to his wife being Dandy and also liking to eat late.  -At this time will recommend repeat EGD to reassess, rule out Whipple's esophagus. Discussed the procedure in detail with the patient including risks such as infection, bleeding, perforation. #2. History of colon polyps: Last colonoscopy in 2022, prep was suboptimal.  -Recommended for repeat colonoscopy with 2-day prep in 2025  --------------------------------------------------------------------------------------------------------------------    Chief Complaint: f/u GERD    HPI: Sandra Fernandez is a 59 y.o. male with a history of GERD, esophagitis, emphysema, CKD, hypertension, and multiple foot surgeries who presents today for follow up. Patient was last seen in March 2022 for an EGD and colonoscopy. At that time his EGD was notable for grade B esophagitis with esophageal ulcer. Is recommended for repeat EGD in 3 months but unfortunately due to his multiple surgeries on his foot, this has not been done. His colonoscopy was a suboptimal prep but he did have 1 polyp removed and was recommended to have another colonoscopy in 3 years with a 2-day prep. Patient reports that he takes pantoprazole once daily. There was a period of time where his insurance did cover it twice daily but he has been on this once daily for quite some time.   He still reports some intermittent reflux symptoms and a raspy voice. He denies any dysphagia, nausea, vomiting. He did lose some weight but this was on purpose. He denies any significant NSAID use due to his CKD. He does report that his wife is Dandy so he tends to eat some foods that do cause reflux. He tries his best to eat early and not right before bedtime. He quit smoking. He does report using alcohol. Denies any abdominal pain. Reports that his bowel movements are regular and denies any blood in the stool or black tarry stools.       Review of Systems:   General: negative for fatigue, fever, night sweats; purposeful weight loss  Psychological: negative for anxiety or depression  Ophthalmic: negative for blurry vision or scleral icterus  ENT: negative for headaches, oral lesions, sore throat, or dysphagia; +raspy voice  Hematological and Lymphatic: negative for pallor or swollen lymph nodes  Respiratory: negative for cough, shortness of breath or wheezing  Cardiovascular: negative for chest pain, edema or murmur  Gastrointestinal: as mentioned in HPI  Genito-Urinary: negative for dysuria or incontinence  Musculoskeletal: negative for joint pain, joint stiffness or joint swelling; foot pain   Dermatological: negative for pruritus, rash, or jaundice    Current Medications  Current Outpatient Medications   Medication Sig Dispense Refill    albuterol (PROVENTIL HFA,VENTOLIN HFA) 90 mcg/act inhaler Inhale 2 puffs every 4 (four) hours as needed for wheezing or shortness of breath 18 g 5    b complex vitamins capsule Take 1 capsule by mouth daily      carvedilol (COREG) 3.125 mg tablet Take 1 tablet (3.125 mg total) by mouth 2 (two) times a day with meals 180 tablet 1    eszopiclone (LUNESTA) 3 MG tablet Take 1 tablet (3 mg total) by mouth daily at bedtime 90 tablet 1    FLUoxetine (PROzac) 40 MG capsule Take 1 capsule (40 mg total) by mouth daily 90 capsule 1    lisinopril (ZESTRIL) 30 mg tablet Take 1 tablet (30 mg total) by mouth every evening 90 tablet 1    multivitamin (THERAGRAN) TABS Take 1 tablet by mouth daily      pantoprazole (PROTONIX) 40 mg tablet Take 1 tablet (40 mg total) by mouth daily 90 tablet 1    rosuvastatin (CRESTOR) 10 MG tablet TAKE 1 TABLET BY MOUTH EVERYDAY AT BEDTIME 90 tablet 1    VITAMIN D PO Take 1 tablet by mouth in the morning        Zinc Sulfate (ZINC 15 PO) Take 1 tablet by mouth in the morning        fluticasone-umeclidinium-vilanterol (Trelegy Ellipta) 100-62.5-25 mcg/actuation inhaler Inhale 1 puff daily Rinse mouth after use. 60 blister 11     No current facility-administered medications for this visit. Past Medical History  Past Medical History:   Diagnosis Date    Arthritis     Colitis     x1 occurnce    Colon polyp     COPD (chronic obstructive pulmonary disease) ([facility])     COVID-19     positive test on 12/16/21    Depression     Diverticulitis     Diverticulitis of colon     Diverticulosis     GERD (gastroesophageal reflux disease)     Hyperlipidemia     Hypertension     Pulmonary emphysema (HCC)     Sleep apnea     no cpap    Umbilical hernia        Past Surgical History  Past Surgical History:   Procedure Laterality Date    CATARACT EXTRACTION Bilateral     CHOLECYSTECTOMY      COLONOSCOPY      COLONOSCOPY      ESOPHAGOGASTRODUODENOSCOPY      EYE SURGERY      HERNIA REPAIR  09/19/2019    Open repair of incarcerated incisional hernia with mesh - Dr. Tima Zaragoza ARTHROSCOPY Left     MA ARTHRODESIS GREAT TOE METATARSOPHALANGEAL JOINT Bilateral 08/25/2022    Procedure: Bilateral 1st MTP arthrodesis; Surgeon: Reji Cerrato MD;  Location: [facility] MAIN OR;  Service: Orthopedics    MA ARTHRODESIS GREAT TOE METATARSOPHALANGEAL JOINT Bilateral 10/05/2023    Procedure: Revision Right first MTP arthrodesis with BMAC harvest and bone graft harvest from the calcaneus.   Left 1st MTP removal of hardware;  Surgeon: Reji Cerrato MD;  Location[facility] MA[facility];  Service: Orthopedics    MA DEBRIDEMENT SUBCUTANEOUS TISSUE 20 SQ CM/< Right 07/14/2022    Procedure: DEBRIDEMENT FOOT/TOE [facility] OUT), negative pressure wound dressing application;  Surgeon: Omaira Robles MD;  [facility] MA[facility];  Service: Orthopedics    IL HALLUX RIGIDUS W/CHEILECTOMY 1ST MP JT W/O IMPLT Bilateral 12/10/2021    Procedure: CHEILECTOMY;  Surgeon: Aidee Andres DPM;  Location: [facility];  Service: Podiatry    IL TNOT ELBOW LATERAL/MEDIAL DEBRIDE OPEN TDN RPR Left 11/17/2020    Procedure: lateral epicondyle elbow debridement, release, and repair;  Surgeon: Mike Sandy MD;  [facility];  Service: Orthopedics    UPPER GASTROINTESTINAL ENDOSCOPY      WISDOM TOOTH EXTRACTION         Past Social History   Social History     Socioeconomic History    Marital status: /Civil Union     Spouse name: Edmar Everett    Number of children: 0    Years of education: 16    Highest education level:  Bachelor's degree (e.g., BA, AB, BS)   Occupational History    None   Tobacco Use    Smoking status: Former     Packs/day: 1.00     Years: 35.00     Total pack years: 35.00     Types: Cigarettes     Start date: 12/2/1986     Quit date: 1/1/2013     Years since quitting: 10.9    Smokeless tobacco: Never    Tobacco comments:     quit 6.5 years ago - As per [employer]    Vaping Use: Never used   Substance and Sexual Activity    Alcohol use: Yes     Comment: "few times a week"    Drug use: Never    Sexual activity: Yes     Partners: Female   Other Topics Concern    None   Social History Narrative    ·     Social Determinants of Health     Financial Resource Strain: Low Risk  (6/3/2021)    Overall Financial Resource Strain (CARDIA)     Difficulty of Paying Living Expenses: Not hard at all   Food Insecurity: No Food Insecurity (6/3/2021)    Hunger Vital Sign     Worried About Running Out of Food in the Last Year: Never true     Ran Out of Food in the Last Year: Never true   Transportation Needs: No Transportation Needs (6/3/2021) PRAPARE - Transportation     Lack of Transportation (Medical): No     Lack of Transportation (Non-Medical): No   Physical Activity: Sufficiently Active (9/24/2020)    Exercise Vital Sign     Days of Exercise per Week: 5 days     Minutes of Exercise per Session: 30 min   Stress: Stress Concern Present (9/24/2020)    [facility]     Feeling of Stress : To some extent   Social Connections:  Moderately Isolated (9/24/2020)    Social Connection and Isolation Panel [NHANES]     Frequency of Communication with Friends and Family: More than three times a week     Frequency of Social Gatherings with Friends and Family: Once a week     Attends Church Services: Never     Active Member of Clubs or Organizations: No     Attends Club or Organization Meetings: Never     Marital Status:    Intimate Partner Violence: Not At Risk (9/24/2020)    Humiliation, Afraid, Rape, and Kick questionnaire     Fear of Current or Ex-Partner: No     Emotionally Abused: No     Physically Abused: No     Sexually Abused: No   Housing Stability: Not on file       The following portions of the patient's history were reviewed and updated as appropriate: allergies, current medications, past family history, past medical history, past social history, past surgical history, and problem list.    Vital Signs  Vitals:    11/27/23 0822   BP: 140/82   BP Location: Left arm   Patient Position: Sitting   Cuff Size: Standard   Pulse: 97   SpO2: 98%   Weight: 100 kg (221 lb)   Height: 6' 1" (1.854 m)       Physical Exam:  General appearance: alert, cooperative, no distress  HEENT: normocephalic, anicteric, no eye erythema or discharge, no oropharyngeal thrush  Neck: supple, trachea midline, no adenopathy  Lungs: CTA b/l, no rales, rhonchi, or wheezing, unlabored respirations  Heart: RRR, no murmur, rubs, or gallops  Abdomen: soft, non-tender, non-distended, normal bowel sounds, no masses or organomegaly  Rectal: deferred  Extremities: no cyanosis, clubbing, or edema  Musculoskeletal: uses cane  Skin: color and texture normal, no jaundice, no rashes or lesions  Psychiatric: alert and oriented, normal affect and behavior

## 2023-11-27 NOTE — TELEPHONE ENCOUNTER
Kiki Blue radiologist commented on the hole in the calcaneus. It is where we harvested the bone graft from to use in the fusion site for his 1st MTP nonunion takedown surgery. We commonly take bone graft from here. Vishal [place], can you call the patient an let him know this. There is absolutely nothing to worry about here. I am sorry that this was communicated to him in this concerning way.

## 2023-12-12 DIAGNOSIS — J43.9 PULMONARY EMPHYSEMA, UNSPECIFIED EMPHYSEMA TYPE (HCC): ICD-10-CM

## 2023-12-12 NOTE — TELEPHONE ENCOUNTER
Reason for call:     Patient requesting Dr Lily Marti refill his Trelegy Ellipta inhaler, was previously prescribed by pulmonology, states this is a maintenance medication. [x] Refill   [] Prior Auth  [] Other:     Office:   [x] PCP/Provider - Dr Lily Marti  [] Specialty/Provider -     Medication: González Girard [id number]        Pharmacy: [facility] [facility]    Does the patient have enough for 3 days?    [x] Yes   [] No - Send as HP to POD

## 2023-12-13 ENCOUNTER — ANESTHESIA EVENT (OUTPATIENT)
Dept: ANESTHESIOLOGY | Facility: HOSPITAL | Age: 58
End: 2023-12-13

## 2023-12-13 ENCOUNTER — ANESTHESIA (OUTPATIENT)
Dept: ANESTHESIOLOGY | Facility: HOSPITAL | Age: 58
End: 2023-12-13

## 2023-12-22 ENCOUNTER — RA CDI HCC (OUTPATIENT)
Dept: OTHER | Facility: HOSPITAL | Age: 58
End: 2023-12-22

## 2023-12-27 ENCOUNTER — OFFICE VISIT (OUTPATIENT)
Dept: OBGYN CLINIC | Facility: CLINIC | Age: 58
End: 2023-12-27

## 2023-12-27 ENCOUNTER — ANESTHESIA (OUTPATIENT)
Dept: GASTROENTEROLOGY | Facility: AMBULARY SURGERY CENTER | Age: 58
End: 2023-12-27

## 2023-12-27 ENCOUNTER — HOSPITAL ENCOUNTER (OUTPATIENT)
Dept: GASTROENTEROLOGY | Facility: AMBULARY SURGERY CENTER | Age: 58
Setting detail: OUTPATIENT SURGERY
Discharge: HOME | End: 2023-12-27
Payer: COMMERCIAL

## 2023-12-27 ENCOUNTER — ANESTHESIA EVENT (OUTPATIENT)
Dept: GASTROENTEROLOGY | Facility: AMBULARY SURGERY CENTER | Age: 58
End: 2023-12-27

## 2023-12-27 ENCOUNTER — APPOINTMENT (OUTPATIENT)
Dept: RADIOLOGY | Facility: AMBULARY SURGERY CENTER | Age: 58
End: 2023-12-27
Attending: ORTHOPAEDIC SURGERY
Payer: COMMERCIAL

## 2023-12-27 VITALS
TEMPERATURE: 98.4 F | DIASTOLIC BLOOD PRESSURE: 77 MMHG | RESPIRATION RATE: 20 BRPM | HEART RATE: 66 BPM | SYSTOLIC BLOOD PRESSURE: 118 MMHG | OXYGEN SATURATION: 97 %

## 2023-12-27 VITALS — HEIGHT: 73 IN | BODY MASS INDEX: 29.29 KG/M2 | WEIGHT: 221 LBS

## 2023-12-27 DIAGNOSIS — K21.00 GASTROESOPHAGEAL REFLUX DISEASE WITH ESOPHAGITIS WITHOUT HEMORRHAGE: ICD-10-CM

## 2023-12-27 DIAGNOSIS — M20.21 HALLUX RIGIDUS, RIGHT FOOT: Primary | ICD-10-CM

## 2023-12-27 DIAGNOSIS — K22.10 ULCER OF ESOPHAGUS WITHOUT BLEEDING: ICD-10-CM

## 2023-12-27 DIAGNOSIS — M20.21 HALLUX RIGIDUS, RIGHT FOOT: ICD-10-CM

## 2023-12-27 PROCEDURE — 88305 TISSUE EXAM BY PATHOLOGIST: CPT | Performed by: PATHOLOGY

## 2023-12-27 PROCEDURE — 99024 POSTOP FOLLOW-UP VISIT: CPT | Performed by: ORTHOPAEDIC SURGERY

## 2023-12-27 PROCEDURE — 73630 X-RAY EXAM OF FOOT: CPT

## 2023-12-27 PROCEDURE — 43239 EGD BIOPSY SINGLE/MULTIPLE: CPT | Performed by: INTERNAL MEDICINE

## 2023-12-27 RX ORDER — SODIUM CHLORIDE, SODIUM LACTATE, POTASSIUM CHLORIDE, CALCIUM CHLORIDE 600; 310; 30; 20 MG/100ML; MG/100ML; MG/100ML; MG/100ML
INJECTION, SOLUTION INTRAVENOUS CONTINUOUS PRN
Status: DISCONTINUED | OUTPATIENT
Start: 2023-12-27 | End: 2023-12-27

## 2023-12-27 RX ORDER — LIDOCAINE HYDROCHLORIDE 20 MG/ML
INJECTION, SOLUTION EPIDURAL; INFILTRATION; INTRACAUDAL; PERINEURAL AS NEEDED
Status: DISCONTINUED | OUTPATIENT
Start: 2023-12-27 | End: 2023-12-27

## 2023-12-27 RX ORDER — PROPOFOL 10 MG/ML
INJECTION, EMULSION INTRAVENOUS AS NEEDED
Status: DISCONTINUED | OUTPATIENT
Start: 2023-12-27 | End: 2023-12-27

## 2023-12-27 RX ADMIN — PROPOFOL 130 MG: 10 INJECTION, EMULSION INTRAVENOUS at 10:02

## 2023-12-27 RX ADMIN — LIDOCAINE HYDROCHLORIDE 100 MG: 20 INJECTION, SOLUTION EPIDURAL; INFILTRATION; INTRACAUDAL; PERINEURAL at 10:02

## 2023-12-27 RX ADMIN — SODIUM CHLORIDE, SODIUM LACTATE, POTASSIUM CHLORIDE, AND CALCIUM CHLORIDE: .6; .31; .03; .02 INJECTION, SOLUTION INTRAVENOUS at 09:59

## 2023-12-27 RX ADMIN — PROPOFOL 140 MCG/KG/MIN: 10 INJECTION, EMULSION INTRAVENOUS at 10:03

## 2023-12-27 NOTE — ANESTHESIA PROCEDURE NOTES
Anesthesia Notable Event  No anethesia notable event occurred.    Date/Time: 12/27/2023 2:42 PM    Patient location during procedure: PACU  Performed by: Shlomo Gastelum MD  Authorized by: Shlomo Gastelum MD

## 2023-12-27 NOTE — PROGRESS NOTES
"      James R Lachman, M.D.  Attending, Orthopaedic Surgery  Foot and Ankle  [facility]      ORTHOPAEDIC FOOT AND ANKLE POST-OP VISIT     Procedure:      Left foot hardware removal  Right revision arthrodesis, 1st MTP joint       Date of surgery:   10/5/23    Xrays today show interval healing in right 1st MTP. Since this is a revision scenario for nonunion, Patient was instructed to obtain a CT scan of his right foot the week of February 5th to further evaluate the healing of his 1st MTP fusion site    PLAN  1. Weightbearing Status - WBAT operative extremity  2. DVT prophylaxis - Completed  3. Continue compression stocking for swelling control  4. Pain control - OTC pain medication  5. RTC in 6 weeks for discussion of CT scan  6. Xrays needed next visit - No    History of Present Illness:   Chief Complaint:   Chief Complaint   Patient presents with    Right Foot - Post-op     Hasn't noticed any improvement       Prince Gonzalez is a 58 y.o. male who is being seen for 3 month post-operative visit for the above procedure. Pain is well controlled about his left foot but he states that his pain continues about his right foot. The patient has successfully transitioned to OTC pain medicines.  he has completed ASA 325mg BID for DVT prophylaxis. Patient has been WBAT in a Supportive sneaker      Review of Systems:  General- denies fever/chills  Respiratory- denies cough or SOB  Cardio- denies chest pain or palpitations  GI- denies abdominal pain  Musculoskeletal- Negative except noted above  Skin- denies rashes or wounds    Physical Exam:   Ht 6' 1\" (1.854 m)   Wt 100 kg (221 lb)   BMI 29.16 kg/m²   General/Constitutional: No apparent distress: well-nourished and well developed.  Eyes: normal ocular motion  Lymphatic: No appreciable lymphadenopathy  Respiratory: Non-labored breathing  Vascular: No edema, swelling or tenderness, except as noted in detailed exam.  Integumentary: No impressive skin " lesions present, except as noted in detailed exam.  Neuro: No ataxia or tremors noted  Psych: Normal mood and affect, oriented to person, place and time. Appropriate affect.  Musculoskeletal: Normal, except as noted in detailed exam and in HPI.    Examination    Bilateral        Incision Clean, dry, intact  Sutures Previously removed.    Ecchymosis none    Swelling mild    Sensation Intact to light touch throughout sural, saphenous, superficial peroneal, deep peroneal and medial/lateral plantar nerve distributions.  Prichard-Pat 5.07 filament (10g) testing deferred.    Cardiovascular Brisk capillary refill < 2 seconds,intact DP and PT pulses    Special Tests None      Imaging Studies:   3 views of the  bilateral feet  were taken, reviewed and interpreted independently that demonstrate hardware in expected position without signs of failure or loosening. Revision fusion site on the right foot appears to be consolidating well. Reviewed by me personally.      Scribe Attestation      I,:  Aravind Vilchis am acting as a scribe while in the presence of the attending physician.:       I,:  James R Lachman, MD personally performed the services described in this documentation    as scribed in my presence.:                James R. Lachman, MD  Foot & Ankle Surgery   Department of Orthopaedic Surgery  [facility]      I personally performed the service.    James R. Lachman, MD

## 2023-12-27 NOTE — H&P
History and Physical -  [facility]  Prince Gonzalez 58 y.o. male MRN: [id number]    HPI: Prince Gonzalez is a 58 y.o. year old male who presents with follow up GERD and esophagitis.       Review of Systems    Historical Information   Past Medical History:   Diagnosis Date    Arthritis     Colitis     x1 occurnce    Colon polyp     COPD (chronic obstructive pulmonary disease) ([facility])     COVID-19     positive test on 12/16/21    Depression     Diverticulitis     Diverticulitis of colon     Diverticulosis     GERD (gastroesophageal reflux disease)     Hyperlipidemia     Hypertension     Pulmonary emphysema (HCC)     Sleep apnea     no cpap    Umbilical hernia      Past Surgical History:   Procedure Laterality Date    CATARACT EXTRACTION Bilateral     CHOLECYSTECTOMY      COLONOSCOPY      COLONOSCOPY      ESOPHAGOGASTRODUODENOSCOPY      EYE SURGERY      HERNIA REPAIR  09/19/2019    Open repair of incarcerated incisional hernia with mesh - Dr. Bloch    KNEE ARTHROSCOPY Left     NV ARTHRODESIS GREAT TOE METATARSOPHALANGEAL JOINT Bilateral 08/25/2022    Procedure: Bilateral 1st MTP arthrodesis;  Surgeon: James R Lachman, MD;  Location: [facility] MAIN OR;  Service: Orthopedics    NV ARTHRODESIS GREAT TOE METATARSOPHALANGEAL JOINT Bilateral 10/05/2023    Procedure: Revision Right first MTP arthrodesis with BMAC harvest and bone graft harvest from the calcaneus.  Left 1st MTP removal of hardware;  Surgeon: James R Lachman, MD;  [facility] MAIN OR;  Service: Orthopedics    NV DEBRIDEMENT SUBCUTANEOUS TISSUE 20 SQ CM/< Right 07/14/2022    Procedure: DEBRIDEMENT FOOT/TOE (WASH OUT), negative pressure wound dressing application;  Surgeon: James R Lachman, MD;  Location: [facility] MAIN OR;  Service: Orthopedics    NV HALLUX RIGIDUS W/CHEILECTOMY 1ST MP JT W/O IMPLT Bilateral 12/10/2021    Procedure: CHEILECTOMY;  Surgeon: Wilfred Loya DPM;  Location: [facility]IN [facility];  Service: Podiatry    NV TNOT ELBOW LATERAL/MEDIAL  "DEBRIDE OPEN TDN RPR Left 11/17/2020    Procedure: lateral epicondyle elbow debridement, release, and repair;  Surgeon: Jannet Parrish MD;  [facility]: [facility]  MAIN OR;  Service: Orthopedics    UPPER GASTROINTESTINAL ENDOSCOPY      WISDOM TOOTH EXTRACTION       Social History   Social History     Substance and Sexual Activity   Alcohol Use Yes    Comment: \"few times a week\"     Social History     Substance and Sexual Activity   Drug Use Never     Social History     Tobacco Use   Smoking Status Former    Current packs/day: 0.00    Average packs/day: 1 pack/day for 35.0 years (35.0 ttl pk-yrs)    Types: Cigarettes    Start date: 12/2/1986    Quit date: 1/1/2013    Years since quitting: 10.9   Smokeless Tobacco Never   Tobacco Comments    quit 6.5 years ago - As per [place]      Family History   Problem Relation Age of Onset    Anemia Mother     Hypertension Mother     Pancreatic cancer Father     Diabetes Father     Hypertension Father     Abdominal aortic aneurysm Father     Cancer Father     Diabetes Maternal Grandmother     Emphysema Maternal Grandmother     Diabetes Maternal Grandfather     Diabetes Paternal Grandmother     Diabetes Paternal Grandfather     Emphysema Brother        Meds/Allergies     (Not in a hospital admission)      Allergies   Allergen Reactions    Allopurinol Rash       Objective     /89   Pulse 60   Temp (!) 97.2 °F (36.2 °C) (Tympanic)   Resp 18   SpO2 96%       PHYSICAL EXAM    Gen: NAD  CV: RRR  CHEST: Clear  ABD: soft, NT/ND  EXT: no edema  Neuro: AAO      ASSESSMENT/PLAN:  This is a 58 y.o. year old male here for follow up GERD and esophagitis.     PLAN:   Procedure: colonoscopy      "

## 2023-12-27 NOTE — ANESTHESIA PREPROCEDURE EVALUATION
Procedure:  EGD    Relevant Problems   CARDIO   (+) Mixed hyperlipidemia      GI/HEPATIC   (+) GERD (gastroesophageal reflux disease)      /RENAL   (+) Benign hypertension with CKD (chronic kidney disease), stage II   (+) CKD (chronic kidney disease) stage 2, GFR 60-89 ml/min      MUSCULOSKELETAL   (+) Arthritis of both feet   (+) Primary osteoarthritis of left foot   (+) Primary osteoarthritis of right foot      NEURO/PSYCH   (+) Major depressive disorder, single episode, mild (HCC)      PULMONARY   (+) Centrilobular emphysema (HCC)   (+) SOB (shortness of breath)   (+) Sleep apnea    Patient reports good functional status without limitation from his emphysema, took his inhaler this morning.    Physical Exam    Airway    Mallampati score: IV  TM Distance: >3 FB  Neck ROM: full     Dental       Cardiovascular  Cardiovascular exam normal    Pulmonary  Pulmonary exam normal     Other Findings        Anesthesia Plan  ASA Score- 3     Anesthesia Type- IV sedation with anesthesia with ASA Monitors.         Additional Monitors:     Airway Plan:            Plan Factors-Exercise tolerance (METS): >4 METS.    Chart reviewed. EKG reviewed.  Existing labs reviewed. Patient summary reviewed.    Patient is not a current smoker.  Patient did not smoke on day of surgery.    Obstructive sleep apnea risk education given perioperatively.        Induction- intravenous.    Postoperative Plan-     Informed Consent- Anesthetic plan and risks discussed with patient.  I personally reviewed this patient with the CRNA. Discussed and agreed on the Anesthesia Plan with the CRNA..

## 2023-12-27 NOTE — ANESTHESIA POSTPROCEDURE EVALUATION
Post-Op Assessment Note    CV Status:  Stable    Pain management: adequate       Mental Status:  Alert and awake   Hydration Status:  Euvolemic   PONV Controlled:  Controlled   Airway Patency:  Patent     Post Op Vitals Reviewed: Yes      Staff: CRNA               /63 (12/27/23 1013)    Temp 98.4 °F (36.9 °C) (12/27/23 1013)    Pulse 67 (12/27/23 1013)   Resp 18 (12/27/23 1013)    SpO2 95 % (12/27/23 1013)

## 2023-12-27 NOTE — LETTER
December 27, 2023     Patient: Prince Gonzalez  YOB: 1965  Date of Visit: 12/27/2023      To Whom it May Concern:    Prince Gonzalez is under my professional care. Prince was seen in my office on 12/27/2023. Prince may return to work with restrictions of desk duty only until his next follow up visit    If you have any questions or concerns, please don't hesitate to call.         Sincerely,          James R Lachman, MD        CC: No Recipients

## 2023-12-28 PROCEDURE — 88305 TISSUE EXAM BY PATHOLOGIST: CPT | Performed by: PATHOLOGY

## 2024-01-03 ENCOUNTER — TELEPHONE (OUTPATIENT)
Dept: PULMONOLOGY | Facility: MEDICAL CENTER | Age: 59
End: 2024-01-03

## 2024-01-03 NOTE — TELEPHONE ENCOUNTER
Spoke with patient attempted to schedule 1yr f/u , patient declined stating he will call office back to schedule follow up.

## 2024-01-27 ENCOUNTER — NURSE TRIAGE (OUTPATIENT)
Dept: OTHER | Facility: OTHER | Age: 59
End: 2024-01-27

## 2024-01-27 DIAGNOSIS — I10 ESSENTIAL HYPERTENSION: ICD-10-CM

## 2024-01-27 DIAGNOSIS — J43.9 PULMONARY EMPHYSEMA, UNSPECIFIED EMPHYSEMA TYPE (HCC): ICD-10-CM

## 2024-01-27 DIAGNOSIS — F32.0 MAJOR DEPRESSIVE DISORDER, SINGLE EPISODE, MILD (HCC): ICD-10-CM

## 2024-01-27 NOTE — TELEPHONE ENCOUNTER
Medication: carvedilol     Dose/Frequency: 3.125 mg     Quantity: 180    Medication: Prozac     Dose/Frequency: 40 mg     Pharmacy: [facility]/pharmacy #[id number] - [place] - [id number] -[phone]     Office:   [x] PCP/Provider -   [] Speciality/Provider -     Does the patient have enough for 3 days?   [] Yes   [x] No - Send as HP to POD

## 2024-01-27 NOTE — TELEPHONE ENCOUNTER
"Regarding: med refill  ----- Message from Elizabeth Greenberg sent at 1/27/2024 10:29 AM EST -----  \" I need a refill on my fluticasone-umeclidinium-vilanterol 100-62.5-25 mcg/actuation inhaler. \"    "

## 2024-01-27 NOTE — TELEPHONE ENCOUNTER
Medication Refill Request     Name Fluticasone -100-62.5-25mcg actuation inhaler  Dose/Frequency 1 puff daily  -uantity 1 blister  Verified pharmacy   [x]  Verified ordering Provider   [x]  Does patient have enough for the next 3 days? Yes [] No [x]

## 2024-01-27 NOTE — TELEPHONE ENCOUNTER
Reason for Disposition  • Negative: Did you page the on call provider?    Protocols used: [facility] NO TRIAGE REQUIRED

## 2024-01-29 RX ORDER — CARVEDILOL 3.12 MG/1
3.12 TABLET ORAL 2 TIMES DAILY WITH MEALS
Qty: 180 TABLET | Refills: 0 | Status: CANCELLED | OUTPATIENT
Start: 2024-01-29 | End: 2024-05-28

## 2024-01-30 DIAGNOSIS — I10 ESSENTIAL HYPERTENSION: ICD-10-CM

## 2024-01-30 RX ORDER — CARVEDILOL 3.12 MG/1
3.12 TABLET ORAL 2 TIMES DAILY WITH MEALS
Qty: 60 TABLET | Refills: 5 | OUTPATIENT
Start: 2024-01-30

## 2024-01-30 RX ORDER — FLUOXETINE HYDROCHLORIDE 40 MG/1
40 CAPSULE ORAL DAILY
Qty: 30 CAPSULE | Refills: 5 | OUTPATIENT
Start: 2024-01-30

## 2024-01-30 RX ORDER — CARVEDILOL 3.12 MG/1
3.12 TABLET ORAL 2 TIMES DAILY WITH MEALS
Qty: 180 TABLET | Refills: 1 | Status: CANCELLED | OUTPATIENT
Start: 2024-01-30 | End: 2024-05-29

## 2024-01-30 RX ORDER — FLUOXETINE HYDROCHLORIDE 40 MG/1
40 CAPSULE ORAL DAILY
Qty: 90 CAPSULE | Refills: 0 | Status: SHIPPED | OUTPATIENT
Start: 2024-01-30

## 2024-01-30 NOTE — TELEPHONE ENCOUNTER
Advair comes from [facility] and was given a 6 month supply one month ago   Is he seeing cardio? They should be filling carvediol   Okay for prozac refill

## 2024-01-30 NOTE — TELEPHONE ENCOUNTER
Reason for call:   [x] Refill   [] Prior Auth  [] Other:     Office:   [x] PCP/Provider - S [place]  [] Specialty/Provider -     Medication: carvedilol 3.125, 1 bid, 180      Pharmacy: [facility]. [place]      Does the patient have enough for 3 days?   [x] Yes   [] No - Send as HP to POD

## 2024-01-31 ENCOUNTER — HOSPITAL ENCOUNTER (OUTPATIENT)
Dept: CT IMAGING | Facility: HOSPITAL | Age: 59
Discharge: HOME | End: 2024-01-31
Attending: ORTHOPAEDIC SURGERY
Payer: COMMERCIAL

## 2024-01-31 ENCOUNTER — HOSPITAL ENCOUNTER (OUTPATIENT)
Dept: RADIOLOGY | Facility: HOSPITAL | Age: 59
End: 2024-01-31
Payer: COMMERCIAL

## 2024-01-31 DIAGNOSIS — M20.21 HALLUX RIGIDUS, RIGHT FOOT: ICD-10-CM

## 2024-01-31 PROCEDURE — 73700 CT LOWER EXTREMITY W/O DYE: CPT

## 2024-01-31 PROCEDURE — G1004 CDSM NDSC: HCPCS

## 2024-01-31 NOTE — TELEPHONE ENCOUNTER
Called patient regarding medication refills for Coreg and Advair. Per patient, he does not want to continue seeing specialists. Patient states if these medications cannot be refilled by his PCP, he will stop taking them. Patient states he does not have any problems currently. Patient requesting that Dr. Winston refill these medications.

## 2024-02-01 NOTE — TELEPHONE ENCOUNTER
I spoke with Prince and he is using Trelegy for maintenance and albuterol for rescue. He said the last time it was sent for 90 days but his insurance would only allow 30 days. He would prefer 90 days if they will fill it.    Karen

## 2024-02-01 NOTE — TELEPHONE ENCOUNTER
I can refill and we can talk about this at his upcoming visit. He has trelegy and advair on his medicine list - which is he taking? Does he want 30 day or 90 day?

## 2024-02-02 RX ORDER — CARVEDILOL 3.12 MG/1
3.12 TABLET ORAL 2 TIMES DAILY WITH MEALS
Qty: 180 TABLET | Refills: 1 | Status: SHIPPED | OUTPATIENT
Start: 2024-02-02

## 2024-02-07 ENCOUNTER — OFFICE VISIT (OUTPATIENT)
Dept: OBGYN CLINIC | Facility: CLINIC | Age: 59
End: 2024-02-07
Payer: COMMERCIAL

## 2024-02-07 VITALS — HEIGHT: 73 IN | BODY MASS INDEX: 29.29 KG/M2 | WEIGHT: 221 LBS

## 2024-02-07 DIAGNOSIS — M20.21 HALLUX RIGIDUS, BILATERAL: Primary | ICD-10-CM

## 2024-02-07 DIAGNOSIS — M20.22 HALLUX RIGIDUS, BILATERAL: Primary | ICD-10-CM

## 2024-02-07 PROCEDURE — 99214 OFFICE O/P EST MOD 30 MIN: CPT | Performed by: ORTHOPAEDIC SURGERY

## 2024-02-07 NOTE — PROGRESS NOTES
James R Lachman, M.D.  Attending, Orthopaedic Surgery  Foot and Ankle  [facility]      ORTHOPAEDIC FOOT AND ANKLE CLINIC VISIT     Assessment:   No diagnosis found.         Plan:   The patient verbalized understanding of exam findings and treatment plan. We engaged in the shared decision-making process and treatment options were discussed at length with the patient. Surgical and conservative management discussed today along with risks and benefits.  His CT scan shows signficant healing across his fusion site at his 1st MTP  He has noticed significant improvements in his symptoms since his last visit. He still has pain and swelling but feels this is improving.  Continue to return to activities as tolerated.  Followup PRN      History of Present Illness:   Chief Complaint:   Chief Complaint   Patient presents with    Right Foot - Post-op     Prince Gonzalez is a 58 y.o. male who is being seen in follow-up for Right 1st MTP revision fusion. When we last saw he we recommended CT scan to evaluate for successful union.  Pain has improved. Residual pain is localized at 1st MTP with minimal radiating.      Pain/symptom timing:  Worse during the day when active  Pain/symptom context:  Worse with activites and work  Pain/symptom modifying factors:  Rest makes better, activities make worse  Pain/symptom associated signs/symptoms: none    Prior treatment   NSAIDsYes   Injections Yes   Bracing/Orthotics Yes    Physical Therapy Yes     Orthopedic Surgical History:   See below    Past Medical, Surgical and Social History:  Past Medical History:  has a past medical history of Arthritis, Colitis, Colon polyp, COPD (chronic obstructive pulmonary disease) ([facility]), COVID-19, Depression, Diverticulitis, Diverticulitis of colon, Diverticulosis, GERD (gastroesophageal reflux disease), Hyperlipidemia, Hypertension, Pulmonary emphysema (HCC), Sleep apnea, and Umbilical hernia.  Problem List: does not have any  pertinent problems on file.  Past Surgical History:  has a past surgical history that includes Cholecystectomy; Cataract extraction (Bilateral); Knee arthroscopy (Left); Colonoscopy; Esophagogastroduodenoscopy; [place] tooth extraction; Hernia repair (09/19/2019); pr tnot elbow lateral/medial debride open tdn rpr (Left, 11/17/2020); pr hallux rigidus w/cheilectomy 1st mp jt w/o implt (Bilateral, 12/10/2021); pr debridement subcutaneous tissue 1st 20 sq cm/< (Right, 07/14/2022); pr arthrodesis great toe metatarsophalangeal joint (Bilateral, 08/25/2022); Eye surgery; pr arthrodesis great toe metatarsophalangeal joint (Bilateral, 10/05/2023); Upper gastrointestinal endoscopy; and Colonoscopy.  Family History: family history includes Abdominal aortic aneurysm in his father; Anemia in his mother; Cancer in his father; Diabetes in his father, maternal grandfather, maternal grandmother, paternal grandfather, and paternal grandmother; Emphysema in his brother and maternal grandmother; Hypertension in his father and mother; Pancreatic cancer in his father.  Social History:  reports that he quit smoking about 11 years ago. His smoking use included cigarettes. He started smoking about 37 years ago. He has a 35.0 pack-year smoking history. He has never used smokeless tobacco. He reports current alcohol use. He reports that he does not use drugs.  Current Medications: has a current medication list which includes the following prescription(s): albuterol, b complex vitamins, carvedilol, eszopiclone, fluoxetine, fluticasone-umeclidinium-vilanterol, lisinopril, multivitamin, pantoprazole, rosuvastatin, vitamin d, and zinc sulfate.  Allergies: is allergic to allopurinol.     Review of Systems:  General- denies fever/chills  HEENT- denies hearing loss or sore throat  Eyes- denies eye pain or visual disturbances, denies red eyes  Respiratory- denies cough or SOB  Cardio- denies chest pain or palpitations  GI- denies abdominal  "pain  Endocrine- denies urinary frequency  Urinary- denies pain with urination  Musculoskeletal- Negative except noted above  Skin- denies rashes or wounds  Neurological- denies dizziness or headache  Psychiatric- denies anxiety or difficulty concentrating    Physical Exam:   Ht 6' 1\" (1.854 m)   Wt 100 kg (221 lb)   BMI 29.16 kg/m²   General/Constitutional: No apparent distress: well-nourished and well developed.  Eyes: normal ocular motion  Lymphatic: No appreciable lymphadenopathy  Respiratory: Non-labored breathing  Vascular: No edema, swelling or tenderness, except as noted in detailed exam.  Integumentary: No impressive skin lesions present, except as noted in detailed exam.  Neuro: No ataxia or tremors noted  Psych: Normal mood and affect, oriented to person, place and time. Appropriate affect.  Musculoskeletal: Normal, except as noted in detailed exam and in HPI.    Examination    Right    Gait Normal   Musculoskeletal Tender to palpation at 1st MTP    Skin Normal.  Well-healed incisions.    Nails Normal    Range of Motion  Ankle-20 degrees dorsiflexion, 30 degrees plantarflexion  Subtalar motion: normal    Stability Stable    Muscle Strength 5/5 tibialis anterior  5/5 gastrocnemius-soleus  5/5 posterior tibialis  5/5 peroneal/eversion strength  5/5 EHL  5/5 FHL    Neurologic Normal    Sensation  Intact to light touch throughout sural, saphenous, superficial peroneal, deep peroneal and medial/lateral plantar nerve distributions.  Bend-Pat 5.07 filament (10g) testing deferred.    Cardiovascular Brisk capillary refill < 2 seconds,intact DP and PT pulses    Special Tests None      Imaging Studies:     CT available for review of Right ankle which shows healing 1st MTP fusion with successful fusion across the dorsal half of the arthrodesis site. Reviewed by me personally.        James R. Lachman, MD  Foot & Ankle Surgery   Department of Orthopaedic Surgery  [facility]      I " personally performed the service.    James R. Lachman, MD

## 2024-02-28 ENCOUNTER — RA CDI HCC (OUTPATIENT)
Dept: OTHER | Facility: HOSPITAL | Age: 59
End: 2024-02-28

## 2024-02-28 PROBLEM — Z12.2 ENCOUNTER FOR SCREENING FOR MALIGNANT NEOPLASM OF LUNG IN FORMER SMOKER WHO QUIT IN PAST 15 YEARS WITH 30 PACK YEAR HISTORY OR GREATER: Status: ACTIVE | Noted: 2024-02-28

## 2024-02-28 PROBLEM — Z87.891 ENCOUNTER FOR SCREENING FOR MALIGNANT NEOPLASM OF LUNG IN FORMER SMOKER WHO QUIT IN PAST 15 YEARS WITH 30 PACK YEAR HISTORY OR GREATER: Status: ACTIVE | Noted: 2024-02-28

## 2024-02-29 ENCOUNTER — OFFICE VISIT (OUTPATIENT)
Dept: FAMILY MEDICINE CLINIC | Facility: CLINIC | Age: 59
End: 2024-02-29
Payer: COMMERCIAL

## 2024-02-29 VITALS
HEIGHT: 73 IN | HEART RATE: 76 BPM | BODY MASS INDEX: 28.97 KG/M2 | TEMPERATURE: 97.5 F | OXYGEN SATURATION: 99 % | RESPIRATION RATE: 16 BRPM | WEIGHT: 218.6 LBS | SYSTOLIC BLOOD PRESSURE: 124 MMHG | DIASTOLIC BLOOD PRESSURE: 82 MMHG

## 2024-02-29 DIAGNOSIS — Z86.010 HISTORY OF COLON POLYPS: ICD-10-CM

## 2024-02-29 DIAGNOSIS — Z12.5 SCREENING PSA (PROSTATE SPECIFIC ANTIGEN): ICD-10-CM

## 2024-02-29 DIAGNOSIS — F51.01 PRIMARY INSOMNIA: ICD-10-CM

## 2024-02-29 DIAGNOSIS — F32.0 MAJOR DEPRESSIVE DISORDER, SINGLE EPISODE, MILD (HCC): ICD-10-CM

## 2024-02-29 DIAGNOSIS — J43.2 CENTRILOBULAR EMPHYSEMA (HCC): ICD-10-CM

## 2024-02-29 DIAGNOSIS — E55.9 VITAMIN D DEFICIENCY: ICD-10-CM

## 2024-02-29 DIAGNOSIS — N18.2 BENIGN HYPERTENSION WITH CKD (CHRONIC KIDNEY DISEASE), STAGE II: ICD-10-CM

## 2024-02-29 DIAGNOSIS — N18.2 CKD (CHRONIC KIDNEY DISEASE) STAGE 2, GFR 60-89 ML/MIN: ICD-10-CM

## 2024-02-29 DIAGNOSIS — E78.2 MIXED HYPERLIPIDEMIA: ICD-10-CM

## 2024-02-29 DIAGNOSIS — E66.3 OVERWEIGHT WITH BODY MASS INDEX (BMI) OF 28 TO 28.9 IN ADULT: ICD-10-CM

## 2024-02-29 DIAGNOSIS — I12.9 BENIGN HYPERTENSION WITH CKD (CHRONIC KIDNEY DISEASE), STAGE II: ICD-10-CM

## 2024-02-29 DIAGNOSIS — Z87.891 ENCOUNTER FOR SCREENING FOR MALIGNANT NEOPLASM OF LUNG IN FORMER SMOKER WHO QUIT IN PAST 15 YEARS WITH 30 PACK YEAR HISTORY OR GREATER: ICD-10-CM

## 2024-02-29 DIAGNOSIS — K21.9 GASTROESOPHAGEAL REFLUX DISEASE WITHOUT ESOPHAGITIS: ICD-10-CM

## 2024-02-29 DIAGNOSIS — R73.01 IFG (IMPAIRED FASTING GLUCOSE): ICD-10-CM

## 2024-02-29 DIAGNOSIS — Z00.00 WELL ADULT EXAM: Primary | ICD-10-CM

## 2024-02-29 DIAGNOSIS — Z12.2 ENCOUNTER FOR SCREENING FOR MALIGNANT NEOPLASM OF LUNG IN FORMER SMOKER WHO QUIT IN PAST 15 YEARS WITH 30 PACK YEAR HISTORY OR GREATER: ICD-10-CM

## 2024-02-29 DIAGNOSIS — K21.00 GASTROESOPHAGEAL REFLUX DISEASE WITH ESOPHAGITIS WITHOUT HEMORRHAGE: ICD-10-CM

## 2024-02-29 PROBLEM — R06.02 SOB (SHORTNESS OF BREATH): Status: RESOLVED | Noted: 2021-11-21 | Resolved: 2024-02-29

## 2024-02-29 PROCEDURE — 99214 OFFICE O/P EST MOD 30 MIN: CPT | Performed by: FAMILY MEDICINE

## 2024-02-29 PROCEDURE — 99396 PREV VISIT EST AGE 40-64: CPT | Performed by: FAMILY MEDICINE

## 2024-02-29 RX ORDER — ESZOPICLONE 3 MG/1
3 TABLET, FILM COATED ORAL
Qty: 90 TABLET | Refills: 1 | Status: SHIPPED | OUTPATIENT
Start: 2024-02-29

## 2024-02-29 RX ORDER — PANTOPRAZOLE SODIUM 40 MG/1
40 TABLET, DELAYED RELEASE ORAL DAILY
Qty: 30 TABLET | Refills: 5 | Status: SHIPPED | OUTPATIENT
Start: 2024-02-29

## 2024-02-29 RX ORDER — FLUOXETINE HYDROCHLORIDE 40 MG/1
40 CAPSULE ORAL DAILY
Qty: 30 CAPSULE | Refills: 5 | Status: SHIPPED | OUTPATIENT
Start: 2024-02-29

## 2024-02-29 NOTE — ASSESSMENT & PLAN NOTE
Lifetime issue. Takes Lunesta for years, feels this works best. Still waking often. No LUTS symptoms. Suspect due to LINDA. He declines test because he would never wear mask   Controlled Substance Review  PDMP Review         Value Time User    PDMP Reviewed  Yes 2/29/2024  9:45 AM Eliane Winston,

## 2024-02-29 NOTE — ASSESSMENT & PLAN NOTE
Lab Results   Component Value Date    EGFR 72 08/17/2023    EGFR 78 05/15/2023    EGFR 73 02/19/2023    CREATININE 1.10 08/17/2023    CREATININE 1.04 05/15/2023    CREATININE 1.09 02/19/2023   Doing well on Lisinopril 30 mg and Coreg 3.125 mg twice a day

## 2024-02-29 NOTE — PROGRESS NOTES
Assessment/Plan:     1. Well adult exam    2. Benign hypertension with CKD (chronic kidney disease), stage II  Assessment & Plan:  Lab Results   Component Value Date    EGFR 72 08/17/2023    EGFR 78 05/15/2023    EGFR 73 02/19/2023    CREATININE 1.10 08/17/2023    CREATININE 1.04 05/15/2023    CREATININE 1.09 02/19/2023   Doing well on Lisinopril 30 mg and Coreg 3.125 mg twice a day     Orders:  -     Comprehensive metabolic panel; Future; Expected date: 08/29/2024  -     Lipid panel; Future; Expected date: 08/29/2024  -     Hemoglobin A1C; Future; Expected date: 08/29/2024  -     Albumin / creatinine urine ratio; Future  -     CBC and differential; Future; Expected date: 08/29/2024    3. Major depressive disorder, single episode, mild (HCC)  Assessment & Plan:  Negative screening today. Continue Prozac 40 mg     Orders:  -     FLUoxetine (PROzac) 40 MG capsule; Take 1 capsule (40 mg total) by mouth daily    4. Primary insomnia  Assessment & Plan:  Lifetime issue. Takes Lunesta for years, feels this works best. Still waking often. No LUTS symptoms. Suspect due to LINDA. He declines test because he would never wear mask   Controlled Substance Review  PDMP Review         Value Time User    PDMP Reviewed  Yes 2/29/2024  9:45 AM Eliane Winston DO                 Orders:  -     eszopiclone (LUNESTA) 3 MG tablet; Take 1 tablet (3 mg total) by mouth daily at bedtime  -     Comprehensive metabolic panel; Future; Expected date: 08/29/2024  -     Lipid panel; Future; Expected date: 08/29/2024  -     Hemoglobin A1C; Future; Expected date: 08/29/2024  -     Albumin / creatinine urine ratio; Future  -     CBC and differential; Future; Expected date: 08/29/2024    5. Mixed hyperlipidemia  Assessment & Plan:  Update fasting blood work. On Crestor 10 mg     Orders:  -     Comprehensive metabolic panel; Future; Expected date: 08/29/2024  -     Lipid panel; Future; Expected date: 08/29/2024  -     Hemoglobin A1C; Future; Expected  date: 08/29/2024  -     Albumin / creatinine urine ratio; Future  -     CBC and differential; Future; Expected date: 08/29/2024    6. Overweight with body mass index (BMI) of 28 to 28.9 in adult  Assessment & Plan:  Pt doing a great job with weight loss       7. History of colon polyps  Assessment & Plan:  Colonoscopy up to date - due again 3/24/25    Orders:  -     Ambulatory referral to Gastroenterology; Future    8. Gastroesophageal reflux disease without esophagitis  Assessment & Plan:  EGD 12/23.  Follows with GI. Refill Protonix       9. Vitamin D deficiency  Assessment & Plan:  Update labs     Orders:  -     Vitamin D 25 hydroxy; Future  -     Comprehensive metabolic panel; Future; Expected date: 08/29/2024  -     Lipid panel; Future; Expected date: 08/29/2024  -     Hemoglobin A1C; Future; Expected date: 08/29/2024  -     Albumin / creatinine urine ratio; Future  -     CBC and differential; Future; Expected date: 08/29/2024    10. IFG (impaired fasting glucose)  Assessment & Plan:  Update fasting blood work     Orders:  -     Comprehensive metabolic panel; Future  -     Hemoglobin A1C; Future  -     Comprehensive metabolic panel; Future; Expected date: 08/29/2024  -     Lipid panel; Future; Expected date: 08/29/2024  -     Hemoglobin A1C; Future; Expected date: 08/29/2024  -     Albumin / creatinine urine ratio; Future  -     CBC and differential; Future; Expected date: 08/29/2024    11. Encounter for screening for malignant neoplasm of lung in former smoker who quit in past 15 years with 30 pack year history or greater  Assessment & Plan:   Declines lung cancer screening.does not want to know and says he feels fine.     I discussed with him that he is a candidate for lung cancer CT screening.       Review of Eligibility Criteria: He meets all of the criteria for Lung Cancer Screening.   He is 58 y.o.   He has 20 pack year tobacco history and is a current smoker or has quit within the past 15 years  He  presents no signs or symptoms of lung cancer    After discussion, the patient declines lung cancer screening.      12. Screening PSA (prostate specific antigen)  -     PSA, Total Screen; Future; Expected date: 02/29/2024    13. Centrilobular emphysema (HCC)  Assessment & Plan:  Some daytime cough. Denies other symptoms. Declines lung cancer screening.       14. Gastroesophageal reflux disease with esophagitis without hemorrhage  -     pantoprazole (PROTONIX) 40 mg tablet; Take 1 tablet (40 mg total) by mouth daily    15. CKD (chronic kidney disease) stage 2, GFR 60-89 ml/min         Well adult exam  ·         Continue healthy diet   ·         Encourage exercise 4 times a week or more for minimum 30 minutes.   ·         Continue to see dentist, wear seatbelt.  ·         Health maintenance reviewed and up-to-date  Reviewed age appropriate health maintenance screenings and immunizations that are due, risks and benefits of these.   Health Maintenance   Topic Date Due    Hepatitis A Vaccine (1 of 2 - Risk 2-dose series) Never done    Lung Cancer Screening  02/28/2025 (Originally 7/15/2022)    COVID-19 Vaccine (1 - 2023-24 season) 02/28/2025 (Originally 9/1/2023)    Depression Screening  02/28/2025    Annual Physical  02/28/2025    Colorectal Cancer Screening  03/23/2025    DTaP,Tdap,and Td Vaccines (2 - Td or Tdap) 09/24/2030    HIV Screening  Completed    Hepatitis C Screening  Completed    Zoster Vaccine  Completed    Pneumococcal Vaccine: Pediatrics (0 to 5 Years) and At-Risk Patients (6 to 64 Years)  Completed    Influenza Vaccine  Completed    HIB Vaccine  Aged Out    IPV Vaccine  Aged Out    Meningococcal ACWY Vaccine  Aged Out    HPV Vaccine  Aged Out     Ordered labs for now and prior to visit in 6 months.     Return in about 6 months (around 8/29/2024) for LABS CC.    Subjective:    MICHAELA Morrison is a 58 y.o. male who presents today for a physical.     Chief Complaint   Patient presents with    Physical Exam      Pt has no concerns. Pt forgot to complete labs.         Patient Care Team:  Eliane Winston DO as PCP - General (Family Medicine)  DODIE Pringle as PCP - Wound (Wound Care)  Wilfred Loya DPM (Podiatry)    PHQ-2/9 Depression Screening    Little interest or pleasure in doing things: 0 - not at all  Feeling down, depressed, or hopeless: 0 - not at all  Trouble falling or staying asleep, or sleeping too much: 1 - several days  Feeling tired or having little energy: 0 - not at all  Poor appetite or overeatin - not at all  Feeling bad about yourself - or that you are a failure or have let yourself or your family down: 0 - not at all  Trouble concentrating on things, such as reading the newspaper or watching television: 0 - not at all  Moving or speaking so slowly that other people could have noticed. Or the opposite - being so fidgety or restless that you have been moving around a lot more than usual: 0 - not at all  Thoughts that you would be better off dead, or of hurting yourself in some way: 0 - not at all  PHQ-9 Score: 1  PHQ-9 Interpretation: No or Minimal depression        GRACIELA-7 Flowsheet Screening      Flowsheet Row Most Recent Value   Over the last 2 weeks, how often have you been bothered by any of the following problems?    Feeling nervous, anxious, or on edge 0   Not being able to stop or control worrying 0   Worrying too much about different things 0   Trouble relaxing 0   Being so restless that it is hard to sit still 0   Becoming easily annoyed or irritable 0   Feeling afraid as if something awful might happen 0   GRACIELA-7 Total Score 0            ---Above per clinical staff & reviewed. ---  Patient here today for a physical:    Diet: healthy   Exercise:  as he can   Dental visits:  yes   Sleep: not great - see below      Concerns today:  Right foot is 25% fused, still painful, swelling. 5 surgeries.   Little exercise.   Nothing for pain.   Gets up several times during the night - 2 -4 hours and  "then awake.   Sleep study many years ago. + apnea.         The following portions of the patient's history were reviewed and updated as appropriate: allergies, current medications, past family history, past medical history, past social history, past surgical history and problem list.     Current Medications:  Current Outpatient Medications   Medication Sig Dispense Refill    albuterol (PROVENTIL HFA,VENTOLIN HFA) 90 mcg/act inhaler Inhale 2 puffs every 4 (four) hours as needed for wheezing or shortness of breath 18 g 5    b complex vitamins capsule Take 1 capsule by mouth daily      carvedilol (COREG) 3.125 mg tablet Take 1 tablet (3.125 mg total) by mouth 2 (two) times a day with meals 180 tablet 1    eszopiclone (LUNESTA) 3 MG tablet Take 1 tablet (3 mg total) by mouth daily at bedtime 90 tablet 1    FLUoxetine (PROzac) 40 MG capsule Take 1 capsule (40 mg total) by mouth daily 30 capsule 5    fluticasone-umeclidinium-vilanterol 100-62.5-25 mcg/actuation inhaler Inhale 1 puff daily Rinse mouth after use. 90 blister 2    lisinopril (ZESTRIL) 30 mg tablet Take 1 tablet (30 mg total) by mouth every evening 90 tablet 1    multivitamin (THERAGRAN) TABS Take 1 tablet by mouth daily      pantoprazole (PROTONIX) 40 mg tablet Take 1 tablet (40 mg total) by mouth daily 30 tablet 5    rosuvastatin (CRESTOR) 10 MG tablet TAKE 1 TABLET BY MOUTH EVERYDAY AT BEDTIME 90 tablet 1    VITAMIN D PO Take 1 tablet by mouth in the morning        Zinc Sulfate (ZINC 15 PO) Take 1 tablet by mouth in the morning         No current facility-administered medications for this visit.        Objective:      /82   Pulse 76   Temp 97.5 °F (36.4 °C) (Temporal)   Resp 16   Ht 6' 1\" (1.854 m)   Wt 99.2 kg (218 lb 9.6 oz)   SpO2 99%   BMI 28.84 kg/m²   BP Readings from Last 3 Encounters:   02/29/24 124/82   12/27/23 118/77   11/27/23 140/82     Wt Readings from Last 3 Encounters:   02/29/24 99.2 kg (218 lb 9.6 oz)   02/07/24 100 kg (221 " lb)   12/27/23 100 kg (221 lb)       Review of Systems  ROS:  all others negative - no chest pain, SOB, normal urine and bowels. no GERD with meds. Not sleeping well. mood good.     Physical Exam   Constitutional: he appears well-developed and well-nourished.   HENT: Head: Normocephalic.   Right Ear: External ear normal. Tympanic membrane normal.   Left Ear: External ear normal. Tympanic membrane normal.   Nose: Nose normal. No mucosal edema, No rhinorrhea.   Right sinus exhibits no maxillary sinus tenderness.   Left sinus exhibits no maxillary sinus tenderness.   Mouth/Throat: Oropharynx is clear and moist.   Eyes: Normal conjunctiva.  No erythema. No discharge.  Neck: No pain on exam. Neck supple.   Cardiovascular: Normal rate, regular rhythm and normal heart sounds.    Pulmonary/Chest: Effort normal and breath sounds normal. No wheezes. No rales. No rhonchi.   Abdominal: Soft. Bowel sounds are normal. There is no tenderness.   Musculoskeletal: he exhibits no edema.   Lymphadenopathy: he has no cervical adenopathy.   Neurological: he  is alert and oriented to person, place, and time.   Skin: Skin is warm and dry. No rashes.  Psychiatric: he  has a normal mood and affect. his behavior is normal. Thought content normal.   Vitals reviewed.

## 2024-02-29 NOTE — ASSESSMENT & PLAN NOTE
Declines lung cancer screening.does not want to know and says he feels fine.     I discussed with him that he is a candidate for lung cancer CT screening.       Review of Eligibility Criteria: He meets all of the criteria for Lung Cancer Screening.   He is 58 y.o.   He has 20 pack year tobacco history and is a current smoker or has quit within the past 15 years  He presents no signs or symptoms of lung cancer    After discussion, the patient declines lung cancer screening.

## 2024-03-03 DIAGNOSIS — I10 ESSENTIAL HYPERTENSION: ICD-10-CM

## 2024-03-03 RX ORDER — LISINOPRIL 30 MG/1
30 TABLET ORAL EVERY EVENING
Qty: 30 TABLET | Refills: 5 | Status: SHIPPED | OUTPATIENT
Start: 2024-03-03

## 2024-03-18 ENCOUNTER — APPOINTMENT (OUTPATIENT)
Dept: LAB | Facility: CLINIC | Age: 59
End: 2024-03-18
Payer: COMMERCIAL

## 2024-03-18 DIAGNOSIS — R73.01 IFG (IMPAIRED FASTING GLUCOSE): ICD-10-CM

## 2024-03-18 DIAGNOSIS — E55.9 VITAMIN D DEFICIENCY: ICD-10-CM

## 2024-03-18 DIAGNOSIS — Z12.5 SCREENING PSA (PROSTATE SPECIFIC ANTIGEN): ICD-10-CM

## 2024-03-18 LAB
25(OH)D3 SERPL-MCNC: 45.7 NG/ML (ref 30–100)
ALBUMIN SERPL BCP-MCNC: 4.6 G/DL (ref 3.5–5)
ALP SERPL-CCNC: 52 U/L (ref 34–104)
ALT SERPL W P-5'-P-CCNC: 17 U/L (ref 7–52)
ANION GAP SERPL CALCULATED.3IONS-SCNC: 7 MMOL/L (ref 4–13)
AST SERPL W P-5'-P-CCNC: 17 U/L (ref 13–39)
BILIRUB SERPL-MCNC: 0.58 MG/DL (ref 0.2–1)
BUN SERPL-MCNC: 20 MG/DL (ref 5–25)
CALCIUM SERPL-MCNC: 10.1 MG/DL (ref 8.4–10.2)
CHLORIDE SERPL-SCNC: 106 MMOL/L (ref 96–108)
CO2 SERPL-SCNC: 26 MMOL/L (ref 21–32)
CREAT SERPL-MCNC: 1.1 MG/DL (ref 0.6–1.3)
EST. AVERAGE GLUCOSE BLD GHB EST-MCNC: 120 MG/DL
GFR SERPL CREATININE-BSD FRML MDRD: 72 ML/MIN/1.73SQ M
GLUCOSE P FAST SERPL-MCNC: 104 MG/DL (ref 65–99)
HBA1C MFR BLD: 5.8 %
POTASSIUM SERPL-SCNC: 4.1 MMOL/L (ref 3.5–5.3)
PROT SERPL-MCNC: 7.5 G/DL (ref 6.4–8.4)
PSA SERPL-MCNC: 2.36 NG/ML (ref 0–4)
SODIUM SERPL-SCNC: 139 MMOL/L (ref 135–147)

## 2024-03-18 PROCEDURE — G0103 PSA SCREENING: HCPCS

## 2024-03-18 PROCEDURE — 36415 COLL VENOUS BLD VENIPUNCTURE: CPT

## 2024-03-18 PROCEDURE — 83036 HEMOGLOBIN GLYCOSYLATED A1C: CPT

## 2024-03-18 PROCEDURE — 82306 VITAMIN D 25 HYDROXY: CPT

## 2024-03-18 PROCEDURE — 80053 COMPREHEN METABOLIC PANEL: CPT

## 2024-04-09 ENCOUNTER — TELEPHONE (OUTPATIENT)
Dept: GASTROENTEROLOGY | Facility: CLINIC | Age: 59
End: 2024-04-09

## 2024-04-28 DIAGNOSIS — E78.2 MIXED HYPERLIPIDEMIA: ICD-10-CM

## 2024-04-28 RX ORDER — ROSUVASTATIN CALCIUM 10 MG/1
10 TABLET, COATED ORAL
Qty: 30 TABLET | Refills: 5 | Status: SHIPPED | OUTPATIENT
Start: 2024-04-28

## 2024-08-19 ENCOUNTER — APPOINTMENT (OUTPATIENT)
Dept: LAB | Facility: CLINIC | Age: 59
End: 2024-08-19
Payer: COMMERCIAL

## 2024-08-19 DIAGNOSIS — E55.9 VITAMIN D DEFICIENCY: ICD-10-CM

## 2024-08-19 DIAGNOSIS — R73.01 IFG (IMPAIRED FASTING GLUCOSE): ICD-10-CM

## 2024-08-19 DIAGNOSIS — E78.2 MIXED HYPERLIPIDEMIA: ICD-10-CM

## 2024-08-19 DIAGNOSIS — N18.2 BENIGN HYPERTENSION WITH CKD (CHRONIC KIDNEY DISEASE), STAGE II: ICD-10-CM

## 2024-08-19 DIAGNOSIS — F51.01 PRIMARY INSOMNIA: ICD-10-CM

## 2024-08-19 DIAGNOSIS — I12.9 BENIGN HYPERTENSION WITH CKD (CHRONIC KIDNEY DISEASE), STAGE II: ICD-10-CM

## 2024-08-19 LAB
ALBUMIN SERPL BCG-MCNC: 4.4 G/DL (ref 3.5–5)
ALP SERPL-CCNC: 48 U/L (ref 34–104)
ALT SERPL W P-5'-P-CCNC: 24 U/L (ref 7–52)
ANION GAP SERPL CALCULATED.3IONS-SCNC: 7 MMOL/L (ref 4–13)
AST SERPL W P-5'-P-CCNC: 23 U/L (ref 13–39)
BASOPHILS # BLD AUTO: 0.06 THOUSANDS/ÂΜL (ref 0–0.1)
BASOPHILS NFR BLD AUTO: 1 % (ref 0–1)
BILIRUB SERPL-MCNC: 0.47 MG/DL (ref 0.2–1)
BUN SERPL-MCNC: 21 MG/DL (ref 5–25)
CALCIUM SERPL-MCNC: 9.6 MG/DL (ref 8.4–10.2)
CHLORIDE SERPL-SCNC: 107 MMOL/L (ref 96–108)
CHOLEST SERPL-MCNC: 137 MG/DL
CO2 SERPL-SCNC: 26 MMOL/L (ref 21–32)
CREAT SERPL-MCNC: 1.2 MG/DL (ref 0.6–1.3)
CREAT UR-MCNC: 158.3 MG/DL
EOSINOPHIL # BLD AUTO: 0.17 THOUSAND/ÂΜL (ref 0–0.61)
EOSINOPHIL NFR BLD AUTO: 3 % (ref 0–6)
ERYTHROCYTE [DISTWIDTH] IN BLOOD BY AUTOMATED COUNT: 13.4 % (ref 11.6–15.1)
EST. AVERAGE GLUCOSE BLD GHB EST-MCNC: 120 MG/DL
GFR SERPL CREATININE-BSD FRML MDRD: 64 ML/MIN/1.73SQ M
GLUCOSE P FAST SERPL-MCNC: 100 MG/DL (ref 65–99)
HBA1C MFR BLD: 5.8 %
HCT VFR BLD AUTO: 43.6 % (ref 36.5–49.3)
HDLC SERPL-MCNC: 56 MG/DL
HGB BLD-MCNC: 14.5 G/DL (ref 12–17)
IMM GRANULOCYTES # BLD AUTO: 0.02 THOUSAND/UL (ref 0–0.2)
IMM GRANULOCYTES NFR BLD AUTO: 0 % (ref 0–2)
LDLC SERPL CALC-MCNC: 58 MG/DL (ref 0–100)
LYMPHOCYTES # BLD AUTO: 1.71 THOUSANDS/ÂΜL (ref 0.6–4.47)
LYMPHOCYTES NFR BLD AUTO: 25 % (ref 14–44)
MCH RBC QN AUTO: 31.2 PG (ref 26.8–34.3)
MCHC RBC AUTO-ENTMCNC: 33.3 G/DL (ref 31.4–37.4)
MCV RBC AUTO: 94 FL (ref 82–98)
MICROALBUMIN UR-MCNC: <7 MG/L
MONOCYTES # BLD AUTO: 0.7 THOUSAND/ÂΜL (ref 0.17–1.22)
MONOCYTES NFR BLD AUTO: 10 % (ref 4–12)
NEUTROPHILS # BLD AUTO: 4.08 THOUSANDS/ÂΜL (ref 1.85–7.62)
NEUTS SEG NFR BLD AUTO: 61 % (ref 43–75)
NONHDLC SERPL-MCNC: 81 MG/DL
NRBC BLD AUTO-RTO: 0 /100 WBCS
PLATELET # BLD AUTO: 251 THOUSANDS/UL (ref 149–390)
PMV BLD AUTO: 9.8 FL (ref 8.9–12.7)
POTASSIUM SERPL-SCNC: 4.2 MMOL/L (ref 3.5–5.3)
PROT SERPL-MCNC: 7.2 G/DL (ref 6.4–8.4)
RBC # BLD AUTO: 4.65 MILLION/UL (ref 3.88–5.62)
SODIUM SERPL-SCNC: 140 MMOL/L (ref 135–147)
TRIGL SERPL-MCNC: 115 MG/DL
WBC # BLD AUTO: 6.74 THOUSAND/UL (ref 4.31–10.16)

## 2024-08-19 PROCEDURE — 82043 UR ALBUMIN QUANTITATIVE: CPT

## 2024-08-19 PROCEDURE — 80053 COMPREHEN METABOLIC PANEL: CPT

## 2024-08-19 PROCEDURE — 83036 HEMOGLOBIN GLYCOSYLATED A1C: CPT

## 2024-08-19 PROCEDURE — 36415 COLL VENOUS BLD VENIPUNCTURE: CPT

## 2024-08-19 PROCEDURE — 82570 ASSAY OF URINE CREATININE: CPT

## 2024-08-19 PROCEDURE — 85025 COMPLETE CBC W/AUTO DIFF WBC: CPT

## 2024-08-19 PROCEDURE — 80061 LIPID PANEL: CPT

## 2024-08-24 NOTE — PROGRESS NOTES
Assessment & Plan  Essential hypertension  Well controlled        Mixed hyperlipidemia  LDL at goal < 70        IFG (impaired fasting glucose)  Stable        Encounter for immunization  Declines vaccine        Screening PSA (prostate specific antigen)  Update in march          Declines lung cancer screening at this time - will ask at follow up   Handicap placard paperwork completed       No follow-ups on file.    Subjective:   Prince is a 59 y.o. male here today for a follow-up on his current medical conditions:    Patient Active Problem List   Diagnosis    Gastroesophageal reflux disease without esophagitis    Diverticulosis    Sleep apnea    Major depressive disorder, single episode, mild (HCC)    Mixed hyperlipidemia    Benign hypertension with CKD (chronic kidney disease), stage II    Primary insomnia    Overweight with body mass index (BMI) of 28 to 28.9 in adult    Psoriasis    Vitamin D deficiency    Low libido    Class 1 obesity due to excess calories with serious comorbidity and body mass index (BMI) of 31.0 to 31.9 in adult    Arthritis of both feet    Abnormal stress test    Centrilobular emphysema (HCC)    CKD (chronic kidney disease) stage 2, GFR 60-89 ml/min    Hallux rigidus, right foot    Hallux rigidus of both feet    Primary osteoarthritis of right foot    Primary osteoarthritis of left foot    C. difficile colitis and Sigmoid Diverticulitis    History of colon polyps    Open wound of right great toe    Cigarette nicotine dependence in remission    IFG (impaired fasting glucose)    Anterolisthesis of lumbar spine    Lumbar foraminal stenosis    Painful orthopaedic hardware (HCC)    Nonunion of osteotomy site    Encounter for screening for malignant neoplasm of lung in former smoker who quit in past 15 years with 30 pack year history or greater         Patient Care Team:  Eliane Winston DO as PCP - General (Family Medicine)  DODIE Pringle as PCP - Wound (Wound Care)  Wilfred Loya DPM  (Podiatry)    Current Medications:  Current Outpatient Medications   Medication Sig Dispense Refill    albuterol (PROVENTIL HFA,VENTOLIN HFA) 90 mcg/act inhaler Inhale 2 puffs every 4 (four) hours as needed for wheezing or shortness of breath 18 g 5    b complex vitamins capsule Take 1 capsule by mouth daily      carvedilol (COREG) 3.125 mg tablet Take 1 tablet (3.125 mg total) by mouth 2 (two) times a day with meals 180 tablet 1    eszopiclone (LUNESTA) 3 MG tablet Take 1 tablet (3 mg total) by mouth daily at bedtime 90 tablet 1    FLUoxetine (PROzac) 40 MG capsule Take 1 capsule (40 mg total) by mouth daily 30 capsule 5    fluticasone-umeclidinium-vilanterol 100-62.5-25 mcg/actuation inhaler Inhale 1 puff daily Rinse mouth after use. 90 blister 2    lisinopril (ZESTRIL) 30 mg tablet TAKE 1 TABLET BY MOUTH EVERY EVENING. 30 tablet 5    multivitamin (THERAGRAN) TABS Take 1 tablet by mouth daily      pantoprazole (PROTONIX) 40 mg tablet Take 1 tablet (40 mg total) by mouth daily 30 tablet 5    rosuvastatin (CRESTOR) 10 MG tablet TAKE 1 TABLET BY MOUTH EVERYDAY AT BEDTIME 30 tablet 5    VITAMIN D PO Take 1 tablet by mouth in the morning        Zinc Sulfate (ZINC 15 PO) Take 1 tablet by mouth in the morning         No current facility-administered medications for this visit.       HPI:  Chief Complaint   Patient presents with    Follow-up     F/U CC/Labs     Pt wants to discuss if handicap parking is an option for him due to limited mobility.   Declines vaccine at this time.     Insect Bite     Insect bite on right shoulder that has been having a difficult time healing.      -- Above per clinical staff and reviewed. --    PHQ-2/9 Depression Screening                Aug 2024 labs done - stable   3/18/25 psa due   let him know he needs Q 6 month appts now   March 2024 blood work done.   - due for A1C, CMP, PSA, vit D   8/17/24 due for lipids, A1C CMP, urine microalbumin:creatinine ratio , CBC  AUg 2023 labs done  "hemo  globin A1c stable at 6.0, GFR 72, urine microalbumin to creatinine ratio normal, cholesterol from 152 down to 134, triglycerides 140, HDL from 59 down to 53, LDL from 74 down to 53.  PSA due 2/19/2024.  Physical due 2/23/2024.  Immunizations up-to-date.  Lung cancer screening due. - DECLINES   abnormal CT scan of back May 2023   aug labs  A1C CMP  vit D  (PSA due 2/19/24)    december increased crestor 5 to 10 mg   chol some improvment. watch bP.    follows with nephrology proteinuria.  last Feb 2019 - d/c HCTZ due to gout, recommend d/c omerazole and NSAIDS. started Norvasc. low salt diet   cardio stopped norvasc started coreg low EF & crestor     Today:  Mood okay  Pain depends on what he does   Kept his weight down - has lost 43 pounds  Elimating carbs, sweets   Drinks water, coffee- 5 cups per day, scotch 2 shots worth few times a week   Most nights he sleeps well   Pre-diabetic for 25 years   Back  knee, and foot pain         The following portions of the patient's history were reviewed and updated as appropriate: allergies, current medications, past family history, past medical history, past social history, past surgical history and problem list.    Objective:  Vitals:  /84 (BP Location: Left arm, Patient Position: Sitting, Cuff Size: Standard)   Pulse 89   Temp 98.7 °F (37.1 °C) (Temporal)   Resp 16   Ht 6' 1\" (1.854 m)   Wt 97.7 kg (215 lb 6.4 oz)   SpO2 98%   BMI 28.42 kg/m²    Wt Readings from Last 3 Encounters:   08/26/24 97.7 kg (215 lb 6.4 oz)   02/29/24 99.2 kg (218 lb 9.6 oz)   02/07/24 100 kg (221 lb)      BP Readings from Last 3 Encounters:   08/26/24 126/84   02/29/24 124/82   12/27/23 118/77        Review of Systems   He has no other concerns. No unexpected weight changes. No chest pain, SOB, or palpitations. No GERD. No changes in bowels or bladder. Sleeping well. No mood changes. we    Physical Exam   Constitutional:  he appears well-developed and " well-nourished.  HENT: Head: Normocephalic.   Neck: Neck supple.   Cardiovascular: Normal rate, regular rhythm and normal heart sounds.   Pulmonary/Chest: Effort normal and breath sounds normal. No wheezes, rales, or rhonchi.   Abdominal: Soft. Bowel sounds are normal. There is no tenderness. No hepatosplenomegaly.   Musculoskeletal: he exhibits no edema.   Lymphadenopathy: he has no cervical adenopathy.   Neurological: he is alert and oriented to person, place, and time.   Skin: Skin is warm and dry.   Psychiatric: he has a normal mood and affect. his behavior is normal. Thought content normal.       Depression Screening and Follow-up Plan: Patient was screened for depression during today's encounter. They screened negative with a PHQ-9 score of 2.

## 2024-08-26 ENCOUNTER — OFFICE VISIT (OUTPATIENT)
Dept: FAMILY MEDICINE CLINIC | Facility: CLINIC | Age: 59
End: 2024-08-26
Payer: COMMERCIAL

## 2024-08-26 VITALS
OXYGEN SATURATION: 98 % | RESPIRATION RATE: 16 BRPM | WEIGHT: 215.4 LBS | DIASTOLIC BLOOD PRESSURE: 84 MMHG | BODY MASS INDEX: 28.55 KG/M2 | HEART RATE: 89 BPM | HEIGHT: 73 IN | SYSTOLIC BLOOD PRESSURE: 126 MMHG | TEMPERATURE: 98.7 F

## 2024-08-26 DIAGNOSIS — Z12.5 SCREENING PSA (PROSTATE SPECIFIC ANTIGEN): ICD-10-CM

## 2024-08-26 DIAGNOSIS — R73.01 IFG (IMPAIRED FASTING GLUCOSE): ICD-10-CM

## 2024-08-26 DIAGNOSIS — F32.0 MAJOR DEPRESSIVE DISORDER, SINGLE EPISODE, MILD (HCC): ICD-10-CM

## 2024-08-26 DIAGNOSIS — E78.2 MIXED HYPERLIPIDEMIA: ICD-10-CM

## 2024-08-26 DIAGNOSIS — I10 ESSENTIAL HYPERTENSION: ICD-10-CM

## 2024-08-26 DIAGNOSIS — Z23 ENCOUNTER FOR IMMUNIZATION: Primary | ICD-10-CM

## 2024-08-26 PROCEDURE — 99214 OFFICE O/P EST MOD 30 MIN: CPT | Performed by: FAMILY MEDICINE

## 2024-08-26 NOTE — PATIENT INSTRUCTIONS
Insect repellent:  DEET 20 - 50% (may use after the age of 2 months)   [id number] (Mindy Skin So Soft)   Oil of lemon eucalyptus paramethane 3, 8- diol (do not use on children < 3 years old)     Tick repellant:  [id number] (Briggs Skin So Soft)   Permethrin on clothes (do not use on skin)     Dermatology Referral Names      [facility]  [address]  [address]  [place]  [address]   Phone:  [phone] Fax: [phone]     [facility]  [facility]  [address]  [address]  [place]  [address]   Phone:  [phone])     [facility]  [address]  [address]  [place]  [address]   Phone:  [phone] Fax: [phone]     [facility]  [address] [place]  [address]   Phone:  [phone] Fax: [phone]     [x][facility]  [address], [address]   [place] [address] [phone]    []Dr. Francois Villegas  [address], [address], [place] [address]   [address] [facility] [address], [place] [address]  [address] [place] [address] [phone]    [facility]  [address]  [address]  [place]  [address] [phone]           Fax:    [phone]    [facility],   [address], [address]   [place] [address] [phone]     Dr. Laquita Dunlap   [address] [place] [address] Rd., [address],   [place] [address] [phone]     [facility]  [address], [address]  [place] [address]   [phone]     [facility],  Dr. Avi Potter   [address] [place] [address] [phone]      Dr. Yoko Arizmendi   [address]  [place], [place] [id number] [phone]      [facility]  [address], [place]  [address]   [phone]     [facility]  [address] [place], [place] [address]  [phone]  [address] [place] [address]  [phone]     [x] Dr. Mando Angeles & Dr. Laquita Donnelly  [address] [address]   [phone]

## 2024-08-29 DIAGNOSIS — I10 ESSENTIAL HYPERTENSION: ICD-10-CM

## 2024-08-29 DIAGNOSIS — K21.00 GASTROESOPHAGEAL REFLUX DISEASE WITH ESOPHAGITIS WITHOUT HEMORRHAGE: ICD-10-CM

## 2024-08-29 RX ORDER — PANTOPRAZOLE SODIUM 40 MG/1
40 TABLET, DELAYED RELEASE ORAL DAILY
Qty: 30 TABLET | Refills: 5 | Status: SHIPPED | OUTPATIENT
Start: 2024-08-29

## 2024-08-29 RX ORDER — LISINOPRIL 30 MG/1
30 TABLET ORAL EVERY EVENING
Qty: 30 TABLET | Refills: 5 | Status: SHIPPED | OUTPATIENT
Start: 2024-08-29

## 2024-08-30 DIAGNOSIS — I10 ESSENTIAL HYPERTENSION: ICD-10-CM

## 2024-08-30 DIAGNOSIS — F51.01 PRIMARY INSOMNIA: ICD-10-CM

## 2024-08-30 RX ORDER — CARVEDILOL 3.12 MG/1
3.12 TABLET ORAL 2 TIMES DAILY WITH MEALS
Qty: 180 TABLET | Refills: 1 | Status: SHIPPED | OUTPATIENT
Start: 2024-08-30

## 2024-08-30 RX ORDER — ESZOPICLONE 3 MG/1
3 TABLET, FILM COATED ORAL
Qty: 30 TABLET | Refills: 0 | Status: SHIPPED | OUTPATIENT
Start: 2024-08-30

## 2024-08-30 NOTE — TELEPHONE ENCOUNTER
Medication Refill Request     Name carvedilol (COREG) 3.125 mg tablet  Dose/Frequency Take 1 tablet (3.125 mg total) by mouth 2 (two) times a day with meals   Quantity 180 tablets  Verified pharmacy   [x]  Verified ordering Provider   [x]  Does patient have enough for the next 3 days? Yes [] No [x]    Medication Refill Request     Name eszopiclone (LUNESTA) 3 MG tablet  Dose/Frequency Take 1 tablet (3 mg total) by mouth daily at bedtime   Quantity 90 tablets  Verified pharmacy   [x]  Verified ordering Provider   [x]  Does patient have enough for the next 3 days? Yes [] No [x]

## 2024-09-14 ENCOUNTER — PATIENT MESSAGE (OUTPATIENT)
Dept: FAMILY MEDICINE CLINIC | Facility: CLINIC | Age: 59
End: 2024-09-14

## 2024-09-19 ENCOUNTER — TELEPHONE (OUTPATIENT)
Dept: FAMILY MEDICINE CLINIC | Facility: CLINIC | Age: 59
End: 2024-09-19

## 2024-09-19 NOTE — TELEPHONE ENCOUNTER
Attempted to call patient to collect more information in order to complete his [facility] Physician's Statement. Call was unavailable. Pt was last active on [employer] today, I will reply to his request with the questions in attempt to collect this information.

## 2024-09-22 DIAGNOSIS — K21.00 GASTROESOPHAGEAL REFLUX DISEASE WITH ESOPHAGITIS WITHOUT HEMORRHAGE: ICD-10-CM

## 2024-09-22 DIAGNOSIS — I10 ESSENTIAL HYPERTENSION: ICD-10-CM

## 2024-09-22 DIAGNOSIS — F32.0 MAJOR DEPRESSIVE DISORDER, SINGLE EPISODE, MILD (HCC): ICD-10-CM

## 2024-09-22 DIAGNOSIS — E78.2 MIXED HYPERLIPIDEMIA: ICD-10-CM

## 2024-09-23 RX ORDER — PANTOPRAZOLE SODIUM 40 MG/1
40 TABLET, DELAYED RELEASE ORAL DAILY
Qty: 90 TABLET | Refills: 1 | Status: SHIPPED | OUTPATIENT
Start: 2024-09-23

## 2024-09-23 RX ORDER — FLUOXETINE 40 MG/1
40 CAPSULE ORAL DAILY
Qty: 30 CAPSULE | Refills: 5 | Status: SHIPPED | OUTPATIENT
Start: 2024-09-23

## 2024-09-23 RX ORDER — ROSUVASTATIN CALCIUM 10 MG/1
10 TABLET, COATED ORAL
Qty: 90 TABLET | Refills: 1 | Status: SHIPPED | OUTPATIENT
Start: 2024-09-23

## 2024-09-23 RX ORDER — LISINOPRIL 30 MG/1
30 TABLET ORAL EVERY EVENING
Qty: 90 TABLET | Refills: 1 | Status: SHIPPED | OUTPATIENT
Start: 2024-09-23

## 2024-09-25 ENCOUNTER — TELEPHONE (OUTPATIENT)
Dept: FAMILY MEDICINE CLINIC | Facility: CLINIC | Age: 59
End: 2024-09-25

## 2024-09-25 NOTE — TELEPHONE ENCOUNTER
Called patient to collect additional information pertaining to [facility]. Filled form out entirely based on patient's answers to questions and placed on Dr. Winston's desk for completion.

## 2024-09-26 NOTE — TELEPHONE ENCOUNTER
Call patient to let him know form is completed and ready for patient to obtain it. Pt stated that he would like the form to be uploaded to his [facility]. I let pt know that I will request clinical to upload in for him.

## 2024-10-24 DIAGNOSIS — F32.0 MAJOR DEPRESSIVE DISORDER, SINGLE EPISODE, MILD (HCC): ICD-10-CM

## 2024-10-24 RX ORDER — FLUOXETINE 40 MG/1
40 CAPSULE ORAL DAILY
Qty: 90 CAPSULE | Refills: 1 | Status: SHIPPED | OUTPATIENT
Start: 2024-10-24

## 2024-11-26 DIAGNOSIS — J43.9 PULMONARY EMPHYSEMA, UNSPECIFIED EMPHYSEMA TYPE (HCC): ICD-10-CM

## 2024-11-29 RX ORDER — FLUTICASONE FUROATE, UMECLIDINIUM BROMIDE AND VILANTEROL TRIFENATATE 100; 62.5; 25 UG/1; UG/1; UG/1
POWDER RESPIRATORY (INHALATION)
Qty: 60 EACH | Refills: 2 | Status: SHIPPED | OUTPATIENT
Start: 2024-11-29

## 2024-12-11 ENCOUNTER — APPOINTMENT (OUTPATIENT)
Dept: RADIOLOGY | Facility: AMBULARY SURGERY CENTER | Age: 59
End: 2024-12-11
Attending: ORTHOPAEDIC SURGERY
Payer: COMMERCIAL

## 2024-12-11 ENCOUNTER — OFFICE VISIT (OUTPATIENT)
Dept: OBGYN CLINIC | Facility: CLINIC | Age: 59
End: 2024-12-11
Payer: COMMERCIAL

## 2024-12-11 VITALS — BODY MASS INDEX: 28.49 KG/M2 | HEIGHT: 73 IN | WEIGHT: 215 LBS

## 2024-12-11 DIAGNOSIS — T84.84XA PAINFUL ORTHOPAEDIC HARDWARE (HCC): Primary | ICD-10-CM

## 2024-12-11 DIAGNOSIS — T84.84XA PAINFUL ORTHOPAEDIC HARDWARE (HCC): ICD-10-CM

## 2024-12-11 DIAGNOSIS — Z01.89 ENCOUNTER FOR LOWER EXTREMITY COMPARISON IMAGING STUDY: ICD-10-CM

## 2024-12-11 PROCEDURE — 99213 OFFICE O/P EST LOW 20 MIN: CPT | Performed by: ORTHOPAEDIC SURGERY

## 2024-12-11 PROCEDURE — 73630 X-RAY EXAM OF FOOT: CPT

## 2024-12-11 PROCEDURE — 73620 X-RAY EXAM OF FOOT: CPT

## 2024-12-11 RX ORDER — CHLORHEXIDINE GLUCONATE 40 MG/ML
SOLUTION TOPICAL DAILY PRN
OUTPATIENT
Start: 2024-12-11

## 2024-12-11 RX ORDER — CHLORHEXIDINE GLUCONATE ORAL RINSE 1.2 MG/ML
15 SOLUTION DENTAL ONCE
OUTPATIENT
Start: 2024-12-11 | End: 2024-12-11

## 2024-12-11 NOTE — PROGRESS NOTES
James R Lachman, M.D.  Attending, Orthopaedic Surgery  Foot and Ankle  [facility]      ORTHOPAEDIC FOOT AND ANKLE CLINIC VISIT     Assessment:     Encounter Diagnoses   Name Primary?    Painful orthopaedic hardware (HCC) Yes    Encounter for lower extremity comparison imaging study             Plan:   The patient verbalized understanding of exam findings and treatment plan. We engaged in the shared decision-making process and treatment options were discussed at length with the patient. Surgical and conservative management discussed today along with risks and benefits.  Patient has a history of a RIGHT revision arthrodesis of 1st MTP joint.   Today, he complains of pain over the dorsal aspect of the foot around the hardware.  He is wishing to have a surgical procedure to remove his hardware  Informed consent was obtained today for RIGHT foot removal of hardware  Return for 3 week post operative visit.    CONSENT FOR BONY PROCEDURES:   Patient understands that there is no guarantee that the surgery will relieve all of His pain and also understands that there may be a prolonged course of protected weight-bearing status required which will restrict them from driving and other activities as discussed at today's visit. Patient recognizes that there are risks with surgery including bleeding, numbness, nerve irritation, wound complications, infection, continued pain, joint stiffness, malunion, nonunion, anesthetic complications, death, failure of procedure and possible need for further surgery. The patient understands that there is no guarantee that this surgery will relieve all of His pain and symptoms.  Patient understands that there is no guarantee that they will return to full function after the procedure. Patient has provided informed consent for the procedure.      History of Present Illness:   Chief Complaint:   Chief Complaint   Patient presents with    Follow-up     Follow up of the  right foot. Thinks the hardware needs to come out.      Prince Gonzalez is a 59 y.o. male who is being seen in follow-up for Right foot pain. When we last saw he we recommended WBAT in a sneaker.  Pain has improved but he believes the hardware. Residual pain is localized at dorsal aspect of the 1st MTP with minimal radiating and described as sharp and severe.      Pain/symptom timing:  Worse during the day when active  Pain/symptom context:  Worse with activites and work  Pain/symptom modifying factors:  Rest makes better, activities make worse  Pain/symptom associated signs/symptoms: none    Prior treatment   NSAIDsYes   Injections No   Bracing/Orthotics Yes    Physical Therapy Yes     Orthopedic Surgical History:   See below    Past Medical, Surgical and Social History:  Past Medical History:  has a past medical history of Arthritis, Colitis, Colon polyp, COPD (chronic obstructive pulmonary disease) ([facility]), COVID-19, Depression, Diverticulitis, Diverticulitis of colon, Diverticulosis, GERD (gastroesophageal reflux disease), Hyperlipidemia, Hypertension, Pulmonary emphysema ([facility]), Sleep apnea, and Umbilical hernia.  Problem List: does not have any pertinent problems on file.  Past Surgical History:  has a past surgical history that includes Cholecystectomy; Cataract extraction (Bilateral); Knee arthroscopy (Left); Colonoscopy; Esophagogastroduodenoscopy; [place] tooth extraction; Hernia repair (09/19/2019); pr tnot elbow lateral/medial debride open tdn rpr (Left, 11/17/2020); pr hallux rigidus w/cheilectomy 1st mp jt w/o implt (Bilateral, 12/10/2021); pr debridement subcutaneous tissue 1st 20 sq cm/< (Right, 07/14/2022); pr arthrodesis great toe metatarsophalangeal joint (Bilateral, 08/25/2022); Eye surgery; pr arthrodesis great toe metatarsophalangeal joint (Bilateral, 10/05/2023); Upper gastrointestinal endoscopy; and Colonoscopy.  Family History: family history includes Abdominal aortic aneurysm in his father;  Anemia in his mother; Cancer in his father; Diabetes in his father, maternal grandfather, maternal grandmother, paternal grandfather, and paternal grandmother; Emphysema in his brother and maternal grandmother; Hypertension in his father and mother; Pancreatic cancer in his father.  Social History:  reports that he quit smoking about 11 years ago. His smoking use included cigarettes. He started smoking about 38 years ago. He has a 35 pack-year smoking history. He has never used smokeless tobacco. He reports current alcohol use. He reports that he does not use drugs.  Current Medications: has a current medication list which includes the following prescription(s): albuterol, b complex vitamins, carvedilol, eszopiclone, fluoxetine, trelegy ellipta, lisinopril, multivitamin, pantoprazole, rosuvastatin, vitamin d, and zinc sulfate.  Allergies: is allergic to allopurinol.     Review of Systems:  General- denies fever/chills  HEENT- denies hearing loss or sore throat  Eyes- denies eye pain or visual disturbances, denies red eyes  Respiratory- denies cough or SOB  Cardio- denies chest pain or palpitations  GI- denies abdominal pain  Endocrine- denies urinary frequency  Urinary- denies pain with urination  Musculoskeletal- Negative except noted above  Skin- denies rashes or wounds  Neurological- denies dizziness or headache  Psychiatric- denies anxiety or difficulty concentrating    Physical Exam:   There were no vitals taken for this visit.  General/Constitutional: No apparent distress: well-nourished and well developed.  Eyes: normal ocular motion  Lymphatic: No appreciable lymphadenopathy  Respiratory: Non-labored breathing  Vascular: No edema, swelling or tenderness, except as noted in detailed exam.  Integumentary: No impressive skin lesions present, except as noted in detailed exam.  Neuro: No ataxia or tremors noted  Psych: Normal mood and affect, oriented to person, place and time. Appropriate  affect.  Musculoskeletal: Normal, except as noted in detailed exam and in HPI.    Examination    Right    Gait Normal   Musculoskeletal Tender to palpation at 1st MTP over hardware    Skin Normal. Well-healed incisions.    Nails Normal    Range of Motion  20 degrees dorsiflexion, 30 degrees plantarflexion  Subtalar motion: normal    Stability Stable    Muscle Strength 5/5 tibialis anterior  5/5 gastrocnemius-soleus  5/5 posterior tibialis  5/5 peroneal/eversion strength  5/5 EHL  5/5 FHL    Neurologic Normal    Sensation Intact to light touch throughout sural, saphenous, superficial peroneal, deep peroneal and medial/lateral plantar nerve distributions.  [place]-Pat 5.07 filament (10g) testing deferred.    Cardiovascular Brisk capillary refill < 2 seconds,intact DP and PT pulses    Special Tests None      Imaging Studies:   3 views of the Right foot were obtained, reviewed and interpreted independently which demonstrate well healed 1st MTP fusion. Orthopedic hardware remains in stable alignment and position without signs of loosening or failure. Reviewed by me personally.      James R. Lachman, MD  Foot & Ankle Surgery   Department of Orthopaedic Surgery  [facility]      I personally performed the service.    James R. Lachman, MD    Scribe Attestation      I,:  Aravind Vilchis am acting as a scribe while in the presence of the attending physician.:       I,:  James R Lachman, MD personally performed the services described in this documentation    as scribed in my presence.:

## 2024-12-11 NOTE — PATIENT INSTRUCTIONS
James R Lachman, M.D.  Attending, Orthopaedic Surgery  [facility]  [place] Office Phone: [phone] ? Fax: [phone]  [place] Office Phone: [phone] ? Fax:[phone]    : Lizzy Schmitt MA     Surgery Coordinators [place]: Jacqueline Barksdale, [phone]  Dianna Klein, [phone]  Surgery Coordinator [place]:  Jonasmackenzie Marcell, [phone]                                                       Natalie Chopra, [phone]                                                            [email or web address]   PRE-OPERATIVE AND POST-OPERATIVE INSTRUCTIONS    General Information:  Your surgery is with Dr. Lachman.  Dates can change (although rare) depending on emergencies.  Typical post operative visits are at the following intervals:  3 weeks post surgery(except 1 week for bunions and wound monitoring), 6 weeks post surgery, 3 months post surgery, 6 months post surgery, and then on a yearly basis.  However, this may change based on Dr. Lachmans’ recommendation.  #1 post-operative rule for foot/ankle surgery:  ONCE YOU ARE OUT OF YOUR CAST AND/OR REMOVABLE BOOT, SWELLING MAY PERSIST FOR MANY MONTHS.  YOU MIGHT ALSO EXPERIENCE A BLUISH DISCOLORATION OF YOUR LEG.  THIS IS NORMAL AND PART OF THE USUAL POSTOPERATIVE EXPERIENCE.    SMOKING:  Smoking results in incomplete healing of fractures (broken bones) and joints that my have been fused.  Smoking and nicotine also prevents the growth of bone into ankle replacements and bone healing.  It also slows the healing of muscles and skin (soft tissue).  Therefore, please do not have surgery if you continue to smoke.  We reserve the right to cancel your surgery if we suspect that you are smoking.  DO NOT use nicorette gum or other patches.  Please find an alternative method to quit smoking before your surgery.    Pre-Operative Information:  Surgery date and preoperative visits:  If you have  medical problems, such as an abnormal EKG, history of BLOOD CLOT, ANEURYSM, and any other heart condition, please inform us so that we can get your medical clearance several weeks before the surgery.  Please bring any important medical information, such as an EKG, chest x-ray, or echocardiogram, with you to ensure that your surgery will not be delayed.  If needed, you will receive your preoperative appointments in the mail or by phone from our scheduling office.  The location of the preoperative appointment will be given to you also.  You may not eat after midnight the night before surgery.  If you do, your surgery will be cancelled.   You will receive a phone call from your surgery center the day before your surgery (if your surgery is on a Monday, you will get a call the Friday before).   If you do not hear from someone by 4pm the day before your surgery, please call the Surgical coordinator (number above) to notify us.  Start taking Vitamin D3 4000 units per day and Calcium 1200mg per day immediately. You will continue this until your 3 month post-op visit. These are over the counter and available at all pharmacies and supermarkets.  FOR THOSE HAVING SURGERY AT [facility]- IF YOU WILL NEED CRUTCHES OR A ROLLING WALKER AFTER SURGERY, ASK FOR A PRESCRIPTION FOR THIS FROM OUR OFFICE TODAY.  THIS CANNOT BE HANDLED THE DAY OF SURGERY AS [facility] DOES NOT STOCK THESE.    Because bacterial can often enter any defect in the skin, it is important to avoid any cuts before surgery.  Any breaks in the skin on the leg will often result in your surgery being postponed.  Please avoid going on a very long walk the day prior to surgery, or doing other activities that could lead to irritation of the skin, including yard work, extra athletic activity, or shaving.   This could result in surgery cancellation.  You MUST be fasting the day of your surgery.  Therefore, please do not consume any foot or beverage  after midnight the night before surgery.  The morning of surgery you may take your usual medications with a sip of water.  It is important not to take anti-inflammatory medication like Ibuprofen, Motrin, Naproxen (Aleve), or Aspirin 7-10 days before surgery because they will make you bleed more than usual.  Vitamin, E, Plavix and Coumadin also have the same effect.  Stop Aspirin and Vitamin E two weeks before surgery.  YOUR MEDICAL DOCTOR SHOULD TELL YOU WHEN TO STOP COUMADIN OR PLAVIX.  If your surgery involves any bone healing, please do not take anti-inflammatories for at least 6 weeks after surgery.  This can impede bone healing (ibuprofen, Aleve, Relafen, iodine).  Tylenol is fine to take.    PREOPERATIVE BATHING INSTRUCTIONS:    Before your surgery, bathe with Hibiclens (4% Chlorhexidene) as instructed below.  This skin cleanser will help reduce the bacteria on your skin before surgery.  To avoid irritating your eyes, do not apply Hibiclens above the level of your neck.  On the evening before AND the morning of surgery, bathe your entire body except the face and scalp, then rinse freely.  DO NOT apply to your face or scalp, as Hibiclens can irritate your eyes.  Purchasing information:   Hibiclens is available without a prescription at most retail pharmacies.     ADDITIONAL INSTRUCTIONS:  PATIENTS HAVING FOOT/ANKLE SURGERY     In preparation for your upcoming surgery, we kindly request and advise the following:  Notify our office if you are taking any of the following:  Coumadin (warfarin):  Persantine (dipyridamole); Pletal (cilostazol); Plavix (clopidogrel); Ticlid (ticlopidine); Agrylin (anagrelide); Aggrenox (dipyridamole and aspirin) or other blood thinners,.  In addition, stop taking Vitamin E and herbal supplements.  Do not schedule any elective dental work for at least 6 months after surgery.  If you had an ankle replacement, you will need to take antibiotics before any future dental procedures. Your  dentist or our office can prescribe these for you.  1000mg of Amoxicillin 1 hour prior to any dental procedure is the recommended dosing.      THREE RULES:    After surgery you will most likely be given the instructions “KEEP YOUR TOES ABOVE YOUR NOSE.”  This means that you MUST have your feet elevated higher than your heart.  Keeping your toes above your nose helps to heal the muscles and skin (soft tissues) by reducing swelling in your leg.  This position also helps to prevent infection, and is very important in avoiding deep venous thrombosis (blood clots).    In order to keep the blood circulating in your legs and in order to avoid deep vein   thrombosis (blood clots), we ask patients to GET UP ONCE AN HOUR during the day.  This means you should at least cross the room and come back.  It does not mean you have to be up for long periods of time.  In most cases we will not have people immediately put any weight on their operated part.  This is important to prevent loosening of metal or other devices holding the bones together.  It also prevents irritation of the soft tissues which can lead to prolonged healing.  When we say get up once an hour, please walk, hop or move with an assisted device.  This is important!    Do not do any excessive walking during the first few days after surgery.  Recovering from surgery is a full-time task for the patient.  Postoperative care is important to avoid irritating the skin incision, which can lead to infection.  Please do not plan activities or go out of town for several weeks after surgery.  If you are unsure about your future activities, please schedule surgery only when you know it is acceptable for you.  Scheduling surgery and then canceling the date, prevents other people from having surgery on that date as it takes time to line everything up effectively.  If you cancel your surgery the week of your planned surgery, we reserve the right to cancel all future surgical  procedures.    THE DAY OF SURGERY:    Arrival to the hospital or outpatient surgical center on time is imperative.  If you arrive late, then your surgery will be cancelled.  You MUST have a family member/friend bring you, stay with you throughout the DURATION of your surgery, and drive you home.  You MUST be fasting the day of your surgery.  Therefore, do not consume any food or beverage after midnight the night before surgery.  At your pre-operative visit with the anesthesia staff, or during your phone screen, a nurse will instruct you what medications you will need to take the day of surgery.  MAKE SURE THAT THE PHARMACY LISTED IN THE ELECTRONIC MEDICAL RECORD (EPIC) IS YOUR PREFERRED PHARMACY. For example, if you are staying with family or a friend, and will not be near your preferred pharmacy, YOU MUST, tell the nurses checking you in the day of surgery so that this can be changed in the system.  If your prescriptions are sent to a pharmacy, this cannot be changed.      AFTER YOUR SURGERY:  Bleeding through the bandage almost always occurs.  Do not let this alarm you.  Simply add more gauze or a towel, call us, and come in for a dressing change.   If you think it is excessive, contact us immediately or go to the local emergency room.    Do not get the bandage wet.  Showering is possible with plastic protectors.   Be very careful, as the bathroom can be wet and slippery.  If you do get your dressing wet, it should be changed immediately.  Please contact us.      ONCE YOUR ARE OUT OF YOUR CAST AND/OR REMOVABLE BOOT, SWELLING MAY PERSIST FOR MANY MONTHS.  YOU MIGHT ALSO EXPERIENCE A BLUISH DISCOLORATION OF YOUR LEG.  THIS IS NORMAL AND PART OF THE USUAL POSTOPERATIVE EXPERIENCE.  WEARING COMPRESSION HOSE (ELASTIC STOCKINGS) CAN HELP AVOID SOME OF THIS SWELLING.      DRESSING:   The purpose of the surgical dressing is to keep your wound and the surgical site protected from the environment.  Most dressings contain  splints, which help to hold your foot and ankle in a corrected position, and also allow the surgical site to heal properly. Dressings will remain in place and undisturbed until the first postop visit.   If you have a drain in place, this will need to be removed in 1-3 days after surgery.  The time for the drain to be pulled will be written on your discharge instruction sheet.    CAST  INSTRUCTIONS:  You may or may not get a cast following surgery.  If you do, pay close attention to the following:    After application of a splint or cast, it is very important to elevate your leg for 24 to 72 hours.   The injured area should be elevated well above the heart.   Remember “Toes above your Nose”.  Rest and elevation greatly reduce pain and speed the healing process by minimizing early swelling.    CALL YOUR DOCTORS OFFICE OR VISIT LOCATION EMERGENCY ROOM IF YOU HAVE ANY OF THE FOLLOWING:    Significant increased pain, which may be caused by swelling, and the feeling that the splint or cast is too tight  Numbness and tingling in your hand or foot, which may be caused by too much pressure on the nerves  Burning and stinging, which may be caused by too much pressure on the skin  Excessive swelling below the cast, which may mean the cast is slowing your blood circulation  Loss of active movement of toes, which request an urgent evaluation  Loss of “capillary refill”.  Pinch the tip of toes and tricia the skin.  Release pressure and if the skin does not return pink then call the office immediately.      DO NOT GET YOUR CAST WET.   Bacteria thrive in moist dark areas.  We do not want this.   If your cast becomes wet, return to the office and we will apply another one.    PAIN AFTER SURGERY:  Narcotic pain medication can and will depress your respiratory system if taken in excess.  The goal of pain management with narcotics is to be comfortable not pain free.  If you take enough narcotics to be pain free then you run the risk of  stopping breathing.  If this happens, call 911 immediately!  Pain in the heel is often  caused by pressure from the weight of your foot on the bed.  Make sure your heel is suspended off the bed by keeping a pillow underneath your calf not your knee.    Medications:  You will be given narcotic pain medication. Do NOT drive while taking narcotic medications. Medications such as Darvocet, Percocet, Vicoden or Tylenol #3, also contain acetaminophen (Tylenol). Do not take acetaminophen or Tylenol from home when taking theses medications. When you fill your prescription, you may ask the pharmacist if your pain medication has acetaminophen/Tylenol in it. It is okay to take Tylenol with Oxycontin/Oxycodone. Should you have pain after taking your prescription medication, ibuprophen (Motrin, Advil, and Alleve) is a common over the counter preparation and may often be taken with the prescription pain medication as long as you take them with food. These medications can irritate the stomach lining.   Unless you are allergic to aspirin or currently taking a blood thinner, Dr. Lachmans’ patients are requested to take one 81 mg aspirin every 12 hours until you are back to walking normally after surgery (This can be up to 6 weeks).  Narcotic medications commonly cause nausea. Taking them with food will decrease this side effect. If you are having extreme nausea, please contact us for an alternative medication or for something that can be taken with this medication to decrease the nausea.   Also, narcotic medications frequently cause constipation. An increase of fiber, fruits and vegetables in your diet may alleviate this problem, or if necessary, you may use an over-the-counter medication such as senekot, colace, or Fibercon for constipation problems.   You should resume all medications you were taking prior to the surgery unless otherwise specified.     Activity:   Because of your recent foot surgery, your activity level will  decrease. You will need to elevate your foot ABOVE the level of your heart for a minimum of four days. The length of time necessary for the swelling to go down, and for your wounds to heal properly depends greatly on your efforts here. Elevation is extremely important to avoid compromising the blood supply to your foot. Remember when your foot is down it will swell, which will increase pain and slow healing. Wiggle your toes frequently if possible.   If you go home with a regional block, (a type of anesthesia) the foot and leg will be numb. Think of ways to get into your house and around the house until the block wears off.   Keep in mind that it may be a legal issue if you drive while in a cast or splint, especially when the splint is on the right foot. You may call the Department of Motor Vehicles to schedule a road test if you have adaptive equipment applied to your car.   The amount of weight you are allowed to bear on your foot will be written on your discharge sheet filled out at the time of surgery. The following is an explanation of the possibilities:       COVID-19 Policies  [facility] has the following policies when it comes to ELECTIVE surgery  No elective surgery requiring anesthesia until 7 weeks after a patient tested positive for COVID-19   No elective surgery requiring anesthesia until 3 months after a patient was hospitalized for COVID-19    Weight bearing as tolerated (WBAT)   You may put your body weight on your foot as long as you tolerate the pain.

## 2025-01-09 ENCOUNTER — ANESTHESIA (OUTPATIENT)
Dept: ANESTHESIOLOGY | Facility: HOSPITAL | Age: 60
End: 2025-01-09

## 2025-01-09 ENCOUNTER — ANESTHESIA EVENT (OUTPATIENT)
Dept: ANESTHESIOLOGY | Facility: HOSPITAL | Age: 60
End: 2025-01-09

## 2025-01-17 NOTE — PRE-PROCEDURE INSTRUCTIONS
Pre-Surgery Instructions:   Medication Instructions    albuterol (PROVENTIL HFA,VENTOLIN HFA) 90 mcg/act inhaler Uses PRN- OK to take day of surgery    b complex vitamins capsule Stop taking 7 days prior to surgery.    carvedilol (COREG) 3.125 mg tablet Take day of surgery.    eszopiclone (LUNESTA) 3 MG tablet Take night before surgery    FLUoxetine (PROzac) 40 MG capsule Take day of surgery.    fluticasone-umeclidinium-vilanterol (Trelegy Ellipta) 100-62.5-25 mcg/actuation inhaler Take day of surgery.    lisinopril (ZESTRIL) 30 mg tablet Hold day of surgery.    multivitamin (THERAGRAN) TABS Stop taking 7 days prior to surgery.    pantoprazole (PROTONIX) 40 mg tablet Uses PRN- OK to take day of surgery    rosuvastatin (CRESTOR) 10 MG tablet Take night before surgery    VITAMIN D PO Stop taking 7 days prior to surgery.    Zinc Sulfate (ZINC 15 PO) Stop taking 7 days prior to surgery.   Medication instructions for day surgery reviewed. Please use only a sip of water to take your instructed medications. Avoid all over the counter vitamins, supplements and NSAIDS for one week prior to surgery per anesthesia guidelines. Tylenol is ok to take as needed.     You will receive a call one business day prior to surgery with an arrival time and hospital directions. If your surgery is scheduled on a Monday, the hospital will be calling you on the Friday prior to your surgery. If you have not heard from anyone by 8pm, please call the hospital supervisor through the hospital  at [phone]. ([place] [phone] or [place] [phone]).    Do not eat or drink anything after midnight the night before your surgery, including candy, mints, lifesavers, or chewing gum. Do not drink alcohol 24hrs before your surgery. Try not to smoke at least 24hrs before your surgery.       Follow the pre surgery showering instructions as listed in the “My Surgical Experience Booklet” or otherwise provided by your surgeon's office. Do not  use a blade to shave the surgical area 1 week before surgery. It is okay to use a clean electric clippers up to 24 hours before surgery. Do not apply any lotions, creams, including makeup, cologne, deodorant, or perfumes after showering on the day of your surgery. Do not use dry shampoo, hair spray, hair gel, or any type of hair products.     No contact lenses, eye make-up, or artificial eyelashes. Remove nail polish, including gel polish, and any artificial, gel, or acrylic nails if possible. Remove all jewelry including rings and body piercing jewelry.     Wear causal clothing that is easy to take on and off. Consider your type of surgery.    Keep any valuables, jewelry, piercings at home. Please bring any specially ordered equipment (sling, braces) if indicated.    Arrange for a responsible person to drive you to and from the hospital on the day of your surgery. Please confirm the visitor policy for the day of your procedure when you receive your phone call with an arrival time.     Call the surgeon's office with any new illnesses, exposures, or additional questions prior to surgery.    Please reference your “My Surgical Experience Booklet” for additional information to prepare for your upcoming surgery.    Pt. Verbalized an understanding of all instructions reviewed and offers no concerns at this time.

## 2025-01-22 ENCOUNTER — TELEPHONE (OUTPATIENT)
Dept: OBGYN CLINIC | Facility: HOSPITAL | Age: 60
End: 2025-01-22

## 2025-01-22 DIAGNOSIS — T84.84XA PAINFUL ORTHOPAEDIC HARDWARE (HCC): Primary | ICD-10-CM

## 2025-01-22 RX ORDER — MORPHINE SULFATE 15 MG/1
15 TABLET ORAL EVERY 4 HOURS PRN
Qty: 20 TABLET | Refills: 0 | Status: SHIPPED | OUTPATIENT
Start: 2025-01-23

## 2025-01-22 NOTE — TELEPHONE ENCOUNTER
Caller: Patient    Doctor: Lachman    Reason for call: Patient wants to know if script for Morphine will be sent to his pharmacy before his surgery tomorrow. Please advise.    Call back#: [phone]

## 2025-01-23 ENCOUNTER — ANESTHESIA EVENT (OUTPATIENT)
Dept: PERIOP | Facility: AMBULARY SURGERY CENTER | Age: 60
End: 2025-01-23
Payer: COMMERCIAL

## 2025-01-23 ENCOUNTER — ANESTHESIA (OUTPATIENT)
Dept: PERIOP | Facility: AMBULARY SURGERY CENTER | Age: 60
End: 2025-01-23
Payer: COMMERCIAL

## 2025-01-23 ENCOUNTER — HOSPITAL ENCOUNTER (OUTPATIENT)
Facility: AMBULARY SURGERY CENTER | Age: 60
Setting detail: OUTPATIENT SURGERY
Discharge: HOME | End: 2025-01-23
Attending: ORTHOPAEDIC SURGERY | Admitting: ORTHOPAEDIC SURGERY
Payer: COMMERCIAL

## 2025-01-23 ENCOUNTER — HOSPITAL ENCOUNTER (OUTPATIENT)
Dept: RADIOLOGY | Facility: AMBULARY SURGERY CENTER | Age: 60
End: 2025-01-23
Payer: COMMERCIAL

## 2025-01-23 VITALS
SYSTOLIC BLOOD PRESSURE: 136 MMHG | TEMPERATURE: 97.4 F | RESPIRATION RATE: 16 BRPM | WEIGHT: 216 LBS | HEIGHT: 73 IN | BODY MASS INDEX: 28.63 KG/M2 | OXYGEN SATURATION: 94 % | DIASTOLIC BLOOD PRESSURE: 85 MMHG | HEART RATE: 74 BPM

## 2025-01-23 DIAGNOSIS — T84.84XA PAINFUL ORTHOPAEDIC HARDWARE (HCC): Primary | ICD-10-CM

## 2025-01-23 DIAGNOSIS — T84.84XA PAINFUL ORTHOPAEDIC HARDWARE (HCC): ICD-10-CM

## 2025-01-23 PROCEDURE — 11426 EXC H-F-NK-SP B9+MARG >4 CM: CPT | Performed by: ORTHOPAEDIC SURGERY

## 2025-01-23 PROCEDURE — 20680 REMOVAL OF IMPLANT DEEP: CPT | Performed by: PHYSICIAN ASSISTANT

## 2025-01-23 PROCEDURE — 11426 EXC H-F-NK-SP B9+MARG >4 CM: CPT | Performed by: PHYSICIAN ASSISTANT

## 2025-01-23 PROCEDURE — 20680 REMOVAL OF IMPLANT DEEP: CPT | Performed by: ORTHOPAEDIC SURGERY

## 2025-01-23 PROCEDURE — NC001 PR NO CHARGE: Performed by: ORTHOPAEDIC SURGERY

## 2025-01-23 RX ORDER — MAGNESIUM HYDROXIDE 1200 MG/15ML
LIQUID ORAL AS NEEDED
Status: DISCONTINUED | OUTPATIENT
Start: 2025-01-23 | End: 2025-01-23 | Stop reason: HOSPADM

## 2025-01-23 RX ORDER — SODIUM CHLORIDE, SODIUM LACTATE, POTASSIUM CHLORIDE, CALCIUM CHLORIDE 600; 310; 30; 20 MG/100ML; MG/100ML; MG/100ML; MG/100ML
INJECTION, SOLUTION INTRAVENOUS CONTINUOUS PRN
Status: DISCONTINUED | OUTPATIENT
Start: 2025-01-23 | End: 2025-01-23

## 2025-01-23 RX ORDER — ONDANSETRON 2 MG/ML
INJECTION INTRAMUSCULAR; INTRAVENOUS AS NEEDED
Status: DISCONTINUED | OUTPATIENT
Start: 2025-01-23 | End: 2025-01-23

## 2025-01-23 RX ORDER — PROPOFOL 10 MG/ML
INJECTION, EMULSION INTRAVENOUS AS NEEDED
Status: DISCONTINUED | OUTPATIENT
Start: 2025-01-23 | End: 2025-01-23

## 2025-01-23 RX ORDER — FENTANYL CITRATE 50 UG/ML
INJECTION, SOLUTION INTRAMUSCULAR; INTRAVENOUS AS NEEDED
Status: DISCONTINUED | OUTPATIENT
Start: 2025-01-23 | End: 2025-01-23

## 2025-01-23 RX ORDER — CHLORHEXIDINE GLUCONATE 40 MG/ML
SOLUTION TOPICAL DAILY PRN
Status: DISCONTINUED | OUTPATIENT
Start: 2025-01-23 | End: 2025-01-23 | Stop reason: HOSPADM

## 2025-01-23 RX ORDER — CEFAZOLIN SODIUM 2 G/50ML
2000 SOLUTION INTRAVENOUS ONCE
Status: COMPLETED | OUTPATIENT
Start: 2025-01-23 | End: 2025-01-23

## 2025-01-23 RX ORDER — ONDANSETRON 4 MG/1
4 TABLET, FILM COATED ORAL EVERY 8 HOURS PRN
Qty: 10 TABLET | Refills: 0 | Status: SHIPPED | OUTPATIENT
Start: 2025-01-23

## 2025-01-23 RX ORDER — BUPIVACAINE HYDROCHLORIDE 2.5 MG/ML
INJECTION, SOLUTION EPIDURAL; INFILTRATION; INTRACAUDAL AS NEEDED
Status: DISCONTINUED | OUTPATIENT
Start: 2025-01-23 | End: 2025-01-23 | Stop reason: HOSPADM

## 2025-01-23 RX ORDER — CHLORHEXIDINE GLUCONATE ORAL RINSE 1.2 MG/ML
15 SOLUTION DENTAL ONCE
Status: COMPLETED | OUTPATIENT
Start: 2025-01-23 | End: 2025-01-23

## 2025-01-23 RX ORDER — LIDOCAINE HYDROCHLORIDE 10 MG/ML
INJECTION, SOLUTION EPIDURAL; INFILTRATION; INTRACAUDAL; PERINEURAL AS NEEDED
Status: DISCONTINUED | OUTPATIENT
Start: 2025-01-23 | End: 2025-01-23

## 2025-01-23 RX ORDER — VANCOMYCIN HYDROCHLORIDE 1 G/20ML
INJECTION, POWDER, LYOPHILIZED, FOR SOLUTION INTRAVENOUS AS NEEDED
Status: DISCONTINUED | OUTPATIENT
Start: 2025-01-23 | End: 2025-01-23 | Stop reason: HOSPADM

## 2025-01-23 RX ORDER — ASPIRIN 81 MG/1
81 TABLET ORAL 2 TIMES DAILY
Qty: 84 TABLET | Refills: 0 | Status: SHIPPED | OUTPATIENT
Start: 2025-01-23 | End: 2025-03-06

## 2025-01-23 RX ORDER — MIDAZOLAM HYDROCHLORIDE 2 MG/2ML
INJECTION, SOLUTION INTRAMUSCULAR; INTRAVENOUS AS NEEDED
Status: DISCONTINUED | OUTPATIENT
Start: 2025-01-23 | End: 2025-01-23

## 2025-01-23 RX ORDER — HYDROMORPHONE HCL/PF 1 MG/ML
0.5 SYRINGE (ML) INJECTION
Status: DISCONTINUED | OUTPATIENT
Start: 2025-01-23 | End: 2025-01-23 | Stop reason: HOSPADM

## 2025-01-23 RX ORDER — ONDANSETRON 2 MG/ML
4 INJECTION INTRAMUSCULAR; INTRAVENOUS ONCE AS NEEDED
Status: DISCONTINUED | OUTPATIENT
Start: 2025-01-23 | End: 2025-01-23 | Stop reason: HOSPADM

## 2025-01-23 RX ORDER — DEXAMETHASONE SODIUM PHOSPHATE 10 MG/ML
INJECTION, SOLUTION INTRAMUSCULAR; INTRAVENOUS AS NEEDED
Status: DISCONTINUED | OUTPATIENT
Start: 2025-01-23 | End: 2025-01-23

## 2025-01-23 RX ORDER — FENTANYL CITRATE/PF 50 MCG/ML
25 SYRINGE (ML) INJECTION
Status: DISCONTINUED | OUTPATIENT
Start: 2025-01-23 | End: 2025-01-23 | Stop reason: HOSPADM

## 2025-01-23 RX ADMIN — ONDANSETRON 4 MG: 2 INJECTION INTRAMUSCULAR; INTRAVENOUS at 12:17

## 2025-01-23 RX ADMIN — FENTANYL CITRATE 50 MCG: 50 INJECTION INTRAMUSCULAR; INTRAVENOUS at 12:08

## 2025-01-23 RX ADMIN — CEFAZOLIN SODIUM 2000 MG: 2 SOLUTION INTRAVENOUS at 12:01

## 2025-01-23 RX ADMIN — SODIUM CHLORIDE, SODIUM LACTATE, POTASSIUM CHLORIDE, AND CALCIUM CHLORIDE: .6; .31; .03; .02 INJECTION, SOLUTION INTRAVENOUS at 11:58

## 2025-01-23 RX ADMIN — MIDAZOLAM 2 MG: 1 INJECTION INTRAMUSCULAR; INTRAVENOUS at 11:52

## 2025-01-23 RX ADMIN — CHLORHEXIDINE GLUCONATE 15 ML: 1.2 SOLUTION ORAL at 09:37

## 2025-01-23 RX ADMIN — DEXAMETHASONE SODIUM PHOSPHATE 10 MG: 10 INJECTION INTRAMUSCULAR; INTRAVENOUS at 12:05

## 2025-01-23 RX ADMIN — LIDOCAINE HYDROCHLORIDE 50 MG: 10 INJECTION, SOLUTION EPIDURAL; INFILTRATION; INTRACAUDAL at 11:58

## 2025-01-23 RX ADMIN — PROPOFOL 200 MG: 10 INJECTION, EMULSION INTRAVENOUS at 11:58

## 2025-01-23 NOTE — DISCHARGE INSTR - AVS FIRST PAGE
James R Lachman, M.D.  Attending, Orthopaedic Surgery  [facility]  [place] Office Phone: [phone] ? Fax: [phone]  [place] Office Phone: [phone] ? Fax:[phone]    : Lizzy Schmitt MA    Surgery Coordinators [place]: Jacqueline Barksdale, [phone]  Dianna Ernie, [phone]  Surgery Coordinator [place]:  Nilesh Faith, [phone]                                                        Natalie Chopra, [phone]                                                                                                                      [email or web address]   POST-OPERATIVE INSTRUCTIONS    General Information:  Typical post operative visits are at the following intervals:  2-3 weeks post surgery, 6 weeks post surgery, 3 months post surgery, 6 months post surgery, and then on a yearly basis.  However, this may change based on Dr. Lachmans’ recommendation.  #1 post-operative rule for foot/ankle surgery:  ONCE YOU ARE OUT OF YOUR CAST AND/OR REMOVABLE BOOT, SWELLING MAY PERSIST FOR MANY MONTHS.  YOU MIGHT ALSO EXPERIENCE A BLUISH DISCOLORATION OF YOUR LEG.  THIS IS NORMAL AND PART OF THE USUAL POSTOPERATIVE EXPERIENCE.  DO NOT WAIT UNTIL YOUR BLOCK WEARS OFF TO TAKE YOUR PAIN MEDICATION.  IT TAKES A FEW DOSES OF THE PAIN MEDICATION TO REACH A THERAPEUTIC LEVEL.  TAKE A TABLET PROACTIVELY BEFORE YOU HAVE ANY PAIN AND AGAIN 4 HOURS LATER SO WHEN THE BLOCK WEARS OFF, YOU ARE NOT CAUGHT OFF GUARD.    SMOKING:  Smoking results in incomplete healing of fractures (broken bones) and joints that my have been fused.  Smoking and nicotine also prevents the growth of bone into ankle replacements and bone healing.  It also slows the healing of muscles and skin (soft tissue).  Therefore, please do not have surgery if you continue to smoke.  We reserve the right to cancel your surgery if we suspect that you are smoking.  DO NOT use  nicorette gum or other patches.  Please find an alternative method to quit smoking before your surgery and do not restart after surgery to allow for healing.      THREE RULES:    After surgery you will most likely be given the instructions “KEEP YOUR TOES ABOVE YOUR NOSE.”  This means that you MUST have your feet elevated higher than your heart.  Keeping your toes above your nose helps to heal the muscles and skin (soft tissues) by reducing swelling in your leg.  This position also helps to prevent infection, and is very important in avoiding deep venous thrombosis (blood clots).    In order to keep the blood circulating in your legs and in order to avoid deep vein   thrombosis (blood clots), we ask patients to GET UP ONCE AN HOUR during the day.  This means you should at least cross the room and come back.  It does not mean you have to be up for long periods of time.  In most cases we will not have people immediately put any weight on their operated part.  This is important to prevent loosening of metal or other devices holding the bones together.  It also prevents irritation of the soft tissues which can lead to prolonged healing.  When we say get up once an hour, please walk, hop or move with an assisted device.  This is important!    Do not do any excessive walking during the first few days after surgery.  Recovering from surgery is a full-time task for the patient.  Postoperative care is important to avoid irritating the skin incision, which can lead to infection.  Please do not plan activities or go out of town for several weeks after surgery.        AFTER YOUR SURGERY:  Bleeding through the bandage almost always occurs.  Do not let this alarm you.  Simply overwrap with an ABD pad and Ace bandage (The nurses discharging your from the day of surgery will provide this.)   If you think it is excessive, you can come in early for a dressing change (at around 1 week instead of 3 weeks postop.)    Do not get the  bandage wet.  Showering is possible with plastic protectors.   Be very careful, as the bathroom can be wet and slippery.  If you do get your dressing wet, it should be changed immediately.  Please contact us.      ONCE YOUR ARE OUT OF YOUR CAST AND/OR REMOVABLE BOOT, SWELLING MAY PERSIST FOR MANY MONTHS.  THERE WILL ALSO BE A BLUISH DISCOLORATION OF YOUR LEG FOR MONTHS.  THIS IS NORMAL AND PART OF THE USUAL POSTOPERATIVE EXPERIENCE.  WEARING COMPRESSION HOSE (ELASTIC STOCKINGS) CAN HELP AVOID SOME OF THIS SWELLING.    Ice the area 20 minutes every hour once the nerve block wears off. If you are in a cast or a splint, you may need to leave the ice on longer than 20 minutes in order to feel any benefits.       DRESSING:   The purpose of the surgical dressing is to keep your wound and the surgical site protected from the environment.  Most dressings contain splints, which help to hold your foot and ankle in a corrected position, and also allow the surgical site to heal properly. IF YOU GO TO THE EMERGENCY ROOM FOR ANY REASON, AND THEY REMOVE YOUR DRESSING OR SPLINT, YOU MUST BE SEEN IN DR. LACHMANS [facility] TO HAVE IT REPLACED) AS SOON AS POSSIBLE (IDEALLY THE NEXT DAY).   If you have a drain in place, this will need to be removed in 1 day after surgery.  The time for the drain to be pulled will be written on your discharge instruction sheet.    CAST  INSTRUCTIONS:  You may or may not get a cast following surgery.  If you do, pay close attention to the following:    After application of a splint or cast, it is very important to elevate your leg for 24 to 72 hours.   The injured area should be elevated well above the heart.   Remember “Toes above your Nose”.  Rest and elevation greatly reduce pain and speed the healing process by minimizing early swelling.    CALL YOUR DOCTORS OFFICE OR VISIT LOCATION EMERGENCY ROOM IF YOU HAVE ANY OF THE FOLLOWING:    Significant increased pain, which may be caused by swelling (Strict  elevation will alleviate this)  Numbness and tingling in your hand or foot, which may be caused by too much pressure on the nerves (There is always numbness after surgery due to nerve blocks)  Burning and stinging, which may be caused by too much pressure on the skin  Excessive swelling below the cast, which may mean the cast is slowing your blood circulation  Loss of active movement of toes, which request an urgent evaluation (if you have had a nerve block, this is an expected thing and totally normal)  Loss of “capillary refill”.  Pinch the tip of toes and tricia the skin.  Release pressure and if the skin does not return pink then call the office immediately.      DO NOT GET YOUR CAST WET.   Bacteria thrive in moist dark areas.  We do not want this.   If your cast becomes wet, return to the office and we will apply another one.    PAIN AFTER SURGERY:  Narcotic pain medication can and will depress your respiratory system if taken in excess.  The goal of pain management with narcotics is to be comfortable not pain free.  If you take enough narcotics to be pain free then you run the risk of stopping breathing.  If this happens, call 911 immediately!  Pain in the heel is often  caused by pressure from the weight of your foot on the bed.  Make sure your heel is suspended off the bed by keeping a pillow underneath your calf not your knee.    Medications:  You will be given narcotic pain medication. Do NOT drive while taking narcotic medications. Medications such as Darvocet, Percocet, Vicoden or Tylenol #3, also contain acetaminophen (Tylenol). Do not take acetaminophen or Tylenol from home when taking theses medications. When you fill your prescription, you may ask the pharmacist if your pain medication has acetaminophen/Tylenol in it. It is okay to take Tylenol with Oxycontin/Oxycodone.   Unless you are allergic to aspirin or currently taking a blood thinner, Dr. Lachmans’ patients are requested to take one 81 mg  aspirin every 12 hours until you are back to walking normally after surgery (This can be up to 6 weeks). Ecotrin (Enteric-coated aspirin) is more sensitive to the stomach and we recommend purchasing this instead of regular aspirin to minimize the risk of stomach irritation.  Narcotic medications commonly cause nausea. Taking them with food will decrease this side effect. If you are having extreme nausea, please contact us for an alternative medication or for something that can be taken with this medication to decrease the nausea.   Also, narcotic medications frequently cause constipation. An increase of fiber, fruits and vegetables in your diet may alleviate this problem, or if necessary, you may use an over-the-counter medication such as senekot, colace, or Fibercon for constipation problems.   You should resume all medications you were taking prior to the surgery unless otherwise specified.   If you had fracture surgery, bony surgery like an osteotomy or fusion, or a surgery that requires bone healing, you are advised to take Vitamin D and Calcium to improve healing potential.  Vitamin D3 4000 units/day and Calcium 1200mg/day. These are over the counter medications so please pick them up at the pharmacy when you are picking up your prescriptions.    Activity:   Because of your recent foot surgery, your activity level will decrease. You will need to elevate your foot ABOVE the level of your heart for a minimum of four days. The length of time necessary for the swelling to go down, and for your wounds to heal properly depends greatly on your efforts here. Elevation is extremely important to avoid compromising the blood supply to your foot. Remember when your foot is down it will swell, which will increase pain and slow healing. Wiggle your toes frequently if possible.   If you go home with a regional block, (a type of anesthesia) the foot and leg will be numb. Think of ways to get into your house and around the  house until the block wears off.   Keep in mind that it may be a legal issue if you drive while in a cast or splint, especially when the splint is on the right foot. You may call the Department of Motor Vehicles to schedule a road test if you have adaptive equipment applied to your car.   The amount of weight you are allowed to bear on your foot will be written on your discharge sheet filled out at the time of surgery. The following is an explanation of the possibilities:     Weight bearing as tolerated (WBAT)   You may put your body weight on your foot in post-op shoe as long as you tolerate the pain.

## 2025-01-23 NOTE — H&P
James R Lachman, M.D.  Attending, Orthopaedic Surgery  Foot and Ankle  [facility]        ORTHOPAEDIC FOOT AND ANKLE H+P     Assessment:   Right foot painful orthopaedic hardware           Plan:   The patient verbalized understanding of exam findings and treatment plan. We engaged in the shared decision-making process and treatment options were discussed at length with the patient. Surgical and conservative management discussed today along with risks and benefits.  Plan for painful orthopaedic hardware removal right hallux  Consent in chart  CONSENT FOR BONY PROCEDURES:   Patient understands that there is no guarantee that the surgery will relieve all of His pain and also understands that there may be a prolonged course of protected weight-bearing status required which will restrict them from driving and other activities as discussed at today's visit. Patient recognizes that there are risks with surgery including bleeding, numbness, nerve irritation, wound complications, infection, continued pain, joint stiffness, malunion, nonunion, anesthetic complications, death, failure of procedure and possible need for further surgery. The patient understands that there is no guarantee that this surgery will relieve all of His pain and symptoms.  Patient understands that there is no guarantee that they will return to full function after the procedure. Patient has provided informed consent for the procedure.              History of Present Illness:   Chief Complaint: Right foot painful orthopaedic hardware  Prince Gonzalez is a 59 y.o. male who is being seen for right foot painful hardware. Patient has failed all conservative treatment measures.  Pain is localized at right foot hardware with minimal radiating and described as sharp and severe. Patient denies numbness, tingling or radicular pain.  Denies history of neuropathy.  Patient does not smoke, does not have diabetes and does not take blood  thinners.  Patient denies family history of anesthesia complications and has not had any complications with anesthesia.     Pain/symptom timing:  Worse during the day when active  Pain/symptom context:  Worse with activites and work  Pain/symptom modifying factors:  Rest makes better, activities make worse  Pain/symptom associated signs/symptoms: none    Prior treatment   NSAIDsYes    Injections No   Bracing/Orthotics Yes   Physical Therapy No     Orthopedic Surgical History:   See below    Past Medical, Surgical and Social History:  Past Medical History:  has a past medical history of Arthritis, C. difficile diarrhea, Colitis, Colon polyp, COPD (chronic obstructive pulmonary disease) ([facility]), COVID-19, Depression, Diverticulitis, Diverticulitis of colon, Diverticulosis, GERD (gastroesophageal reflux disease), Hyperlipidemia, Hypertension, Pulmonary emphysema ([facility]), Sleep apnea, and Umbilical hernia.  Problem List: does not have any pertinent problems on file.  Past Surgical History:  has a past surgical history that includes Cholecystectomy; Cataract extraction (Bilateral); Knee arthroscopy (Left); Colonoscopy; Esophagogastroduodenoscopy; [place] tooth extraction; Hernia repair (09/19/2019); pr tnot elbow lateral/medial debride open tdn rpr (Left, 11/17/2020); pr hallux rigidus w/cheilectomy 1st mp jt w/o implt (Bilateral, 12/10/2021); pr debridement subcutaneous tissue 1st 20 sq cm/< (Right, 07/14/2022); pr arthrodesis great toe metatarsophalangeal joint (Bilateral, 08/25/2022); Eye surgery; pr arthrodesis great toe metatarsophalangeal joint (Bilateral, 10/05/2023); Upper gastrointestinal endoscopy; and Colonoscopy.  Family History: family history includes Abdominal aortic aneurysm in his father; Anemia in his mother; Cancer in his father; Diabetes in his father, maternal grandfather, maternal grandmother, paternal grandfather, and paternal grandmother; Emphysema in his brother and maternal grandmother; Hypertension  "in his father and mother; Pancreatic cancer in his father.  Social History:  reports that he quit smoking about 12 years ago. His smoking use included cigarettes. He started smoking about 38 years ago. He has a 35 pack-year smoking history. He has never used smokeless tobacco. He reports current alcohol use. He reports that he does not use drugs.  Current Medications: Scheduled Meds:  Current Facility-Administered Medications   Medication Dose Route Frequency Provider Last Rate    bupivacaine liposomal  20 mL Infiltration Once James R Lachman, MD      cefazolin  2,000 mg Intravenous Once James R Lachman, MD      chlorhexidine gluconate   Topical Daily PRN James R Lachman, MD       Continuous Infusions:   PRN Meds:.  chlorhexidine gluconate    Allergies: is allergic to oxycodone and allopurinol.     Review of Systems:  General- denies fever/chills  HEENT- denies hearing loss or sore throat  Eyes- denies eye pain or visual disturbances, denies red eyes  Respiratory- denies cough or SOB  Cardio- denies chest pain or palpitations  GI- denies abdominal pain  Endocrine- denies urinary frequency  Urinary- denies pain with urination  Musculoskeletal- Negative except noted above  Skin- denies rashes or wounds  Neurological- denies dizziness or headache  Psychiatric- denies anxiety or difficulty concentrating    Physical Exam:   /91   Pulse 74   Temp (!) 97.1 °F (36.2 °C) (Temporal)   Resp 20   Ht 6' 1\" (1.854 m)   Wt 98 kg (216 lb)   SpO2 98%   BMI 28.50 kg/m²   General/Constitutional: No apparent distress: well-nourished and well developed.  Eyes: normal ocular motion  Cardio: RRR, Normal S1S2, No m/r/g  Lymphatic: No appreciable lymphadenopathy  Respiratory: Non-labored breathing, CTA b/l no w/c/r  Abdominal: Soft and nontender  Vascular: No edema, swelling or tenderness, except as noted in detailed exam.  Integumentary: No impressive skin lesions present, except as noted in detailed exam.  Neuro: No ataxia or " tremors noted  Psych: Normal mood and affect, oriented to person, place and time. Appropriate affect.  Musculoskeletal: Normal, except as noted in detailed exam and in HPI.    Examination    Right    Gait Antalgic   Musculoskeletal Tender to palpation at dorsal 1st MTP    Skin Normal.      Nails Normal    Range of Motion  20 degrees dorsiflexion, 30 degrees plantarflexion  Subtalar motion: normal    Stability Stable    Muscle Strength 5/5 tibialis anterior  5/5 gastrocnemius-soleus  5/5 posterior tibialis  5/5 peroneal/eversion strength  5/5 EHL  5/5 FHL    Neurologic Normal    Sensation Intact to light touch throughout sural, saphenous, superficial peroneal, deep peroneal and medial/lateral plantar nerve distributions.  [place]-Pat 5.07 filament (10g) testing deferred.    Cardiovascular Brisk capillary refill < 2 seconds,intact DP and PT pulses    Special Tests None      Imaging Studies:   None new        James R. Lachman, MD  Foot & Ankle Surgery   Department of Orthopaedic Surgery  [facility]      I personally performed the service.    James R. Lachman, MD

## 2025-01-23 NOTE — ANESTHESIA POSTPROCEDURE EVALUATION
Post-Op Assessment Note    CV Status:  Stable  Pain Score: 0    Pain management: adequate       Mental Status:  Alert and awake   Hydration Status:  Euvolemic   PONV Controlled:  Controlled   Airway Patency:  Patent  Two or more mitigation strategies used for obstructive sleep apnea   Post Op Vitals Reviewed: Yes    No anethesia notable event occurred.    Staff: CRNA           Last Filed PACU Vitals:  Vitals Value Taken Time   Temp 97    Pulse 67    /84    Resp 16    SpO2 96

## 2025-01-23 NOTE — ANESTHESIA POSTPROCEDURE EVALUATION
Post-Op Assessment Note    CV Status:  Stable  Pain Score: 0    Pain management: adequate       Mental Status:  Alert and awake   Hydration Status:  Euvolemic   PONV Controlled:  Controlled   Airway Patency:  Patent  Two or more mitigation strategies used for obstructive sleep apnea   Post Op Vitals Reviewed: Yes    No anethesia notable event occurred.    Staff: Anesthesiologist, CRNA           Last Filed PACU Vitals:  Vitals Value Taken Time   Temp 97 °F (36.1 °C) 01/23/25 1234   Pulse 70 01/23/25 1258   /82 01/23/25 1253   Resp 18 01/23/25 1258   SpO2 93 % 01/23/25 1258   Vitals shown include unfiled device data.    Modified Nicci:     Vitals Value Taken Time   Activity 2 01/23/25 1253   Respiration 2 01/23/25 1253   Circulation 2 01/23/25 1253   Consciousness 2 01/23/25 1253   Oxygen Saturation 2 01/23/25 1253     Modified Nicci Score: 10

## 2025-01-23 NOTE — ANESTHESIA PREPROCEDURE EVALUATION
Procedure:  REMOVAL HARDWARE FOOT (Right: Ankle)    Relevant Problems   CARDIO   (+) Mixed hyperlipidemia      GI/HEPATIC   (+) Gastroesophageal reflux disease without esophagitis      /RENAL   (+) Benign hypertension with CKD (chronic kidney disease), stage II   (+) CKD (chronic kidney disease) stage 2, GFR 60-89 ml/min      MUSCULOSKELETAL   (+) Arthritis of both feet   (+) Primary osteoarthritis of left foot   (+) Primary osteoarthritis of right foot      NEURO/PSYCH   (+) Major depressive disorder, single episode, mild (HCC)      PULMONARY   (+) Centrilobular emphysema (HCC)   (+) Sleep apnea        Physical Exam    Airway    Mallampati score: II  TM Distance: >3 FB  Neck ROM: full     Dental       Cardiovascular  Cardiovascular exam normal    Pulmonary  Pulmonary exam normal     Other Findings        Anesthesia Plan  ASA Score- 3     Anesthesia Type- general with ASA Monitors.         Additional Monitors:     Airway Plan: LMA.           Plan Factors-    Chart reviewed. EKG reviewed.  Existing labs reviewed. Patient summary reviewed.          Obstructive sleep apnea risk education given perioperatively.        Induction- intravenous.    Postoperative Plan-         Informed Consent- Anesthetic plan and risks discussed with patient.  I personally reviewed this patient with the CRNA. Discussed and agreed on the Anesthesia Plan with the CRNA..      NPO Status:  Vitals Value Taken Time   Date of last liquid 01/22/25 01/23/25 0934   Time of last liquid 1900 01/23/25 0934   Date of last solid 01/22/25 01/23/25 0934   Time of last solid 1900 01/23/25 0934

## 2025-02-06 DIAGNOSIS — J43.9 PULMONARY EMPHYSEMA, UNSPECIFIED EMPHYSEMA TYPE (HCC): ICD-10-CM

## 2025-02-06 RX ORDER — FLUTICASONE FUROATE, UMECLIDINIUM BROMIDE AND VILANTEROL TRIFENATATE 100; 62.5; 25 UG/1; UG/1; UG/1
POWDER RESPIRATORY (INHALATION)
Qty: 60 EACH | Refills: 2 | Status: SHIPPED | OUTPATIENT
Start: 2025-02-06

## 2025-02-12 ENCOUNTER — OFFICE VISIT (OUTPATIENT)
Dept: OBGYN CLINIC | Facility: CLINIC | Age: 60
End: 2025-02-12

## 2025-02-12 VITALS — HEIGHT: 73 IN | WEIGHT: 216 LBS | BODY MASS INDEX: 28.63 KG/M2

## 2025-02-12 DIAGNOSIS — T84.84XA PAINFUL ORTHOPAEDIC HARDWARE (HCC): Primary | ICD-10-CM

## 2025-02-12 PROCEDURE — 99024 POSTOP FOLLOW-UP VISIT: CPT | Performed by: ORTHOPAEDIC SURGERY

## 2025-02-12 NOTE — PROGRESS NOTES
"      James R Lachman, M.D.  Attending, Orthopaedic Surgery  Foot and Ankle  [facility]      ORTHOPAEDIC FOOT AND ANKLE POST-OP VISIT     Procedure:      Right removal pain orthopedic hardware 1st MTP        Date of surgery:   1/23/2025      PLAN  1. Weightbearing Status - WBAT operative extremity  2. DVT prophylaxis - ASA 81 mg BID  3. Begin compression stocking for swelling control  4. Pain control - OTC pain medication  5. RTC in 6 week(s)  6. Xrays needed next visit - no    History of Present Illness:   Chief Complaint:   Chief Complaint   Patient presents with    Post-op     Post op 3 weeks. Splint off and stiches out.   Removal Hardware Foot - Right       Prince Gonzalez is a 59 y.o. male who is being seen for 3 week post-operative visit for the above procedure. Pain is well controlled and the patient has successfully transitioned to OTC pain medicines.  he is taking ASA 81 mg BID for DVT prophylaxis. Patient has been WBAT in a Post op shoe      Review of Systems:  General- denies fever/chills  Respiratory- denies cough or SOB  Cardio- denies chest pain or palpitations  GI- denies abdominal pain  Musculoskeletal- Negative except noted above  Skin- denies rashes or wounds    Physical Exam:   Ht 6' 1\" (1.854 m)   Wt 98 kg (216 lb)   BMI 28.50 kg/m²   General/Constitutional: No apparent distress: well-nourished and well developed.  Eyes: normal ocular motion  Lymphatic: No appreciable lymphadenopathy  Respiratory: Non-labored breathing  Vascular: No edema, swelling or tenderness, except as noted in detailed exam.  Integumentary: No impressive skin lesions present, except as noted in detailed exam.  Neuro: No ataxia or tremors noted  Psych: Normal mood and affect, oriented to person, place and time. Appropriate affect.  Musculoskeletal: Normal, except as noted in detailed exam and in HPI.    Examination    right        Incision Clean, dry, intact  Sutures Removed this visit    Ecchymosis " mild    Swelling mild    Sensation Intact to light touch throughout sural, saphenous, superficial peroneal, deep peroneal and medial/lateral plantar nerve distributions.  [place]-Pat 5.07 filament (10g) testing deferred.    Cardiovascular Brisk capillary refill < 2 seconds,intact DP and PT pulses    Special Tests None      Imaging Studies:   No new images    Scribe Attestation      I,:  Ysesi De León PA-C am acting as a scribe while in the presence of the attending physician.:       I,:  James R Lachman, MD personally performed the services described in this documentation    as scribed in my presence.:                James R. Lachman, MD  Foot & Ankle Surgery   Department of Orthopaedic Surgery  [facility]      I personally performed the service.    James R. Lachman, MD

## 2025-02-12 NOTE — PATIENT INSTRUCTIONS
You may now begin weaning your boot and transitioning to a sneaker. It is important to do this gradually to avoid aggravating the healing process.    Today, you may come out of the boot into a sneaker for 2 hours.  2. Tomorrow, you may come out of the boot into a sneaker for 4 hours,  3. The next day, you may come out of the boot into a sneaker for 6 hours.  4. Continue this (adding 2 hours per day) as you tolerate. For example, if you do 6 hours out of the boot into a sneaker and your foot swells more than usual at night and it is difficult to control the discomfort, do not advance to 8 hours the next day, stay at 6 hours until you are able to tolerate it.    Elevation, Ice and tylenol and staying off of it at night will be important to aide in this transition out of the boot. Swelling and soreness are normal as you begin to do more with the injured leg.     Continue aspirin/lovenox for blood clot prevention  May shower, do not soak in a tub/pool/ocean/etc for another 4 weeks.  Scar massage- pea sized amount of lotion, massage into scar for 5 minutes each day.  Compression stocking (Knee high, 20-30mm Hg) to be worn at all times while awake.

## 2025-02-14 DIAGNOSIS — T84.84XA PAINFUL ORTHOPAEDIC HARDWARE (HCC): ICD-10-CM

## 2025-02-14 RX ORDER — ASPIRIN 81 MG/1
81 TABLET, COATED ORAL 2 TIMES DAILY
Qty: 180 TABLET | Refills: 1 | Status: SHIPPED | OUTPATIENT
Start: 2025-02-14

## 2025-03-03 ENCOUNTER — OFFICE VISIT (OUTPATIENT)
Dept: FAMILY MEDICINE CLINIC | Facility: CLINIC | Age: 60
End: 2025-03-03
Payer: COMMERCIAL

## 2025-03-03 VITALS
OXYGEN SATURATION: 97 % | TEMPERATURE: 97.7 F | HEIGHT: 73 IN | HEART RATE: 87 BPM | WEIGHT: 222.8 LBS | BODY MASS INDEX: 29.53 KG/M2

## 2025-03-03 DIAGNOSIS — J43.9 PULMONARY EMPHYSEMA, UNSPECIFIED EMPHYSEMA TYPE (HCC): ICD-10-CM

## 2025-03-03 DIAGNOSIS — J01.00 ACUTE NON-RECURRENT MAXILLARY SINUSITIS: Primary | ICD-10-CM

## 2025-03-03 PROCEDURE — 99214 OFFICE O/P EST MOD 30 MIN: CPT | Performed by: FAMILY MEDICINE

## 2025-03-03 RX ORDER — AZITHROMYCIN 250 MG/1
TABLET, FILM COATED ORAL
Qty: 6 TABLET | Refills: 0 | Status: SHIPPED | OUTPATIENT
Start: 2025-03-03 | End: 2025-03-08

## 2025-03-03 RX ORDER — ALBUTEROL SULFATE 90 UG/1
2 INHALANT RESPIRATORY (INHALATION) EVERY 4 HOURS PRN
Qty: 18 G | Refills: 5 | Status: SHIPPED | OUTPATIENT
Start: 2025-03-03

## 2025-03-03 RX ORDER — BENZONATATE 200 MG/1
200 CAPSULE ORAL 3 TIMES DAILY PRN
Qty: 30 CAPSULE | Refills: 1 | Status: SHIPPED | OUTPATIENT
Start: 2025-03-03

## 2025-03-03 NOTE — PROGRESS NOTES
Assessment/Plan:     Assessment & Plan  Acute non-recurrent maxillary sinusitis  Patient with symptoms for almost a week and worsening.  He is requesting a Z-Andrés for treatment.  Will also prescribe Tessalon Perles as needed and refill his albuterol inhaler.  Orders:  •  azithromycin (Zithromax) 250 mg tablet; Take 2 tablets (500 mg total) by mouth daily for 1 day, THEN 1 tablet (250 mg total) daily for 4 days.  •  benzonatate (TESSALON) 200 MG capsule; Take 1 capsule (200 mg total) by mouth 3 (three) times a day as needed for cough    Pulmonary emphysema, unspecified emphysema type (HCC)  Requesting a refill for his albuterol inhaler.  Orders:  •  albuterol (PROVENTIL HFA,VENTOLIN HFA) 90 mcg/act inhaler; Inhale 2 puffs every 4 (four) hours as needed for wheezing or shortness of breath         Patient Instructions   To soften and help remove ear wax use:  Debrox 5 - 10 drops to ears daily for at least 7 days prior to visit.   Or make your own solution with 50% alcohol and 50% peroxide.   No follow-ups on file.  Subjective:    MICHAELA Morrison is a 59 y.o. male who presents with:  Chief Complaint    Sinus Problem       HPI     Sinus Problem     Additional comments: Started: Monday/Tuesday. Sx: Clogged head, cough, sneezing, headache, green mucus. Not taking anything for it.           Last edited by Fadia Bran MA on 3/3/2025 11:38 AM.        ---Above per clinical staff & reviewed. ---        Today:  Symptoms started mild symptoms a week ago   Over weekend got much worse, green mucus. Sinus pressure.   No fevers   No ear pain   Body aches   Coughing   No meds over the counter       The following portions of the patient's history were reviewed and updated as appropriate: allergies, current medications, past family history, past medical history, past social history, past surgical history and problem list.  Review of Systems  ROS:  all others negative - no chest pain, SOB, normal urine and bowels. no GERD. sleeping well.  "mood good.   Objective:    Pulse 87   Temp 97.7 °F (36.5 °C) (Temporal)   Ht 6' 1\" (1.854 m)   Wt 101 kg (222 lb 12.8 oz)   SpO2 97%   BMI 29.39 kg/m²   Wt Readings from Last 3 Encounters:   03/03/25 101 kg (222 lb 12.8 oz)   02/12/25 98 kg (216 lb)   01/23/25 98 kg (216 lb)     BP Readings from Last 3 Encounters:   01/23/25 136/85   08/26/24 126/84   02/29/24 124/82       Current Medications:  Current Outpatient Medications   Medication Sig Dispense Refill   • albuterol (PROVENTIL HFA,VENTOLIN HFA) 90 mcg/act inhaler Inhale 2 puffs every 4 (four) hours as needed for wheezing or shortness of breath 18 g 5   • Aspirin Low Dose 81 MG EC tablet TAKE 1 TABLET BY MOUTH 2 TIMES A DAY. 180 tablet 1   • azithromycin (Zithromax) 250 mg tablet Take 2 tablets (500 mg total) by mouth daily for 1 day, THEN 1 tablet (250 mg total) daily for 4 days. 6 tablet 0   • b complex vitamins capsule Take 1 capsule by mouth daily     • benzonatate (TESSALON) 200 MG capsule Take 1 capsule (200 mg total) by mouth 3 (three) times a day as needed for cough 30 capsule 1   • carvedilol (COREG) 3.125 mg tablet TAKE 1 TABLET BY MOUTH TWICE A DAY WITH MEALS 60 tablet 0   • eszopiclone (LUNESTA) 3 MG tablet TAKE 1 TABLET BY MOUTH DAILY AT BEDTIME 30 tablet 0   • FLUoxetine (PROzac) 40 MG capsule TAKE 1 CAPSULE (40 MG TOTAL) BY MOUTH DAILY. 90 capsule 1   • lisinopril (ZESTRIL) 30 mg tablet TAKE 1 TABLET BY MOUTH EVERY EVENING. 90 tablet 1   • multivitamin (THERAGRAN) TABS Take 1 tablet by mouth daily     • ondansetron (ZOFRAN) 4 mg tablet Take 1 tablet (4 mg total) by mouth every 8 (eight) hours as needed for nausea or vomiting 10 tablet 0   • pantoprazole (PROTONIX) 40 mg tablet TAKE 1 TABLET BY MOUTH EVERY DAY (Patient taking differently: Take 40 mg by mouth daily Every other day) 90 tablet 1   • rosuvastatin (CRESTOR) 10 MG tablet TAKE 1 TABLET BY MOUTH EVERYDAY AT BEDTIME 90 tablet 1   • Trelegy Ellipta 100-62.5-25 MCG/ACT inhaler INHALE 1 " PUFF DAILY RINSE MOUTH AFTER USE. 60 each 2   • VITAMIN D PO Take 1 tablet by mouth in the morning       • Zinc Sulfate (ZINC 15 PO) Take 1 tablet by mouth in the morning         No current facility-administered medications for this visit.        Physical Exam   Constitutional: he appears well-developed and well-nourished.   HENT: Head: Normocephalic.   Right Ear: External ear normal. Tympanic membrane normal.   Left Ear: External ear normal. Tympanic membrane normal.   Nose: Nose normal.  + Mucosal edema, + thick discolored rhinorrhea.   Right sinus exhibits + maxillary sinus tenderness.   Left sinus exhibits + maxillary sinus tenderness.   Mouth/Throat: Oropharynx is clear and moist.   Eyes: Normal conjunctiva.  No erythema. No discharge.  Neck: No pain on exam. Neck supple.   Cardiovascular: Normal rate, regular rhythm and normal heart sounds.    Pulmonary/Chest: Effort normal and breath sounds normal.   Abdominal: Soft. Bowel sounds are normal. There is no tenderness.   Lymphadenopathy: he has no cervical adenopathy.   Neurological: he is alert and oriented to person, place, and time.   Skin: Skin is warm and dry.   Psychiatric: he has a normal mood and affect. his behavior is normal.

## 2025-03-04 ENCOUNTER — PATIENT MESSAGE (OUTPATIENT)
Dept: FAMILY MEDICINE CLINIC | Facility: CLINIC | Age: 60
End: 2025-03-04

## 2025-03-06 ENCOUNTER — PATIENT MESSAGE (OUTPATIENT)
Dept: FAMILY MEDICINE CLINIC | Facility: CLINIC | Age: 60
End: 2025-03-06

## 2025-03-06 NOTE — PATIENT COMMUNICATION
The medication was just started on 3/3/25. It will take time to work. If he has new symptoms, or is worsening, he can be seen again or go to urgent care. But I would anticipate 7 - 10 days for him to get better.

## 2025-03-06 NOTE — PATIENT COMMUNICATION
Patient called to advise his provider that he is getting worse since seeing her on Monday and he wants to know what she recommends him to do. Please call back to advise. Thank you.

## 2025-03-17 ENCOUNTER — APPOINTMENT (OUTPATIENT)
Dept: LAB | Facility: CLINIC | Age: 60
End: 2025-03-17
Payer: COMMERCIAL

## 2025-03-17 DIAGNOSIS — Z12.5 SCREENING PSA (PROSTATE SPECIFIC ANTIGEN): ICD-10-CM

## 2025-03-17 DIAGNOSIS — E78.2 MIXED HYPERLIPIDEMIA: ICD-10-CM

## 2025-03-17 DIAGNOSIS — R73.01 IFG (IMPAIRED FASTING GLUCOSE): ICD-10-CM

## 2025-03-17 DIAGNOSIS — I10 ESSENTIAL HYPERTENSION: ICD-10-CM

## 2025-03-17 LAB
ALBUMIN SERPL BCG-MCNC: 4.5 G/DL (ref 3.5–5)
ALP SERPL-CCNC: 58 U/L (ref 34–104)
ALT SERPL W P-5'-P-CCNC: 21 U/L (ref 7–52)
ANION GAP SERPL CALCULATED.3IONS-SCNC: 7 MMOL/L (ref 4–13)
AST SERPL W P-5'-P-CCNC: 18 U/L (ref 13–39)
BASOPHILS # BLD AUTO: 0.07 THOUSANDS/ÂΜL (ref 0–0.1)
BASOPHILS NFR BLD AUTO: 1 % (ref 0–1)
BILIRUB SERPL-MCNC: 0.55 MG/DL (ref 0.2–1)
BUN SERPL-MCNC: 21 MG/DL (ref 5–25)
CALCIUM SERPL-MCNC: 10 MG/DL (ref 8.4–10.2)
CHLORIDE SERPL-SCNC: 106 MMOL/L (ref 96–108)
CHOLEST SERPL-MCNC: 134 MG/DL (ref ?–200)
CO2 SERPL-SCNC: 27 MMOL/L (ref 21–32)
CREAT SERPL-MCNC: 1.17 MG/DL (ref 0.6–1.3)
EOSINOPHIL # BLD AUTO: 0.12 THOUSAND/ÂΜL (ref 0–0.61)
EOSINOPHIL NFR BLD AUTO: 1 % (ref 0–6)
ERYTHROCYTE [DISTWIDTH] IN BLOOD BY AUTOMATED COUNT: 12.8 % (ref 11.6–15.1)
EST. AVERAGE GLUCOSE BLD GHB EST-MCNC: 126 MG/DL
GFR SERPL CREATININE-BSD FRML MDRD: 66 ML/MIN/1.73SQ M
GLUCOSE P FAST SERPL-MCNC: 136 MG/DL (ref 65–99)
HBA1C MFR BLD: 6 %
HCT VFR BLD AUTO: 43.2 % (ref 36.5–49.3)
HDLC SERPL-MCNC: 59 MG/DL
HGB BLD-MCNC: 14.1 G/DL (ref 12–17)
IMM GRANULOCYTES # BLD AUTO: 0.05 THOUSAND/UL (ref 0–0.2)
IMM GRANULOCYTES NFR BLD AUTO: 1 % (ref 0–2)
LDLC SERPL CALC-MCNC: 59 MG/DL (ref 0–100)
LYMPHOCYTES # BLD AUTO: 1.64 THOUSANDS/ÂΜL (ref 0.6–4.47)
LYMPHOCYTES NFR BLD AUTO: 16 % (ref 14–44)
MCH RBC QN AUTO: 30.5 PG (ref 26.8–34.3)
MCHC RBC AUTO-ENTMCNC: 32.6 G/DL (ref 31.4–37.4)
MCV RBC AUTO: 94 FL (ref 82–98)
MONOCYTES # BLD AUTO: 0.76 THOUSAND/ÂΜL (ref 0.17–1.22)
MONOCYTES NFR BLD AUTO: 8 % (ref 4–12)
NEUTROPHILS # BLD AUTO: 7.41 THOUSANDS/ÂΜL (ref 1.85–7.62)
NEUTS SEG NFR BLD AUTO: 73 % (ref 43–75)
NONHDLC SERPL-MCNC: 75 MG/DL
NRBC BLD AUTO-RTO: 0 /100 WBCS
PLATELET # BLD AUTO: 312 THOUSANDS/UL (ref 149–390)
PMV BLD AUTO: 10 FL (ref 8.9–12.7)
POTASSIUM SERPL-SCNC: 4.1 MMOL/L (ref 3.5–5.3)
PROT SERPL-MCNC: 7.4 G/DL (ref 6.4–8.4)
PSA SERPL-MCNC: 2.51 NG/ML (ref 0–4)
RBC # BLD AUTO: 4.62 MILLION/UL (ref 3.88–5.62)
SODIUM SERPL-SCNC: 140 MMOL/L (ref 135–147)
TRIGL SERPL-MCNC: 79 MG/DL (ref ?–150)
WBC # BLD AUTO: 10.05 THOUSAND/UL (ref 4.31–10.16)

## 2025-03-17 PROCEDURE — 80061 LIPID PANEL: CPT

## 2025-03-17 PROCEDURE — 80053 COMPREHEN METABOLIC PANEL: CPT

## 2025-03-17 PROCEDURE — 85025 COMPLETE CBC W/AUTO DIFF WBC: CPT

## 2025-03-17 PROCEDURE — G0103 PSA SCREENING: HCPCS

## 2025-03-17 PROCEDURE — 83036 HEMOGLOBIN GLYCOSYLATED A1C: CPT

## 2025-03-17 PROCEDURE — 36415 COLL VENOUS BLD VENIPUNCTURE: CPT

## 2025-03-18 ENCOUNTER — RESULTS FOLLOW-UP (OUTPATIENT)
Dept: FAMILY MEDICINE CLINIC | Facility: CLINIC | Age: 60
End: 2025-03-18

## 2025-03-24 ENCOUNTER — APPOINTMENT (EMERGENCY)
Dept: CT IMAGING | Facility: HOSPITAL | Age: 60
DRG: 392 | End: 2025-03-24
Payer: COMMERCIAL

## 2025-03-24 ENCOUNTER — HOSPITAL ENCOUNTER (INPATIENT)
Facility: HOSPITAL | Age: 60
LOS: 1 days | Discharge: HOME | DRG: 392 | End: 2025-03-27
Attending: EMERGENCY MEDICINE | Admitting: INTERNAL MEDICINE
Payer: COMMERCIAL

## 2025-03-24 ENCOUNTER — APPOINTMENT (EMERGENCY)
Dept: RADIOLOGY | Facility: HOSPITAL | Age: 60
DRG: 392 | End: 2025-03-24
Payer: COMMERCIAL

## 2025-03-24 DIAGNOSIS — K21.00 GASTROESOPHAGEAL REFLUX DISEASE WITH ESOPHAGITIS WITHOUT HEMORRHAGE: ICD-10-CM

## 2025-03-24 DIAGNOSIS — R11.2 NAUSEA AND VOMITING: ICD-10-CM

## 2025-03-24 DIAGNOSIS — R19.7 DIARRHEA: ICD-10-CM

## 2025-03-24 DIAGNOSIS — R11.0 NAUSEA: ICD-10-CM

## 2025-03-24 DIAGNOSIS — R10.9 ABDOMINAL PAIN: Primary | ICD-10-CM

## 2025-03-24 PROBLEM — R07.9 CHEST PAIN: Status: ACTIVE | Noted: 2025-03-24

## 2025-03-24 LAB
ALBUMIN SERPL BCG-MCNC: 4.7 G/DL (ref 3.5–5)
ALP SERPL-CCNC: 62 U/L (ref 34–104)
ALT SERPL W P-5'-P-CCNC: 20 U/L (ref 7–52)
ANION GAP SERPL CALCULATED.3IONS-SCNC: 10 MMOL/L (ref 4–13)
AST SERPL W P-5'-P-CCNC: 17 U/L (ref 13–39)
ATRIAL RATE: 91 BPM
BACTERIA UR QL AUTO: ABNORMAL /HPF
BASOPHILS # BLD AUTO: 0.02 THOUSANDS/ÂΜL (ref 0–0.1)
BASOPHILS NFR BLD AUTO: 0 % (ref 0–1)
BILIRUB SERPL-MCNC: 0.9 MG/DL (ref 0.2–1)
BILIRUB UR QL STRIP: NEGATIVE
BNP SERPL-MCNC: 22 PG/ML (ref 0–100)
BUN SERPL-MCNC: 18 MG/DL (ref 5–25)
CALCIUM SERPL-MCNC: 10.1 MG/DL (ref 8.4–10.2)
CAOX CRY URNS QL MICRO: ABNORMAL /HPF
CARDIAC TROPONIN I PNL SERPL HS: <2 NG/L (ref ?–50)
CHLORIDE SERPL-SCNC: 100 MMOL/L (ref 96–108)
CLARITY UR: CLEAR
CO2 SERPL-SCNC: 23 MMOL/L (ref 21–32)
COLOR UR: YELLOW
CREAT SERPL-MCNC: 1.23 MG/DL (ref 0.6–1.3)
D DIMER PPP FEU-MCNC: 1.42 UG/ML FEU
EOSINOPHIL # BLD AUTO: 0.07 THOUSAND/ÂΜL (ref 0–0.61)
EOSINOPHIL NFR BLD AUTO: 1 % (ref 0–6)
ERYTHROCYTE [DISTWIDTH] IN BLOOD BY AUTOMATED COUNT: 12.9 % (ref 11.6–15.1)
FLUAV RNA RESP QL NAA+PROBE: NEGATIVE
FLUBV RNA RESP QL NAA+PROBE: NEGATIVE
GFR SERPL CREATININE-BSD FRML MDRD: 62 ML/MIN/1.73SQ M
GLUCOSE SERPL-MCNC: 120 MG/DL (ref 65–140)
GLUCOSE UR STRIP-MCNC: NEGATIVE MG/DL
HCT VFR BLD AUTO: 43.5 % (ref 36.5–49.3)
HGB BLD-MCNC: 14.8 G/DL (ref 12–17)
HGB UR QL STRIP.AUTO: NEGATIVE
IMM GRANULOCYTES # BLD AUTO: 0.02 THOUSAND/UL (ref 0–0.2)
IMM GRANULOCYTES NFR BLD AUTO: 0 % (ref 0–2)
KETONES UR STRIP-MCNC: NEGATIVE MG/DL
LEUKOCYTE ESTERASE UR QL STRIP: ABNORMAL
LIPASE SERPL-CCNC: 35 U/L (ref 11–82)
LYMPHOCYTES # BLD AUTO: 0.54 THOUSANDS/ÂΜL (ref 0.6–4.47)
LYMPHOCYTES NFR BLD AUTO: 7 % (ref 14–44)
MAGNESIUM SERPL-MCNC: 1.8 MG/DL (ref 1.9–2.7)
MCH RBC QN AUTO: 31 PG (ref 26.8–34.3)
MCHC RBC AUTO-ENTMCNC: 34 G/DL (ref 31.4–37.4)
MCV RBC AUTO: 91 FL (ref 82–98)
MONOCYTES # BLD AUTO: 1 THOUSAND/ÂΜL (ref 0.17–1.22)
MONOCYTES NFR BLD AUTO: 13 % (ref 4–12)
MUCOUS THREADS UR QL AUTO: ABNORMAL
NEUTROPHILS # BLD AUTO: 6.06 THOUSANDS/ÂΜL (ref 1.85–7.62)
NEUTS SEG NFR BLD AUTO: 79 % (ref 43–75)
NITRITE UR QL STRIP: NEGATIVE
NON-SQ EPI CELLS URNS QL MICRO: ABNORMAL /HPF
NRBC BLD AUTO-RTO: 0 /100 WBCS
P AXIS: -79 DEGREES
PH UR STRIP.AUTO: 6 [PH]
PLATELET # BLD AUTO: 233 THOUSANDS/UL (ref 149–390)
PMV BLD AUTO: 9.7 FL (ref 8.9–12.7)
POTASSIUM SERPL-SCNC: 3.7 MMOL/L (ref 3.5–5.3)
PR INTERVAL: 126 MS
PROT SERPL-MCNC: 7.9 G/DL (ref 6.4–8.4)
PROT UR STRIP-MCNC: ABNORMAL MG/DL
QRS AXIS: 62 DEGREES
QRSD INTERVAL: 86 MS
QT INTERVAL: 362 MS
QTC INTERVAL: 445 MS
RBC # BLD AUTO: 4.77 MILLION/UL (ref 3.88–5.62)
RBC #/AREA URNS AUTO: ABNORMAL /HPF
RSV RNA RESP QL NAA+PROBE: NEGATIVE
SARS-COV-2 RNA RESP QL NAA+PROBE: NEGATIVE
SODIUM SERPL-SCNC: 133 MMOL/L (ref 135–147)
SP GR UR STRIP.AUTO: 1.03 (ref 1–1.03)
T WAVE AXIS: 45 DEGREES
UROBILINOGEN UR STRIP-ACNC: <2 MG/DL
VENTRICULAR RATE: 91 BPM
WBC # BLD AUTO: 7.71 THOUSAND/UL (ref 4.31–10.16)
WBC #/AREA URNS AUTO: ABNORMAL /HPF

## 2025-03-24 PROCEDURE — 93010 ELECTROCARDIOGRAM REPORT: CPT | Performed by: INTERNAL MEDICINE

## 2025-03-24 PROCEDURE — 96374 THER/PROPH/DIAG INJ IV PUSH: CPT

## 2025-03-24 PROCEDURE — 84484 ASSAY OF TROPONIN QUANT: CPT

## 2025-03-24 PROCEDURE — 0241U HB NFCT DS VIR RESP RNA 4 TRGT: CPT

## 2025-03-24 PROCEDURE — 96376 TX/PRO/DX INJ SAME DRUG ADON: CPT

## 2025-03-24 PROCEDURE — 80053 COMPREHEN METABOLIC PANEL: CPT

## 2025-03-24 PROCEDURE — 36415 COLL VENOUS BLD VENIPUNCTURE: CPT

## 2025-03-24 PROCEDURE — 96375 TX/PRO/DX INJ NEW DRUG ADDON: CPT

## 2025-03-24 PROCEDURE — 83880 ASSAY OF NATRIURETIC PEPTIDE: CPT

## 2025-03-24 PROCEDURE — 83735 ASSAY OF MAGNESIUM: CPT

## 2025-03-24 PROCEDURE — 74177 CT ABD & PELVIS W/CONTRAST: CPT

## 2025-03-24 PROCEDURE — 87505 NFCT AGENT DETECTION GI: CPT

## 2025-03-24 PROCEDURE — 71275 CT ANGIOGRAPHY CHEST: CPT

## 2025-03-24 PROCEDURE — 83690 ASSAY OF LIPASE: CPT

## 2025-03-24 PROCEDURE — 99223 1ST HOSP IP/OBS HIGH 75: CPT | Performed by: HOSPITALIST

## 2025-03-24 PROCEDURE — 71045 X-RAY EXAM CHEST 1 VIEW: CPT

## 2025-03-24 PROCEDURE — 93005 ELECTROCARDIOGRAM TRACING: CPT

## 2025-03-24 PROCEDURE — 85025 COMPLETE CBC W/AUTO DIFF WBC: CPT

## 2025-03-24 PROCEDURE — 81001 URINALYSIS AUTO W/SCOPE: CPT

## 2025-03-24 PROCEDURE — 99284 EMERGENCY DEPT VISIT MOD MDM: CPT

## 2025-03-24 PROCEDURE — 99285 EMERGENCY DEPT VISIT HI MDM: CPT | Performed by: EMERGENCY MEDICINE

## 2025-03-24 PROCEDURE — 85379 FIBRIN DEGRADATION QUANT: CPT

## 2025-03-24 RX ORDER — FAMOTIDINE 10 MG/ML
20 INJECTION, SOLUTION INTRAVENOUS ONCE
Status: COMPLETED | OUTPATIENT
Start: 2025-03-24 | End: 2025-03-24

## 2025-03-24 RX ORDER — VANCOMYCIN HYDROCHLORIDE 125 MG/1
125 CAPSULE ORAL EVERY 6 HOURS SCHEDULED
Status: DISCONTINUED | OUTPATIENT
Start: 2025-03-24 | End: 2025-03-25

## 2025-03-24 RX ORDER — FLUTICASONE FUROATE AND VILANTEROL 100; 25 UG/1; UG/1
1 POWDER RESPIRATORY (INHALATION) DAILY
Status: DISCONTINUED | OUTPATIENT
Start: 2025-03-25 | End: 2025-03-27 | Stop reason: HOSPADM

## 2025-03-24 RX ORDER — HYDROMORPHONE HCL IN WATER/PF 6 MG/30 ML
0.1 PATIENT CONTROLLED ANALGESIA SYRINGE INTRAVENOUS
Status: DISCONTINUED | OUTPATIENT
Start: 2025-03-24 | End: 2025-03-26

## 2025-03-24 RX ORDER — HYDROMORPHONE HCL/PF 1 MG/ML
0.5 SYRINGE (ML) INJECTION ONCE
Refills: 0 | Status: COMPLETED | OUTPATIENT
Start: 2025-03-24 | End: 2025-03-24

## 2025-03-24 RX ORDER — ENOXAPARIN SODIUM 100 MG/ML
40 INJECTION SUBCUTANEOUS DAILY
Status: DISCONTINUED | OUTPATIENT
Start: 2025-03-24 | End: 2025-03-27 | Stop reason: HOSPADM

## 2025-03-24 RX ORDER — ONDANSETRON 2 MG/ML
4 INJECTION INTRAMUSCULAR; INTRAVENOUS ONCE
Status: COMPLETED | OUTPATIENT
Start: 2025-03-24 | End: 2025-03-24

## 2025-03-24 RX ORDER — MAGNESIUM HYDROXIDE/ALUMINUM HYDROXICE/SIMETHICONE 120; 1200; 1200 MG/30ML; MG/30ML; MG/30ML
30 SUSPENSION ORAL EVERY 4 HOURS PRN
Status: DISCONTINUED | OUTPATIENT
Start: 2025-03-24 | End: 2025-03-27 | Stop reason: HOSPADM

## 2025-03-24 RX ORDER — LANOLIN ALCOHOL/MO/W.PET/CERES
400 CREAM (GRAM) TOPICAL 2 TIMES DAILY
Status: DISCONTINUED | OUTPATIENT
Start: 2025-03-24 | End: 2025-03-27 | Stop reason: HOSPADM

## 2025-03-24 RX ORDER — SUCRALFATE 1 G/1
1 TABLET ORAL
Status: DISCONTINUED | OUTPATIENT
Start: 2025-03-24 | End: 2025-03-25

## 2025-03-24 RX ORDER — SUCRALFATE 1 G/1
1 TABLET ORAL ONCE
Status: COMPLETED | OUTPATIENT
Start: 2025-03-24 | End: 2025-03-24

## 2025-03-24 RX ORDER — CARVEDILOL 3.12 MG/1
3.12 TABLET ORAL 2 TIMES DAILY WITH MEALS
Status: DISCONTINUED | OUTPATIENT
Start: 2025-03-24 | End: 2025-03-27 | Stop reason: HOSPADM

## 2025-03-24 RX ORDER — ALBUTEROL SULFATE 90 UG/1
2 INHALANT RESPIRATORY (INHALATION) EVERY 4 HOURS PRN
Status: DISCONTINUED | OUTPATIENT
Start: 2025-03-24 | End: 2025-03-27 | Stop reason: HOSPADM

## 2025-03-24 RX ORDER — PRAVASTATIN SODIUM 80 MG/1
80 TABLET ORAL
Status: DISCONTINUED | OUTPATIENT
Start: 2025-03-24 | End: 2025-03-27 | Stop reason: HOSPADM

## 2025-03-24 RX ORDER — ACETAMINOPHEN 325 MG/1
650 TABLET ORAL EVERY 4 HOURS PRN
Status: DISCONTINUED | OUTPATIENT
Start: 2025-03-24 | End: 2025-03-27 | Stop reason: HOSPADM

## 2025-03-24 RX ORDER — ZINC SULFATE 50(220)MG
220 CAPSULE ORAL DAILY
Status: DISCONTINUED | OUTPATIENT
Start: 2025-03-25 | End: 2025-03-27 | Stop reason: HOSPADM

## 2025-03-24 RX ORDER — HYDROMORPHONE HCL/PF 1 MG/ML
0.5 SYRINGE (ML) INJECTION ONCE
Status: COMPLETED | OUTPATIENT
Start: 2025-03-24 | End: 2025-03-24

## 2025-03-24 RX ORDER — MAGNESIUM HYDROXIDE/ALUMINUM HYDROXICE/SIMETHICONE 120; 1200; 1200 MG/30ML; MG/30ML; MG/30ML
30 SUSPENSION ORAL ONCE
Status: COMPLETED | OUTPATIENT
Start: 2025-03-24 | End: 2025-03-24

## 2025-03-24 RX ORDER — ZOLPIDEM TARTRATE 5 MG/1
10 TABLET ORAL
Status: DISCONTINUED | OUTPATIENT
Start: 2025-03-24 | End: 2025-03-27 | Stop reason: HOSPADM

## 2025-03-24 RX ADMIN — ALUMINUM HYDROXIDE, MAGNESIUM HYDROXIDE, AND DIMETHICONE 30 ML: 200; 20; 200 SUSPENSION ORAL at 12:54

## 2025-03-24 RX ADMIN — FAMOTIDINE 20 MG: 10 INJECTION INTRAVENOUS at 12:53

## 2025-03-24 RX ADMIN — Medication 400 MG: at 17:45

## 2025-03-24 RX ADMIN — HYDROMORPHONE HYDROCHLORIDE 0.1 MG: 0.2 INJECTION, SOLUTION INTRAMUSCULAR; INTRAVENOUS; SUBCUTANEOUS at 19:36

## 2025-03-24 RX ADMIN — SUCRALFATE 1 G: 1 TABLET ORAL at 21:26

## 2025-03-24 RX ADMIN — VANCOMYCIN HYDROCHLORIDE 125 MG: 125 CAPSULE ORAL at 17:45

## 2025-03-24 RX ADMIN — PRAVASTATIN SODIUM 80 MG: 80 TABLET ORAL at 16:47

## 2025-03-24 RX ADMIN — ONDANSETRON 4 MG: 2 INJECTION INTRAMUSCULAR; INTRAVENOUS at 19:41

## 2025-03-24 RX ADMIN — SUCRALFATE 1 G: 1 TABLET ORAL at 12:53

## 2025-03-24 RX ADMIN — ONDANSETRON 4 MG: 2 INJECTION, SOLUTION INTRAMUSCULAR; INTRAVENOUS at 07:43

## 2025-03-24 RX ADMIN — SUCRALFATE 1 G: 1 TABLET ORAL at 16:46

## 2025-03-24 RX ADMIN — ENOXAPARIN SODIUM 40 MG: 40 INJECTION SUBCUTANEOUS at 15:16

## 2025-03-24 RX ADMIN — ALUMINUM HYDROXIDE, MAGNESIUM HYDROXIDE, AND DIMETHICONE 30 ML: 200; 20; 200 SUSPENSION ORAL at 20:58

## 2025-03-24 RX ADMIN — HYDROMORPHONE HYDROCHLORIDE 0.1 MG: 0.2 INJECTION, SOLUTION INTRAMUSCULAR; INTRAVENOUS; SUBCUTANEOUS at 15:15

## 2025-03-24 RX ADMIN — HYDROMORPHONE HYDROCHLORIDE 0.5 MG: 1 INJECTION, SOLUTION INTRAMUSCULAR; INTRAVENOUS; SUBCUTANEOUS at 07:44

## 2025-03-24 RX ADMIN — ZOLPIDEM TARTRATE 10 MG: 5 TABLET, FILM COATED ORAL at 21:26

## 2025-03-24 RX ADMIN — IOHEXOL 85 ML: 350 INJECTION, SOLUTION INTRAVENOUS at 09:44

## 2025-03-24 RX ADMIN — HYDROMORPHONE HYDROCHLORIDE 0.5 MG: 1 INJECTION, SOLUTION INTRAMUSCULAR; INTRAVENOUS; SUBCUTANEOUS at 12:26

## 2025-03-24 RX ADMIN — CARVEDILOL 3.12 MG: 3.12 TABLET, FILM COATED ORAL at 16:47

## 2025-03-24 RX ADMIN — Medication 400 MG: at 08:27

## 2025-03-24 RX ADMIN — Medication 125 MG: at 12:38

## 2025-03-24 NOTE — ED PROVIDER NOTES
Time reflects when diagnosis was documented in both MDM as applicable and the Disposition within this note       Time User Action Codes Description Comment    3/24/2025  1:13 PM Azeem Blair [R10.9] Abdominal pain     3/24/2025  1:13 PM Azeem Blair [R19.7] Diarrhea     3/24/2025  1:13 PM Azeem Blair [R11.2] Nausea and vomiting           ED Disposition       ED Disposition   Admit    Condition   Stable    Date/Time   Mon Mar 24, 2025  1:13 PM    Comment   Case was discussed with JORGE and the patient's admission status was agreed to be Admission Status: observation status to the service of Dr. Charles .               Assessment & Plan       Medical Decision Making  59-year-old male presenting to the ED for complaint of abdominal pain, chest pain, nausea, diarrhea.    Concerning for C. difficile, diverticulitis, diverticulosis, gastroenteritis, other type diarrhea, viral illness, renal stone, ACS, PE    Will get lab workup and consider further imaging following lab workup.  After noting D-dimer, will get CT PE study with abdomen pelvis with contrast.    See ED course for interpretation of results and progression of evaluation and treatments.  Patient initially given Zofran and Dilaudid.    Patient started on oral vancomycin and given a repeat dose of IV Dilaudid.  Pain is still persistent.  Given patient's history with C. difficile resulting in sepsis as well as his intractable pain at this time, will admit patient to [facility].  Patient admitted.    Amount and/or Complexity of Data Reviewed  Labs: ordered. Decision-making details documented in ED Course.  Radiology: ordered.    Risk  OTC drugs.  Prescription drug management.  Decision regarding hospitalization.        ED Course as of 03/24/25 1531   Mon Mar 24, 2025   0807 CMP(!)  Mildly decreased sodium otherwise unremarkable reassuring CMP.   0807 CBC and differential(!)  No elevated white cell count, no anemia, reassuring.   0807 LIPASE:  35  Reassuring.   0908 hs TnI 0hr: <2  Reassuring, will need delta based on timing.   0908 Urine Microscopic(!)  Oxalate crystals noted, occasional bacteria however no nitrite noted on UA, trace leukocytes noted.  Will be assessing CT first for signs of other infection.   0921 SARS-COV-2: Negative   0921 INFLU A PCR: Negative   0921 INFLU B PCR: Negative   0921 RSV PCR: Negative   0923 D-Dimer, Quant(!): 1.42  Will get CT PE study   1014 IMPRESSION:     No acute cardiopulmonary disease.           Workstation performed: [id number]     1102 IMPRESSION:     CTA chest:     No pulmonary embolus.     No evidence of acute thoracic process.     Ectatic aortic root measuring 4.2 cm. Noncontrast chest CT follow-up in 12 months is the recommendation.     Additional chronic findings and negatives as above.     CT abdomen and pelvis:     No evidence of acute abdominopelvic process.     Colonic diverticulosis.     Additional chronic findings and negatives as above.                    Workstation performed: [id number]         Medications   magnesium Oxide (MAG-OX) tablet 400 mg (400 mg Oral Given 3/24/25 0827)   vancomycin (VANCOCIN) capsule 125 mg (has no administration in time range)   albuterol (PROVENTIL HFA,VENTOLIN HFA) inhaler 2 puff (has no administration in time range)   carvedilol (COREG) tablet 3.125 mg (has no administration in time range)   zolpidem (AMBIEN) tablet 10 mg (has no administration in time range)   FLUoxetine (PROzac) capsule 40 mg (has no administration in time range)   lisinopril (ZESTRIL) tablet 30 mg (has no administration in time range)   pravastatin (PRAVACHOL) tablet 80 mg (has no administration in time range)   Fluticasone Furoate-Vilanterol 100-25 mcg/actuation 1 puff (has no administration in time range)     And   umeclidinium 62.5 mcg/actuation inhaler AEPB 1 puff (has no administration in time range)   zinc sulfate (ZINCATE) capsule 220 mg (has no administration in time range)   enoxaparin  (LOVENOX) subcutaneous injection 40 mg (40 mg Subcutaneous Given 3/24/25 1516)   sucralfate (CARAFATE) tablet 1 g (has no administration in time range)   HYDROmorphone HCl (DILAUDID) injection 0.1 mg (0.1 mg Intravenous Given 3/24/25 1515)   acetaminophen (TYLENOL) tablet 650 mg (has no administration in time range)   ondansetron (ZOFRAN) injection 4 mg (4 mg Intravenous Given 3/24/25 0743)   HYDROmorphone (DILAUDID) injection 0.5 mg (0.5 mg Intravenous Given 3/24/25 0744)   iohexol (OMNIPAQUE) 350 MG/ML injection (MULTI-DOSE) 85 mL (85 mL Intravenous Given 3/24/25 0944)   HYDROmorphone (DILAUDID) injection 0.5 mg (0.5 mg Intravenous Given 3/24/25 1226)   vancomycin (VANCOCIN) oral solution 125 mg (125 mg Oral Given 3/24/25 1238)   aluminum-magnesium hydroxide-simethicone (MAALOX) oral suspension 30 mL (30 mL Oral Given 3/24/25 1254)   sucralfate (CARAFATE) tablet 1 g (1 g Oral Given 3/24/25 1253)   Famotidine (PF) (PEPCID) injection 20 mg (20 mg Intravenous Given 3/24/25 1253)       ED Risk Strat Scores   HEART Risk Score      Flowsheet Row Most Recent Value   Heart Score Risk Calculator    History 0 Filed at: 03/24/2025 0938   ECG 0 Filed at: 03/24/2025 0938   Age 1 Filed at: 03/24/2025 0938   Risk Factors 1 Filed at: 03/24/2025 0938   Troponin 0 Filed at: 03/24/2025 0938   HEART Score 2 Filed at: 03/24/2025 0938          HEART Risk Score      Flowsheet Row Most Recent Value   Heart Score Risk Calculator    History 0 Filed at: 03/24/2025 0938   ECG 0 Filed at: 03/24/2025 0938   Age 1 Filed at: 03/24/2025 0938   Risk Factors 1 Filed at: 03/24/2025 0938   Troponin 0 Filed at: 03/24/2025 0938   HEART Score 2 Filed at: 03/24/2025 0938                                  Vivek' Criteria for PE      Flowsheet Row Most Recent Value   Wells' Criteria for PE    Clinical signs and symptoms of DVT 0 Filed at: 03/24/2025 0938   PE is primary diagnosis or equally likely 0 Filed at: 03/24/2025 0938   HR >100 0 Filed at: 03/24/2025  0938   Immobilization at least 3 days or Surgery in the previous 4 weeks 0 Filed at: 03/24/2025 0938   Previous, objectively diagnosed PE or DVT 0 Filed at: 03/24/2025 0938   Hemoptysis 0 Filed at: 03/24/2025 0938   Malignancy with treatment within 6 months or palliative 0 Filed at: 03/24/2025 0938   Wells' Criteria Total 0 Filed at: 03/24/2025 0938                        History of Present Illness       Chief Complaint   Patient presents with    Abdominal Pain     Abdominal pain starting Saturday. Reports diarrhea started Sunday. Hx diverticulitis. Feels similar. +sob       Past Medical History:   Diagnosis Date    Arthritis     C. difficile diarrhea     x 3 yrs ago    Colitis     x1 occurnce    Colon polyp     COPD (chronic obstructive pulmonary disease) (HCC)     COVID-19     positive test on 12/16/21    Depression     Diverticulitis     Diverticulitis of colon     Diverticulosis     GERD (gastroesophageal reflux disease)     Hyperlipidemia     Hypertension     Pulmonary emphysema (HCC)     Sleep apnea     no cpap    Umbilical hernia       Past Surgical History:   Procedure Laterality Date    CATARACT EXTRACTION Bilateral     CHOLECYSTECTOMY      COLONOSCOPY      COLONOSCOPY      ESOPHAGOGASTRODUODENOSCOPY      EYE SURGERY      HERNIA REPAIR  09/19/2019    Open repair of incarcerated incisional hernia with mesh - Dr. Bloch    KNEE ARTHROSCOPY Left     CT ARTHRODESIS GREAT TOE METATARSOPHALANGEAL JOINT Bilateral 08/25/2022    Procedure: Bilateral 1st MTP arthrodesis;  Surgeon: James R Lachman, MD;  Location: [facility] MAIN OR;  Service: Orthopedics    CT ARTHRODESIS GREAT TOE METATARSOPHALANGEAL JOINT Bilateral 10/05/2023    Procedure: Revision Right first MTP arthrodesis with BMAC harvest and bone graft harvest from the calcaneus.  Left 1st MTP removal of hardware;  Surgeon: James R Lachman, MD;  Location: [facility] MAIN OR;  Service: Orthopedics    CT DEBRIDEMENT SUBCUTANEOUS TISSUE 1ST 20 SQ CM/< Right 07/14/2022     "Procedure: DEBRIDEMENT FOOT/TOE (WASH OUT), negative pressure wound dressing application;  Surgeon: James R Lachman, MD;  [facility];  Service: Orthopedics    KY HALLUX RIGIDUS W/CHEILECTOMY 1ST MP JT W/O IMPLT Bilateral 12/10/2021    Procedure: CHEILECTOMY;  Surgeon: Wilfred Loya DPM;  Location: [facility] MA[facility];  Service: Podiatry    KY REMOVAL IMPLANT DEEP Right 2025    Procedure: REMOVAL HARDWARE FOOT;  Surgeon: James R Lachman, MD;  [facility];  Service: Orthopedics    KY TNOT ELBOW LATERAL/MEDIAL DEBRIDE OPEN TDN RPR Left 2020    Procedure: lateral epicondyle elbow debridement, release, and repair;  Surgeon: Jannet Parrish MD;  [facility];  Service: Orthopedics    UPPER GASTROINTESTINAL ENDOSCOPY      WISDOM TOOTH EXTRACTION        Family History   Problem Relation Age of Onset    Anemia Mother     Hypertension Mother     Osteoporosis Mother     Pancreatic cancer Father     Diabetes Father     Hypertension Father     Abdominal aortic aneurysm Father     Cancer Father     Diabetes Maternal Grandmother     Emphysema Maternal Grandmother     Diabetes Maternal Grandfather     Diabetes Paternal Grandmother     Diabetes Paternal Grandfather     Emphysema Brother       Social History     Tobacco Use    Smoking status: Former     Current packs/day: 0.00     Average packs/day: 1 pack/day for 35.0 years (35.0 ttl pk-yrs)     Types: Cigarettes     Start date: 1986     Quit date: 2013     Years since quittin.2     Passive exposure: Never    Smokeless tobacco: Never   Vaping Use    Vaping status: Never Used   Substance Use Topics    Alcohol use: Yes     Comment: \"few times a week\"    Drug use: Never      E-Cigarette/Vaping    E-Cigarette Use Never User       E-Cigarette/Vaping Substances    Nicotine No     THC No     CBD No     Flavoring No     Other No     Unknown No       I have reviewed and agree with the history as documented.     59-year-old male with " significant history of C. difficile, diverticulitis, GERD presenting to the ED with worsening abdominal pain and diarrhea.  Patient states that a little over a week ago he had an upper respiratory infection in which she was on a Z-Andrés for.  Following the Z-Andrés, patient started having diffuse abdominal pain and profuse diarrhea that was reminiscent of his past times in which she had diverticulitis and C. difficile.  Patient states his diarrhea is nonbloody but is voluminous and watery in nature.  Patient states that today he started having chest tightness and chest pain associated with the abdominal pain which prompted him to come to the ED for evaluation.  Patient endorses nausea and states he has been taking Zofran at home which has helped and he has not vomited.        Review of Systems   Constitutional:  Negative for chills and fever.   HENT:  Negative for congestion and rhinorrhea.    Respiratory:  Negative for chest tightness and shortness of breath.    Cardiovascular:  Positive for chest pain. Negative for palpitations.   Gastrointestinal:  Positive for abdominal pain, diarrhea and nausea. Negative for vomiting.   Endocrine: Negative for polyuria.   Genitourinary:  Negative for dysuria.   Musculoskeletal:  Negative for back pain and neck pain.   Skin:  Negative for color change.   Neurological:  Negative for dizziness, weakness and light-headedness.   Psychiatric/Behavioral:  Negative for agitation and confusion.            Objective       ED Triage Vitals   Temperature Pulse Blood Pressure Respirations SpO2 Patient Position - Orthostatic VS   03/24/25 0723 03/24/25 0723 03/24/25 0723 03/24/25 0723 03/24/25 0723 03/24/25 0723   97.8 °F (36.6 °C) 89 (!) 156/103 20 99 % Lying      Temp Source Heart Rate Source BP Location FiO2 (%) Pain Score    03/24/25 1500 03/24/25 0723 03/24/25 0723 -- 03/24/25 0723    Oral Monitor Right arm  8      Vitals      Date and Time Temp Pulse SpO2 Resp BP Pain Score FACES Pain  Rating User   03/24/25 1515 -- -- -- -- -- 2 --    03/24/25 1500 97.8 °F (36.6 °C) 85 98 % 18 130/85 No Pain --    03/24/25 1430 -- 84 93 % 18 147/94 -- -- MD   03/24/25 1400 -- 86 99 % 18 119/74 -- --    03/24/25 1226 -- -- -- -- -- 7 --    03/24/25 1200 -- 83 99 % 18 122/80 -- -- MD   03/24/25 1130 -- 82 97 % 18 121/77 -- -- MD   03/24/25 1100 -- 83 96 % 18 128/74 -- --    03/24/25 0933 -- -- 93 % -- -- -- --    03/24/25 0900 -- 79 91 % -- 125/77 -- -- MD   03/24/25 0830 -- 85 97 % 18 127/93 -- --    03/24/25 0828 -- -- -- -- -- 3 --    03/24/25 0744 -- -- -- -- -- 4 --    03/24/25 0723 97.8 °F (36.6 °C) 89 99 % 20 156/103 8 -- TB            Physical Exam  Constitutional:       Appearance: He is obese.   HENT:      Head: Normocephalic and atraumatic.      Mouth/Throat:      Mouth: Mucous membranes are moist.   Eyes:      Extraocular Movements: Extraocular movements intact.   Cardiovascular:      Rate and Rhythm: Normal rate and regular rhythm.      Heart sounds: Normal heart sounds.   Pulmonary:      Effort: Pulmonary effort is normal.      Breath sounds: Normal breath sounds.   Abdominal:      Palpations: Abdomen is soft.      Tenderness: There is generalized abdominal tenderness.   Skin:     General: Skin is warm.      Capillary Refill: Capillary refill takes less than 2 seconds.   Neurological:      General: No focal deficit present.      Mental Status: He is alert and oriented to person, place, and time.   Psychiatric:         Mood and Affect: Mood normal.         Behavior: Behavior normal.         Results Reviewed       Procedure Component Value Units Date/Time    HS Troponin I 4hr [[id number]] Collected: 03/24/25 1224    Lab Status: Final result Specimen: Blood from Arm, Left Updated: 03/24/25 1308     hs TnI 4hr <2 ng/L      Delta 4hr hsTnI --    B-Type Natriuretic Peptide(BNP) [[id number]]  (Normal) Collected: 03/24/25 0741    Lab Status: Final result Specimen: Blood from Arm, Left Updated:  03/24/25 1130     BNP 22 pg/mL     HS Troponin I 2hr [[id number]] Collected: 03/24/25 1027    Lab Status: Final result Specimen: Blood from Arm, Left Updated: 03/24/25 1109     hs TnI 2hr <2 ng/L      Delta 2hr hsTnI --    Stool Enteric Bacterial Panel by PCR [[id number]] Collected: 03/24/25 1014    Lab Status: In process Specimen: Stool from Per Rectum Updated: 03/24/25 1020    Clostridium difficile toxin by PCR with EIA [[id number]] Collected: 03/24/25 1014    Lab Status: In process Specimen: Stool from Per Rectum Updated: 03/24/25 1020    D-dimer, quantitative [[id number]]  (Abnormal) Collected: 03/24/25 0741    Lab Status: Final result Specimen: Blood from Arm, Left Updated: 03/24/25 0922     D-Dimer, Quant 1.42 ug/ml FEU     Narrative:      In the evaluation for possible pulmonary embolism, in the appropriate (Well's Score of 4 or less) patient, the age adjusted d-dimer cutoff for this patient can be calculated as:    Age x 0.01 (in ug/mL) for Age-adjusted D-dimer exclusion threshold for a patient over 50 years.    COVID/FLU/RSV [[id number]]  (Normal) Collected: 03/24/25 0733    Lab Status: Final result Specimen: Nares from Nose Updated: 03/24/25 0917     SARS-CoV-2 Negative     INFLUENZA A PCR Negative     INFLUENZA B PCR Negative     RSV PCR Negative    Narrative:      This test has been performed using the CoV-2/Flu/RSV plus assay on the [employer] GeneXpert platform. This test has been validated by the  and verified by the performing laboratory.     This test is designed to amplify and detect the following: nucleocapsid (N), envelope (E), and RNA-dependent RNA polymerase (RdRP) genes of the SARS-CoV-2 genome; matrix (M), basic polymerase (PB2), and acidic protein (PA) segments of the influenza A genome; matrix (M) and non-structural protein (NS) segments of the influenza B genome, and the nucleocapsid genes of RSV A and RSV B.     Positive results are indicative of the presence of Flu A, Flu B, RSV,  and/or SARS-CoV-2 RNA. Positive results for SARS-CoV-2 or suspected novel influenza should be reported to state, local, or federal health departments according to local reporting requirements.      All results should be assessed in conjunction with clinical presentation and other laboratory markers for clinical management.     FOR PEDIATRIC PATIENTS - copy/paste COVID Guidelines URL to browser: [email or web address]       Urine Microscopic [[id number]]  (Abnormal) Collected: 03/24/25 0802    Lab Status: Final result Specimen: Urine, Clean Catch Updated: 03/24/25 0838     RBC, UA 4-10 /hpf      WBC, UA 2-4 /hpf      Epithelial Cells Occasional /hpf      Bacteria, UA Occasional /hpf      MUCUS THREADS Innumerable     Ca Oxalate Yessi, UA Moderate /hpf     UA w Reflex to Microscopic w Reflex to Culture [[id number]]  (Abnormal) Collected: 03/24/25 0802    Lab Status: Final result Specimen: Urine, Clean Catch Updated: 03/24/25 0821     Color, UA Yellow     Clarity, UA Clear     Specific Gravity, UA 1.031     pH, UA 6.0     Leukocytes, UA Trace     Nitrite, UA Negative     Protein, UA 30 (1+) mg/dl      Glucose, UA Negative mg/dl      Ketones, UA Negative mg/dl      Urobilinogen, UA <2.0 mg/dl      Bilirubin, UA Negative     Occult Blood, UA Negative    HS Troponin 0hr (reflex protocol) [[id number]]  (Normal) Collected: 03/24/25 0741    Lab Status: Final result Specimen: Blood from Arm, Left Updated: 03/24/25 0811     hs TnI 0hr <2 ng/L     CMP [[id number]]  (Abnormal) Collected: 03/24/25 0741    Lab Status: Final result Specimen: Blood from Arm, Left Updated: 03/24/25 0805     Sodium 133 mmol/L      Potassium 3.7 mmol/L      Chloride 100 mmol/L      CO2 23 mmol/L      ANION GAP 10 mmol/L      BUN 18 mg/dL      Creatinine 1.23 mg/dL      Glucose 120 mg/dL      Calcium 10.1 mg/dL      AST 17 U/L      ALT 20 U/L      Alkaline Phosphatase 62 U/L      Total Protein 7.9 g/dL       Albumin 4.7 g/dL      Total Bilirubin 0.90 mg/dL      eGFR 62 ml/min/1.73sq m     Narrative:      National Kidney Disease Foundation guidelines for Chronic Kidney Disease (CKD):     Stage 1 with normal or high GFR (GFR > 90 mL/min/1.73 square meters)    Stage 2 Mild CKD (GFR = 60-89 mL/min/1.73 square meters)    Stage 3A Moderate CKD (GFR = 45-59 mL/min/1.73 square meters)    Stage 3B Moderate CKD (GFR = 30-44 mL/min/1.73 square meters)    Stage 4 Severe CKD (GFR = 15-29 mL/min/1.73 square meters)    Stage 5 End Stage CKD (GFR <15 mL/min/1.73 square meters)  Note: GFR calculation is accurate only with a steady state creatinine    Lipase [[id number]]  (Normal) Collected: 03/24/25 0741    Lab Status: Final result Specimen: Blood from Arm, Left Updated: 03/24/25 0805     Lipase 35 u/L     Magnesium [[id number]]  (Abnormal) Collected: 03/24/25 0741    Lab Status: Final result Specimen: Blood from Arm, Left Updated: 03/24/25 0805     Magnesium 1.8 mg/dL     CBC and differential [[id number]]  (Abnormal) Collected: 03/24/25 0741    Lab Status: Final result Specimen: Blood from Arm, Left Updated: 03/24/25 0756     WBC 7.71 Thousand/uL      RBC 4.77 Million/uL      Hemoglobin 14.8 g/dL      Hematocrit 43.5 %      MCV 91 fL      MCH 31.0 pg      MCHC 34.0 g/dL      RDW 12.9 %      MPV 9.7 fL      Platelets 233 Thousands/uL      nRBC 0 /100 WBCs      Segmented % 79 %      Immature Grans % 0 %      Lymphocytes % 7 %      Monocytes % 13 %      Eosinophils Relative 1 %      Basophils Relative 0 %      Absolute Neutrophils 6.06 Thousands/µL      Absolute Immature Grans 0.02 Thousand/uL      Absolute Lymphocytes 0.54 Thousands/µL      Absolute Monocytes 1.00 Thousand/µL      Eosinophils Absolute 0.07 Thousand/µL      Basophils Absolute 0.02 Thousands/µL             CT pe study w abdomen pelvis w contrast   Final Interpretation by Shlomo Wilde MD (03/24 1025)      CTA chest:      No pulmonary embolus.      No evidence  of acute thoracic process.      Ectatic aortic root measuring 4.2 cm. Noncontrast chest CT follow-up in 12 months is the recommendation.      Additional chronic findings and negatives as above.      CT abdomen and pelvis:      No evidence of acute abdominopelvic process.      Colonic diverticulosis.      Additional chronic findings and negatives as above.                     Workstation performed: [id number]         X-ray chest 1 view portable   Final Interpretation by Ricky Vasquez MD (03/24 1011)      No acute cardiopulmonary disease.            Workstation performed: [id number]             ECG 12 Lead Documentation Only    Date/Time: 3/24/2025 9:43 AM    Performed by: Azeem Mohan MD  Authorized by: Azeem Mohan MD    Indications / Diagnosis:  Chest pain  ECG reviewed by me, the ED Provider: yes    Patient location:  ED  Previous ECG:     Previous ECG:  Compared to current    Similarity:  No change  Interpretation:     Interpretation: normal    Rate:     ECG rate:  91    ECG rate assessment: normal    Rhythm:     Rhythm: sinus rhythm    Ectopy:     Ectopy: none    QRS:     QRS axis:  Normal    QRS intervals:  Normal  Conduction:     Conduction: normal    ST segments:     ST segments:  Normal  T waves:     T waves: inverted      Inverted:  II      ED Medication and Procedure Management   Prior to Admission Medications   Prescriptions Last Dose Informant Patient Reported? Taking?   Aspirin Low Dose 81 MG EC tablet Not Taking  No No   Sig: TAKE 1 TABLET BY MOUTH 2 TIMES A DAY.   Patient not taking: Reported on 3/24/2025   FLUoxetine (PROzac) 40 MG capsule 3/24/2025  No Yes   Sig: TAKE 1 CAPSULE (40 MG TOTAL) BY MOUTH DAILY.   Trelegy Ellipta 100-62.5-25 MCG/ACT inhaler 3/24/2025 Morning  No Yes   Sig: INHALE 1 PUFF DAILY RINSE MOUTH AFTER USE.   VITAMIN D PO  Self Yes No   Sig: Take 1 tablet by mouth in the morning     Zinc Sulfate (ZINC 15 PO) 3/24/2025 Morning Self Yes Yes   Sig: Take 1 tablet by mouth  in the morning     albuterol (PROVENTIL HFA,VENTOLIN HFA) 90 mcg/act inhaler   No No   Sig: Inhale 2 puffs every 4 (four) hours as needed for wheezing or shortness of breath   b complex vitamins capsule  Self Yes No   Sig: Take 1 capsule by mouth daily   benzonatate (TESSALON) 200 MG capsule   No No   Sig: Take 1 capsule (200 mg total) by mouth 3 (three) times a day as needed for cough   carvedilol (COREG) 3.125 mg tablet 3/24/2025  No Yes   Sig: TAKE 1 TABLET BY MOUTH TWICE A DAY WITH MEALS   eszopiclone (LUNESTA) 3 MG tablet 3/23/2025 Bedtime  No Yes   Sig: TAKE 1 TABLET BY MOUTH DAILY AT BEDTIME   lisinopril (ZESTRIL) 30 mg tablet 3/23/2025 Evening  No Yes   Sig: TAKE 1 TABLET BY MOUTH EVERY EVENING.   multivitamin (THERAGRAN) TABS  Self Yes No   Sig: Take 1 tablet by mouth daily   ondansetron (ZOFRAN) 4 mg tablet   No No   Sig: Take 1 tablet (4 mg total) by mouth every 8 (eight) hours as needed for nausea or vomiting   pantoprazole (PROTONIX) 40 mg tablet   No No   Sig: TAKE 1 TABLET BY MOUTH EVERY DAY   Patient taking differently: Take 40 mg by mouth daily Every other day   rosuvastatin (CRESTOR) 10 MG tablet 3/23/2025 Bedtime  No Yes   Sig: TAKE 1 TABLET BY MOUTH EVERYDAY AT BEDTIME      Facility-Administered Medications: None     Patient's Medications   Discharge Prescriptions    No medications on file     No discharge procedures on file.  ED SEPSIS DOCUMENTATION   Time reflects when diagnosis was documented in both MDM as applicable and the Disposition within this note       Time User Action Codes Description Comment    3/24/2025  1:13 PM Azeem Mohan [R10.9] Abdominal pain     3/24/2025  1:13 PM Azeem Mohan [R19.7] Diarrhea     3/24/2025  1:13 PM Azeem Mohan [R11.2] Nausea and vomiting                  Azeem Mohan MD  03/24/25 5557

## 2025-03-24 NOTE — PLAN OF CARE
Problem: Potential for Falls  Goal: Patient will remain free of falls  Description: INTERVENTIONS:- Educate patient/family on patient safety including physical limitations- Instruct patient to call for assistance with activity - Consult OT/PT to assist with strengthening/mobility - Keep Call bell within reach- Keep bed low and locked with side rails adjusted as appropriate- Keep care items and personal belongings within reach- Initiate and maintain comfort rounds- Make Fall Risk Sign visible to staff- Offer Toileting every  Hours, in advance of need- Initiate/Maintain alarm- Obtain necessary fall risk management equipment: - Apply yellow socks and bracelet for high fall risk patients- Consider moving patient to room near nurses station  INTERVENTIONS:- Educate patient/family on patient safety including physical limitations- Instruct patient to call for assistance with activity - Consult OT/PT to assist with strengthening/mobility - Keep Call bell within reach- Keep bed low and locked with side rails adjusted as appropriate- Keep care items and personal belongings within reach- Initiate and maintain comfort rounds- Make Fall Risk Sign visible to staff- Offer Toileting every  Hours, in advance of need- Initiate/Maintain alarm- Obtain necessary fall risk management equipment: - Apply yellow socks and bracelet for high fall risk patients- Consider moving patient to room near nurses station  Outcome: Progressing     Problem: PAIN - ADULT  Goal: Verbalizes/displays adequate comfort level or baseline comfort level  Description: Interventions:- Encourage patient to monitor pain and request assistance- Assess pain using appropriate pain scale- Administer analgesics based on type and severity of pain and evaluate response- Implement non-pharmacological measures as appropriate and evaluate response- Consider cultural and social influences on pain and pain management- Notify physician/advanced practitioner if interventions  unsuccessful or patient reports new pain  Outcome: Progressing     Problem: INFECTION - ADULT  Goal: Absence or prevention of progression during hospitalization  Description: INTERVENTIONS:- Assess and monitor for signs and symptoms of infection- Monitor lab/diagnostic results- Monitor all insertion sites, i.e. indwelling lines, tubes, and drains- Monitor endotracheal if appropriate and nasal secretions for changes in amount and color- [place] appropriate cooling/warming therapies per order- Administer medications as ordered- Instruct and encourage patient and family to use good hand hygiene technique- Identify and instruct in appropriate isolation precautions for identified infection/condition  Outcome: Progressing  Goal: Absence of fever/infection during neutropenic period  Description: INTERVENTIONS:- Monitor WBC  Outcome: Progressing     Problem: SAFETY ADULT  Goal: Patient will remain free of falls  Description: INTERVENTIONS:- Educate patient/family on patient safety including physical limitations- Instruct patient to call for assistance with activity - Consult OT/PT to assist with strengthening/mobility - Keep Call bell within reach- Keep bed low and locked with side rails adjusted as appropriate- Keep care items and personal belongings within reach- Initiate and maintain comfort rounds- Make Fall Risk Sign visible to staff- Offer Toileting every  Hours, in advance of need- Initiate/Maintain alarm- Obtain necessary fall risk management equipment: - Apply yellow socks and bracelet for high fall risk patients- Consider moving patient to room near nurses station  INTERVENTIONS:- Educate patient/family on patient safety including physical limitations- Instruct patient to call for assistance with activity - Consult OT/PT to assist with strengthening/mobility - Keep Call bell within reach- Keep bed low and locked with side rails adjusted as appropriate- Keep care items and personal belongings within reach-  Initiate and maintain comfort rounds- Make Fall Risk Sign visible to staff- Offer Toileting every  Hours, in advance of need- Initiate/Maintain alarm- Obtain necessary fall risk management equipment: - Apply yellow socks and bracelet for high fall risk patients- Consider moving patient to room near nurses station  Outcome: Progressing  Goal: Maintain or return to baseline ADL function  Description: INTERVENTIONS:-  Assess patient's ability to carry out ADLs; assess patient's baseline for ADL function and identify physical deficits which impact ability to perform ADLs (bathing, care of mouth/teeth, toileting, grooming, dressing, etc.)- Assess/evaluate cause of self-care deficits - Assess range of motion- Assess patient's mobility; develop plan if impaired- Assess patient's need for assistive devices and provide as appropriate- Encourage maximum independence but intervene and supervise when necessary- Involve family in performance of ADLs- Assess for home care needs following discharge - Consider OT consult to assist with ADL evaluation and planning for discharge- Provide patient education as appropriate  Outcome: Progressing  Goal: Maintains/Returns to pre admission functional level  Description: INTERVENTIONS:- Perform AM-PAC 6 Click Basic Mobility/ Daily Activity assessment daily.- Set and communicate daily mobility goal to care team and patient/family/caregiver. - Collaborate with rehabilitation services on mobility goals if consulted- Perform Range of Motion times a day.- Reposition patient every  hours.- Dangle patient  times a day- Stand patient  times a day- Ambulate patient  times a day- Out of bed to chair  times a day - Out of bed for meals  times a day- Out of bed for toileting- Record patient progress and toleration of activity level   Outcome: Progressing     Problem: DISCHARGE PLANNING  Goal: Discharge to home or other facility with appropriate resources  Description: INTERVENTIONS:- Identify barriers  to discharge w/patient and caregiver- Arrange for needed discharge resources and transportation as appropriate- Identify discharge learning needs (meds, wound care, etc.)- Arrange for interpretive services to assist at discharge as needed- Refer to Case Management Department for coordinating discharge planning if the patient needs post-hospital services based on physician/advanced practitioner order or complex needs related to functional status, cognitive ability, or social support system  Outcome: Progressing     Problem: Knowledge Deficit  Goal: Patient/family/caregiver demonstrates understanding of disease process, treatment plan, medications, and discharge instructions  Description: Complete learning assessment and assess knowledge base.Interventions:- Provide teaching at level of understanding- Provide teaching via preferred learning methods  Outcome: Progressing     Problem: Nutrition/Hydration-ADULT  Goal: Nutrient/Hydration intake appropriate for improving, restoring or maintaining nutritional needs  Description: Monitor and assess patient's nutrition/hydration status for malnutrition. Collaborate with interdisciplinary team and initiate plan and interventions as ordered.  Monitor patient's weight and dietary intake as ordered or per policy. Utilize nutrition screening tool and intervene as necessary. Determine patient's food preferences and provide high-protein, high-caloric foods as appropriate. INTERVENTIONS:- Monitor oral intake, urinary output, labs, and treatment plans- Assess nutrition and hydration status and recommend course of action- Evaluate amount of meals eaten- Assist patient with eating if necessary - Allow adequate time for meals- Recommend/ encourage appropriate diets, oral nutritional supplements, and vitamin/mineral supplements- Order, calculate, and assess calorie counts as needed- Recommend, monitor, and adjust tube feedings and TPN/PPN based on assessed needs- Assess need for  intravenous fluids- Provide specific nutrition/hydration education as appropriate- Include patient/family/caregiver in decisions related to nutrition  Outcome: Progressing     Problem: GASTROINTESTINAL - ADULT  Goal: Minimal or absence of nausea and/or vomiting  Description: INTERVENTIONS:- Administer IV fluids if ordered to ensure adequate hydration- Maintain NPO status until nausea and vomiting are resolved- Nasogastric tube if ordered- Administer ordered antiemetic medications as needed- Provide nonpharmacologic comfort measures as appropriate- Advance diet as tolerated, if ordered- Consider nutrition services referral to assist patient with adequate nutrition and appropriate food choices  Outcome: Progressing  Goal: Maintains or returns to baseline bowel function  Description: INTERVENTIONS:- Assess bowel function- Encourage oral fluids to ensure adequate hydration- Administer IV fluids if ordered to ensure adequate hydration- Administer ordered medications as needed- Encourage mobilization and activity- Consider nutritional services referral to assist patient with adequate nutrition and appropriate food choices  Outcome: Progressing  Goal: Maintains adequate nutritional intake  Description: INTERVENTIONS:- Monitor percentage of each meal consumed- Identify factors contributing to decreased intake, treat as appropriate- Assist with meals as needed- Monitor I&O, weight, and lab values if indicated- Obtain nutrition services referral as needed  Outcome: Progressing

## 2025-03-24 NOTE — ASSESSMENT & PLAN NOTE
CC: Chest pain in epigastric/right lower chest, described as sharp/shooting intermittent pain, no change with position/drip breathing, nontender on palpation  Workup negative for ACS/PE  Etiology: Likely GERD  Will start patient on Yolanda Carafate 1 g 4 times daily, hold off on PPI(ruling out C. difficile)  Narcotic pain regimen since patient mentioned it is helping, can consider lidocaine patch if needed

## 2025-03-24 NOTE — ASSESSMENT & PLAN NOTE
Home regimen: Lunesta 3 mg daily at bedtime, continue Ambien 10 mg daily at bedtime (since Lunesta is nonformulary)

## 2025-03-24 NOTE — H&P
H&P - Hospitalist   Name: Prince Gonzalez 59 y.o. male I MRN: [id number]  Unit/Bed#: ED-17 I Date of Admission: 3/24/2025   Date of Service: 3/24/2025 I Hospital Day: 0     Assessment & Plan  Diarrhea  CC: Diarrhea (20 episodes in 3 days, watery in consistency, unknown BRBPR/dark brown stool)  H/o C. difficile PCR positive/toxin negative in 2022, presumably treated for C. difficile colitis with p.o. vancomycin  Patient is on PPI every other day for GERD, recently treated with Z-Andrés for URI  CT AP negative for colitis/diverticulitis    Plan:  Considering patient history of colitis/C. difficile colonization, will treat empirically with p.o. vancomycin 125 mcg 4 times daily  Pending stool C. difficile/enteric panel  Diet: GI low fiber/low residue  Encourage p.o. hydration, no need for IV hydration since patient is euvolemic  Chest pain  CC: Chest pain in epigastric/right lower chest, described as sharp/shooting intermittent pain, no change with position/drip breathing, nontender on palpation  Workup negative for ACS/PE  Etiology: Likely GERD  Will start patient on Yolanda Carafate 1 g 4 times daily, hold off on PPI(ruling out C. difficile)  Narcotic pain regimen since patient mentioned it is helping, can consider lidocaine patch if needed  Hyponatremia  Recent Labs     03/24/25  0741   SODIUM 133*     Mild hyponatremia likely in the setting of diarrhea/volume depletion  Monitor BMP  Encourage p.o. hydration  Benign hypertension with CKD (chronic kidney disease), stage II  Lab Results   Component Value Date    EGFR 62 03/24/2025    EGFR 66 03/17/2025    EGFR 64 08/19/2024    CREATININE 1.23 03/24/2025    CREATININE 1.17 03/17/2025    CREATININE 1.20 08/19/2024     Home regimen: Coreg 3.125 mg twice daily, lisinopril 30 mg daily  Continue home regimen  Mixed hyperlipidemia  Home regimen: Rosuvastatin 10 mg daily, continue pravastatin 80 mg(since rosuvastatin is non formulary)  Primary insomnia  Home regimen: Lunesta 3 mg  daily at bedtime, continue Ambien 10 mg daily at bedtime (since Lunesta is nonformulary)  Centrilobular emphysema (HCC)  Home regimen: Trelegy Ellipta, continue Breo Ellipta (Trelegy Ellipta is nonformulary)      VTE Pharmacologic Prophylaxis: VTE Score: 3 Moderate Risk (Score 3-4) - Pharmacological DVT Prophylaxis Ordered: enoxaparin (Lovenox).  Code Status: Level 3 - DNAR and DNI   Discussion with family: Updated  (wife) at bedside.    Anticipated Length of Stay: Patient will be admitted on an observation basis with an anticipated length of stay of less than 2 midnights secondary to diarrheal illness diarrheal illness/chest pain.    History of Present Illness   Chief Complaint:     Prince Gonzalez is a 59 y.o. male with a PMH of emphysema, hypertension, CKD, GERD who presents with diarrhea/nausea/chest pain.  Per patient his symptoms started on Saturday, had around 20 episodes in last 3 days, described consistency is watery, since patient is color blind unknown about stool color/if any bleeding.  Does report he ate Mexican food(sausage/cheese sandwich) a day prior and has been trying outside food couple of days prior to symptoms started.  Wife ate from the same restaurant but different food, she is not sick.  Denies any sick contact.  Recently had URI symptoms and was treated with Z-Andrés around 2-1/2 weeks ago.  Patient is history of C. difficile PCR positive/toxin negative in 2022, was admitted for colitis and was presumably treated for C. difficile colitis.  He does report that symptoms at this time is similar to in 2022.  In ED labs are grossly unremarkable except for hyponatremia in CMP, workup for chest pain negative for ACS/PE, CT scanning did showed ectatic aortic root.    Review of Systems   Constitutional:  Negative for chills and fever.   HENT:  Negative for ear pain and sore throat.    Eyes:  Negative for pain and visual disturbance.   Respiratory:  Positive for cough and shortness of  breath.    Cardiovascular:  Positive for chest pain. Negative for palpitations.   Gastrointestinal:  Positive for abdominal pain, diarrhea and nausea. Negative for vomiting.   Genitourinary:  Negative for dysuria and hematuria.   Musculoskeletal:  Negative for arthralgias and back pain.   Skin:  Negative for color change and rash.   Neurological:  Negative for seizures and syncope.   All other systems reviewed and are negative.      Historical Information   Past Medical History:   Diagnosis Date    Arthritis     C. difficile diarrhea     x 3 yrs ago    Colitis     x1 occurnce    Colon polyp     COPD (chronic obstructive pulmonary disease) ([facility])     COVID-19     positive test on 12/16/21    Depression     Diverticulitis     Diverticulitis of colon     Diverticulosis     GERD (gastroesophageal reflux disease)     Hyperlipidemia     Hypertension     Pulmonary emphysema ([facility])     Sleep apnea     no cpap    Umbilical hernia      Past Surgical History:   Procedure Laterality Date    CATARACT EXTRACTION Bilateral     CHOLECYSTECTOMY      COLONOSCOPY      COLONOSCOPY      ESOPHAGOGASTRODUODENOSCOPY      EYE SURGERY      HERNIA REPAIR  09/19/2019    Open repair of incarcerated incisional hernia with mesh - Dr. Bloch    KNEE ARTHROSCOPY Left     RI ARTHRODESIS GREAT TOE METATARSOPHALANGEAL JOINT Bilateral 08/25/2022    Procedure: Bilateral 1st MTP arthrodesis;  Surgeon: James R Lachman, MD;  Location: [facility] MAIN OR;  Service: Orthopedics    RI ARTHRODESIS GREAT TOE METATARSOPHALANGEAL JOINT Bilateral 10/05/2023    Procedure: Revision Right first MTP arthrodesis with BMAC harvest and bone graft harvest from the calcaneus.  Left 1st MTP removal of hardware;  Surgeon: James R Lachman, MD;  Location: [facility] MAIN OR;  Service: Orthopedics    RI DEBRIDEMENT SUBCUTANEOUS TISSUE 1ST 20 SQ CM/< Right 07/14/2022    Procedure: DEBRIDEMENT FOOT/TOE (WASH OUT), negative pressure wound dressing application;  Surgeon: James R Lachman, MD;   "Location[facility];  Service: Orthopedics    NV HALLUX RIGIDUS W/CHEILECTOMY 1ST MP JT W/O IMPLT Bilateral 12/10/2021    Procedure: CHEILECTOMY;  Surgeon: Wilfred Loya DPM;  Location: [facility] MAIN OR;  Service: Podiatry    NV REMOVAL IMPLANT DEEP Right 2025    Procedure: REMOVAL HARDWARE FOOT;  Surgeon: James R Lachman, MD;  [facility];  Service: Orthopedics    NV TNOT ELBOW LATERAL/MEDIAL DEBRIDE OPEN TDN RPR Left 2020    Procedure: lateral epicondyle elbow debridement, release, and repair;  Surgeon: Jannet Parrish MD;  [facility];  Service: Orthopedics    UPPER GASTROINTESTINAL ENDOSCOPY      WISDOM TOOTH EXTRACTION       Social History     Tobacco Use    Smoking status: Former     Current packs/day: 0.00     Average packs/day: 1 pack/day for 35.0 years (35.0 ttl pk-yrs)     Types: Cigarettes     Start date: 1986     Quit date: 2013     Years since quittin.2     Passive exposure: Never    Smokeless tobacco: Never   Vaping Use    Vaping status: Never Used   Substance and Sexual Activity    Alcohol use: Yes     Comment: \"few times a week\"    Drug use: Never    Sexual activity: Yes     Partners: Female     E-Cigarette/Vaping    E-Cigarette Use Never User      E-Cigarette/Vaping Substances    Nicotine No     THC No     CBD No     Flavoring No     Other No     Unknown No      Family history non-contributory  Social History:  Marital Status: /Civil Union   Patient Pre-hospital Living Situation: Home  Patient Pre-hospital Level of Mobility: walks  Patient Pre-hospital Diet Restrictions: None    Meds/Allergies   I have reviewed home medications with patient personally.  Prior to Admission medications    Medication Sig Start Date End Date Taking? Authorizing Provider   carvedilol (COREG) 3.125 mg tablet TAKE 1 TABLET BY MOUTH TWICE A DAY WITH MEALS 25  Yes Eliane Winston,    eszopiclone (LUNESTA) 3 MG tablet TAKE 1 TABLET BY MOUTH DAILY AT BEDTIME " 2/28/25  Yes Eliane Winston DO   FLUoxetine (PROzac) 40 MG capsule TAKE 1 CAPSULE (40 MG TOTAL) BY MOUTH DAILY. 10/24/24  Yes Eliane Winston DO   lisinopril (ZESTRIL) 30 mg tablet TAKE 1 TABLET BY MOUTH EVERY EVENING. 9/23/24  Yes Eliane Winston DO   rosuvastatin (CRESTOR) 10 MG tablet TAKE 1 TABLET BY MOUTH EVERYDAY AT BEDTIME 9/23/24  Yes Eliane Winston DO   Trelegy Ellipta 100-62.5-25 MCG/ACT inhaler INHALE 1 PUFF DAILY RINSE MOUTH AFTER USE. 2/6/25  Yes Eliane Winston DO   Zinc Sulfate (ZINC 15 PO) Take 1 tablet by mouth in the morning     Yes Historical Provider, MD   albuterol (PROVENTIL HFA,VENTOLIN HFA) 90 mcg/act inhaler Inhale 2 puffs every 4 (four) hours as needed for wheezing or shortness of breath 3/3/25   Eliane Winston DO   Aspirin Low Dose 81 MG EC tablet TAKE 1 TABLET BY MOUTH 2 TIMES A DAY.  Patient not taking: Reported on 3/24/2025 2/14/25   Yessi De León PA-C   b complex vitamins capsule Take 1 capsule by mouth daily    Historical Provider, MD   benzonatate (TESSALON) 200 MG capsule Take 1 capsule (200 mg total) by mouth 3 (three) times a day as needed for cough 3/3/25   Eliane Winston DO   multivitamin (THERAGRAN) TABS Take 1 tablet by mouth daily    Historical Provider, MD   ondansetron (ZOFRAN) 4 mg tablet Take 1 tablet (4 mg total) by mouth every 8 (eight) hours as needed for nausea or vomiting 1/23/25   Yessi De León PA-C   pantoprazole (PROTONIX) 40 mg tablet TAKE 1 TABLET BY MOUTH EVERY DAY  Patient taking differently: Take 40 mg by mouth daily Every other day 9/23/24   Eliane Winston DO   VITAMIN D PO Take 1 tablet by mouth in the morning      Historical Provider, MD     Allergies   Allergen Reactions    Oxycodone Delirium    Allopurinol Rash       Objective :  Temp:  [97.8 °F (36.6 °C)] 97.8 °F (36.6 °C)  HR:  [79-89] 86  BP: (119-156)/() 119/74  Resp:  [18-20] 18  SpO2:  [91 %-99 %] 99 %  O2 Device: None (Room air)    Physical Exam  Vitals and nursing note  reviewed.   Constitutional:       General: He is not in acute distress.     Appearance: He is well-developed.   HENT:      Head: Normocephalic and atraumatic.   Eyes:      Conjunctiva/sclera: Conjunctivae normal.   Cardiovascular:      Rate and Rhythm: Normal rate and regular rhythm.      Heart sounds: Murmur heard.   Pulmonary:      Effort: Pulmonary effort is normal. No respiratory distress.      Breath sounds: Normal breath sounds. No wheezing or rhonchi.   Abdominal:      Palpations: Abdomen is soft.      Tenderness: There is abdominal tenderness in the periumbilical area and left lower quadrant. There is no guarding or rebound. Negative signs include Tierney's sign.   Musculoskeletal:         General: No swelling.      Cervical back: Neck supple.      Right lower leg: No edema.      Left lower leg: No edema.   Skin:     General: Skin is warm and dry.      Capillary Refill: Capillary refill takes less than 2 seconds.   Neurological:      Mental Status: He is alert.   Psychiatric:         Mood and Affect: Mood normal.          Lines/Drains:            Lab Results: I have reviewed the following results:  Results from last 7 days   Lab Units 03/24/25  0741   WBC Thousand/uL 7.71   HEMOGLOBIN g/dL 14.8   HEMATOCRIT % 43.5   PLATELETS Thousands/uL 233   SEGS PCT % 79*   LYMPHO PCT % 7*   MONO PCT % 13*   EOS PCT % 1     Results from last 7 days   Lab Units 03/24/25  0741   SODIUM mmol/L 133*   POTASSIUM mmol/L 3.7   CHLORIDE mmol/L 100   CO2 mmol/L 23   BUN mg/dL 18   CREATININE mg/dL 1.23   ANION GAP mmol/L 10   CALCIUM mg/dL 10.1   ALBUMIN g/dL 4.7   TOTAL BILIRUBIN mg/dL 0.90   ALK PHOS U/L 62   ALT U/L 20   AST U/L 17   GLUCOSE RANDOM mg/dL 120             Lab Results   Component Value Date    HGBA1C 6.0 (H) 03/17/2025    HGBA1C 5.8 (H) 08/19/2024    HGBA1C 5.8 (H) 03/18/2024           Imaging Results Review: I reviewed radiology reports from this admission including: CT chest.  Other Study Results Review: EKG was  reviewed.     Administrative Statements       ** Please Note: This note has been constructed using a voice recognition system. **

## 2025-03-24 NOTE — LETTER
[facility] SURGICAL  [address] [id number]  Dept: [phone]    March 27, 2025     Patient: Prince Gonzalez   YOB: 1965   Date of Visit: 3/24/2025       To Whom it May Concern:    Prince Gonzalez is under my professional care. He was seen in the hospital from 3/24/2025 to 03/27/25. He may return to work on 3/29/2025 without limitations.    If you have any questions or concerns, please don't hesitate to call.    Sincerely,          Eveline Sue MD

## 2025-03-24 NOTE — INCIDENTAL FINDINGS
The following findings require follow up:  Radiographic finding   Finding: Ectatic aortic root measuring 4.2 cm. Noncontrast chest CT follow-up in 12 months is the recommendation.    Follow up required: CT in 12 months   Follow up should be done within 12 month(s)    Please notify the following clinician to assist with the follow up:   PCP    Incidental finding results were discussed with the Patient by Azeem Blair MD on 03/24/25.   They expressed understanding and all questions answered.

## 2025-03-24 NOTE — ED ATTENDING ATTESTATION
3/24/2025  I, Leidy Thornton MD, saw and evaluated the patient. I have discussed the patient with the resident/non-physician practitioner and agree with the resident's/non-physician practitioner's findings, Plan of Care, and MDM as documented in the resident's/non-physician practitioner's note, except where noted. All available labs and Radiology studies were reviewed.  I was present for key portions of any procedure(s) performed by the resident/non-physician practitioner and I was immediately available to provide assistance.       At this point I agree with the current assessment done in the Emergency Department.  I have conducted an independent evaluation of this patient a history and physical is as follows:    59-year-old male with history of emphysema and prior admission to the hospital in 2022 for C. difficile colitis and sigmoid diverticulitis.  Patient presents for evaluation of pain across his lower abdomen with diarrhea that feels reminiscent of when he had C. difficile.  Week and a half ago the patient was started on a Z-Andrés for a suspected sinus infection.  He states that his sinus infection symptoms have resolved but that about a week ago after completing the Z-Andrés he developed diarrhea, estimates anywhere from 5-10 episodes of watery loose stool daily.  This was accompanied by diffuse discomfort across his lower abdomen.  This morning he was at work when he began having substernal chest pain that he describes as a pressure sensation, nonradiating to his back, arms, or jaw.  Accompanied by shortness of breath.  Denies dysuria or frequency.  No fevers or chills.  He is color blind so is unable to tell if there is blood in his stool.    Physical Exam  Vitals and nursing note reviewed.   Constitutional:       General: He is not in acute distress.     Appearance: He is well-developed. He is not ill-appearing, toxic-appearing or diaphoretic.   HENT:      Head: Normocephalic and atraumatic.      Right Ear:  External ear normal.      Left Ear: External ear normal.      Nose: Nose normal.   Eyes:      Conjunctiva/sclera: Conjunctivae normal.   Cardiovascular:      Rate and Rhythm: Normal rate and regular rhythm.      Pulses: Normal pulses.      Heart sounds: Normal heart sounds. No murmur heard.     No friction rub. No gallop.   Pulmonary:      Effort: Pulmonary effort is normal. No respiratory distress.      Breath sounds: Normal breath sounds. No wheezing or rales.   Abdominal:      General: Bowel sounds are normal. There is no distension.      Palpations: Abdomen is soft.      Tenderness: There is abdominal tenderness (TTP across the lower abdomen without rebound or guarding). There is no guarding.   Musculoskeletal:         General: No deformity. Normal range of motion.      Cervical back: Normal range of motion and neck supple.      Right lower leg: No edema.      Left lower leg: No edema.      Comments: No calf swelling or tenderness   Skin:     General: Skin is warm and dry.   Neurological:      Mental Status: He is alert and oriented to person, place, and time.      Motor: No abnormal muscle tone.   Psychiatric:         Mood and Affect: Mood normal.         ED Course  ED Course as of 03/24/25 1458   Mon Mar 24, 2025   0937 I personally interpreted the patient's EKG which reveals normal rate, normal sinus rhythm, normal axis, normal intervals, no ST segment deviation, nonspecific T wave abnormality in lead III.  Patient reports shortness of breath, satting in the low 90s on room air, D-dimer is elevated, will obtain CT of the chest to rule out PE and scan through the abdomen pelvis given history of C. difficile colitis with diarrhea and recent antibiotic use.  Stool studies ordered when patient is able to produce a sample.  Troponins ordered due to report of chest pressure.   0937 Initial troponin is undetectable, will obtain delta in 2 hours. HEART score 2.    1239 CT scan of the chest, abdomen, and pelvis shows  no intra-abdominal or intrathoracic abnormalities.CT of the chest without evidence of PE.  No acute findings within the abdomen and pelvis, specifically, no evidence of diverticulitis or colitis.  Incidental finding of ectatic aortic root measuring 4.2 cm discussed with the patient with recommendation for follow-up noncontrast CT scan of the chest in 12 months for reevaluation.  Patient continues to experience a significant abdominal discomfort and continues to have diarrhea.  Testing sent for C. difficile.  Will start oral vancomycin.  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         Critical Care Time  Procedures

## 2025-03-24 NOTE — ASSESSMENT & PLAN NOTE
Home regimen: Rosuvastatin 10 mg daily, continue pravastatin 80 mg(since rosuvastatin is non formulary)

## 2025-03-24 NOTE — ASSESSMENT & PLAN NOTE
CC: Diarrhea (20 episodes in 3 days, watery in consistency, unknown BRBPR/dark brown stool)  H/o C. difficile PCR positive/toxin negative in 2022, presumably treated for C. difficile colitis with p.o. vancomycin  Patient is on PPI every other day for GERD, recently treated with Z-Andrés for URI  CT AP negative for colitis/diverticulitis    Plan:  Considering patient history of colitis/C. difficile colonization, will treat empirically with p.o. vancomycin 125 mcg 4 times daily  Pending stool C. difficile/enteric panel  Diet: GI low fiber/low residue  Encourage p.o. hydration, no need for IV hydration since patient is euvolemic   Plan:   - routine care, strict I and O, daily weights  - bilirubin prior to discharge   - hearing screen  - CCHD,  screen  - parental education and anticipatory guidance.  hypoglycemia guideline

## 2025-03-24 NOTE — ASSESSMENT & PLAN NOTE
Recent Labs     03/24/25  0741   SODIUM 133*     Mild hyponatremia likely in the setting of diarrhea/volume depletion  Monitor BMP  Encourage p.o. hydration

## 2025-03-24 NOTE — ASSESSMENT & PLAN NOTE
Lab Results   Component Value Date    EGFR 62 03/24/2025    EGFR 66 03/17/2025    EGFR 64 08/19/2024    CREATININE 1.23 03/24/2025    CREATININE 1.17 03/17/2025    CREATININE 1.20 08/19/2024     Home regimen: Coreg 3.125 mg twice daily, lisinopril 30 mg daily  Continue home regimen

## 2025-03-24 NOTE — ED PROCEDURE NOTE
Procedure  POC Cardiac US    Date/Time: 3/24/2025 8:55 AM    Performed by: Mando Yu DO  Authorized by: Mando Yu DO    Patient location:  ED  Other Assisting Provider: Yes (comment) (Mary Ellen Irby)    Procedure details:     Exam Type:  Educational    Indications: suspected volume depletion      Indications comment:  Nausea, SOB    Assessment / Evaluation for: cardiac function, pericardial effusion, inferior vena cava for fluid responsiveness and intravascular volume status      Exam Type: initial exam      Image quality: diagnostic      Image availability:  Images available in PACS  Patient Details:     Cardiac Rhythm:  Regular  Cardiac findings:     Echo technique: limited 2D      Views obtained: parasternal long axis, parasternal short axis, subcostal and apical      Pericardial effusion: absent      Tamponade physiology: absent      Wall motion: normal      LV systolic function: normal      RV dilation: none    POC AAA US    Date/Time: 3/24/2025 9:08 AM    Performed by: Mando Yu DO  Authorized by: Mando Yu DO    Patient location:  ED  Performing Provider:  Resident  Other Assisting Provider: Yes (comment) (Mary Ellen Irby)    Procedure details:     Exam Type:  Educational    Indications: abdominal pain      Views Obtained:  Transverse proximal, transverse mid view, sagittal (longitudinal) view and transverse distal view    Image quality: non-diagnostic      Image availability:  Images available in PACS  Findings:     Abdominal Aorta Findings: limited visualization of infrarenal aorta      Intra-abdominal fluid: not identified    Interpretation:     Aortic ultrasound impression: indeterminate                     Mando Yu DO  03/24/25 0916

## 2025-03-25 DIAGNOSIS — I10 ESSENTIAL HYPERTENSION: ICD-10-CM

## 2025-03-25 LAB
ALBUMIN SERPL BCG-MCNC: 4.2 G/DL (ref 3.5–5)
ALP SERPL-CCNC: 57 U/L (ref 34–104)
ALT SERPL W P-5'-P-CCNC: 21 U/L (ref 7–52)
ANION GAP SERPL CALCULATED.3IONS-SCNC: 10 MMOL/L (ref 4–13)
AST SERPL W P-5'-P-CCNC: 19 U/L (ref 13–39)
BILIRUB SERPL-MCNC: 0.63 MG/DL (ref 0.2–1)
BUN SERPL-MCNC: 16 MG/DL (ref 5–25)
C COLI+JEJUNI TUF STL QL NAA+PROBE: NEGATIVE
C DIFF TOX GENS STL QL NAA+PROBE: NEGATIVE
CALCIUM SERPL-MCNC: 8.7 MG/DL (ref 8.4–10.2)
CHLORIDE SERPL-SCNC: 103 MMOL/L (ref 96–108)
CO2 SERPL-SCNC: 21 MMOL/L (ref 21–32)
CREAT SERPL-MCNC: 1 MG/DL (ref 0.6–1.3)
EC STX1+STX2 GENES STL QL NAA+PROBE: NEGATIVE
ERYTHROCYTE [DISTWIDTH] IN BLOOD BY AUTOMATED COUNT: 12.9 % (ref 11.6–15.1)
GFR SERPL CREATININE-BSD FRML MDRD: 80 ML/MIN/1.73SQ M
GLUCOSE SERPL-MCNC: 110 MG/DL (ref 65–140)
HCT VFR BLD AUTO: 42.9 % (ref 36.5–49.3)
HGB BLD-MCNC: 14.3 G/DL (ref 12–17)
MAGNESIUM SERPL-MCNC: 2 MG/DL (ref 1.9–2.7)
MCH RBC QN AUTO: 30.4 PG (ref 26.8–34.3)
MCHC RBC AUTO-ENTMCNC: 33.3 G/DL (ref 31.4–37.4)
MCV RBC AUTO: 91 FL (ref 82–98)
PLATELET # BLD AUTO: 218 THOUSANDS/UL (ref 149–390)
PMV BLD AUTO: 10.1 FL (ref 8.9–12.7)
POTASSIUM SERPL-SCNC: 3.7 MMOL/L (ref 3.5–5.3)
PROT SERPL-MCNC: 7.1 G/DL (ref 6.4–8.4)
RBC # BLD AUTO: 4.7 MILLION/UL (ref 3.88–5.62)
SALMONELLA SP SPAO STL QL NAA+PROBE: NEGATIVE
SHIGELLA SP+EIEC IPAH STL QL NAA+PROBE: NEGATIVE
SODIUM SERPL-SCNC: 134 MMOL/L (ref 135–147)
WBC # BLD AUTO: 5.57 THOUSAND/UL (ref 4.31–10.16)

## 2025-03-25 PROCEDURE — 80053 COMPREHEN METABOLIC PANEL: CPT

## 2025-03-25 PROCEDURE — 85027 COMPLETE CBC AUTOMATED: CPT

## 2025-03-25 PROCEDURE — 83735 ASSAY OF MAGNESIUM: CPT

## 2025-03-25 PROCEDURE — 99232 SBSQ HOSP IP/OBS MODERATE 35: CPT | Performed by: INTERNAL MEDICINE

## 2025-03-25 RX ORDER — DICYCLOMINE HYDROCHLORIDE 10 MG/1
10 CAPSULE ORAL
Status: DISCONTINUED | OUTPATIENT
Start: 2025-03-25 | End: 2025-03-26

## 2025-03-25 RX ORDER — DICYCLOMINE HYDROCHLORIDE 10 MG/1
10 CAPSULE ORAL 3 TIMES DAILY PRN
Status: DISCONTINUED | OUTPATIENT
Start: 2025-03-25 | End: 2025-03-25

## 2025-03-25 RX ORDER — CARVEDILOL 3.12 MG/1
3.12 TABLET ORAL 2 TIMES DAILY WITH MEALS
Qty: 180 TABLET | Refills: 1 | OUTPATIENT
Start: 2025-03-25

## 2025-03-25 RX ORDER — LOPERAMIDE HYDROCHLORIDE 2 MG/1
2 CAPSULE ORAL 3 TIMES DAILY PRN
Status: DISCONTINUED | OUTPATIENT
Start: 2025-03-25 | End: 2025-03-26

## 2025-03-25 RX ORDER — SODIUM CHLORIDE, SODIUM GLUCONATE, SODIUM ACETATE, POTASSIUM CHLORIDE, MAGNESIUM CHLORIDE, SODIUM PHOSPHATE, DIBASIC, AND POTASSIUM PHOSPHATE .53; .5; .37; .037; .03; .012; .00082 G/100ML; G/100ML; G/100ML; G/100ML; G/100ML; G/100ML; G/100ML
100 INJECTION, SOLUTION INTRAVENOUS CONTINUOUS
Status: DISCONTINUED | OUTPATIENT
Start: 2025-03-25 | End: 2025-03-27 | Stop reason: HOSPADM

## 2025-03-25 RX ORDER — MAGNESIUM SULFATE HEPTAHYDRATE 40 MG/ML
2 INJECTION, SOLUTION INTRAVENOUS ONCE
Status: DISCONTINUED | OUTPATIENT
Start: 2025-03-25 | End: 2025-03-25

## 2025-03-25 RX ORDER — SODIUM CHLORIDE, SODIUM GLUCONATE, SODIUM ACETATE, POTASSIUM CHLORIDE, MAGNESIUM CHLORIDE, SODIUM PHOSPHATE, DIBASIC, AND POTASSIUM PHOSPHATE .53; .5; .37; .037; .03; .012; .00082 G/100ML; G/100ML; G/100ML; G/100ML; G/100ML; G/100ML; G/100ML
100 INJECTION, SOLUTION INTRAVENOUS CONTINUOUS
Status: DISCONTINUED | OUTPATIENT
Start: 2025-03-25 | End: 2025-03-25

## 2025-03-25 RX ORDER — ONDANSETRON 2 MG/ML
4 INJECTION INTRAMUSCULAR; INTRAVENOUS EVERY 6 HOURS PRN
Status: DISCONTINUED | OUTPATIENT
Start: 2025-03-25 | End: 2025-03-27 | Stop reason: HOSPADM

## 2025-03-25 RX ORDER — PANTOPRAZOLE SODIUM 40 MG/1
40 TABLET, DELAYED RELEASE ORAL
Status: DISCONTINUED | OUTPATIENT
Start: 2025-03-25 | End: 2025-03-26

## 2025-03-25 RX ADMIN — LISINOPRIL 30 MG: 20 TABLET ORAL at 17:01

## 2025-03-25 RX ADMIN — FLUOXETINE 40 MG: 20 CAPSULE ORAL at 09:12

## 2025-03-25 RX ADMIN — HYDROMORPHONE HYDROCHLORIDE 0.1 MG: 0.2 INJECTION, SOLUTION INTRAMUSCULAR; INTRAVENOUS; SUBCUTANEOUS at 07:23

## 2025-03-25 RX ADMIN — SUCRALFATE 1 G: 1 TABLET ORAL at 11:15

## 2025-03-25 RX ADMIN — SODIUM CHLORIDE, SODIUM GLUCONATE, SODIUM ACETATE, POTASSIUM CHLORIDE, MAGNESIUM CHLORIDE, SODIUM PHOSPHATE, DIBASIC, AND POTASSIUM PHOSPHATE 100 ML/HR: .53; .5; .37; .037; .03; .012; .00082 INJECTION, SOLUTION INTRAVENOUS at 12:33

## 2025-03-25 RX ADMIN — DICYCLOMINE HYDROCHLORIDE 10 MG: 10 CAPSULE ORAL at 17:01

## 2025-03-25 RX ADMIN — ONDANSETRON 4 MG: 2 INJECTION INTRAMUSCULAR; INTRAVENOUS at 19:42

## 2025-03-25 RX ADMIN — VANCOMYCIN HYDROCHLORIDE 125 MG: 125 CAPSULE ORAL at 05:26

## 2025-03-25 RX ADMIN — ENOXAPARIN SODIUM 40 MG: 40 INJECTION SUBCUTANEOUS at 09:12

## 2025-03-25 RX ADMIN — Medication 400 MG: at 17:01

## 2025-03-25 RX ADMIN — VANCOMYCIN HYDROCHLORIDE 125 MG: 125 CAPSULE ORAL at 11:15

## 2025-03-25 RX ADMIN — SODIUM CHLORIDE, SODIUM GLUCONATE, SODIUM ACETATE, POTASSIUM CHLORIDE, MAGNESIUM CHLORIDE, SODIUM PHOSPHATE, DIBASIC, AND POTASSIUM PHOSPHATE 100 ML/HR: .53; .5; .37; .037; .03; .012; .00082 INJECTION, SOLUTION INTRAVENOUS at 19:07

## 2025-03-25 RX ADMIN — VANCOMYCIN HYDROCHLORIDE 125 MG: 125 CAPSULE ORAL at 00:52

## 2025-03-25 RX ADMIN — ZINC SULFATE 220 MG (50 MG) CAPSULE 220 MG: CAPSULE at 09:12

## 2025-03-25 RX ADMIN — FLUTICASONE FUROATE AND VILANTEROL TRIFENATATE 1 PUFF: 100; 25 POWDER RESPIRATORY (INHALATION) at 09:12

## 2025-03-25 RX ADMIN — PANTOPRAZOLE SODIUM 40 MG: 40 TABLET, DELAYED RELEASE ORAL at 12:33

## 2025-03-25 RX ADMIN — HYDROMORPHONE HYDROCHLORIDE 0.1 MG: 0.2 INJECTION, SOLUTION INTRAMUSCULAR; INTRAVENOUS; SUBCUTANEOUS at 11:15

## 2025-03-25 RX ADMIN — LOPERAMIDE HYDROCHLORIDE 2 MG: 2 CAPSULE ORAL at 12:33

## 2025-03-25 RX ADMIN — PRAVASTATIN SODIUM 80 MG: 80 TABLET ORAL at 17:01

## 2025-03-25 RX ADMIN — ONDANSETRON 4 MG: 2 INJECTION INTRAMUSCULAR; INTRAVENOUS at 07:29

## 2025-03-25 RX ADMIN — UMECLIDINIUM 1 PUFF: 62.5 AEROSOL, POWDER ORAL at 09:12

## 2025-03-25 RX ADMIN — SODIUM CHLORIDE, SODIUM GLUCONATE, SODIUM ACETATE, POTASSIUM CHLORIDE, MAGNESIUM CHLORIDE, SODIUM PHOSPHATE, DIBASIC, AND POTASSIUM PHOSPHATE 100 ML/HR: .53; .5; .37; .037; .03; .012; .00082 INJECTION, SOLUTION INTRAVENOUS at 09:12

## 2025-03-25 RX ADMIN — CARVEDILOL 3.12 MG: 3.12 TABLET, FILM COATED ORAL at 07:32

## 2025-03-25 RX ADMIN — SUCRALFATE 1 G: 1 TABLET ORAL at 05:26

## 2025-03-25 RX ADMIN — HYDROMORPHONE HYDROCHLORIDE 0.1 MG: 0.2 INJECTION, SOLUTION INTRAMUSCULAR; INTRAVENOUS; SUBCUTANEOUS at 14:53

## 2025-03-25 RX ADMIN — CARVEDILOL 3.12 MG: 3.12 TABLET, FILM COATED ORAL at 17:01

## 2025-03-25 RX ADMIN — Medication 400 MG: at 09:12

## 2025-03-25 RX ADMIN — HYDROMORPHONE HYDROCHLORIDE 0.1 MG: 0.2 INJECTION, SOLUTION INTRAMUSCULAR; INTRAVENOUS; SUBCUTANEOUS at 19:04

## 2025-03-25 RX ADMIN — ZOLPIDEM TARTRATE 10 MG: 5 TABLET, FILM COATED ORAL at 21:51

## 2025-03-25 NOTE — UTILIZATION REVIEW
Initial Clinical Review    OBSERVATION  3/24/25 @ 1314  CONVERTED TO INPATIENT ADMISSION 3/26/25 @1425   DUE TO CONTINUED STAY REQUIRED TO EVALUATE AND TREAT PATIENT WITH DIARRHEA WITH IVF, MED CHANGES       Admission: Date/Time/Statement:   Admission Orders (From admission, onward)       Ordered        03/26/25 1425  INPATIENT ADMISSION  Once            03/24/25 1314  Place in Observation  Once                          Orders Placed This Encounter   Procedures    INPATIENT ADMISSION     Standing Status:   Standing     Number of Occurrences:   1     Level of Care:   Med Surg [16]     Estimated length of stay:   More than 2 Midnights     Certification:   I certify that inpatient services are medically necessary for this patient for a duration of greater than two midnights. See H&P and MD Progress Notes for additional information about the patient's course of treatment.     ED Arrival Information       Expected   -    Arrival   3/24/2025 07:08    Acuity   Urgent              Means of arrival   Walk-In    Escorted by   Self    Service   Hospitalist    Admission type   Emergency              Arrival complaint   abd pain/diarrhea             Chief Complaint   Patient presents with    Abdominal Pain     Abdominal pain starting Saturday. Reports diarrhea started Sunday. Hx diverticulitis. Feels similar. +sob       Initial Presentation: 59 y.o. male with  PMH of emphysema, hypertension, CKD, GERD who presents to ED from home with diarrhea/nausea/chest pain. Symptoms started on Saturday, had around 20 episodes in last 3 days, described consistency is watery, since patient is color blind unknown about stool color/if any bleeding. Pt ate Mexican food(sausage/cheese sandwich) a day prior to sx . Recently had URI symptoms and was treated with Z-Andrés around 2-1/2 weeks ago.Patient is history of C. difficile PCR positive/toxin negative in 2022, was admitted for colitis . On exam,  abdominal tenderness in the periumbilical area and  left lower quadrant . Abdomen soft .  Chest pain in epigastric/right lower chest, described as sharp/shooting intermittent pain, no change with position/drip breathing, nontender on palpation  . Labs :Na 133 , D Dimer 1.42 . Workup for chest pain negative for ACS/PE, CT scanning did showed ectatic aortic root, neg for colitis/ diverticulitis. Pt given IV antiemetic, IV analgesic in ED. Admitted as OBS with diarrhea , chest pain- likely GERD as etiology . Hyponatremia . Plan - Treat empirically w/ PO vanco. F/U stool studies . Low fat, lo residue diet . Encourage po fluids . Start schedule Carafate . Pain control. Monitor BMP .   Anticipated Length of Stay/Certification Statement: Patient will be admitted on an observation basis with an anticipated length of stay of less than 2 midnights secondary to diarrheal illness diarrheal illness/chest pain.     Date: 3/25      Patient still having diarrhea, with watery stools, nonbloody. Patient still had occasional nausea, but no actual vomiting episodes . Having occasional abdominal pain  On exam,  mild generalized abdominal tenderness on direct palpation.  C. difficile PCR as well as stool enteric bacterial panel PCR were all negative . Likely diarrhea 2/2 acute viral gastroenteritis .Added prn Imodium . D/C Vanco po .  Diet of low fiber/low residue diet. May continue Maalox on as-needed basis.Start Bentyl 10 mg 3 times daily . Continue prn Zofran . Patient had chest pains earlier, but this had improved- likely GERD .  Started on PPI for GI sx  Pt's wife  also having GI symptoms with nausea, but no diarrheal episodes . Hyponatremia : continue IVF ,monitor Na .     Date 3/26 Converted to IP   Pt reports some improvement to his diarrhea, reports he only had 3 episodes of nonbloody diarrhea yesterday. He denies any vomiting, does continue to endorse some nausea. He also continues with intermittent epigastric pain that was improved by prn IV Dilaudid . Patient continues to have  "poor appetite, having \"a few bites\" of each meal . On  exam, diffuse abdominal tenderness to deep palpation , increased bowel sounds . Abdomen soft . Dry mucous membranes . Na improved today from 134 to 138  : Hyponatremia resolved . PLan : increase Protonix 40 mg BID from daily   , Increase Imodium 2 mg 4 times daily prn , Changed  Bentyl to 20 mg QID PRN ,  D/C'ed prn IV Dilaudid, added IV Toradol scheduled ATC.  Continue IVF .       Date 3/27 Day 2   IVF infusing overnight and this am .R sided chest pain relieved w/ Lido patch overnight .  Mid lower abdominal  pain down to 3/10 this am on scheduled IV Toradol. Plan for d/c to home today on Protonix , PRN Zofran .     ED Treatment-Medication Administration from 03/24/2025 0708 to 03/24/2025 1929         Date/Time Order Dose Route Action     03/24/2025 0743 ondansetron (ZOFRAN) injection 4 mg 4 mg Intravenous Given     03/24/2025 0744 HYDROmorphone (DILAUDID) injection 0.5 mg 0.5 mg Intravenous Given     03/24/2025 0827 magnesium Oxide (MAG-OX) tablet 400 mg 400 mg Oral Given     03/24/2025 1745 magnesium Oxide (MAG-OX) tablet 400 mg 400 mg Oral Given     03/24/2025 0944 iohexol (OMNIPAQUE) 350 MG/ML injection (MULTI-DOSE) 85 mL 85 mL Intravenous Given     03/24/2025 1226 HYDROmorphone (DILAUDID) injection 0.5 mg 0.5 mg Intravenous Given     03/24/2025 1238 vancomycin (VANCOCIN) oral solution 125 mg 125 mg Oral Given     03/24/2025 1254 aluminum-magnesium hydroxide-simethicone (MAALOX) oral suspension 30 mL 30 mL Oral Given     03/24/2025 1253 sucralfate (CARAFATE) tablet 1 g 1 g Oral Given     03/24/2025 1253 Famotidine (PF) (PEPCID) injection 20 mg 20 mg Intravenous Given     03/24/2025 1745 vancomycin (VANCOCIN) capsule 125 mg 125 mg Oral Given     03/24/2025 1647 carvedilol (COREG) tablet 3.125 mg 3.125 mg Oral Given     03/24/2025 1647 pravastatin (PRAVACHOL) tablet 80 mg 80 mg Oral Given     03/24/2025 1516 enoxaparin (LOVENOX) subcutaneous injection 40 mg 40 " mg Subcutaneous Given     03/24/2025 1646 sucralfate (CARAFATE) tablet 1 g 1 g Oral Given     03/24/2025 1515 HYDROmorphone HCl (DILAUDID) injection 0.1 mg 0.1 mg Intravenous Given            Scheduled Medications:  carvedilol, 3.125 mg, Oral, BID With Meals  enoxaparin, 40 mg, Subcutaneous, Daily  FLUoxetine, 40 mg, Oral, Daily  Fluticasone Furoate-Vilanterol, 1 puff, Inhalation, Daily   And  umeclidinium, 1 puff, Inhalation, Daily  ketorolac, 15 mg, Intravenous, Q6H AIDEN  lidocaine, 1 patch, Topical, Once  lisinopril, 30 mg, Oral, QPM  magnesium Oxide, 400 mg, Oral, BID  pantoprazole, 40 mg, Oral, BID AC  pravastatin, 80 mg, Oral, Daily With Dinner  zinc sulfate, 220 mg, Oral, Daily  zolpidem, 10 mg, Oral, HS    sucralfate (CARAFATE) tablet 1 g  Dose: 1 g  Freq: 4 times daily (before meals and at bedtime) Route: PO  Start: 03/24/25 1600 End: 03/25/25 1222  vancomycin (VANCOCIN) capsule 125 mg  Dose: 125 mg  Freq: Every 6 hours scheduled Route: PO  Start: 03/24/25 1800 End: 03/25/25 1225  pantoprazole (PROTONIX) EC tablet 40 mg  Dose: 40 mg  Freq: Daily (early morning) Route: PO  Start: 03/25/25 1230 End: 03/26/25 1325  dicyclomine (BENTYL) capsule 10 mg  Dose: 10 mg  Freq: 3 times daily before meals Route: PO  Start: 03/25/25 1600 End: 03/26/25 0727  dicyclomine (BENTYL) capsule 20 mg  Dose: 20 mg  Freq: 3 times daily before meals Route: PO  Start: 03/26/25 1130 End: 03/26/25 1324      Continuous IV Infusions:  multi-electrolyte, 100 mL/hr, Intravenous, Continuous      PRN Meds:  acetaminophen, 650 mg, Oral, Q4H PRN  albuterol, 2 puff, Inhalation, Q4H PRN  aluminum-magnesium hydroxide-simethicone, 30 mL, Oral, Q4H PRN x1 3/24   HYDROmorphone, 0.1 mg, Intravenous, Q3H PRN x1 3/24, x4 3/25  x3 3/26  end: 03/26/25 1324   ondansetron, 4 mg, Intravenous, Q6H PRN x2 3/25   loperamide (IMODIUM) capsule 2 mg  Dose: 2 mg  Freq: 3 times daily PRN Route: PO  PRN Reason: diarrhea  Start: 03/25/25 1219 x1 3/25 , x1 3/26 End:  03/26/25 1324   dicyclomine (BENTYL) capsule 20 mg  Dose: 20 mg  Freq: 4 times daily PRN Route: PO  PRN Reason: cramping  PRN Comment: abdominal/intestinal cramping/spasms/pain  Start: 03/26/25 1330   loperamide (IMODIUM) capsule 2 mg  Dose: 2 mg  Freq: Every 4 hours PRN Route: PO  PRN Reason: diarrhea  Start: 03/26/25 1323          ED Triage Vitals [03/24/25 0723]   Temperature Pulse Respirations Blood Pressure SpO2 Pain Score   97.8 °F (36.6 °C) 89 20 (!) 156/103 99 % 8     Weight (last 2 days)       None            Vital Signs (last 3 days)       Date/Time Temp Pulse Resp BP MAP (mmHg) SpO2 O2 Device Patient Position - Orthostatic VS Pattie Coma Scale Score Pain    03/27/25 08:08:44 98.4 °F (36.9 °C) 75 -- 133/88 103 96 % -- -- -- --    03/27/25 0558 -- -- -- -- -- -- -- -- -- 3    03/26/25 2338 -- -- -- -- -- -- None (Room air) -- 15 1    03/26/25 2301 -- -- -- -- -- -- -- -- -- 6    03/26/25 22:35:12 98.3 °F (36.8 °C) 70 12 136/90 105 94 % -- -- -- --    03/26/25 15:46:24 97.5 °F (36.4 °C) -- 16 147/99 115 -- -- -- -- --    03/26/25 1543 -- 70 -- 147/99 -- -- -- -- -- 9    03/26/25 1136 -- -- -- -- -- -- -- -- -- 10 - Worst Possible Pain    03/26/25 1100 -- -- -- -- -- -- -- -- -- 7    03/26/25 0850 -- -- -- -- -- -- None (Room air) -- 15 --    03/26/25 0835 -- -- -- -- -- -- -- -- -- 6    03/26/25 08:03:21 98.2 °F (36.8 °C) 76 17 128/85 99 97 % -- -- -- --    03/26/25 0500 -- -- -- -- -- -- -- -- -- 8 03/25/25 22:20:57 97.6 °F (36.4 °C) 72 -- 130/86 101 94 % -- -- -- --    03/25/25 1937 -- -- -- -- -- -- -- -- 15 5    03/25/25 1904 -- -- -- -- -- -- -- -- -- 8 03/25/25 15:27:54 98.4 °F (36.9 °C) 77 18 130/92 105 93 % -- -- -- --    03/25/25 1453 -- -- -- -- -- -- -- -- -- 7 03/25/25 1115 -- -- -- -- -- -- -- -- -- 7 03/25/25 0915 -- -- -- -- -- -- None (Room air) -- 15 --    03/25/25 07:26:37 98 °F (36.7 °C) 83 -- 134/92 106 100 % -- -- -- --    03/25/25 0723 -- -- -- -- -- -- -- -- -- 9     03/24/25 23:45:34 98.4 °F (36.9 °C) 78 -- 127/81 96 95 % -- -- -- --    03/24/25 1937 -- -- -- -- -- -- -- -- 15 5 03/24/25 1936 -- -- -- -- -- -- -- -- -- 5    03/24/25 19:35:52 98.2 °F (36.8 °C) 87 16 141/89 106 99 % -- -- -- --    03/24/25 1730 -- 80 18 137/85 104 94 % None (Room air) Lying -- --    03/24/25 1700 -- 81 -- 114/76 92 94 % -- -- -- --    03/24/25 1647 -- 83 -- 130/88 -- -- -- -- -- --    03/24/25 1630 -- 82 18 123/87 102 93 % -- -- -- --    03/24/25 1530 -- 85 18 125/75 92 96 % -- -- -- --    03/24/25 1515 -- -- -- -- -- -- -- -- -- 2    03/24/25 1502 -- -- -- -- -- -- -- -- 15 --    03/24/25 1500 97.8 °F (36.6 °C) 85 18 130/85 104 98 % None (Room air) Lying -- No Pain    03/24/25 1430 -- 84 18 147/94 112 93 % -- -- -- --    03/24/25 1400 -- 86 18 119/74 91 99 % -- -- -- --    03/24/25 1226 -- -- -- -- -- -- -- -- -- 7    03/24/25 1200 -- 83 18 122/80 95 99 % -- -- -- --    03/24/25 1130 -- 82 18 121/77 94 97 % -- -- -- --    03/24/25 1100 -- 83 18 128/74 95 96 % -- -- -- --    03/24/25 0933 -- -- -- -- -- 93 % -- -- -- --    03/24/25 0900 -- 79 -- 125/77 95 91 % -- -- -- --    03/24/25 0830 -- 85 18 127/93 108 97 % None (Room air) Lying -- --    03/24/25 0828 -- -- -- -- -- -- -- -- -- 3    03/24/25 0759 -- -- -- -- -- -- -- -- 15 --    03/24/25 0744 -- -- -- -- -- -- -- -- -- 4    03/24/25 0723 97.8 °F (36.6 °C) 89 20 156/103 -- 99 % None (Room air) Lying -- 8              Pertinent Labs/Diagnostic Test Results:   Radiology:  CT pe study w abdomen pelvis w contrast   Final Interpretation by Shlomo Wilde MD (03/24 1025)      CTA chest:      No pulmonary embolus.      No evidence of acute thoracic process.      Ectatic aortic root measuring 4.2 cm. Noncontrast chest CT follow-up in 12 months is the recommendation.      Additional chronic findings and negatives as above.      CT abdomen and pelvis:      No evidence of acute abdominopelvic process.      Colonic diverticulosis.       Additional chronic findings and negatives as above.                     Workstation performed: [id number]         X-ray chest 1 view portable   Final Interpretation by Ricky Vasquez MD (03/24 1011)      No acute cardiopulmonary disease.            Workstation performed: [id number]           Cardiology:  ECG 12 lead   Final Result by Ricky Franco MD (03/24 0910)   Unusual P axis, possible ectopic atrial rhythm   Nonspecific T wave abnormality   Abnormal ECG   When compared with ECG of 15-May-2023 06:49,   No significant change was found   Confirmed by Ricky Franco ([id number]) on 3/24/2025 9:10:17 AM          Results from last 7 days   Lab Units 03/24/25  0733   SARS-COV-2  Negative     Results from last 7 days   Lab Units 03/25/25  0518 03/24/25  0741   WBC Thousand/uL 5.57 7.71   HEMOGLOBIN g/dL 14.3 14.8   HEMATOCRIT % 42.9 43.5   PLATELETS Thousands/uL 218 233   TOTAL NEUT ABS Thousands/µL  --  6.06         Results from last 7 days   Lab Units 03/27/25  0522 03/26/25  0503 03/25/25  0518 03/24/25  0741   SODIUM mmol/L 140 138 134* 133*   POTASSIUM mmol/L 3.8 3.7 3.7 3.7   CHLORIDE mmol/L 108 106 103 100   CO2 mmol/L 26 26 21 23   ANION GAP mmol/L 6 6 10 10   BUN mg/dL 14 15 16 18   CREATININE mg/dL 1.00 1.01 1.00 1.23   EGFR ml/min/1.73sq m 80 79 80 62   CALCIUM mg/dL 8.4 8.6 8.7 10.1   MAGNESIUM mg/dL 2.5  --  2.0 1.8*   PHOSPHORUS mg/dL 3.3  --   --   --      Results from last 7 days   Lab Units 03/25/25  0518 03/24/25  0741   AST U/L 19 17   ALT U/L 21 20   ALK PHOS U/L 57 62   TOTAL PROTEIN g/dL 7.1 7.9   ALBUMIN g/dL 4.2 4.7   TOTAL BILIRUBIN mg/dL 0.63 0.90         Results from last 7 days   Lab Units 03/27/25  0522 03/26/25  0503 03/25/25  0518 03/24/25  0741   GLUCOSE RANDOM mg/dL 94 95 110 120               Results from last 7 days   Lab Units 03/24/25  1224 03/24/25  1027 03/24/25  0741   HS TNI 0HR ng/L  --   --  <2   HS TNI 2HR ng/L  --  <2  --    HS TNI 4HR ng/L <2  --   --      Results from last  7 days   Lab Units 03/24/25  0741   D-DIMER QUANTITATIVE ug/ml FEU 1.42*               Results from last 7 days   Lab Units 03/24/25  0741   BNP pg/mL 22                     Results from last 7 days   Lab Units 03/24/25  0741   LIPASE u/L 35                 Results from last 7 days   Lab Units 03/24/25  0802   CLARITY UA  Clear   COLOR UA  Yellow   SPEC GRAV UA  1.031*   PH UA  6.0   GLUCOSE UA mg/dl Negative   KETONES UA mg/dl Negative   BLOOD UA  Negative   PROTEIN UA mg/dl 30 (1+)*   NITRITE UA  Negative   BILIRUBIN UA  Negative   UROBILINOGEN UA (BE) mg/dl <2.0   LEUKOCYTES UA  Trace*   WBC UA /hpf 2-4*   RBC UA /hpf 4-10*   BACTERIA UA /hpf Occasional   EPITHELIAL CELLS WET PREP /hpf Occasional   MUCUS THREADS  Innumerable*     Results from last 7 days   Lab Units 03/24/25  0733   INFLUENZA A PCR  Negative   INFLUENZA B PCR  Negative   RSV PCR  Negative                 Results from last 7 days   Lab Units 03/24/25  1014   C DIFF TOXIN B BY PCR  Negative     Results from last 7 days   Lab Units 03/24/25  1014   SALMONELLA SP PCR  Negative   SHIGELLA SP/ENTEROINVASIVE E. COLI (EIEC)  Negative   CAMPYLOBACTER SP (JEJUNI AND COLI)  Negative   SHIGA TOXIN 1/SHIGA TOXIN 2  Negative                           Past Medical History:   Diagnosis Date    Arthritis     C. difficile diarrhea     x 3 yrs ago    Colitis     x1 occurnce    Colon polyp     COPD (chronic obstructive pulmonary disease) (HCC)     COVID-19     positive test on 12/16/21    Depression     Diverticulitis     Diverticulitis of colon     Diverticulosis     GERD (gastroesophageal reflux disease)     Hyperlipidemia     Hypertension     Pulmonary emphysema (HCC)     Sleep apnea     no cpap    Umbilical hernia      Present on Admission:   Mixed hyperlipidemia   Primary insomnia   Centrilobular emphysema (HCC)   Diarrhea   Benign hypertension with CKD (chronic kidney disease), stage II   (Resolved) Hyponatremia      Admitting Diagnosis: Diarrhea  [R19.7]  Abdominal pain [R10.9]  Nausea and vomiting [R11.2]  Age/Sex: 59 y.o. male    Network Utilization Review Department  ATTENTION: Please call with any questions or concerns to [phone] and carefully listen to the prompts so that you are directed to the right person. All voicemails are confidential.   For Discharge needs, contact Care Management DC Support Team at [phone] opt. [phone]  Send all requests for admission clinical reviews, approved or denied determinations and any other requests to dedicated fax number below belonging to the campus where the patient is receiving treatment. List of dedicated fax numbers for the Facilities:  FACILITY NAME UR FAX NUMBER   ADMISSION DENIALS (Administrative/Medical Necessity) [phone]   DISCHARGE SUPPORT TEAM (NETWORK) [phone]   PARENT CHILD HEALTH (Maternity/NICU/Pediatrics) [phone]   [facility] [phone]   [facility] [phone]   [facility] [phone]   [facility] [phone]   [facility] [phone]   General acute hospital [phone]   [facility] [phone]   [facility] [phone]   [facility] [phone]   [facility] [phone]   [facility] [phone]   [facility] [phone]

## 2025-03-25 NOTE — TELEPHONE ENCOUNTER
"On 2/27/25  \"One month supply sent to last until his appointment   Please remind him to get his blood work prior to the appointment \"  Will deny prescription   "

## 2025-03-25 NOTE — ASSESSMENT & PLAN NOTE
CC: Chest pain in epigastric/right lower chest, described as sharp/shooting intermittent pain, no change with position/breathing, nontender on palpation  Workup negative for ACS/PE  Etiology: Likely GERD  Plan:  Discontinue sucralfate   Scheduled Bentyl, lidocaine patch as needed, Maalox as needed

## 2025-03-25 NOTE — ASSESSMENT & PLAN NOTE
CC: Diarrhea (20 episodes in 3 days, watery in consistency, unknown BRBPR/dark brown stool)  H/o C. difficile PCR positive/toxin negative in 2022, presumably treated for C. difficile colitis with p.o. vancomycin  Patient is on PPI every other day for GERD  Recently treated with Z-Andrés for URI  CT AP negative for colitis/diverticulitis  Negative stool enterics panel/negative C. Difficile PCR/toxin    Plan:  Stop vancomycin given negative C. difficile  IVF Plasmalyte/Isolyte for rehydration   Zofran for nausea  Start Protonix 40 mg qd  Start Imodium 2 mg 3 times daily prn  Start Bentyl 10 mg 3 times daily  Diet: GI low fiber/low residue

## 2025-03-25 NOTE — PLAN OF CARE
Problem: Potential for Falls  Goal: Patient will remain free of falls  Description: INTERVENTIONS:- Educate patient/family on patient safety including physical limitations- Instruct patient to call for assistance with activity - Consult OT/PT to assist with strengthening/mobility - Keep Call bell within reach- Keep bed low and locked with side rails adjusted as appropriate- Keep care items and personal belongings within reach- Initiate and maintain comfort rounds- Make Fall Risk Sign visible to staff- Offer Toileting every  Hours, in advance of need- Initiate/Maintain alarm- Obtain necessary fall risk management equipment: - Apply yellow socks and bracelet for high fall risk patients- Consider moving patient to room near nurses station  INTERVENTIONS:- Educate patient/family on patient safety including physical limitations- Instruct patient to call for assistance with activity - Consult OT/PT to assist with strengthening/mobility - Keep Call bell within reach- Keep bed low and locked with side rails adjusted as appropriate- Keep care items and personal belongings within reach- Initiate and maintain comfort rounds- Make Fall Risk Sign visible to staff- Offer Toileting every  Hours, in advance of need- Initiate/Maintain alarm- Obtain necessary fall risk management equipment: - Apply yellow socks and bracelet for high fall risk patients- Consider moving patient to room near nurses station  Outcome: Progressing     Problem: PAIN - ADULT  Goal: Verbalizes/displays adequate comfort level or baseline comfort level  Description: Interventions:- Encourage patient to monitor pain and request assistance- Assess pain using appropriate pain scale- Administer analgesics based on type and severity of pain and evaluate response- Implement non-pharmacological measures as appropriate and evaluate response- Consider cultural and social influences on pain and pain management- Notify physician/advanced practitioner if interventions  unsuccessful or patient reports new pain  Outcome: Progressing     Problem: INFECTION - ADULT  Goal: Absence or prevention of progression during hospitalization  Description: INTERVENTIONS:- Assess and monitor for signs and symptoms of infection- Monitor lab/diagnostic results- Monitor all insertion sites, i.e. indwelling lines, tubes, and drains- Monitor endotracheal if appropriate and nasal secretions for changes in amount and color- [place] appropriate cooling/warming therapies per order- Administer medications as ordered- Instruct and encourage patient and family to use good hand hygiene technique- Identify and instruct in appropriate isolation precautions for identified infection/condition  Outcome: Progressing  Goal: Absence of fever/infection during neutropenic period  Description: INTERVENTIONS:- Monitor WBC  Outcome: Progressing     Problem: SAFETY ADULT  Goal: Patient will remain free of falls  Description: INTERVENTIONS:- Educate patient/family on patient safety including physical limitations- Instruct patient to call for assistance with activity - Consult OT/PT to assist with strengthening/mobility - Keep Call bell within reach- Keep bed low and locked with side rails adjusted as appropriate- Keep care items and personal belongings within reach- Initiate and maintain comfort rounds- Make Fall Risk Sign visible to staff- Offer Toileting every  Hours, in advance of need- Initiate/Maintain alarm- Obtain necessary fall risk management equipment: - Apply yellow socks and bracelet for high fall risk patients- Consider moving patient to room near nurses station  INTERVENTIONS:- Educate patient/family on patient safety including physical limitations- Instruct patient to call for assistance with activity - Consult OT/PT to assist with strengthening/mobility - Keep Call bell within reach- Keep bed low and locked with side rails adjusted as appropriate- Keep care items and personal belongings within reach-  Initiate and maintain comfort rounds- Make Fall Risk Sign visible to staff- Offer Toileting every  Hours, in advance of need- Initiate/Maintain alarm- Obtain necessary fall risk management equipment: - Apply yellow socks and bracelet for high fall risk patients- Consider moving patient to room near nurses station  Outcome: Progressing  Goal: Maintain or return to baseline ADL function  Description: INTERVENTIONS:-  Assess patient's ability to carry out ADLs; assess patient's baseline for ADL function and identify physical deficits which impact ability to perform ADLs (bathing, care of mouth/teeth, toileting, grooming, dressing, etc.)- Assess/evaluate cause of self-care deficits - Assess range of motion- Assess patient's mobility; develop plan if impaired- Assess patient's need for assistive devices and provide as appropriate- Encourage maximum independence but intervene and supervise when necessary- Involve family in performance of ADLs- Assess for home care needs following discharge - Consider OT consult to assist with ADL evaluation and planning for discharge- Provide patient education as appropriate  Outcome: Progressing  Goal: Maintains/Returns to pre admission functional level  Description: INTERVENTIONS:- Perform AM-PAC 6 Click Basic Mobility/ Daily Activity assessment daily.- Set and communicate daily mobility goal to care team and patient/family/caregiver. - Collaborate with rehabilitation services on mobility goals if consulted- Perform Range of Motion times a day.- Reposition patient every  hours.- Dangle patient  times a day- Stand patient  times a day- Ambulate patient  times a day- Out of bed to chair  times a day - Out of bed for meals  times a day- Out of bed for toileting- Record patient progress and toleration of activity level   Outcome: Progressing     Problem: DISCHARGE PLANNING  Goal: Discharge to home or other facility with appropriate resources  Description: INTERVENTIONS:- Identify barriers  to discharge w/patient and caregiver- Arrange for needed discharge resources and transportation as appropriate- Identify discharge learning needs (meds, wound care, etc.)- Arrange for interpretive services to assist at discharge as needed- Refer to Case Management Department for coordinating discharge planning if the patient needs post-hospital services based on physician/advanced practitioner order or complex needs related to functional status, cognitive ability, or social support system  Outcome: Progressing     Problem: Knowledge Deficit  Goal: Patient/family/caregiver demonstrates understanding of disease process, treatment plan, medications, and discharge instructions  Description: Complete learning assessment and assess knowledge base.Interventions:- Provide teaching at level of understanding- Provide teaching via preferred learning methods  Outcome: Progressing     Problem: Nutrition/Hydration-ADULT  Goal: Nutrient/Hydration intake appropriate for improving, restoring or maintaining nutritional needs  Description: Monitor and assess patient's nutrition/hydration status for malnutrition. Collaborate with interdisciplinary team and initiate plan and interventions as ordered.  Monitor patient's weight and dietary intake as ordered or per policy. Utilize nutrition screening tool and intervene as necessary. Determine patient's food preferences and provide high-protein, high-caloric foods as appropriate. INTERVENTIONS:- Monitor oral intake, urinary output, labs, and treatment plans- Assess nutrition and hydration status and recommend course of action- Evaluate amount of meals eaten- Assist patient with eating if necessary - Allow adequate time for meals- Recommend/ encourage appropriate diets, oral nutritional supplements, and vitamin/mineral supplements- Order, calculate, and assess calorie counts as needed- Recommend, monitor, and adjust tube feedings and TPN/PPN based on assessed needs- Assess need for  intravenous fluids- Provide specific nutrition/hydration education as appropriate- Include patient/family/caregiver in decisions related to nutrition  Outcome: Progressing     Problem: GASTROINTESTINAL - ADULT  Goal: Minimal or absence of nausea and/or vomiting  Description: INTERVENTIONS:- Administer IV fluids if ordered to ensure adequate hydration- Maintain NPO status until nausea and vomiting are resolved- Nasogastric tube if ordered- Administer ordered antiemetic medications as needed- Provide nonpharmacologic comfort measures as appropriate- Advance diet as tolerated, if ordered- Consider nutrition services referral to assist patient with adequate nutrition and appropriate food choices  Outcome: Progressing  Goal: Maintains or returns to baseline bowel function  Description: INTERVENTIONS:- Assess bowel function- Encourage oral fluids to ensure adequate hydration- Administer IV fluids if ordered to ensure adequate hydration- Administer ordered medications as needed- Encourage mobilization and activity- Consider nutritional services referral to assist patient with adequate nutrition and appropriate food choices  Outcome: Progressing  Goal: Maintains adequate nutritional intake  Description: INTERVENTIONS:- Monitor percentage of each meal consumed- Identify factors contributing to decreased intake, treat as appropriate- Assist with meals as needed- Monitor I&O, weight, and lab values if indicated- Obtain nutrition services referral as needed  Outcome: Progressing

## 2025-03-25 NOTE — ASSESSMENT & PLAN NOTE
Recent Labs     03/24/25  0741 03/25/25  0518   SODIUM 133* 134*     Mild hyponatremia likely in the setting of diarrhea/volume depletion  Plan:  Monitor BMP  IVF and encourage PO hydration

## 2025-03-25 NOTE — PROGRESS NOTES
Progress Note - Hospitalist   Name: Prince Gonzalez 59 y.o. male I MRN: [id number]  Unit/Bed#: W -01 I Date of Admission: 3/24/2025   Date of Service: 3/25/2025 I Hospital Day: 0    Assessment & Plan  Diarrhea  CC: Diarrhea (20 episodes in 3 days, watery in consistency, unknown BRBPR/dark brown stool)  H/o C. difficile PCR positive/toxin negative in 2022, presumably treated for C. difficile colitis with p.o. vancomycin  Patient is on PPI every other day for GERD  Recently treated with Z-Andrés for URI  CT AP negative for colitis/diverticulitis  Negative stool enterics panel/negative C. Difficile PCR/toxin    Plan:  Stop vancomycin given negative C. difficile  IVF Plasmalyte/Isolyte for rehydration   Zofran for nausea  Start Protonix 40 mg qd  Start Imodium 2 mg 3 times daily prn  Start Bentyl 10 mg 3 times daily  Diet: GI low fiber/low residue  Chest pain  CC: Chest pain in epigastric/right lower chest, described as sharp/shooting intermittent pain, no change with position/breathing, nontender on palpation  Workup negative for ACS/PE  Etiology: Likely GERD  Plan:  Discontinue sucralfate   Scheduled Bentyl, lidocaine patch as needed, Maalox as needed  Hyponatremia  Recent Labs     03/24/25  0741 03/25/25  0518   SODIUM 133* 134*     Mild hyponatremia likely in the setting of diarrhea/volume depletion  Plan:  Monitor BMP  IVF and encourage PO hydration  Benign hypertension with CKD (chronic kidney disease), stage II  Lab Results   Component Value Date    EGFR 80 03/25/2025    EGFR 62 03/24/2025    EGFR 66 03/17/2025    CREATININE 1.00 03/25/2025    CREATININE 1.23 03/24/2025    CREATININE 1.17 03/17/2025     Home regimen: Coreg 3.125 mg twice daily, lisinopril 30 mg daily  Continue home regimen  Mixed hyperlipidemia  Home regimen: Rosuvastatin 10 mg daily, continue pravastatin 80 mg(since rosuvastatin is non formulary)  Primary insomnia  Home regimen: Lunesta 3 mg daily at bedtime, continue Ambien 10 mg daily at  "bedtime (since Lunesta is nonformulary)  Centrilobular emphysema (HCC)  Home regimen: Trelegy Ellipta, continue Breo Ellipta (Trelegy Ellipta is nonformulary)    VTE Pharmacologic Prophylaxis: VTE Score: 3 Moderate Risk (Score 3-4) - Pharmacological DVT Prophylaxis Ordered: enoxaparin (Lovenox).    Mobility:   Basic Mobility Inpatient Raw Score: 24  JH-HLM Goal: 8: Walk 250 feet or more  JH-HLM Achieved: 6: Walk 10 steps or more  JH-HLM Goal achieved. Continue to encourage appropriate mobility.    Patient Centered Rounds: I performed bedside rounds with nursing staff today.   Discussions with Specialists or Other Care Team Provider: None    Education and Discussions with Family / Patient: Patient declined call to .     Current Length of Stay: 0 day(s)  Current Patient Status: Observation   Certification Statement: The patient will continue to require additional inpatient hospital stay due to need for symptomatic treatment and IV fluid rehydration.  Discharge Plan: Anticipate discharge in 24-48 hrs to home.    Code Status: Level 3 - DNAR and DNI    Subjective   Patient seen and evaluated at bedside this AM. He reports he continues to have epigastric/chest pain that is improved after opioid pain medication earlier today. He had some reflux overnight that was improved by Maalox. Patient continues to have episodes of diarrhea \"20 episodes of varying hours amounts since admission.\" He denies any vomiting but does feel nauseous. He denies having a fever since onset of symptoms, but did not measure his temperature. He mentions that his wife is now sick with similar symptoms, otherwise no known sick contacts.     Of note, patient reports that he had C. diff diarrhea in 2022 after taking antibiotics for an eye infection. He believes the antibiotics (Z shannan) taken for URI a few weeks ago may have triggered his current symptoms.     In regards to chest pain, patient reports it is intermittent and at right sternal " border.  He denies any exacerbating or relieving factors.  He describes the pain as sharp, pressure that varies in duration sometimes a few minutes and sometimes 20.  Denies palpitations, shortness of breath.     Objective :  Temp:  [97.8 °F (36.6 °C)-98.4 °F (36.9 °C)] 98 °F (36.7 °C)  HR:  [78-87] 83  BP: (114-147)/(74-94) 134/92  Resp:  [16-18] 16  SpO2:  [91 %-100 %] 100 %  O2 Device: None (Room air)    Body mass index is 29.38 kg/m².     Input and Output Summary (last 24 hours):     Intake/Output Summary (Last 24 hours) at 3/25/2025 0818  Last data filed at 3/25/2025 0527  Gross per 24 hour   Intake 480 ml   Output 1200 ml   Net -720 ml       Physical Exam  Vitals and nursing note reviewed.   Constitutional:       General: He is awake. He is not in acute distress.  HENT:      Head: Normocephalic and atraumatic.      Mouth/Throat:      Mouth: Mucous membranes are dry.   Eyes:      General: No scleral icterus.     Conjunctiva/sclera: Conjunctivae normal.   Cardiovascular:      Rate and Rhythm: Normal rate and regular rhythm.      Heart sounds: Normal heart sounds.   Pulmonary:      Effort: Pulmonary effort is normal. No respiratory distress.      Breath sounds: Normal breath sounds.   Abdominal:      General: Abdomen is flat. Bowel sounds are increased. There is no distension.      Palpations: Abdomen is soft.      Tenderness: There is no abdominal tenderness.   Musculoskeletal:      Cervical back: Neck supple.      Right lower leg: No edema.      Left lower leg: No edema.   Skin:     General: Skin is warm and dry.   Neurological:      Mental Status: He is alert.   Psychiatric:         Behavior: Behavior is cooperative.             Lab Results: I have reviewed the following results:   Results from last 7 days   Lab Units 03/25/25  0518 03/24/25  0741   WBC Thousand/uL 5.57 7.71   HEMOGLOBIN g/dL 14.3 14.8   HEMATOCRIT % 42.9 43.5   PLATELETS Thousands/uL 218 233   SEGS PCT %  --  79*   LYMPHO PCT %  --  7*   MONO  PCT %  --  13*   EOS PCT %  --  1     Results from last 7 days   Lab Units 03/25/25  0518   SODIUM mmol/L 134*   POTASSIUM mmol/L 3.7   CHLORIDE mmol/L 103   CO2 mmol/L 21   BUN mg/dL 16   CREATININE mg/dL 1.00   ANION GAP mmol/L 10   CALCIUM mg/dL 8.7   ALBUMIN g/dL 4.2   TOTAL BILIRUBIN mg/dL 0.63   ALK PHOS U/L 57   ALT U/L 21   AST U/L 19   GLUCOSE RANDOM mg/dL 110                       Recent Cultures (last 7 days):         Imaging Results Review: I reviewed radiology reports from this admission including: chest xray, CT chest, and Ultrasound(s).  Other Study Results Review: EKG was reviewed.     Last 24 Hours Medication List:     Current Facility-Administered Medications:     acetaminophen (TYLENOL) tablet 650 mg, Q4H PRN    albuterol (PROVENTIL HFA,VENTOLIN HFA) inhaler 2 puff, Q4H PRN    aluminum-magnesium hydroxide-simethicone (MAALOX) oral suspension 30 mL, Q4H PRN    carvedilol (COREG) tablet 3.125 mg, BID With Meals    enoxaparin (LOVENOX) subcutaneous injection 40 mg, Daily    FLUoxetine (PROzac) capsule 40 mg, Daily    Fluticasone Furoate-Vilanterol 100-25 mcg/actuation 1 puff, Daily **AND** umeclidinium 62.5 mcg/actuation inhaler AEPB 1 puff, Daily    HYDROmorphone HCl (DILAUDID) injection 0.1 mg, Q3H PRN    lisinopril (ZESTRIL) tablet 30 mg, QPM    magnesium Oxide (MAG-OX) tablet 400 mg, BID    ondansetron (ZOFRAN) injection 4 mg, Q6H PRN    pravastatin (PRAVACHOL) tablet 80 mg, Daily With Dinner    sucralfate (CARAFATE) tablet 1 g, 4x Daily (AC & HS)    vancomycin (VANCOCIN) capsule 125 mg, Q6H AIDEN    zinc sulfate (ZINCATE) capsule 220 mg, Daily    zolpidem (AMBIEN) tablet 10 mg, HS    Administrative Statements   Today, Patient Was Seen By: Adonis Huitron      **Please Note: This note may have been constructed using a voice recognition system.**

## 2025-03-25 NOTE — PLAN OF CARE
Problem: Potential for Falls  Goal: Patient will remain free of falls  Description: INTERVENTIONS:- Educate patient/family on patient safety including physical limitations- Instruct patient to call for assistance with activity - Consult OT/PT to assist with strengthening/mobility - Keep Call bell within reach- Keep bed low and locked with side rails adjusted as appropriate- Keep care items and personal belongings within reach- Initiate and maintain comfort rounds- Make Fall Risk Sign visible to staff- Offer Toileting every Hours, in advance of need- Initiate/Maintain alarm- Obtain necessary fall risk management equipment: - Apply yellow socks and bracelet for high fall risk patients- Consider moving patient to room near nurses station  INTERVENTIONS:- Educate patient/family on patient safety including physical limitations- Instruct patient to call for assistance with activity - Consult OT/PT to assist with strengthening/mobility - Keep Call bell within reach- Keep bed low and locked with side rails adjusted as appropriate- Keep care items and personal belongings within reach- Initiate and maintain comfort rounds- Make Fall Risk Sign visible to staff- Offer Toileting every  Hours, in advance of need- Initiate/Maintainalarm- Obtain necessary fall risk management equipment: - Apply yellow socks and bracelet for high fall risk patients- Consider moving patient to room near nurses station  Outcome: Progressing     Problem: PAIN - ADULT  Goal: Verbalizes/displays adequate comfort level or baseline comfort level  Description: Interventions:- Encourage patient to monitor pain and request assistance- Assess pain using appropriate pain scale- Administer analgesics based on type and severity of pain and evaluate response- Implement non-pharmacological measures as appropriate and evaluate response- Consider cultural and social influences on pain and pain management- Notify physician/advanced practitioner if interventions  unsuccessful or patient reports new pain  Outcome: Progressing     Problem: INFECTION - ADULT  Goal: Absence or prevention of progression during hospitalization  Description: INTERVENTIONS:- Assess and monitor for signs and symptoms of infection- Monitor lab/diagnostic results- Monitor all insertion sites, i.e. indwelling lines, tubes, and drains- Monitor endotracheal if appropriate and nasal secretions for changes in amount and color- [place] appropriate cooling/warming therapies per order- Administer medications as ordered- Instruct and encourage patient and family to use good hand hygiene technique- Identify and instruct in appropriate isolation precautions for identified infection/condition  Outcome: Progressing  Goal: Absence of fever/infection during neutropenic period  Description: INTERVENTIONS:- Monitor WBC  Outcome: Progressing     Problem: SAFETY ADULT  Goal: Patient will remain free of falls  Description: INTERVENTIONS:- Educate patient/family on patient safety including physical limitations- Instruct patient to call for assistance with activity - Consult OT/PT to assist with strengthening/mobility - Keep Call bell within reach- Keep bed low and locked with side rails adjusted as appropriate- Keep care items and personal belongings within reach- Initiate and maintain comfort rounds- Make Fall Risk Sign visible to staff- Offer Toileting every  Hours, in advance of need- Initiate/Maintain alarm- Obtain necessary fall risk management equipment: - Apply yellow socks and bracelet for high fall risk patients- Consider moving patient to room near nurses station  INTERVENTIONS:- Educate patient/family on patient safety including physical limitations- Instruct patient to call for assistance with activity - Consult OT/PT to assist with strengthening/mobility - Keep Call bell within reach- Keep bed low and locked with side rails adjusted as appropriate- Keep care items and personal belongings within reach-  Initiate and maintain comfort rounds- Make Fall Risk Sign visible to staff- Offer Toileting every Hours, in advance of need- Initiate/Maintain alarm- Obtain necessary fall risk management equipment: - Apply yellow socks and bracelet for high fall risk patients- Consider moving patient to room near nurses station  Outcome: Progressing  Goal: Maintain or return to baseline ADL function  Description: INTERVENTIONS:-  Assess patient's ability to carry out ADLs; assess patient's baseline for ADL function and identify physical deficits which impact ability to perform ADLs (bathing, care of mouth/teeth, toileting, grooming, dressing, etc.)- Assess/evaluate cause of self-care deficits - Assess range of motion- Assess patient's mobility; develop plan if impaired- Assess patient's need for assistive devices and provide as appropriate- Encourage maximum independence but intervene and supervise when necessary- Involve family in performance of ADLs- Assess for home care needs following discharge - Consider OT consult to assist with ADL evaluation and planning for discharge- Provide patient education as appropriate  Outcome: Progressing  Goal: Maintains/Returns to pre admission functional level  Description: INTERVENTIONS:- Perform AM-PAC 6 Click Basic Mobility/ Daily Activity assessment daily.- Set and communicate daily mobility goal to care team and patient/family/caregiver. - Collaborate with rehabilitation services on mobility goals if consulted- Perform Range of Motion  times a day.- Reposition patient every  hours.- Dangle patient  times a day- Stand patient  times a day- Ambulate patient  times a day- Out of bed to chair  times a day - Out of bed for meals  times a day- Out of bed for toileting- Record patient progress and toleration of activity level   Outcome: Progressing     Problem: DISCHARGE PLANNING  Goal: Discharge to home or other facility with appropriate resources  Description: INTERVENTIONS:- Identify barriers  to discharge w/patient and caregiver- Arrange for needed discharge resources and transportation as appropriate- Identify discharge learning needs (meds, wound care, etc.)- Arrange for interpretive services to assist at discharge as needed- Refer to Case Management Department for coordinating discharge planning if the patient needs post-hospital services based on physician/advanced practitioner order or complex needs related to functional status, cognitive ability, or social support system  Outcome: Progressing     Problem: Knowledge Deficit  Goal: Patient/family/caregiver demonstrates understanding of disease process, treatment plan, medications, and discharge instructions  Description: Complete learning assessment and assess knowledge base.Interventions:- Provide teaching at level of understanding- Provide teaching via preferred learning methods  Outcome: Progressing     Problem: Nutrition/Hydration-ADULT  Goal: Nutrient/Hydration intake appropriate for improving, restoring or maintaining nutritional needs  Description: Monitor and assess patient's nutrition/hydration status for malnutrition. Collaborate with interdisciplinary team and initiate plan and interventions as ordered.  Monitor patient's weight and dietary intake as ordered or per policy. Utilize nutrition screening tool and intervene as necessary. Determine patient's food preferences and provide high-protein, high-caloric foods as appropriate. INTERVENTIONS:- Monitor oral intake, urinary output, labs, and treatment plans- Assess nutrition and hydration status and recommend course of action- Evaluate amount of meals eaten- Assist patient with eating if necessary - Allow adequate time for meals- Recommend/ encourage appropriate diets, oral nutritional supplements, and vitamin/mineral supplements- Order, calculate, and assess calorie counts as needed- Recommend, monitor, and adjust tube feedings and TPN/PPN based on assessed needs- Assess need for  intravenous fluids- Provide specific nutrition/hydration education as appropriate- Include patient/family/caregiver in decisions related to nutrition  Outcome: Progressing     Problem: GASTROINTESTINAL - ADULT  Goal: Minimal or absence of nausea and/or vomiting  Description: INTERVENTIONS:- Administer IV fluids if ordered to ensure adequate hydration- Maintain NPO status until nausea and vomiting are resolved- Nasogastric tube if ordered- Administer ordered antiemetic medications as needed- Provide nonpharmacologic comfort measures as appropriate- Advance diet as tolerated, if ordered- Consider nutrition services referral to assist patient with adequate nutrition and appropriate food choices  Outcome: Progressing  Goal: Maintains or returns to baseline bowel function  Description: INTERVENTIONS:- Assess bowel function- Encourage oral fluids to ensure adequate hydration- Administer IV fluids if ordered to ensure adequate hydration- Administer ordered medications as needed- Encourage mobilization and activity- Consider nutritional services referral to assist patient with adequate nutrition and appropriate food choices  Outcome: Progressing  Goal: Maintains adequate nutritional intake  Description: INTERVENTIONS:- Monitor percentage of each meal consumed- Identify factors contributing to decreased intake, treat as appropriate- Assist with meals as needed- Monitor I&O, weight, and lab values if indicated- Obtain nutrition services referral as needed  Outcome: Progressing

## 2025-03-25 NOTE — ASSESSMENT & PLAN NOTE
Lab Results   Component Value Date    EGFR 80 03/25/2025    EGFR 62 03/24/2025    EGFR 66 03/17/2025    CREATININE 1.00 03/25/2025    CREATININE 1.23 03/24/2025    CREATININE 1.17 03/17/2025     Home regimen: Coreg 3.125 mg twice daily, lisinopril 30 mg daily  Continue home regimen

## 2025-03-26 PROBLEM — E87.1 HYPONATREMIA: Status: RESOLVED | Noted: 2022-01-21 | Resolved: 2025-03-26

## 2025-03-26 LAB
ANION GAP SERPL CALCULATED.3IONS-SCNC: 6 MMOL/L (ref 4–13)
BUN SERPL-MCNC: 15 MG/DL (ref 5–25)
CALCIUM SERPL-MCNC: 8.6 MG/DL (ref 8.4–10.2)
CHLORIDE SERPL-SCNC: 106 MMOL/L (ref 96–108)
CO2 SERPL-SCNC: 26 MMOL/L (ref 21–32)
CREAT SERPL-MCNC: 1.01 MG/DL (ref 0.6–1.3)
GFR SERPL CREATININE-BSD FRML MDRD: 79 ML/MIN/1.73SQ M
GLUCOSE SERPL-MCNC: 95 MG/DL (ref 65–140)
POTASSIUM SERPL-SCNC: 3.7 MMOL/L (ref 3.5–5.3)
SODIUM SERPL-SCNC: 138 MMOL/L (ref 135–147)

## 2025-03-26 PROCEDURE — 80048 BASIC METABOLIC PNL TOTAL CA: CPT

## 2025-03-26 PROCEDURE — 99232 SBSQ HOSP IP/OBS MODERATE 35: CPT | Performed by: INTERNAL MEDICINE

## 2025-03-26 RX ORDER — LIDOCAINE 50 MG/G
1 PATCH TOPICAL ONCE
Status: COMPLETED | OUTPATIENT
Start: 2025-03-26 | End: 2025-03-27

## 2025-03-26 RX ORDER — DICYCLOMINE HYDROCHLORIDE 10 MG/1
20 CAPSULE ORAL 4 TIMES DAILY PRN
Status: DISCONTINUED | OUTPATIENT
Start: 2025-03-26 | End: 2025-03-27 | Stop reason: HOSPADM

## 2025-03-26 RX ORDER — LIDOCAINE 50 MG/G
1 PATCH TOPICAL ONCE
Status: COMPLETED | OUTPATIENT
Start: 2025-03-26 | End: 2025-03-26

## 2025-03-26 RX ORDER — DICYCLOMINE HYDROCHLORIDE 10 MG/1
20 CAPSULE ORAL
Status: DISCONTINUED | OUTPATIENT
Start: 2025-03-26 | End: 2025-03-26

## 2025-03-26 RX ORDER — LOPERAMIDE HYDROCHLORIDE 2 MG/1
2 CAPSULE ORAL EVERY 4 HOURS PRN
Status: DISCONTINUED | OUTPATIENT
Start: 2025-03-26 | End: 2025-03-27 | Stop reason: HOSPADM

## 2025-03-26 RX ORDER — KETOROLAC TROMETHAMINE 30 MG/ML
15 INJECTION, SOLUTION INTRAMUSCULAR; INTRAVENOUS EVERY 6 HOURS SCHEDULED
Status: DISCONTINUED | OUTPATIENT
Start: 2025-03-26 | End: 2025-03-27 | Stop reason: HOSPADM

## 2025-03-26 RX ORDER — KETOROLAC TROMETHAMINE 30 MG/ML
15 INJECTION, SOLUTION INTRAMUSCULAR; INTRAVENOUS EVERY 6 HOURS SCHEDULED
Status: DISCONTINUED | OUTPATIENT
Start: 2025-03-26 | End: 2025-03-26

## 2025-03-26 RX ORDER — PANTOPRAZOLE SODIUM 40 MG/1
40 TABLET, DELAYED RELEASE ORAL
Status: DISCONTINUED | OUTPATIENT
Start: 2025-03-26 | End: 2025-03-27 | Stop reason: HOSPADM

## 2025-03-26 RX ADMIN — LIDOCAINE 1 PATCH: 50 PATCH CUTANEOUS at 08:36

## 2025-03-26 RX ADMIN — ZINC SULFATE 220 MG (50 MG) CAPSULE 220 MG: CAPSULE at 08:36

## 2025-03-26 RX ADMIN — SODIUM CHLORIDE, SODIUM GLUCONATE, SODIUM ACETATE, POTASSIUM CHLORIDE, MAGNESIUM CHLORIDE, SODIUM PHOSPHATE, DIBASIC, AND POTASSIUM PHOSPHATE 100 ML/HR: .53; .5; .37; .037; .03; .012; .00082 INJECTION, SOLUTION INTRAVENOUS at 15:13

## 2025-03-26 RX ADMIN — ENOXAPARIN SODIUM 40 MG: 40 INJECTION SUBCUTANEOUS at 08:36

## 2025-03-26 RX ADMIN — HYDROMORPHONE HYDROCHLORIDE 0.1 MG: 0.2 INJECTION, SOLUTION INTRAMUSCULAR; INTRAVENOUS; SUBCUTANEOUS at 08:35

## 2025-03-26 RX ADMIN — SODIUM CHLORIDE, SODIUM GLUCONATE, SODIUM ACETATE, POTASSIUM CHLORIDE, MAGNESIUM CHLORIDE, SODIUM PHOSPHATE, DIBASIC, AND POTASSIUM PHOSPHATE 100 ML/HR: .53; .5; .37; .037; .03; .012; .00082 INJECTION, SOLUTION INTRAVENOUS at 05:00

## 2025-03-26 RX ADMIN — FLUOXETINE 40 MG: 20 CAPSULE ORAL at 08:35

## 2025-03-26 RX ADMIN — CARVEDILOL 3.12 MG: 3.12 TABLET, FILM COATED ORAL at 15:43

## 2025-03-26 RX ADMIN — PANTOPRAZOLE SODIUM 40 MG: 40 TABLET, DELAYED RELEASE ORAL at 15:13

## 2025-03-26 RX ADMIN — CARVEDILOL 3.12 MG: 3.12 TABLET, FILM COATED ORAL at 08:36

## 2025-03-26 RX ADMIN — UMECLIDINIUM 1 PUFF: 62.5 AEROSOL, POWDER ORAL at 08:38

## 2025-03-26 RX ADMIN — HYDROMORPHONE HYDROCHLORIDE 0.1 MG: 0.2 INJECTION, SOLUTION INTRAMUSCULAR; INTRAVENOUS; SUBCUTANEOUS at 05:00

## 2025-03-26 RX ADMIN — DICYCLOMINE HYDROCHLORIDE 10 MG: 10 CAPSULE ORAL at 05:02

## 2025-03-26 RX ADMIN — ZOLPIDEM TARTRATE 10 MG: 5 TABLET, FILM COATED ORAL at 22:03

## 2025-03-26 RX ADMIN — LIDOCAINE 1 PATCH: 50 PATCH CUTANEOUS at 22:59

## 2025-03-26 RX ADMIN — DICYCLOMINE HYDROCHLORIDE 20 MG: 10 CAPSULE ORAL at 11:36

## 2025-03-26 RX ADMIN — KETOROLAC TROMETHAMINE 15 MG: 30 INJECTION, SOLUTION INTRAMUSCULAR; INTRAVENOUS at 23:01

## 2025-03-26 RX ADMIN — LOPERAMIDE HYDROCHLORIDE 2 MG: 2 CAPSULE ORAL at 11:45

## 2025-03-26 RX ADMIN — Medication 400 MG: at 17:02

## 2025-03-26 RX ADMIN — PRAVASTATIN SODIUM 80 MG: 80 TABLET ORAL at 15:43

## 2025-03-26 RX ADMIN — FLUTICASONE FUROATE AND VILANTEROL TRIFENATATE 1 PUFF: 100; 25 POWDER RESPIRATORY (INHALATION) at 08:38

## 2025-03-26 RX ADMIN — Medication 400 MG: at 08:36

## 2025-03-26 RX ADMIN — LISINOPRIL 30 MG: 20 TABLET ORAL at 17:02

## 2025-03-26 RX ADMIN — HYDROMORPHONE HYDROCHLORIDE 0.1 MG: 0.2 INJECTION, SOLUTION INTRAMUSCULAR; INTRAVENOUS; SUBCUTANEOUS at 11:36

## 2025-03-26 RX ADMIN — PANTOPRAZOLE SODIUM 40 MG: 40 TABLET, DELAYED RELEASE ORAL at 05:00

## 2025-03-26 RX ADMIN — KETOROLAC TROMETHAMINE 15 MG: 30 INJECTION, SOLUTION INTRAMUSCULAR; INTRAVENOUS at 15:43

## 2025-03-26 NOTE — ASSESSMENT & PLAN NOTE
CC: Chest pain in epigastric/right lower chest, described as sharp/shooting intermittent pain, no change with position/breathing, nontender on palpation  Workup negative for ACS/PE  Etiology: Likely GERD  Plan:  Bentyl prn, lidocaine patch as needed, Maalox as needed  Discontinued IV Dilaudid

## 2025-03-26 NOTE — PLAN OF CARE
Problem: Potential for Falls  Goal: Patient will remain free of falls  Description: INTERVENTIONS:- Educate patient/family on patient safety including physical limitations- Instruct patient to call for assistance with activity - Consult OT/PT to assist with strengthening/mobility - Keep Call bell within reach- Keep bed low and locked with side rails adjusted as appropriate- Keep care items and personal belongings within reach- Initiate and maintain comfort rounds- Make Fall Risk Sign visible to staff- Offer Toileting every  Hours, in advance of need- Initiate/Maintain alarm- Obtain necessary fall risk management equipment: - Apply yellow socks and bracelet for high fall risk patients- Consider moving patient to room near nurses station  INTERVENTIONS:- Educate patient/family on patient safety including physical limitations- Instruct patient to call for assistance with activity - Consult OT/PT to assist with strengthening/mobility - Keep Call bell within reach- Keep bed low and locked with side rails adjusted as appropriate- Keep care items and personal belongings within reach- Initiate and maintain comfort rounds- Make Fall Risk Sign visible to staff- Offer Toileting every  Hours, in advance of need- Initiate/Maintain alarm- Obtain necessary fall risk management equipment: - Apply yellow socks and bracelet for high fall risk patients- Consider moving patient to room near nurses station  Outcome: Progressing     Problem: PAIN - ADULT  Goal: Verbalizes/displays adequate comfort level or baseline comfort level  Description: Interventions:- Encourage patient to monitor pain and request assistance- Assess pain using appropriate pain scale- Administer analgesics based on type and severity of pain and evaluate response- Implement non-pharmacological measures as appropriate and evaluate response- Consider cultural and social influences on pain and pain management- Notify physician/advanced practitioner if interventions  unsuccessful or patient reports new pain  Outcome: Progressing     Problem: INFECTION - ADULT  Goal: Absence or prevention of progression during hospitalization  Description: INTERVENTIONS:- Assess and monitor for signs and symptoms of infection- Monitor lab/diagnostic results- Monitor all insertion sites, i.e. indwelling lines, tubes, and drains- Monitor endotracheal if appropriate and nasal secretions for changes in amount and color- [place] appropriate cooling/warming therapies per order- Administer medications as ordered- Instruct and encourage patient and family to use good hand hygiene technique- Identify and instruct in appropriate isolation precautions for identified infection/condition  Outcome: Progressing  Goal: Absence of fever/infection during neutropenic period  Description: INTERVENTIONS:- Monitor WBC  Outcome: Progressing     Problem: SAFETY ADULT  Goal: Patient will remain free of falls  Description: INTERVENTIONS:- Educate patient/family on patient safety including physical limitations- Instruct patient to call for assistance with activity - Consult OT/PT to assist with strengthening/mobility - Keep Call bell within reach- Keep bed low and locked with side rails adjusted as appropriate- Keep care items and personal belongings within reach- Initiate and maintain comfort rounds- Make Fall Risk Sign visible to staff- Offer Toileting every  Hours, in advance of need- Initiate/Maintain alarm- Obtain necessary fall risk management equipment: - Apply yellow socks and bracelet for high fall risk patients- Consider moving patient to room near nurses station  INTERVENTIONS:- Educate patient/family on patient safety including physical limitations- Instruct patient to call for assistance with activity - Consult OT/PT to assist with strengthening/mobility - Keep Call bell within reach- Keep bed low and locked with side rails adjusted as appropriate- Keep care items and personal belongings within reach-  Initiate and maintain comfort rounds- Make Fall Risk Sign visible to staff- Offer Toileting every  Hours, in advance of need- Initiate/Maintain alarm- Obtain necessary fall risk management equipment: - Apply yellow socks and bracelet for high fall risk patients- Consider moving patient to room near nurses station  Outcome: Progressing  Goal: Maintain or return to baseline ADL function  Description: INTERVENTIONS:-  Assess patient's ability to carry out ADLs; assess patient's baseline for ADL function and identify physical deficits which impact ability to perform ADLs (bathing, care of mouth/teeth, toileting, grooming, dressing, etc.)- Assess/evaluate cause of self-care deficits - Assess range of motion- Assess patient's mobility; develop plan if impaired- Assess patient's need for assistive devices and provide as appropriate- Encourage maximum independence but intervene and supervise when necessary- Involve family in performance of ADLs- Assess for home care needs following discharge - Consider OT consult to assist with ADL evaluation and planning for discharge- Provide patient education as appropriate  Outcome: Progressing  Goal: Maintains/Returns to pre admission functional level  Description: INTERVENTIONS:- Perform AM-PAC 6 Click Basic Mobility/ Daily Activity assessment daily.- Set and communicate daily mobility goal to care team and patient/family/caregiver. - Collaborate with rehabilitation services on mobility goals if consulted- Perform Range of Motion  times a day.- Reposition patient every  hours.- Dangle patient  times a day- Stand patient  times a day- Ambulate patient  times a day- Out of bed to chair  times a day - Out of bed for meals times a day- Out of bed for toileting- Record patient progress and toleration of activity level   Outcome: Progressing     Problem: DISCHARGE PLANNING  Goal: Discharge to home or other facility with appropriate resources  Description: INTERVENTIONS:- Identify barriers  to discharge w/patient and caregiver- Arrange for needed discharge resources and transportation as appropriate- Identify discharge learning needs (meds, wound care, etc.)- Arrange for interpretive services to assist at discharge as needed- Refer to Case Management Department for coordinating discharge planning if the patient needs post-hospital services based on physician/advanced practitioner order or complex needs related to functional status, cognitive ability, or social support system  Outcome: Progressing     Problem: Knowledge Deficit  Goal: Patient/family/caregiver demonstrates understanding of disease process, treatment plan, medications, and discharge instructions  Description: Complete learning assessment and assess knowledge base.Interventions:- Provide teaching at level of understanding- Provide teaching via preferred learning methods  Outcome: Progressing     Problem: Nutrition/Hydration-ADULT  Goal: Nutrient/Hydration intake appropriate for improving, restoring or maintaining nutritional needs  Description: Monitor and assess patient's nutrition/hydration status for malnutrition. Collaborate with interdisciplinary team and initiate plan and interventions as ordered.  Monitor patient's weight and dietary intake as ordered or per policy. Utilize nutrition screening tool and intervene as necessary. Determine patient's food preferences and provide high-protein, high-caloric foods as appropriate. INTERVENTIONS:- Monitor oral intake, urinary output, labs, and treatment plans- Assess nutrition and hydration status and recommend course of action- Evaluate amount of meals eaten- Assist patient with eating if necessary - Allow adequate time for meals- Recommend/ encourage appropriate diets, oral nutritional supplements, and vitamin/mineral supplements- Order, calculate, and assess calorie counts as needed- Recommend, monitor, and adjust tube feedings and TPN/PPN based on assessed needs- Assess need for  intravenous fluids- Provide specific nutrition/hydration education as appropriate- Include patient/family/caregiver in decisions related to nutrition  Outcome: Progressing     Problem: GASTROINTESTINAL - ADULT  Goal: Minimal or absence of nausea and/or vomiting  Description: INTERVENTIONS:- Administer IV fluids if ordered to ensure adequate hydration- Maintain NPO status until nausea and vomiting are resolved- Nasogastric tube if ordered- Administer ordered antiemetic medications as needed- Provide nonpharmacologic comfort measures as appropriate- Advance diet as tolerated, if ordered- Consider nutrition services referral to assist patient with adequate nutrition and appropriate food choices  Outcome: Progressing  Goal: Maintains or returns to baseline bowel function  Description: INTERVENTIONS:- Assess bowel function- Encourage oral fluids to ensure adequate hydration- Administer IV fluids if ordered to ensure adequate hydration- Administer ordered medications as needed- Encourage mobilization and activity- Consider nutritional services referral to assist patient with adequate nutrition and appropriate food choices  Outcome: Progressing  Goal: Maintains adequate nutritional intake  Description: INTERVENTIONS:- Monitor percentage of each meal consumed- Identify factors contributing to decreased intake, treat as appropriate- Assist with meals as needed- Monitor I&O, weight, and lab values if indicated- Obtain nutrition services referral as needed  Outcome: Progressing

## 2025-03-26 NOTE — ASSESSMENT & PLAN NOTE
Recent Labs     03/24/25  0741 03/25/25  0518 03/26/25  0503   SODIUM 133* 134* 138     Initially with mild hyponatremia likely in the setting of diarrhea/volume depletion  Na improved to 138 on 3/26  Plan:  Monitor BMP  IVF and encourage PO hydration

## 2025-03-26 NOTE — ASSESSMENT & PLAN NOTE
CC: Diarrhea (20 episodes in 3 days, watery in consistency, unknown BRBPR/dark brown stool)  H/o C. difficile PCR positive/toxin negative in 2022, presumably treated for C. difficile colitis with p.o. vancomycin  Patient is on PPI every other day for GERD  Recently treated with Z-Andrés for URI  CT AP negative for colitis/diverticulitis  Negative stool enterics panel/negative C. Difficile PCR/toxin    Plan:  IVF Plasmalyte/Isolyte for rehydration   Zofran for nausea  Increased Protonix 40 mg BID  Imodium 2 mg 4 times daily prn  Bentyl 20 mg 4 times daily prn  Diet: GI low fiber/low residue

## 2025-03-26 NOTE — ASSESSMENT & PLAN NOTE
Lab Results   Component Value Date    EGFR 79 03/26/2025    EGFR 80 03/25/2025    EGFR 62 03/24/2025    CREATININE 1.01 03/26/2025    CREATININE 1.00 03/25/2025    CREATININE 1.23 03/24/2025     Home regimen: Coreg 3.125 mg twice daily, lisinopril 30 mg daily  Continue home regimen

## 2025-03-26 NOTE — PROGRESS NOTES
Progress Note - Hospitalist   Name: Prince Gonzalez 59 y.o. male I MRN: [id number]  Unit/Bed#: W -01 I Date of Admission: 3/24/2025   Date of Service: 3/26/2025 I Hospital Day: 0    Assessment & Plan  Diarrhea  CC: Diarrhea (20 episodes in 3 days, watery in consistency, unknown BRBPR/dark brown stool)  H/o C. difficile PCR positive/toxin negative in 2022, presumably treated for C. difficile colitis with p.o. vancomycin  Patient is on PPI every other day for GERD  Recently treated with Z-Andrés for URI  CT AP negative for colitis/diverticulitis  Negative stool enterics panel/negative C. Difficile PCR/toxin    Plan:  IVF Plasmalyte/Isolyte for rehydration   Zofran for nausea  Increased Protonix 40 mg BID  Imodium 2 mg 4 times daily prn  Bentyl 20 mg 4 times daily prn  Diet: GI low fiber/low residue  Chest pain  CC: Chest pain in epigastric/right lower chest, described as sharp/shooting intermittent pain, no change with position/breathing, nontender on palpation  Workup negative for ACS/PE  Etiology: Likely GERD  Plan:  Bentyl prn, lidocaine patch as needed, Maalox as needed  Discontinued IV Dilaudid  Hyponatremia (Resolved: 3/26/2025)  Recent Labs     03/24/25  0741 03/25/25  0518 03/26/25  0503   SODIUM 133* 134* 138     Initially with mild hyponatremia likely in the setting of diarrhea/volume depletion  Na improved to 138 on 3/26  Plan:  Monitor BMP  IVF and encourage PO hydration  Benign hypertension with CKD (chronic kidney disease), stage II  Lab Results   Component Value Date    EGFR 79 03/26/2025    EGFR 80 03/25/2025    EGFR 62 03/24/2025    CREATININE 1.01 03/26/2025    CREATININE 1.00 03/25/2025    CREATININE 1.23 03/24/2025     Home regimen: Coreg 3.125 mg twice daily, lisinopril 30 mg daily  Continue home regimen  Mixed hyperlipidemia  Home regimen: Rosuvastatin 10 mg daily, continue pravastatin 80 mg(since rosuvastatin is non formulary)  Primary insomnia  Home regimen: Lunesta 3 mg daily at bedtime,  "continue Ambien 10 mg daily at bedtime (since Lunesta is nonformulary)  Centrilobular emphysema (HCC)  Home regimen: Trelegy Ellipta, continue Breo Ellipta (Trelegy Ellipta is nonformulary)    VTE Pharmacologic Prophylaxis: VTE Score: 3 Moderate Risk (Score 3-4) - Pharmacological DVT Prophylaxis Ordered: enoxaparin (Lovenox).    Mobility:   Basic Mobility Inpatient Raw Score: 24  JH-HLM Goal: 8: Walk 250 feet or more  JH-HLM Achieved: 8: Walk 250 feet ot more  JH-HLM Goal achieved. Continue to encourage appropriate mobility.    Patient Centered Rounds: I performed bedside rounds with nursing staff today.   Discussions with Specialists or Other Care Team Provider: None    Education and Discussions with Family / Patient: Patient declined call to .     Current Length of Stay: 0 day(s)  Current Patient Status: Inpatient   Certification Statement: The patient will continue to require additional inpatient hospital stay due to symptomatic treatment  Discharge Plan: Anticipate discharge in 24-48 hrs to home.    Code Status: Level 3 - DNAR and DNI    Subjective   Patient seen and examined at bedside. He reports some improvement to his diarrhea, reports he only had 3 episodes of nonbloody diarrhea yesterday, none ON. He denies any vomiting, does continue to endorse some nausea. He also continues with intermittent epigastric pain that was improved by opioid pain medication. Patient continues to have poor appetite, having \"a few bites\" of each meal.     Objective :  Temp:  [97.5 °F (36.4 °C)-98.2 °F (36.8 °C)] 97.5 °F (36.4 °C)  HR:  [70-76] 70  BP: (128-147)/(85-99) 147/99  Resp:  [16-17] 16  SpO2:  [94 %-97 %] 97 %  O2 Device: None (Room air)    Body mass index is 29.38 kg/m².     Input and Output Summary (last 24 hours):     Intake/Output Summary (Last 24 hours) at 3/26/2025 1654  Last data filed at 3/26/2025 1634  Gross per 24 hour   Intake 2736.67 ml   Output 3050 ml   Net -313.33 ml       Physical " Exam  Vitals and nursing note reviewed.   Constitutional:       General: He is awake. He is not in acute distress.  HENT:      Head: Normocephalic and atraumatic.      Mouth/Throat:      Mouth: Mucous membranes are dry.   Eyes:      General: No scleral icterus.     Conjunctiva/sclera: Conjunctivae normal.   Cardiovascular:      Rate and Rhythm: Normal rate and regular rhythm.      Heart sounds: Normal heart sounds.   Pulmonary:      Effort: Pulmonary effort is normal. No tachypnea or respiratory distress.      Breath sounds: Normal breath sounds. No wheezing, rhonchi or rales.   Abdominal:      General: Bowel sounds are increased. There is no distension.      Palpations: Abdomen is soft.      Tenderness: There is abdominal tenderness (mild diffuse tenderness to deep palpation).   Musculoskeletal:      Cervical back: Neck supple.      Right lower leg: No edema.      Left lower leg: No edema.   Skin:     General: Skin is warm and dry.   Neurological:      Mental Status: He is alert.   Psychiatric:         Behavior: Behavior is cooperative.             Lab Results: I have reviewed the following results:   Results from last 7 days   Lab Units 03/25/25  0518 03/24/25  0741   WBC Thousand/uL 5.57 7.71   HEMOGLOBIN g/dL 14.3 14.8   HEMATOCRIT % 42.9 43.5   PLATELETS Thousands/uL 218 233   SEGS PCT %  --  79*   LYMPHO PCT %  --  7*   MONO PCT %  --  13*   EOS PCT %  --  1     Results from last 7 days   Lab Units 03/26/25  0503 03/25/25  0518   SODIUM mmol/L 138 134*   POTASSIUM mmol/L 3.7 3.7   CHLORIDE mmol/L 106 103   CO2 mmol/L 26 21   BUN mg/dL 15 16   CREATININE mg/dL 1.01 1.00   ANION GAP mmol/L 6 10   CALCIUM mg/dL 8.6 8.7   ALBUMIN g/dL  --  4.2   TOTAL BILIRUBIN mg/dL  --  0.63   ALK PHOS U/L  --  57   ALT U/L  --  21   AST U/L  --  19   GLUCOSE RANDOM mg/dL 95 110                       Recent Cultures (last 7 days):   Results from last 7 days   Lab Units 03/24/25  1014   C DIFF TOXIN B BY PCR  Negative        Imaging Results Review: I reviewed radiology reports from this admission including: chest xray, CT chest, CT abdomen/pelvis, and Ultrasound(s).  Other Study Results Review: EKG was reviewed.     Last 24 Hours Medication List:     Current Facility-Administered Medications:     acetaminophen (TYLENOL) tablet 650 mg, Q4H PRN    albuterol (PROVENTIL HFA,VENTOLIN HFA) inhaler 2 puff, Q4H PRN    aluminum-magnesium hydroxide-simethicone (MAALOX) oral suspension 30 mL, Q4H PRN    carvedilol (COREG) tablet 3.125 mg, BID With Meals    dicyclomine (BENTYL) capsule 20 mg, 4x Daily PRN    enoxaparin (LOVENOX) subcutaneous injection 40 mg, Daily    FLUoxetine (PROzac) capsule 40 mg, Daily    Fluticasone Furoate-Vilanterol 100-25 mcg/actuation 1 puff, Daily **AND** umeclidinium 62.5 mcg/actuation inhaler AEPB 1 puff, Daily    ketorolac (TORADOL) injection 15 mg, Q6H AIDEN    lidocaine (LIDODERM) 5 % patch 1 patch, Once    lisinopril (ZESTRIL) tablet 30 mg, QPM    loperamide (IMODIUM) capsule 2 mg, Q4H PRN    magnesium Oxide (MAG-OX) tablet 400 mg, BID    multi-electrolyte (Plasmalyte-A/Isolyte-S PH 7.4/Normosol-R) IV solution, Continuous, Last Rate: 100 mL/hr (03/26/25 1513)    ondansetron (ZOFRAN) injection 4 mg, Q6H PRN    pantoprazole (PROTONIX) EC tablet 40 mg, BID AC    pravastatin (PRAVACHOL) tablet 80 mg, Daily With Dinner    zinc sulfate (ZINCATE) capsule 220 mg, Daily    zolpidem (AMBIEN) tablet 10 mg, HS    Administrative Statements   Today, Patient Was Seen By: Eveline Sue MD      **Please Note: This note may have been constructed using a voice recognition system.**

## 2025-03-27 VITALS
BODY MASS INDEX: 29.51 KG/M2 | SYSTOLIC BLOOD PRESSURE: 133 MMHG | DIASTOLIC BLOOD PRESSURE: 88 MMHG | WEIGHT: 222.66 LBS | HEART RATE: 75 BPM | HEIGHT: 73 IN | OXYGEN SATURATION: 96 % | RESPIRATION RATE: 12 BRPM | TEMPERATURE: 98.4 F

## 2025-03-27 PROBLEM — R07.9 CHEST PAIN: Status: RESOLVED | Noted: 2025-03-24 | Resolved: 2025-03-27

## 2025-03-27 LAB
ANION GAP SERPL CALCULATED.3IONS-SCNC: 6 MMOL/L (ref 4–13)
BUN SERPL-MCNC: 14 MG/DL (ref 5–25)
CALCIUM SERPL-MCNC: 8.4 MG/DL (ref 8.4–10.2)
CHLORIDE SERPL-SCNC: 108 MMOL/L (ref 96–108)
CO2 SERPL-SCNC: 26 MMOL/L (ref 21–32)
CREAT SERPL-MCNC: 1 MG/DL (ref 0.6–1.3)
GFR SERPL CREATININE-BSD FRML MDRD: 80 ML/MIN/1.73SQ M
GLUCOSE SERPL-MCNC: 94 MG/DL (ref 65–140)
MAGNESIUM SERPL-MCNC: 2.5 MG/DL (ref 1.9–2.7)
PHOSPHATE SERPL-MCNC: 3.3 MG/DL (ref 2.3–4.1)
POTASSIUM SERPL-SCNC: 3.8 MMOL/L (ref 3.5–5.3)
SODIUM SERPL-SCNC: 140 MMOL/L (ref 135–147)

## 2025-03-27 PROCEDURE — 99239 HOSP IP/OBS DSCHRG MGMT >30: CPT | Performed by: INTERNAL MEDICINE

## 2025-03-27 PROCEDURE — 84100 ASSAY OF PHOSPHORUS: CPT | Performed by: INTERNAL MEDICINE

## 2025-03-27 PROCEDURE — 80048 BASIC METABOLIC PNL TOTAL CA: CPT | Performed by: INTERNAL MEDICINE

## 2025-03-27 PROCEDURE — 83735 ASSAY OF MAGNESIUM: CPT | Performed by: INTERNAL MEDICINE

## 2025-03-27 RX ORDER — ONDANSETRON 4 MG/1
4 TABLET, ORALLY DISINTEGRATING ORAL ONCE
Status: CANCELLED | OUTPATIENT
Start: 2025-03-27 | End: 2025-03-27

## 2025-03-27 RX ORDER — PANTOPRAZOLE SODIUM 40 MG/1
40 TABLET, DELAYED RELEASE ORAL DAILY
Start: 2025-03-27 | End: 2025-03-27

## 2025-03-27 RX ORDER — PANTOPRAZOLE SODIUM 40 MG/1
40 TABLET, DELAYED RELEASE ORAL EVERY OTHER DAY
COMMUNITY

## 2025-03-27 RX ORDER — ONDANSETRON 4 MG/1
4 TABLET, FILM COATED ORAL EVERY 8 HOURS PRN
Qty: 15 TABLET | Refills: 0 | Status: SHIPPED | OUTPATIENT
Start: 2025-03-27 | End: 2025-04-01

## 2025-03-27 RX ADMIN — KETOROLAC TROMETHAMINE 15 MG: 30 INJECTION, SOLUTION INTRAMUSCULAR; INTRAVENOUS at 05:58

## 2025-03-27 RX ADMIN — FLUOXETINE 40 MG: 20 CAPSULE ORAL at 08:09

## 2025-03-27 RX ADMIN — Medication 400 MG: at 08:10

## 2025-03-27 RX ADMIN — PANTOPRAZOLE SODIUM 40 MG: 40 TABLET, DELAYED RELEASE ORAL at 06:01

## 2025-03-27 RX ADMIN — ZINC SULFATE 220 MG (50 MG) CAPSULE 220 MG: CAPSULE at 08:10

## 2025-03-27 RX ADMIN — FLUTICASONE FUROATE AND VILANTEROL TRIFENATATE 1 PUFF: 100; 25 POWDER RESPIRATORY (INHALATION) at 08:11

## 2025-03-27 RX ADMIN — CARVEDILOL 3.12 MG: 3.12 TABLET, FILM COATED ORAL at 08:10

## 2025-03-27 RX ADMIN — UMECLIDINIUM 1 PUFF: 62.5 AEROSOL, POWDER ORAL at 08:11

## 2025-03-27 RX ADMIN — ENOXAPARIN SODIUM 40 MG: 40 INJECTION SUBCUTANEOUS at 08:09

## 2025-03-27 NOTE — DISCHARGE SUMMARY
Discharge Summary - Hospitalist   Name: Prince Gonzalez 59 y.o. male I MRN: [id number]  Unit/Bed#: W -01 I Date of Admission: 3/24/2025   Date of Service: 3/27/2025 I Hospital Day: 1     Assessment & Plan  Diarrhea  CC: Diarrhea (20 episodes in 3 days, watery in consistency, unknown BRBPR/dark brown stool)  H/o C. difficile PCR positive/toxin negative in 2022, presumably treated for C. difficile colitis with p.o. vancomycin  Patient is on PPI every other day for GERD  Recently treated with Z-Andrés for URI  CT AP negative for colitis/diverticulitis  Negative stool enterics panel/negative C. Difficile PCR/toxin  Currently resolved    Plan:    Zofran 4 mg every 8 hours as needed  Over-the-counter Imodium as needed if diarrhea returns  Patient advised to adequately hydrate with water or Gatorade and maintain good hand hygiene.    Chest pain (Resolved: 3/27/2025)  CC: Chest pain in epigastric/right lower chest, described as sharp/shooting intermittent pain, no change with position/breathing, nontender on palpation  Workup negative for ACS/PE  Etiology: Likely GERD with some musculoskeletal component as pain resolved with lidocaine patch    Benign hypertension with CKD (chronic kidney disease), stage II  Lab Results   Component Value Date    EGFR 80 03/27/2025    EGFR 79 03/26/2025    EGFR 80 03/25/2025    CREATININE 1.00 03/27/2025    CREATININE 1.01 03/26/2025    CREATININE 1.00 03/25/2025     Home regimen: Coreg 3.125 mg twice daily, lisinopril 30 mg daily  Continue home regimen  Mixed hyperlipidemia  Home regimen: Rosuvastatin 10 mg daily, continue \  Primary insomnia  Home regimen: Lunesta 3 mg daily at bedtime, continue  Centrilobular emphysema (HCC)  Home regimen: Trelegy Ellipta, continue      Medical Problems       Resolved Problems  Date Reviewed: 3/27/2025          Resolved    Hyponatremia 3/26/2025     Resolved by  Alaina Epperson MD    Chest pain 3/27/2025     Resolved by  Eveline Sue MD         Discharging Physician / Practitioner: Eveline Sue MD  PCP: Eliane Winsotn DO  Admission Date:   Admission Orders (From admission, onward)       Ordered        03/26/25 1425  INPATIENT ADMISSION  Once            03/24/25 1314  Place in Observation  Once                          Discharge Date: 03/27/25    Consultations During Hospital Stay:  None    Procedures Performed:   None    Significant Findings / Test Results:     CT pe study w abdomen pelvis w contrast   Final Result by Shlomo Wilde MD (03/24 1025)      CTA chest:      No pulmonary embolus.      No evidence of acute thoracic process.      Ectatic aortic root measuring 4.2 cm. Noncontrast chest CT follow-up in 12 months is the recommendation.      Additional chronic findings and negatives as above.      CT abdomen and pelvis:      No evidence of acute abdominopelvic process.      Colonic diverticulosis.      Additional chronic findings and negatives as above.                     Workstation performed: [id number]         X-ray chest 1 view portable   Final Result by Ricky Vasquez MD (03/24 1011)      No acute cardiopulmonary disease.            Workstation performed: [id number]             Incidental Findings:   Ectatic aortic root measuring 4.2 cm.   I reviewed the above mentioned incidental findings with the patient and/or family and they expressed understanding.    Test Results Pending at Discharge (will require follow up):   none     Outpatient Tests Requested:  Noncontrast chest CT follow-up in 12 months     Complications:  None    Reason for Admission: Diarrhea    Hospital Course:   Prince Gonzalez is a 59 y.o. male w/ PMH of emphysema, hypertension, CKD, GERD, hx C. difficile and 2022 for which he was admitted for colitis who originally presented to the hospital on 3/24/2025 due to diarrhea/nausea/chest pain that began Saturday 3/22.  Had approximately 20 episodes of diarrhea in the last 3 days that were watery in consistency.   "Workup upon arrival overall unremarkable.  C. difficile and enteric stool panel negative.  Patient was treated with IVF and supportive care with resolution of diarrhea and nausea.  Patient's chest pain likely due to GERD with musculoskeletal component as it resolved with lidocaine patch.     Please see above list of diagnoses and related plan for additional information.     Condition at Discharge: stable    Discharge Day Visit / Exam:   Subjective: Patient seen and examined at bedside.  No overnight events.  Patient now tolerating p.o.  Last episode of diarrhea was yesterday afternoon.  He denies any chest or abdominal pain.  Patient is eager to be discharged.     Vitals: Blood Pressure: 133/88 (03/27/25 0808)  Pulse: 75 (03/27/25 0808)  Temperature: 98.4 °F (36.9 °C) (03/27/25 0808)  Temp Source: Oral (03/24/25 1500)  Respirations: 12 (03/26/25 2235)  Height: 6' 1\" (185.4 cm) (03/24/25 1500)  Weight - Scale: 101 kg (222 lb 10.6 oz) (03/24/25 1500)  SpO2: 96 % (03/27/25 0808)    Physical Exam  Vitals and nursing note reviewed.   Constitutional:       General: He is awake. He is not in acute distress.  HENT:      Head: Normocephalic and atraumatic.      Mouth/Throat:      Mouth: Mucous membranes are moist.   Eyes:      General: No scleral icterus.     Conjunctiva/sclera: Conjunctivae normal.   Cardiovascular:      Rate and Rhythm: Normal rate and regular rhythm.      Heart sounds: Normal heart sounds.   Pulmonary:      Effort: Pulmonary effort is normal. No tachypnea or respiratory distress.      Breath sounds: Normal breath sounds. No wheezing, rhonchi or rales.   Abdominal:      General: Abdomen is flat. Bowel sounds are normal. There is no distension.      Palpations: Abdomen is soft.      Tenderness: There is no abdominal tenderness. There is no guarding.   Musculoskeletal:      Cervical back: Neck supple.      Right lower leg: No edema.      Left lower leg: No edema.   Skin:     General: Skin is warm and dry. "   Neurological:      Mental Status: He is alert.   Psychiatric:         Behavior: Behavior is cooperative.          Discussion with Family: Patient declined call to .     Discharge instructions/Information to patient and family:   See after visit summary for information provided to patient and family.      Provisions for Follow-Up Care:  See after visit summary for information related to follow-up care and any pertinent home health orders.      Mobility at time of Discharge:   Basic Mobility Inpatient Raw Score: 24  JH-HLM Goal: 8: Walk 250 feet or more  JH-HLM Achieved: 8: Walk 250 feet ot more  HLM Goal achieved. Continue to encourage appropriate mobility.     Disposition:   Home    Planned Readmission: No    Discharge Medications:  See after visit summary for reconciled discharge medications provided to patient and/or family.      Administrative Statements   Discharge Statement:  I have spent a total time of 30 minutes in caring for this patient on the day of the visit/encounter. .    **Please Note: This note may have been constructed using a voice recognition system**

## 2025-03-27 NOTE — ASSESSMENT & PLAN NOTE
CC: Chest pain in epigastric/right lower chest, described as sharp/shooting intermittent pain, no change with position/breathing, nontender on palpation  Workup negative for ACS/PE  Etiology: Likely GERD with some musculoskeletal component as pain resolved with lidocaine patch

## 2025-03-27 NOTE — DISCHARGE INSTR - AVS FIRST PAGE
Dear Prince Gonzalez,     It was our pleasure to care for you here at [facility].  It is our hope that we were always able to exceed the expected standards for your care during your stay.  You were hospitalized due to diarrhea.  You were cared for on the fourth floor by Eveline Sue MD under the service of Alaina Gallardo* with the [facility] who covers for your primary care physician (PCP), Eliane Winston DO, while you were hospitalized.  If you have any questions or concerns related to this hospitalization, you may contact us at .  For follow up as well as any medication refills, we recommend that you follow up with your primary care physician.  A registered nurse will reach out to you by phone within a few days after your discharge to answer any additional questions that you may have after going home.  However, at this time we provide for you here, the most important instructions / recommendations at discharge:     Notable Medication Adjustments -   Start zofran 4 mg, 1 tablet, every 8 hours as needed for nausea and vomiting   You may take over the counter imodium as needed if diarrhea returns  No other medication changes were made  Testing Required after Discharge -   Please have a non-contrast chest CT repeated in 12 months to revaluate you aorta  Your primary care physician can order this test  Important follow up information -   Please follow up with you primary care physician within one week of discharge   Other Instructions -   Please return to the emergency department or contact you primary care provider if you have nausea, vomiting, abdominal pain, diarrhea, fever, chills, inability to tolerate PO, new or worsening symptoms.  We strongly recommend that you hydrate yourself with water or Gatorade and maintain hand hygiene in order to prevent transmission of pathogens.  Please review this entire after visit summary as  additional general instructions including medication list, appointments, activity, diet, any pertinent wound care, and other additional recommendations from your care team that may be provided for you.    Sincerely,     Eveline Sue MD

## 2025-03-27 NOTE — ASSESSMENT & PLAN NOTE
CC: Diarrhea (20 episodes in 3 days, watery in consistency, unknown BRBPR/dark brown stool)  H/o C. difficile PCR positive/toxin negative in 2022, presumably treated for C. difficile colitis with p.o. vancomycin  Patient is on PPI every other day for GERD  Recently treated with Z-Andrés for URI  CT AP negative for colitis/diverticulitis  Negative stool enterics panel/negative C. Difficile PCR/toxin  Currently resolved    Plan:    Zofran 4 mg every 8 hours as needed  Over-the-counter Imodium as needed if diarrhea returns  Patient advised to adequately hydrate with water or Gatorade and maintain good hand hygiene.

## 2025-03-27 NOTE — PLAN OF CARE
Problem: Potential for Falls  Goal: Patient will remain free of falls  Description: INTERVENTIONS:- Educate patient/family on patient safety including physical limitations- Instruct patient to call for assistance with activity - Consult OT/PT to assist with strengthening/mobility - Keep Call bell within reach- Keep bed low and locked with side rails adjusted as appropriate- Keep care items and personal belongings within reach- Initiate and maintain comfort rounds- Make Fall Risk Sign visible to staff- Offer Toileting every  Hours, in advance of need- Initiate/Maintain larm- Obtain necessary fall risk management equipment: - Apply yellow socks and bracelet for high fall risk patients- Consider moving patient to room near nurses station  INTERVENTIONS:- Educate patient/family on patient safety including physical limitations- Instruct patient to call for assistance with activity - Consult OT/PT to assist with strengthening/mobility - Keep Call bell within reach- Keep bed low and locked with side rails adjusted as appropriate- Keep care items and personal belongings within reach- Initiate and maintain comfort rounds- Make Fall Risk Sign visible to staff- Offer Toileting every  Hours, in advance of need- Initiate/Maintain alarm- Obtain necessary fall risk management equipment: - Apply yellow socks and bracelet for high fall risk patients- Consider moving patient to room near nurses station  Outcome: Progressing

## 2025-03-27 NOTE — ASSESSMENT & PLAN NOTE
Lab Results   Component Value Date    EGFR 80 03/27/2025    EGFR 79 03/26/2025    EGFR 80 03/25/2025    CREATININE 1.00 03/27/2025    CREATININE 1.01 03/26/2025    CREATININE 1.00 03/25/2025     Home regimen: Coreg 3.125 mg twice daily, lisinopril 30 mg daily  Continue home regimen

## 2025-03-27 NOTE — PLAN OF CARE
Problem: Potential for Falls  Goal: Patient will remain free of falls  Description: INTERVENTIONS:- Educate patient/family on patient safety including physical limitations- Instruct patient to call for assistance with activity - Consult OT/PT to assist with strengthening/mobility - Keep Call bell within reach- Keep bed low and locked with side rails adjusted as appropriate- Keep care items and personal belongings within reach- Initiate and maintain comfort rounds- Make Fall Risk Sign visible to staff- Offer Toileting every  Hours, in advance of need- Initiate/Maintain alarm- Obtain necessary fall risk management equipment: - Apply yellow socks and bracelet for high fall risk patients- Consider moving patient to room near nurses station  INTERVENTIONS:- Educate patient/family on patient safety including physical limitations- Instruct patient to call for assistance with activity - Consult OT/PT to assist with strengthening/mobility - Keep Call bell within reach- Keep bed low and locked with side rails adjusted as appropriate- Keep care items and personal belongings within reach- Initiate and maintain comfort rounds- Make Fall Risk Sign visible to staff- Offer Toileting every Hours, in advance of need- Initiate/Maintain alarm- Obtain necessary fall risk management equipment: - Apply yellow socks and bracelet for high fall risk patients- Consider moving patient to room near nurses station  Outcome: Progressing     Problem: PAIN - ADULT  Goal: Verbalizes/displays adequate comfort level or baseline comfort level  Description: Interventions:- Encourage patient to monitor pain and request assistance- Assess pain using appropriate pain scale- Administer analgesics based on type and severity of pain and evaluate response- Implement non-pharmacological measures as appropriate and evaluate response- Consider cultural and social influences on pain and pain management- Notify physician/advanced practitioner if interventions  unsuccessful or patient reports new pain  Outcome: Progressing     Problem: INFECTION - ADULT  Goal: Absence or prevention of progression during hospitalization  Description: INTERVENTIONS:- Assess and monitor for signs and symptoms of infection- Monitor lab/diagnostic results- Monitor all insertion sites, i.e. indwelling lines, tubes, and drains- Monitor endotracheal if appropriate and nasal secretions for changes in amount and color- [place] appropriate cooling/warming therapies per order- Administer medications as ordered- Instruct and encourage patient and family to use good hand hygiene technique- Identify and instruct in appropriate isolation precautions for identified infection/condition  Outcome: Progressing  Goal: Absence of fever/infection during neutropenic period  Description: INTERVENTIONS:- Monitor WBC  Outcome: Progressing     Problem: SAFETY ADULT  Goal: Patient will remain free of falls  Description: INTERVENTIONS:- Educate patient/family on patient safety including physical limitations- Instruct patient to call for assistance with activity - Consult OT/PT to assist with strengthening/mobility - Keep Call bell within reach- Keep bed low and locked with side rails adjusted as appropriate- Keep care items and personal belongings within reach- Initiate and maintain comfort rounds- Make Fall Risk Sign visible to staff- Offer Toileting every  Hours, in advance of need- Initiate/Maintain alarm- Obtain necessary fall risk management equipment: - Apply yellow socks and bracelet for high fall risk patients- Consider moving patient to room near nurses station  INTERVENTIONS:- Educate patient/family on patient safety including physical limitations- Instruct patient to call for assistance with activity - Consult OT/PT to assist with strengthening/mobility - Keep Call bell within reach- Keep bed low and locked with side rails adjusted as appropriate- Keep care items and personal belongings within reach-  Initiate and maintain comfort rounds- Make Fall Risk Sign visible to staff- Offer Toileting every  Hours, in advance of need- Initiate/Maintain alarm- Obtain necessary fall risk management equipment:  Apply yellow socks and bracelet for high fall risk patients- Consider moving patient to room near nurses station  Outcome: Progressing  Goal: Maintain or return to baseline ADL function  Description: INTERVENTIONS:-  Assess patient's ability to carry out ADLs; assess patient's baseline for ADL function and identify physical deficits which impact ability to perform ADLs (bathing, care of mouth/teeth, toileting, grooming, dressing, etc.)- Assess/evaluate cause of self-care deficits - Assess range of motion- Assess patient's mobility; develop plan if impaired- Assess patient's need for assistive devices and provide as appropriate- Encourage maximum independence but intervene and supervise when necessary- Involve family in performance of ADLs- Assess for home care needs following discharge - Consider OT consult to assist with ADL evaluation and planning for discharge- Provide patient education as appropriate  Outcome: Progressing  Goal: Maintains/Returns to pre admission functional level  Description: INTERVENTIONS:- Perform AM-PAC 6 Click Basic Mobility/ Daily Activity assessment daily.- Set and communicate daily mobility goal to care team and patient/family/caregiver. - Collaborate with rehabilitation services on mobility goals if consulted- Perform Range of Motion times a day.- Reposition patient every hours.- Dangle patient  times a day- Stand patient  times a day- Ambulate patient  times a day- Out of bed to chair  times a day - Out of bed for meals  times a day- Out of bed for toileting- Record patient progress and toleration of activity level   Outcome: Progressing     Problem: DISCHARGE PLANNING  Goal: Discharge to home or other facility with appropriate resources  Description: INTERVENTIONS:- Identify barriers to  discharge w/patient and caregiver- Arrange for needed discharge resources and transportation as appropriate- Identify discharge learning needs (meds, wound care, etc.)- Arrange for interpretive services to assist at discharge as needed- Refer to Case Management Department for coordinating discharge planning if the patient needs post-hospital services based on physician/advanced practitioner order or complex needs related to functional status, cognitive ability, or social support system  Outcome: Progressing     Problem: Knowledge Deficit  Goal: Patient/family/caregiver demonstrates understanding of disease process, treatment plan, medications, and discharge instructions  Description: Complete learning assessment and assess knowledge base.Interventions:- Provide teaching at level of understanding- Provide teaching via preferred learning methods  Outcome: Progressing     Problem: Nutrition/Hydration-ADULT  Goal: Nutrient/Hydration intake appropriate for improving, restoring or maintaining nutritional needs  Description: Monitor and assess patient's nutrition/hydration status for malnutrition. Collaborate with interdisciplinary team and initiate plan and interventions as ordered.  Monitor patient's weight and dietary intake as ordered or per policy. Utilize nutrition screening tool and intervene as necessary. Determine patient's food preferences and provide high-protein, high-caloric foods as appropriate. INTERVENTIONS:- Monitor oral intake, urinary output, labs, and treatment plans- Assess nutrition and hydration status and recommend course of action- Evaluate amount of meals eaten- Assist patient with eating if necessary - Allow adequate time for meals- Recommend/ encourage appropriate diets, oral nutritional supplements, and vitamin/mineral supplements- Order, calculate, and assess calorie counts as needed- Recommend, monitor, and adjust tube feedings and TPN/PPN based on assessed needs- Assess need for intravenous  fluids- Provide specific nutrition/hydration education as appropriate- Include patient/family/caregiver in decisions related to nutrition  Outcome: Progressing     Problem: GASTROINTESTINAL - ADULT  Goal: Minimal or absence of nausea and/or vomiting  Description: INTERVENTIONS:- Administer IV fluids if ordered to ensure adequate hydration- Maintain NPO status until nausea and vomiting are resolved- Nasogastric tube if ordered- Administer ordered antiemetic medications as needed- Provide nonpharmacologic comfort measures as appropriate- Advance diet as tolerated, if ordered- Consider nutrition services referral to assist patient with adequate nutrition and appropriate food choices  Outcome: Progressing  Goal: Maintains or returns to baseline bowel function  Description: INTERVENTIONS:- Assess bowel function- Encourage oral fluids to ensure adequate hydration- Administer IV fluids if ordered to ensure adequate hydration- Administer ordered medications as needed- Encourage mobilization and activity- Consider nutritional services referral to assist patient with adequate nutrition and appropriate food choices  Outcome: Progressing  Goal: Maintains adequate nutritional intake  Description: INTERVENTIONS:- Monitor percentage of each meal consumed- Identify factors contributing to decreased intake, treat as appropriate- Assist with meals as needed- Monitor I&O, weight, and lab values if indicated- Obtain nutrition services referral as needed  Outcome: Progressing

## 2025-03-28 NOTE — UTILIZATION REVIEW
NOTIFICATION OF ADMISSION DISCHARGE   This is a Notification of Discharge from [facility]. Please be advised that this patient has been discharge from our facility. Below you will find the admission and discharge date and time including the patient’s disposition.   UTILIZATION REVIEW CONTACT:  Utilization Review Assistants  Network Utilization Review Department  Phone: [phone] x carefully listen to the prompts. All voicemails are confidential.  Email: [email or web address]     ADMISSION INFORMATION  PRESENTATION DATE: 3/24/2025  7:18 AM  OBERVATION ADMISSION DATE: 03/24/2025 1314  INPATIENT ADMISSION DATE: 3/26/25  2:25 PM   DISCHARGE DATE: 3/27/2025 12:25 PM   DISPOSITION:Home/Self Care    Network Utilization Review Department  ATTENTION: Please call with any questions or concerns to [phone] and carefully listen to the prompts so that you are directed to the right person. All voicemails are confidential.   For Discharge needs, contact Care Management DC Support Team at [phone] opt. 2  Send all requests for admission clinical reviews, approved or denied determinations and any other requests to dedicated fax number below belonging to the campus where the patient is receiving treatment. List of dedicated fax numbers for the Facilities:  FACILITY NAME UR FAX NUMBER   ADMISSION DENIALS (Administrative/Medical Necessity) [phone]   DISCHARGE SUPPORT TEAM ([facility]) [phone]   PARENT CHILD HEALTH (Maternity/NICU/Pediatrics) [phone]   [facility] [phone]   [facility] [phone]   [facility] [phone]   [facility] [phone]   Counts include 234 beds at the [facility] [phone]   [facility] [phone]   [facility] [phone]   [facility] [phone]    [facility] [phone]   [facility] [phone]   [facility] [phone]   [facility] [phone]          Quality 431: Preventive Care And Screening: Unhealthy Alcohol Use - Screening: Patient screened for unhealthy alcohol use using a single question and scores less than 2 times per year Quality 110: Preventive Care And Screening: Influenza Immunization: Influenza Immunization Administered during Influenza season Quality 47: Advance Care Plan: Advance Care Planning discussed and documented; advance care plan or surrogate decision maker documented in the medical record. Detail Level: Detailed Quality 111:Pneumonia Vaccination Status For Older Adults: Pneumococcal Vaccination Previously Received Quality 130: Documentation Of Current Medications In The Medical Record: Current Medications with Name, Dosage, Frequency, or Route not Documented, Reason not Given Quality 226: Preventive Care And Screening: Tobacco Use: Screening And Cessation Intervention: Patient screened for tobacco use and is an ex/non-smoker

## 2025-03-29 DIAGNOSIS — I10 ESSENTIAL HYPERTENSION: ICD-10-CM

## 2025-03-29 DIAGNOSIS — E78.2 MIXED HYPERLIPIDEMIA: ICD-10-CM

## 2025-03-29 DIAGNOSIS — K21.00 GASTRO-ESOPHAGEAL REFLUX DISEASE WITH ESOPHAGITIS, WITHOUT BLEEDING: ICD-10-CM

## 2025-03-29 RX ORDER — ROSUVASTATIN CALCIUM 10 MG/1
10 TABLET, COATED ORAL
Qty: 30 TABLET | Refills: 5 | Status: SHIPPED | OUTPATIENT
Start: 2025-03-29

## 2025-03-29 RX ORDER — CARVEDILOL 3.12 MG/1
3.12 TABLET ORAL 2 TIMES DAILY WITH MEALS
Qty: 60 TABLET | Refills: 5 | Status: SHIPPED | OUTPATIENT
Start: 2025-03-29

## 2025-03-29 RX ORDER — LISINOPRIL 30 MG/1
30 TABLET ORAL EVERY EVENING
Qty: 30 TABLET | Refills: 5 | Status: SHIPPED | OUTPATIENT
Start: 2025-03-29 | End: 2025-04-07

## 2025-03-31 ENCOUNTER — TRANSITIONAL CARE MANAGEMENT (OUTPATIENT)
Dept: FAMILY MEDICINE CLINIC | Facility: CLINIC | Age: 60
End: 2025-03-31

## 2025-03-31 ENCOUNTER — TELEPHONE (OUTPATIENT)
Dept: FAMILY MEDICINE CLINIC | Facility: CLINIC | Age: 60
End: 2025-03-31

## 2025-03-31 ENCOUNTER — PATIENT MESSAGE (OUTPATIENT)
Dept: FAMILY MEDICINE CLINIC | Facility: CLINIC | Age: 60
End: 2025-03-31

## 2025-03-31 DIAGNOSIS — F51.01 PRIMARY INSOMNIA: ICD-10-CM

## 2025-03-31 DIAGNOSIS — F32.0 MAJOR DEPRESSIVE DISORDER, SINGLE EPISODE, MILD (HCC): ICD-10-CM

## 2025-03-31 RX ORDER — PANTOPRAZOLE SODIUM 40 MG/1
40 TABLET, DELAYED RELEASE ORAL EVERY OTHER DAY
Qty: 30 TABLET | Refills: 0 | Status: CANCELLED | OUTPATIENT
Start: 2025-03-31

## 2025-03-31 RX ORDER — FLUOXETINE HYDROCHLORIDE 40 MG/1
40 CAPSULE ORAL DAILY
Qty: 90 CAPSULE | Refills: 1 | Status: SHIPPED | OUTPATIENT
Start: 2025-03-31 | End: 2025-04-04 | Stop reason: SDUPTHER

## 2025-03-31 RX ORDER — PANTOPRAZOLE SODIUM 40 MG/1
40 TABLET, DELAYED RELEASE ORAL DAILY
Qty: 30 TABLET | Refills: 5 | OUTPATIENT
Start: 2025-03-31

## 2025-03-31 RX ORDER — ESZOPICLONE 3 MG/1
3 TABLET, FILM COATED ORAL
Qty: 30 TABLET | Refills: 0 | Status: SHIPPED | OUTPATIENT
Start: 2025-03-31

## 2025-03-31 NOTE — TELEPHONE ENCOUNTER
Lunesta sent   Last prescription for protonix says daily - why is it now every other day? Please clarify before refill.

## 2025-03-31 NOTE — TELEPHONE ENCOUNTER
Reason for call:   [x] Refill   [] Prior Auth  [x] Other: patient just released from hospital - they are trying to get an appt set up ASAP for after hospital visit     Office:   [] PCP/Provider -   [x] Specialty/Provider - : Eliane Winston,  / José Manuel TOBAR     Medication: pantoprazole (PROTONIX) 40 mg tablet     Dose/Frequency: Take 40 mg by mouth every other day,     Quantity: 45      Medication: FLUoxetine (PROzac) 40 MG capsule     Dose/Frequency: : TAKE 1 CAPSULE (40 MG TOTAL) BY MOUTH DAILY.,    Quantity: 90      Medication:     eszopiclone (LUNESTA) 3 MG tablet       Dose/Frequency:  TAKE 1 TABLET BY MOUTH DAILY AT BEDTIME,     Quantity: 30    Pharmacy: [id number] - [place] - [address] [facility]   Does the patient have enough for 3 days?   [] Yes   [x] No - Send as HP to POD

## 2025-03-31 NOTE — TELEPHONE ENCOUNTER
If I have no openings you will need to put him on a wait list and hold any canceled patients in order to see him. I recommend he see another provider if they have an opening.

## 2025-03-31 NOTE — PATIENT COMMUNICATION
Patient returned Stephanie's call. Access center attempted to transfer call but patient's line disconnected prior to transfer. Please return patient's call.

## 2025-03-31 NOTE — TELEPHONE ENCOUNTER
Pt was discharged on 3/27/25 and was looking to schedule a tcm appt. You don't have an openings for this appt and he is refusing to see another Doctor. Please advise.

## 2025-04-03 ENCOUNTER — RA CDI HCC (OUTPATIENT)
Dept: OTHER | Facility: HOSPITAL | Age: 60
End: 2025-04-03

## 2025-04-04 DIAGNOSIS — F32.0 MAJOR DEPRESSIVE DISORDER, SINGLE EPISODE, MILD (HCC): ICD-10-CM

## 2025-04-04 RX ORDER — FLUOXETINE HYDROCHLORIDE 40 MG/1
40 CAPSULE ORAL DAILY
Qty: 90 CAPSULE | Refills: 1 | Status: SHIPPED | OUTPATIENT
Start: 2025-04-04

## 2025-04-04 NOTE — TELEPHONE ENCOUNTER
[facility] told patient they did not receive this Rx  Reason for call:   [x] Refill   [] Prior Auth  [] Other:     Office:   [x] PCP/Provider - Catr[place]ito  [] Specialty/Provider -     Medication: Fluoxetine 40mg    Dose/Frequency: 1 cap daily     Quantity: 90    Pharmacy: [id number] - [place] - [address] [phone]     Local Pharmacy   Does the patient have enough for 3 days?   [] Yes   [x] No -

## 2025-04-07 ENCOUNTER — OFFICE VISIT (OUTPATIENT)
Dept: FAMILY MEDICINE CLINIC | Facility: CLINIC | Age: 60
End: 2025-04-07
Payer: COMMERCIAL

## 2025-04-07 ENCOUNTER — TELEPHONE (OUTPATIENT)
Dept: FAMILY MEDICINE CLINIC | Facility: CLINIC | Age: 60
End: 2025-04-07

## 2025-04-07 VITALS
DIASTOLIC BLOOD PRESSURE: 112 MMHG | BODY MASS INDEX: 29.1 KG/M2 | HEIGHT: 73 IN | SYSTOLIC BLOOD PRESSURE: 150 MMHG | TEMPERATURE: 96.5 F | OXYGEN SATURATION: 100 % | HEART RATE: 77 BPM | WEIGHT: 219.6 LBS

## 2025-04-07 DIAGNOSIS — R19.7 DIARRHEA, UNSPECIFIED TYPE: ICD-10-CM

## 2025-04-07 DIAGNOSIS — K21.9 GASTROESOPHAGEAL REFLUX DISEASE WITHOUT ESOPHAGITIS: Primary | ICD-10-CM

## 2025-04-07 DIAGNOSIS — I77.810 AORTIC ROOT DILATATION (HCC): ICD-10-CM

## 2025-04-07 DIAGNOSIS — R73.01 IFG (IMPAIRED FASTING GLUCOSE): ICD-10-CM

## 2025-04-07 DIAGNOSIS — I12.9 BENIGN HYPERTENSION WITH CKD (CHRONIC KIDNEY DISEASE), STAGE II: ICD-10-CM

## 2025-04-07 DIAGNOSIS — E78.2 MIXED HYPERLIPIDEMIA: ICD-10-CM

## 2025-04-07 DIAGNOSIS — Z86.0100 HISTORY OF COLON POLYPS: ICD-10-CM

## 2025-04-07 DIAGNOSIS — Z53.20 LUNG CANCER SCREENING DECLINED BY PATIENT: ICD-10-CM

## 2025-04-07 DIAGNOSIS — Z28.21 VACCINATION DECLINED: ICD-10-CM

## 2025-04-07 DIAGNOSIS — N18.2 BENIGN HYPERTENSION WITH CKD (CHRONIC KIDNEY DISEASE), STAGE II: ICD-10-CM

## 2025-04-07 DIAGNOSIS — R07.9 CHEST PAIN, UNSPECIFIED TYPE: ICD-10-CM

## 2025-04-07 PROBLEM — S91.101A OPEN WOUND OF RIGHT GREAT TOE: Status: RESOLVED | Noted: 2022-07-14 | Resolved: 2025-04-07

## 2025-04-07 PROCEDURE — 99495 TRANSJ CARE MGMT MOD F2F 14D: CPT | Performed by: FAMILY MEDICINE

## 2025-04-07 RX ORDER — PANTOPRAZOLE SODIUM 40 MG/1
40 TABLET, DELAYED RELEASE ORAL EVERY OTHER DAY
Qty: 30 TABLET | Refills: 1 | Status: SHIPPED | OUTPATIENT
Start: 2025-04-07

## 2025-04-07 RX ORDER — LISINOPRIL 40 MG/1
40 TABLET ORAL DAILY
Qty: 30 TABLET | Refills: 5 | Status: SHIPPED | OUTPATIENT
Start: 2025-04-07

## 2025-04-07 RX ORDER — SODIUM FLUORIDE 6.1 MG/ML
PASTE, DENTIFRICE DENTAL
COMMUNITY
Start: 2025-04-04

## 2025-04-07 NOTE — ASSESSMENT & PLAN NOTE
Due for colonoscopy   He was under the impression he did not need to come back for 10 years -   Advised him to see GI for updated colonoscopy and EGD.  Orders:  •  Ambulatory Referral to Gastroenterology; Future

## 2025-04-07 NOTE — PROGRESS NOTES
Assessment/Plan:     Assessment & Plan  Gastroesophageal reflux disease without esophagitis  Not well controlled  He had been taking protonix every other day   Recommend he now take it daily, and then wean off at follow up or after EGD if appropriate   Orders:  •  pantoprazole (PROTONIX) 40 mg tablet; Take 1 tablet (40 mg total) by mouth every other day  •  Ambulatory Referral to Gastroenterology; Future    Chest pain, unspecified type  Likely secondary to reflux.  See above.       Diarrhea, unspecified type  Resolved.       History of colon polyps  Due for colonoscopy   He was under the impression he did not need to come back for 10 years -   Advised him to see GI for updated colonoscopy and EGD.  Orders:  •  Ambulatory Referral to Gastroenterology; Future    Aortic root dilatation (HCC)  Pt declines further testing for this. He does not want to know. Understands the risks of this. He would not want surgery. He wishes to be a DNR. His wife is aware. 5 wishes given today, or he will get a legal document for us.        Benign hypertension with CKD (chronic kidney disease), stage II  Lab Results   Component Value Date    EGFR 80 03/27/2025    EGFR 79 03/26/2025    EGFR 80 03/25/2025    CREATININE 1.00 03/27/2025    CREATININE 1.01 03/26/2025    CREATININE 1.00 03/25/2025     Orders:  •  lisinopril (ZESTRIL) 40 mg tablet; Take 1 tablet (40 mg total) by mouth daily  not at goal   No longer following with cardio   IFG (impaired fasting glucose)    Orders:  •  Hemoglobin A1C; Future  •  Comprehensive metabolic panel; Future    Mixed hyperlipidemia  Labs obtained this morning.  Results via [employer].  Patient currently on Crestor 10 mg daily.       Lung cancer screening declined by patient         Vaccination declined            Return in about 4 months (around 8/7/2025) for  physical .    Subjective:   Prince is a 59 y.o. male here today for a hospital follow-up.  Patient Active Problem List   Diagnosis   • Gastroesophageal  reflux disease without esophagitis   • Diverticulosis   • Sleep apnea   • Major depressive disorder, single episode, mild (HCC)   • Mixed hyperlipidemia   • Benign hypertension with CKD (chronic kidney disease), stage II   • Primary insomnia   • Overweight with body mass index (BMI) of 28 to 28.9 in adult   • Psoriasis   • Vitamin D deficiency   • Low libido   • Class 1 obesity due to excess calories with serious comorbidity and body mass index (BMI) of 31.0 to 31.9 in adult   • Arthritis of both feet   • Abnormal stress test   • Centrilobular emphysema (HCC)   • CKD (chronic kidney disease) stage 2, GFR 60-89 ml/min   • Hallux rigidus, right foot   • Hallux rigidus of both feet   • Primary osteoarthritis of right foot   • Primary osteoarthritis of left foot   • C. difficile colitis and Sigmoid Diverticulitis   • History of colon polyps   • Cigarette nicotine dependence in remission   • IFG (impaired fasting glucose)   • Anterolisthesis of lumbar spine   • Lumbar foraminal stenosis   • Painful orthopaedic hardware (HCC)   • Nonunion of osteotomy site   • Encounter for screening for malignant neoplasm of lung in former smoker who quit in past 15 years with 30 pack year history or greater   • Diarrhea   • Lung cancer screening declined by patient   • Vaccination declined        Current medications:  Current Outpatient Medications   Medication Sig Dispense Refill   • albuterol (PROVENTIL HFA,VENTOLIN HFA) 90 mcg/act inhaler Inhale 2 puffs every 4 (four) hours as needed for wheezing or shortness of breath 18 g 5   • b complex vitamins capsule Take 1 capsule by mouth daily     • benzonatate (TESSALON) 200 MG capsule Take 1 capsule (200 mg total) by mouth 3 (three) times a day as needed for cough 30 capsule 1   • carvedilol (COREG) 3.125 mg tablet TAKE 1 TABLET BY MOUTH TWICE A DAY WITH MEALS 60 tablet 5   • eszopiclone (LUNESTA) 3 MG tablet Take 1 tablet (3 mg total) by mouth daily at bedtime 30 tablet 0   • FLUoxetine  (PROzac) 40 MG capsule Take 1 capsule (40 mg total) by mouth daily 90 capsule 1   • lisinopril (ZESTRIL) 40 mg tablet Take 1 tablet (40 mg total) by mouth daily 30 tablet 5   • multivitamin (THERAGRAN) TABS Take 1 tablet by mouth daily     • pantoprazole (PROTONIX) 40 mg tablet Take 1 tablet (40 mg total) by mouth every other day 30 tablet 1   • rosuvastatin (CRESTOR) 10 MG tablet TAKE 1 TABLET BY MOUTH EVERYDAY AT BEDTIME 30 tablet 5   • Sodium Fluoride 5000 PPM 1.1 % GEL      • Trelegy Ellipta 100-62.5-25 MCG/ACT inhaler INHALE 1 PUFF DAILY RINSE MOUTH AFTER USE. 60 each 2   • VITAMIN D PO Take 1 tablet by mouth in the morning       • Zinc Sulfate (ZINC 15 PO) Take 1 tablet by mouth in the morning       • ondansetron (ZOFRAN) 4 mg tablet Take 1 tablet (4 mg total) by mouth every 8 (eight) hours as needed for nausea or vomiting for up to 5 days 15 tablet 0     No current facility-administered medications for this visit.       HPI:   Chief Complaint   Patient presents with   • Transition of Care Management   • Follow-up     -- Above per clinical staff and reviewed. --    TCM Call (since 3/24/2025)     Date and time call was made  3/31/2025 12:03 PM    Hospital care reviewed  Records reviewed    Patient was hospitialized at  [facility]    Date of Admission  03/24/25    Date of discharge  03/27/25    Diagnosis  diarrhea    Disposition  Home      TCM Call (since 3/24/2025)     None          Hospital Course:  3/24/2025 - 3/27/2025 (3 days)  Formerly [facility]      Discharge Summary Attending Attestation     I was the supervising physician and personally saw/examined the patient on 3/27/2025.  I agree with the Resident/Fellow's note with the following additions/exceptions:      Patient was seen and examined earlier this morning.  Patient was doing fine and better.  Patient denied any symptoms and did not have any complaints.  Patient denied any more diarrhea.  Patient denied any more abdominal  pains or any chest pains or any other pains.  Patient denied any more nausea or vomiting episodes.  No dizziness.  No fever or chills.  Patient was okay with his discharge to home today.  I agree with the physical examination findings as stated below.  Patient's diarrhea, likely due to acute viral gastroenteritis, had resolved.  Patient was instructed to hydrate himself adequately every day, and may have Gatorade, most specially if he develops diarrhea and/or vomiting again.  Patient was prescribed with Zofran on as-needed basis if he develops any nausea or vomiting again.  Patient was instructed to have good nutrition.  Patient was also instructed to have good hygiene and to wash hands with soap and water every time he uses the bathroom.  I called the patient again this afternoon to instruct him that if he develops diarrhea in the future, to seek the advice of his primary care physician first, if taking Imodium is appropriate at that particular time.  I called him about this, that although he was C. difficile negative on this admission, he had a history of C. difficile infection in the past and at risk of possibly having it again in the future, and due to this I reinforced to him if he develops diarrhea in the future, not to take any medications, such as Imodium, unless verified from his primary care physician (this contrary to what was written in the discharge instruction that was done by our resident).  This morning, patient was also instructed to call his primary care physician and/or go to the nearest emergency room if he develops significant diarrhea again, nausea/vomiting, fever or chills, or abdominal or chest pains or shortness of breath.  He expressed understanding of these instructions that I mentioned above.  Outpatient follow-up with primary care physician.  We offered to call patient's family/, but patient declined.                  Expand All Collapse All  Discharge Summary - Hospitalist    Name: Prince Gonzalez 59 y.o. male I MRN: [id number]  Unit/Bed#: W -01 I Date of Admission: 3/24/2025   Date of Service: 3/27/2025 I Hospital Day: 1      Assessment & Plan  Diarrhea  CC: Diarrhea (20 episodes in 3 days, watery in consistency, unknown BRBPR/dark brown stool)  H/o C. difficile PCR positive/toxin negative in 2022, presumably treated for C. difficile colitis with p.o. vancomycin  Patient is on PPI every other day for GERD  Recently treated with Z-Andrés for URI  CT AP negative for colitis/diverticulitis  Negative stool enterics panel/negative C. Difficile PCR/toxin  Currently resolved     Plan:     Zofran 4 mg every 8 hours as needed  Over-the-counter Imodium as needed if diarrhea returns  Patient advised to adequately hydrate with water or Gatorade and maintain good hand hygiene.     Chest pain (Resolved: 3/27/2025)  CC: Chest pain in epigastric/right lower chest, described as sharp/shooting intermittent pain, no change with position/breathing, nontender on palpation  Workup negative for ACS/PE  Etiology: Likely GERD with some musculoskeletal component as pain resolved with lidocaine patch     Benign hypertension with CKD (chronic kidney disease), stage II        Lab Results   Component Value Date     EGFR 80 03/27/2025     EGFR 79 03/26/2025     EGFR 80 03/25/2025     CREATININE 1.00 03/27/2025     CREATININE 1.01 03/26/2025     CREATININE 1.00 03/25/2025      Home regimen: Coreg 3.125 mg twice daily, lisinopril 30 mg daily  Continue home regimen  Mixed hyperlipidemia  Home regimen: Rosuvastatin 10 mg daily, continue \  Primary insomnia  Home regimen: Lunesta 3 mg daily at bedtime, continue  Centrilobular emphysema (HCC)  Home regimen: Trelegy Ellipta, continue      Medical Problems         Resolved Problems  Date Reviewed: 3/27/2025            Resolved     Hyponatremia 3/26/2025       Resolved by  Alaina Epperson MD     Chest pain 3/27/2025       Resolved by  Eveline Sue MD          Discharging Physician / Practitioner: Eveline Sue MD  PCP: Eliane Winston DO  Admission Date:   Admission Orders (From admission, onward)          Ordered         03/26/25 1425   INPATIENT ADMISSION  Once             03/24/25 1314   Place in Observation  Once                               Discharge Date: 03/27/25     Consultations During Hospital Stay:  None     Procedures Performed:   None     Significant Findings / Test Results:      CT pe study w abdomen pelvis w contrast   Final Result by Shlomo Wilde MD (03/24 1025)       CTA chest:       No pulmonary embolus.       No evidence of acute thoracic process.       Ectatic aortic root measuring 4.2 cm. Noncontrast chest CT follow-up in 12 months is the recommendation.       Additional chronic findings and negatives as above.       CT abdomen and pelvis:       No evidence of acute abdominopelvic process.       Colonic diverticulosis.       Additional chronic findings and negatives as above.                           Workstation performed: [id number]           X-ray chest 1 view portable   Final Result by Ricky Vasquez MD (03/24 1011)       No acute cardiopulmonary disease.               Workstation performed: [id number]                 Incidental Findings:   Ectatic aortic root measuring 4.2 cm.   I reviewed the above mentioned incidental findings with the patient and/or family and they expressed understanding.     Test Results Pending at Discharge (will require follow up):   none     Outpatient Tests Requested:  Noncontrast chest CT follow-up in 12 months      Complications:  None     Reason for Admission: Diarrhea     Hospital Course:   Prince Gonzalez is a 59 y.o. male w/ PMH of emphysema, hypertension, CKD, GERD, hx C. difficile and 2022 for which he was admitted for colitis who originally presented to the hospital on 3/24/2025 due to diarrhea/nausea/chest pain that began Saturday 3/22.  Had approximately 20 episodes of diarrhea in the last 3  "days that were watery in consistency.  Workup upon arrival overall unremarkable.  C. difficile and enteric stool panel negative.  Patient was treated with IVF and supportive care with resolution of diarrhea and nausea.  Patient's chest pain likely due to GERD with musculoskeletal component as it resolved with lidocaine patch.                Today:  Feeling better.   He had [employer] food pain, reflux, intestinal pain, then diarrhea   It was worse, diarrhea 20 times a day   Felt in the end it was due to a virus   4 -5 times 2 days ago   Some soreness across lower stomach, not relieved with diarrhea   No fevers   No blood or mucus in the stool   Antinausea meds   Pain epigastric to mid chest   Lidocaine patch helped this   Stabbing pain, was constant, got better, then comes and goes     He has been on Pantoprazole 3 years ago for erosion   DNR.     Cheated day before the blood test   Watching his sguars       Prince presents today for hospital follow-up visit.      Patient was contacted within 2 business days.   Medications were reconciled.  Hospital discharge summary was reviewed.    As part of this post-hospital visit, records from the hospital, including history and physical examination, discharge summary, all laboratory tests and radiographic studies was reviewed with this patient.    The following portions of the patient's history were reviewed and updated as appropriate: allergies, current medications, past family history, past medical history, past social history, past surgical history and problem list.    Objective:  Vitals:  BP (!) 150/112 (Patient Position: Sitting, Cuff Size: Standard)   Pulse 77   Temp (!) 96.5 °F (35.8 °C) (Temporal)   Ht 6' 1\" (1.854 m)   Wt 99.6 kg (219 lb 9.6 oz)   SpO2 100%   BMI 28.97 kg/m²    Wt Readings from Last 3 Encounters:   04/07/25 99.6 kg (219 lb 9.6 oz)   03/24/25 101 kg (222 lb 10.6 oz)   03/03/25 101 kg (222 lb 12.8 oz)      BP Readings from Last 3 Encounters: "   04/07/25 (!) 150/112   03/27/25 133/88   01/23/25 136/85        Review of Systems   He has no other concerns. No unexpected weight changes. No chest pain, SOB, or palpitations. No GERD. No changes in bowels or bladder for last 2 days . Sleeping well. No mood changes.     Physical Exam   Constitutional:  he appears well-developed and well-nourished.  HENT: Head: Normocephalic.   Neck: Neck supple.   Cardiovascular: Normal rate, regular rhythm and normal heart sounds.   Pulmonary/Chest: Effort normal and breath sounds normal. No wheezes, rales, or rhonchi.   Abdominal: Soft. Bowel sounds are normal. There is mild LLQ tenderness. No hepatosplenomegaly. No rebound, rigidity, guarding.   Musculoskeletal: he exhibits no edema.   Lymphadenopathy: he has no cervical adenopathy.   Neurological: he is alert and oriented to person, place, and time.   Skin: Skin is warm and dry.   Psychiatric: he has a normal mood and affect. his behavior is normal. Thought content normal.

## 2025-04-07 NOTE — ASSESSMENT & PLAN NOTE
Not well controlled  He had been taking protonix every other day   Recommend he now take it daily, and then wean off at follow up or after EGD if appropriate   Orders:  •  pantoprazole (PROTONIX) 40 mg tablet; Take 1 tablet (40 mg total) by mouth every other day  •  Ambulatory Referral to Gastroenterology; Future

## 2025-04-07 NOTE — ASSESSMENT & PLAN NOTE
Labs obtained this morning.  Results via [employer].  Patient currently on Crestor 10 mg daily.

## 2025-04-07 NOTE — ASSESSMENT & PLAN NOTE
Lab Results   Component Value Date    EGFR 80 03/27/2025    EGFR 79 03/26/2025    EGFR 80 03/25/2025    CREATININE 1.00 03/27/2025    CREATININE 1.01 03/26/2025    CREATININE 1.00 03/25/2025     Orders:  •  lisinopril (ZESTRIL) 40 mg tablet; Take 1 tablet (40 mg total) by mouth daily  not at goal   No longer following with cardio

## 2025-04-07 NOTE — TELEPHONE ENCOUNTER
Per dr farr pt needs a nurse visit in 2 weeks to f/u on his bp. She would also like him to bring his cuff with him to have it calibrated .

## 2025-04-16 ENCOUNTER — OFFICE VISIT (OUTPATIENT)
Dept: OBGYN CLINIC | Facility: CLINIC | Age: 60
End: 2025-04-16

## 2025-04-16 VITALS — WEIGHT: 219 LBS | BODY MASS INDEX: 29.03 KG/M2 | HEIGHT: 73 IN

## 2025-04-16 DIAGNOSIS — T84.84XA PAINFUL ORTHOPAEDIC HARDWARE (HCC): Primary | ICD-10-CM

## 2025-04-16 PROCEDURE — 99024 POSTOP FOLLOW-UP VISIT: CPT | Performed by: ORTHOPAEDIC SURGERY

## 2025-04-16 NOTE — ASSESSMENT & PLAN NOTE
Patient is 3 months s/p above procedure. Reports notable improvement of pain with hardware removal.  1. Weightbearing Status - WBAT operative extremity supportive sneaker  2. DVT prophylaxis - Completed  3. Continue compression stocking for swelling control  4. Pain control - OTC pain medication  5. RTC PRN

## 2025-04-16 NOTE — PROGRESS NOTES
"      James R Lachman, M.D.  Attending, Orthopaedic Surgery  Foot and Ankle  [facility]      ORTHOPAEDIC FOOT AND ANKLE POST-OP VISIT     Procedure:      Right removal painful orthopedic hardware 1st MTP       Date of surgery:   1/23/2025      Assessment & Plan  Painful orthopaedic hardware (HCC)  Patient is 3 months s/p above procedure. Reports notable improvement of pain with hardware removal.  1. Weightbearing Status - WBAT operative extremity supportive sneaker  2. DVT prophylaxis - Completed  3. Continue compression stocking for swelling control  4. Pain control - OTC pain medication  5. RTC PRN          History of Present Illness:   Chief Complaint:   Chief Complaint   Patient presents with    Post-op     Post op 3 months. Still having pain.   Removal Hardware Foot - Right       Prince Gonzalez is a 59 y.o. male who is being seen for 3 month post-operative visit for the above procedure. Pain is well controlled and the patient has successfully transitioned to OTC pain medicines.  he is taking ASA 81 mg BID for DVT prophylaxis. Patient has been WBAT in a Supportive sneaker.       Review of Systems:  General- denies fever/chills  Respiratory- denies cough or SOB  Cardio- denies chest pain or palpitations  GI- denies abdominal pain  Musculoskeletal- Negative except noted above  Skin- denies rashes or wounds    Physical Exam:   Ht 6' 1\" (1.854 m)   Wt 99.3 kg (219 lb)   BMI 28.89 kg/m²   General/Constitutional: No apparent distress: well-nourished and well developed.  Eyes: normal ocular motion  Lymphatic: No appreciable lymphadenopathy  Respiratory: Non-labored breathing  Vascular: No edema, swelling or tenderness, except as noted in detailed exam.  Integumentary: No impressive skin lesions present, except as noted in detailed exam.  Neuro: No ataxia or tremors noted  Psych: Normal mood and affect, oriented to person, place and time. Appropriate affect.  Musculoskeletal: Normal, except as " noted in detailed exam and in HPI.    Examination    right        Incision Clean, dry, intact  Sutures Previously removed.    Ecchymosis none    Swelling mild    Sensation Intact to light touch throughout sural, saphenous, superficial peroneal, deep peroneal and medial/lateral plantar nerve distributions.  Dumas-Pat 5.07 filament (10g) testing deferred.    Cardiovascular Brisk capillary refill < 2 seconds,intact DP and PT pulses    Special Tests None      Imaging Studies:   No new images    Scribe Attestation      I,:  Yessi De León PA-C am acting as a scribe while in the presence of the attending physician.:       I,:  James R Lachman, MD personally performed the services described in this documentation    as scribed in my presence.:                James R. Lachman, MD  Foot & Ankle Surgery   Department of Orthopaedic Surgery  [facility]      I personally performed the service.    James R. Lachman, MD

## 2025-05-04 DIAGNOSIS — N18.2 BENIGN HYPERTENSION WITH CKD (CHRONIC KIDNEY DISEASE), STAGE II: ICD-10-CM

## 2025-05-04 DIAGNOSIS — K21.9 GASTROESOPHAGEAL REFLUX DISEASE WITHOUT ESOPHAGITIS: ICD-10-CM

## 2025-05-04 DIAGNOSIS — I12.9 BENIGN HYPERTENSION WITH CKD (CHRONIC KIDNEY DISEASE), STAGE II: ICD-10-CM

## 2025-05-04 DIAGNOSIS — F51.01 PRIMARY INSOMNIA: ICD-10-CM

## 2025-05-05 RX ORDER — PANTOPRAZOLE SODIUM 40 MG/1
40 TABLET, DELAYED RELEASE ORAL EVERY OTHER DAY
Qty: 30 TABLET | Refills: 0 | OUTPATIENT
Start: 2025-05-05

## 2025-05-05 RX ORDER — LISINOPRIL 40 MG/1
40 TABLET ORAL DAILY
Qty: 30 TABLET | Refills: 0 | OUTPATIENT
Start: 2025-05-05

## 2025-05-06 RX ORDER — ESZOPICLONE 3 MG/1
3 TABLET, FILM COATED ORAL
Qty: 30 TABLET | Refills: 3 | Status: SHIPPED | OUTPATIENT
Start: 2025-05-06

## 2025-05-31 DIAGNOSIS — J43.9 PULMONARY EMPHYSEMA, UNSPECIFIED EMPHYSEMA TYPE (HCC): ICD-10-CM

## 2025-06-01 DIAGNOSIS — K21.9 GASTROESOPHAGEAL REFLUX DISEASE WITHOUT ESOPHAGITIS: ICD-10-CM

## 2025-06-01 RX ORDER — PANTOPRAZOLE SODIUM 40 MG/1
40 TABLET, DELAYED RELEASE ORAL DAILY
Qty: 30 TABLET | Refills: 5 | Status: SHIPPED | OUTPATIENT
Start: 2025-06-01

## 2025-06-02 ENCOUNTER — PATIENT MESSAGE (OUTPATIENT)
Dept: FAMILY MEDICINE CLINIC | Facility: CLINIC | Age: 60
End: 2025-06-02

## 2025-06-02 RX ORDER — FLUTICASONE FUROATE, UMECLIDINIUM BROMIDE AND VILANTEROL TRIFENATATE 100; 62.5; 25 UG/1; UG/1; UG/1
POWDER RESPIRATORY (INHALATION)
Qty: 60 EACH | Refills: 0 | Status: SHIPPED | OUTPATIENT
Start: 2025-06-02

## 2025-06-02 NOTE — TELEPHONE ENCOUNTER
He is overdue for a visit with pulmonary and they should be prescribing this medication. I will send in one refill to allow him time to schedule appointment. No refills after this.

## 2025-06-02 NOTE — TELEPHONE ENCOUNTER
"Patient called the RX Refill Line. Message is being forwarded to the office.     Patient is requesting refill of his inhaler, I informed him that it was refilled but then told him about the [employer] message and he became very angry, he said tell her \"this is BS,I do not need to see pulmonary for a maintenance inhaler that works for him and if she's going to be like this, I'll find another dr\" and then hung up the phone         "

## 2025-07-21 ENCOUNTER — TELEPHONE (OUTPATIENT)
Dept: FAMILY MEDICINE CLINIC | Facility: CLINIC | Age: 60
End: 2025-07-21

## 2025-07-21 NOTE — TELEPHONE ENCOUNTER
Patient canceled upcoming visit with PCP (see below) and noted they are using and different PCP. Please remove Dr. Winston as patient's PCP.      Appointment canceled for Prince Gonzalez ([id number])  Visit type: PHYSICAL [facility]  8/7/2025 9:00 AM (30 minutes) with Eliane Winston DO in [facility]     Reason for cancellation: Canceled via [employer]     Patient comments: I'm using a different Primary Care Physician going forward.

## 2025-07-22 NOTE — TELEPHONE ENCOUNTER
07/22/25 7:25 AM     The office's request has been received and reviewed.     PCP cannot be removed at this time due to insurance roster information. Roster will be re-checked at a later date. If patient is no longer listed at that time, PCP will be removed in the chart.      This message will now be completed.    Any additional questions or concerns should be sent to the Practice Liaisons via the appropriate education email address. Please do not reply via In Basket or Encounter.    Thank you  Lilian Hart

## (undated) DEVICE — GLOVE SRG BIOGEL 8

## (undated) DEVICE — ACE WRAP 4 IN STERILE

## (undated) DEVICE — GLOVE INDICATOR PI UNDERGLOVE SZ 8 BLUE

## (undated) DEVICE — SUT VICRYL 4-0 PS-2 18 IN J496G

## (undated) DEVICE — HALF SHEET: Brand: CONVERTORS

## (undated) DEVICE — BONE SCREW, FULLY THREADED, T8
Type: IMPLANTABLE DEVICE | Site: FOOT | Status: NON-FUNCTIONAL
Brand: VARIAX
Removed: 2023-10-05

## (undated) DEVICE — BASIC DOUBLE BASIN 2-LF: Brand: MEDLINE INDUSTRIES, INC.

## (undated) DEVICE — DRAPE SHEET THREE QUARTER

## (undated) DEVICE — DISPOSABLE EQUIPMENT COVER: Brand: SMALL TOWEL DRAPE

## (undated) DEVICE — ACE WRAP 4 IN UNSTERILE

## (undated) DEVICE — BETHLEHEM UNIVERSAL  MIONR EXT: Brand: CARDINAL HEALTH

## (undated) DEVICE — SUT ETHILON 3-0 PS-1 18 IN 1663G

## (undated) DEVICE — GLOVE SRG BIOGEL 7.5

## (undated) DEVICE — CUFF TOURNIQUET 18 X 4 IN QUICK CONNECT DISP 1 BLADDER

## (undated) DEVICE — LIGHT HANDLE COVER SLEEVE DISP BLUE STELLAR

## (undated) DEVICE — SUT FIBERWIRE 2-0 3/8 CIRCLE 38 IN AR-7221

## (undated) DEVICE — ASTOUND STANDARD SURGICAL GOWN, XL: Brand: CONVERTORS

## (undated) DEVICE — COBAN 4 IN STERILE

## (undated) DEVICE — CURITY NON-ADHERENT STRIPS: Brand: CURITY

## (undated) DEVICE — SUT CHROMIC 4-0 SH-1 27 IN G181H

## (undated) DEVICE — DRILL BIT, AO, SCALED: Brand: VARIAX

## (undated) DEVICE — PREVENA PLUS INCISION MANAGEMENT SYSTEM: Brand: PREVENA PLUS™

## (undated) DEVICE — INTENDED FOR TISSUE SEPARATION, AND OTHER PROCEDURES THAT REQUIRE A SHARP SURGICAL BLADE TO PUNCTURE OR CUT.: Brand: BARD-PARKER ® CARBON RIB-BACK BLADES

## (undated) DEVICE — GAUZE SPONGES,16 PLY: Brand: CURITY

## (undated) DEVICE — CURITY STRETCH BANDAGE: Brand: CURITY

## (undated) DEVICE — HOLDING PIN
Type: IMPLANTABLE DEVICE | Site: FOOT | Status: NON-FUNCTIONAL
Brand: ANCHORAGE
Removed: 2022-08-25

## (undated) DEVICE — SUT VICRYL 2-0 CT-2 18 IN J726D

## (undated) DEVICE — OCCLUSIVE GAUZE STRIP,3% BISMUTH TRIBROMOPHENATE IN PETROLATUM BLEND: Brand: XEROFORM

## (undated) DEVICE — CHLORAPREP HI-LITE 26ML ORANGE

## (undated) DEVICE — ABDOMINAL PAD: Brand: DERMACEA

## (undated) DEVICE — ACE WRAP 3 IN STERILE

## (undated) DEVICE — STERILE BETHLEHEM PLASTIC HAND: Brand: CARDINAL HEALTH

## (undated) DEVICE — ACE WRAP 6 IN UNSTERILE

## (undated) DEVICE — DRESSING XEROFORM 5 X 9

## (undated) DEVICE — SUT CHROMIC 3-0 SH 27 IN G122H

## (undated) DEVICE — SPONGE SCRUB 4 PCT CHLORHEXIDINE

## (undated) DEVICE — TIBURON BILATERAL LIMB SHEET: Brand: CONVERTORS

## (undated) DEVICE — GLOVE INDICATOR PI UNDERGLOVE SZ 6.5 BLUE

## (undated) DEVICE — ADHESIVE SKIN HIGH VISCOSITY EXOFIN 1ML

## (undated) DEVICE — PENCIL ELECTROSURG E-Z CLEAN -0035H

## (undated) DEVICE — SUT VICRYL 4-0 PS-2 27 IN J426H

## (undated) DEVICE — COUNTERSINK FOR SCREWS 2.7,3.5MM: Brand: VARIAX

## (undated) DEVICE — STRETCH BANDAGE: Brand: CURITY

## (undated) DEVICE — UNTHREADED GUIDE WIRE
Type: IMPLANTABLE DEVICE | Site: FOOT | Status: NON-FUNCTIONAL
Brand: FIXOS
Removed: 2023-10-05

## (undated) DEVICE — IMPERVIOUS STOCKINETTE: Brand: DEROYAL

## (undated) DEVICE — MINI BLADE ROUND TIP SHARP ON ONE SIDE

## (undated) DEVICE — CUFF TOURNIQUET 30 X 4 IN QUICK CONNECT DISP 1BLA

## (undated) DEVICE — PADDING CAST 4 IN  COTTON STRL

## (undated) DEVICE — SYRINGE 10ML LL CONTROL TOP

## (undated) DEVICE — PRECISION (9.0 X 0.51 X 18.5MM)

## (undated) DEVICE — UNTHREADED GUIDE WIRE
Type: IMPLANTABLE DEVICE | Site: FOOT | Status: NON-FUNCTIONAL
Brand: FIXOS
Removed: 2022-08-25

## (undated) DEVICE — SUT ETHILON 4-0 PS-2 18 IN 1667G

## (undated) DEVICE — SPLINT 5 X 30 IN FAST SET PLASTER

## (undated) DEVICE — C-WIRE PAK DOUBLE ENDED ORTHOPAEDIC WIRE, SPADE, .045" (1.14 MM)
Type: IMPLANTABLE DEVICE | Site: ELBOW | Status: NON-FUNCTIONAL
Brand: C-WIRE
Removed: 2020-11-17

## (undated) DEVICE — GLOVE SRG BIOGEL 7

## (undated) DEVICE — PACK GENERAL LF

## (undated) DEVICE — 3M™ STERI-STRIP™ REINFORCED ADHESIVE SKIN CLOSURES, R1547, 1/2 IN X 4 IN (12 MM X 100 MM), 6 STRIPS/ENVELOPE: Brand: 3M™ STERI-STRIP™

## (undated) DEVICE — K-WIRE DIAMOND POINT BOTH ENDS                                    .062 X 5
Type: IMPLANTABLE DEVICE | Site: FOOT | Status: NON-FUNCTIONAL
Removed: 2021-12-10

## (undated) DEVICE — 3000CC GUARDIAN II: Brand: GUARDIAN

## (undated) DEVICE — SUT VICRYL 3-0 SH 27 IN J416H

## (undated) DEVICE — KIRSCHNER WIRE , L, TIPS TROCAR , SMOOTH
Type: IMPLANTABLE DEVICE | Site: FOOT | Status: NON-FUNCTIONAL
Removed: 2023-10-05

## (undated) DEVICE — PREVENA INCISION MANAGEMENT SYSTEM- PEEL & PLACE DRESSING: Brand: PREVENA™ PEEL & PLACE™

## (undated) DEVICE — HOLDING PIN
Type: IMPLANTABLE DEVICE | Site: FOOT | Status: NON-FUNCTIONAL
Brand: ANCHORAGE
Removed: 2023-10-05

## (undated) DEVICE — LABEL STERILE (RSC) (2-SENSOR CAINE 2-LIDOCAINE 2-HEPARIN)

## (undated) DEVICE — STOCKINETTE,IMPERVIOUS,12X48,STERILE: Brand: MEDLINE

## (undated) DEVICE — COBAN 2 IN UNSTERILE

## (undated) DEVICE — GLOVE INDICATOR PI UNDERGLOVE SZ 7 BLUE

## (undated) DEVICE — SPONGE PVP SCRUB WING STERILE

## (undated) DEVICE — 3M™ DURAPORE™ SURGICAL TAPE 1538-1, 1 INCH X 10 YARD (2,5CM X 9,1M), 12 ROLLS/BOX: Brand: 3M™ DURAPORE™

## (undated) DEVICE — DRILL BIT, AO DIA2.6MM X 135MM, SCALED: Brand: VARIAX

## (undated) DEVICE — ACE WRAP 3 IN VELCRO LATEX FREE

## (undated) DEVICE — 3M™ MICROFOAM™ SURGICAL TAPE 4 ROLLS/CARTON 6 CARTONS/CASE 1528-3: Brand: 3M™ MICROFOAM™

## (undated) DEVICE — ANTIBACTERIAL UNDYED BRAIDED (POLYGLACTIN 910), SYNTHETIC ABSORBABLE SUTURE: Brand: COATED VICRYL

## (undated) DEVICE — SUT MONOCRYL 3-0 SH 27 IN Y416H

## (undated) DEVICE — NEEDLE 25G X 1 1/2

## (undated) DEVICE — GLOVE SRG BIOGEL 6.5